# Patient Record
Sex: FEMALE | Race: WHITE | NOT HISPANIC OR LATINO | Employment: OTHER | URBAN - METROPOLITAN AREA
[De-identification: names, ages, dates, MRNs, and addresses within clinical notes are randomized per-mention and may not be internally consistent; named-entity substitution may affect disease eponyms.]

---

## 2023-05-30 PROBLEM — R71.8 ELEVATED MCV: Status: ACTIVE | Noted: 2023-05-30

## 2023-05-30 PROBLEM — R79.89 ABNORMAL THYROID BLOOD TEST: Status: ACTIVE | Noted: 2023-05-30

## 2023-05-30 PROBLEM — F41.9 ANXIETY AND DEPRESSION: Status: ACTIVE | Noted: 2023-05-30

## 2023-05-30 PROBLEM — L01.00 IMPETIGO: Status: ACTIVE | Noted: 2023-05-30

## 2023-05-30 PROBLEM — I63.411 CEREBRAL INFARCTION DUE TO EMBOLISM OF RIGHT MIDDLE CEREBRAL ARTERY (MULTI): Status: ACTIVE | Noted: 2023-05-30

## 2023-05-30 PROBLEM — D64.9 ANEMIA: Status: ACTIVE | Noted: 2023-05-30

## 2023-05-30 PROBLEM — R03.0 ELEVATED BLOOD PRESSURE READING WITHOUT DIAGNOSIS OF HYPERTENSION: Status: ACTIVE | Noted: 2023-05-30

## 2023-05-30 PROBLEM — R19.7 DIARRHEA: Status: ACTIVE | Noted: 2023-05-30

## 2023-05-30 PROBLEM — I61.9 STROKE, HEMORRHAGIC (MULTI): Status: ACTIVE | Noted: 2023-05-30

## 2023-05-30 PROBLEM — R93.0 ABNORMAL MRI OF HEAD: Status: ACTIVE | Noted: 2023-05-30

## 2023-05-30 PROBLEM — M35.9 CONNECTIVE TISSUE DISEASE (MULTI): Status: ACTIVE | Noted: 2023-05-30

## 2023-05-30 PROBLEM — F32.A ANXIETY AND DEPRESSION: Status: ACTIVE | Noted: 2023-05-30

## 2023-05-30 PROBLEM — I63.9 ACUTE CVA (CEREBROVASCULAR ACCIDENT) (MULTI): Status: ACTIVE | Noted: 2023-05-30

## 2023-05-30 PROBLEM — R56.9: Status: ACTIVE | Noted: 2023-05-30

## 2023-05-30 PROBLEM — N28.9 RENAL INSUFFICIENCY: Status: ACTIVE | Noted: 2023-05-30

## 2023-05-30 PROBLEM — I10 ACCELERATED HYPERTENSION: Status: ACTIVE | Noted: 2023-05-30

## 2023-05-30 PROBLEM — E78.5 HYPERLIPIDEMIA: Status: ACTIVE | Noted: 2023-05-30

## 2023-05-30 PROBLEM — I10 BENIGN ESSENTIAL HYPERTENSION: Status: ACTIVE | Noted: 2023-05-30

## 2023-05-30 PROBLEM — R55 VASOVAGAL SYNCOPE: Status: ACTIVE | Noted: 2023-05-30

## 2023-05-30 PROBLEM — L73.2 HYDRADENITIS: Status: ACTIVE | Noted: 2023-05-30
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
## 2023-05-30 PROBLEM — I61.9 CEREBRAL HEMORRHAGE (MULTI): Status: ACTIVE | Noted: 2023-05-30

## 2023-05-30 PROBLEM — F10.10 ALCOHOL ABUSE: Status: ACTIVE | Noted: 2023-05-30

## 2023-05-30 PROBLEM — G40.909 SEIZURE DISORDER (MULTI): Status: ACTIVE | Noted: 2023-05-30

## 2023-05-30 PROBLEM — R74.8 ELEVATED LIVER ENZYMES: Status: ACTIVE | Noted: 2023-05-30

## 2023-05-30 PROBLEM — T30.0 BURN BY HOT LIQUID: Status: ACTIVE | Noted: 2023-05-30

## 2023-05-30 PROBLEM — F10.929 ALCOHOL INTOXICATION (CMS-HCC): Status: ACTIVE | Noted: 2023-05-30

## 2023-05-30 RX ORDER — LEVETIRACETAM 500 MG/1
1 TABLET ORAL 2 TIMES DAILY
COMMUNITY
Start: 2020-03-21 | End: 2023-08-15 | Stop reason: SDUPTHER

## 2023-05-30 RX ORDER — ASPIRIN 81 MG/1
1 TABLET ORAL DAILY
COMMUNITY
Start: 2018-07-13

## 2023-05-30 RX ORDER — ATORVASTATIN CALCIUM 40 MG/1
1 TABLET, FILM COATED ORAL DAILY
COMMUNITY
Start: 2018-07-13 | End: 2023-05-31 | Stop reason: SDUPTHER

## 2023-05-30 RX ORDER — SERTRALINE HYDROCHLORIDE 50 MG/1
1 TABLET, FILM COATED ORAL DAILY
COMMUNITY
Start: 2018-11-05 | End: 2023-07-24 | Stop reason: SDUPTHER

## 2023-05-30 RX ORDER — CLOPIDOGREL BISULFATE 75 MG/1
1 TABLET ORAL DAILY
COMMUNITY
Start: 2022-03-08 | End: 2024-03-06 | Stop reason: SDUPTHER

## 2023-05-30 RX ORDER — LISINOPRIL 20 MG/1
1 TABLET ORAL DAILY
COMMUNITY
Start: 2019-01-08 | End: 2023-05-31 | Stop reason: SDUPTHER

## 2023-05-30 RX ORDER — HYDRALAZINE HYDROCHLORIDE 10 MG/1
1 TABLET, FILM COATED ORAL 3 TIMES DAILY
COMMUNITY
Start: 2019-01-08 | End: 2023-07-24 | Stop reason: SDUPTHER

## 2023-05-31 ENCOUNTER — OFFICE VISIT (OUTPATIENT)
Dept: PRIMARY CARE | Facility: CLINIC | Age: 63
End: 2023-05-31
Payer: MEDICAID

## 2023-05-31 VITALS
RESPIRATION RATE: 16 BRPM | OXYGEN SATURATION: 98 % | WEIGHT: 145.2 LBS | SYSTOLIC BLOOD PRESSURE: 122 MMHG | HEART RATE: 76 BPM | TEMPERATURE: 97.3 F | HEIGHT: 68 IN | BODY MASS INDEX: 22.01 KG/M2 | DIASTOLIC BLOOD PRESSURE: 70 MMHG

## 2023-05-31 DIAGNOSIS — Z00.00 ANNUAL PHYSICAL EXAM: Primary | ICD-10-CM

## 2023-05-31 DIAGNOSIS — F41.9 ANXIETY AND DEPRESSION: ICD-10-CM

## 2023-05-31 DIAGNOSIS — G40.909 SEIZURE DISORDER (MULTI): ICD-10-CM

## 2023-05-31 DIAGNOSIS — E78.2 MIXED HYPERLIPIDEMIA: ICD-10-CM

## 2023-05-31 DIAGNOSIS — F32.A ANXIETY AND DEPRESSION: ICD-10-CM

## 2023-05-31 DIAGNOSIS — I10 BENIGN ESSENTIAL HYPERTENSION: ICD-10-CM

## 2023-05-31 DIAGNOSIS — F10.10 ALCOHOL ABUSE: ICD-10-CM

## 2023-05-31 DIAGNOSIS — I61.9 STROKE, HEMORRHAGIC (MULTI): ICD-10-CM

## 2023-05-31 PROBLEM — I63.9 ACUTE CVA (CEREBROVASCULAR ACCIDENT) (MULTI): Status: RESOLVED | Noted: 2023-05-30 | Resolved: 2023-05-31

## 2023-05-31 PROBLEM — R03.0 ELEVATED BLOOD PRESSURE READING WITHOUT DIAGNOSIS OF HYPERTENSION: Status: RESOLVED | Noted: 2023-05-30 | Resolved: 2023-05-31

## 2023-05-31 PROBLEM — R79.89 ABNORMAL THYROID BLOOD TEST: Status: RESOLVED | Noted: 2023-05-30 | Resolved: 2023-05-31

## 2023-05-31 PROBLEM — R55 VASOVAGAL SYNCOPE: Status: RESOLVED | Noted: 2023-05-30 | Resolved: 2023-05-31

## 2023-05-31 PROBLEM — I63.411 CEREBRAL INFARCTION DUE TO EMBOLISM OF RIGHT MIDDLE CEREBRAL ARTERY (MULTI): Status: RESOLVED | Noted: 2023-05-30 | Resolved: 2023-05-31

## 2023-05-31 PROBLEM — T30.0 BURN BY HOT LIQUID: Status: RESOLVED | Noted: 2023-05-30 | Resolved: 2023-05-31
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
## 2023-05-31 PROBLEM — R56.9: Status: RESOLVED | Noted: 2023-05-30 | Resolved: 2023-05-31

## 2023-05-31 PROBLEM — R93.0 ABNORMAL MRI OF HEAD: Status: RESOLVED | Noted: 2023-05-30 | Resolved: 2023-05-31

## 2023-05-31 PROCEDURE — 99396 PREV VISIT EST AGE 40-64: CPT | Performed by: FAMILY MEDICINE

## 2023-05-31 PROCEDURE — 3078F DIAST BP <80 MM HG: CPT | Performed by: FAMILY MEDICINE

## 2023-05-31 PROCEDURE — 1036F TOBACCO NON-USER: CPT | Performed by: FAMILY MEDICINE

## 2023-05-31 PROCEDURE — 99213 OFFICE O/P EST LOW 20 MIN: CPT | Performed by: FAMILY MEDICINE

## 2023-05-31 PROCEDURE — 3074F SYST BP LT 130 MM HG: CPT | Performed by: FAMILY MEDICINE

## 2023-05-31 RX ORDER — FOLIC ACID 1 MG/1
1 TABLET ORAL DAILY
COMMUNITY
End: 2024-01-02 | Stop reason: SDUPTHER

## 2023-05-31 RX ORDER — ATORVASTATIN CALCIUM 40 MG/1
40 TABLET, FILM COATED ORAL DAILY
Qty: 30 TABLET | Refills: 0 | Status: SHIPPED | OUTPATIENT
Start: 2023-05-31 | End: 2024-03-06 | Stop reason: SDUPTHER

## 2023-05-31 RX ORDER — LISINOPRIL 20 MG/1
20 TABLET ORAL DAILY
Qty: 30 TABLET | Refills: 0 | Status: SHIPPED | OUTPATIENT
Start: 2023-05-31 | End: 2023-07-24 | Stop reason: SDUPTHER

## 2023-05-31 ASSESSMENT — PATIENT HEALTH QUESTIONNAIRE - PHQ9
SUM OF ALL RESPONSES TO PHQ QUESTIONS 1-9: 0
1. LITTLE INTEREST OR PLEASURE IN DOING THINGS: NOT AT ALL
3. TROUBLE FALLING OR STAYING ASLEEP OR SLEEPING TOO MUCH: NOT AT ALL
10. IF YOU CHECKED OFF ANY PROBLEMS, HOW DIFFICULT HAVE THESE PROBLEMS MADE IT FOR YOU TO DO YOUR WORK, TAKE CARE OF THINGS AT HOME, OR GET ALONG WITH OTHER PEOPLE: NOT DIFFICULT AT ALL
6. FEELING BAD ABOUT YOURSELF - OR THAT YOU ARE A FAILURE OR HAVE LET YOURSELF OR YOUR FAMILY DOWN: NOT AT ALL
SUM OF ALL RESPONSES TO PHQ9 QUESTIONS 1 AND 2: 0
7. TROUBLE CONCENTRATING ON THINGS, SUCH AS READING THE NEWSPAPER OR WATCHING TELEVISION: NOT AT ALL
2. FEELING DOWN, DEPRESSED OR HOPELESS: NOT AT ALL
5. POOR APPETITE OR OVEREATING: NOT AT ALL
4. FEELING TIRED OR HAVING LITTLE ENERGY: NOT AT ALL
8. MOVING OR SPEAKING SO SLOWLY THAT OTHER PEOPLE COULD HAVE NOTICED. OR THE OPPOSITE, BEING SO FIGETY OR RESTLESS THAT YOU HAVE BEEN MOVING AROUND A LOT MORE THAN USUAL: NOT AT ALL
9. THOUGHTS THAT YOU WOULD BE BETTER OFF DEAD, OR OF HURTING YOURSELF: NOT AT ALL

## 2023-05-31 ASSESSMENT — ENCOUNTER SYMPTOMS
OCCASIONAL FEELINGS OF UNSTEADINESS: 0
LOSS OF SENSATION IN FEET: 1
DEPRESSION: 0

## 2023-05-31 NOTE — ASSESSMENT & PLAN NOTE
Schedule fasting labs.  Declines mammogram, colonoscopy and bone density.  Declines immunizations.  Limit alcohol use to 1-2 per weekend.  Increase physical activity.    I am recommending a whole foods plant based diet with lean meats and low fat dairy.  Limit processed foods, fast food and sugar.  Engage in 150 minutes of moderate intensity aerobic activity weekly,  including strength training of most major muscle groups 2 times a week.

## 2023-05-31 NOTE — ASSESSMENT & PLAN NOTE
Stable  Checking basic labs.  Reviewed diet changes, limit processed sugar and salt in diet.  Recheck in 6 months

## 2023-05-31 NOTE — PROGRESS NOTES
"Subjective   Patient ID: Yue Sanz is a 62 y.o. female who presents for Annual Exam.    Here with mom for CPE    Taking meds daily and tolerating well  Has not run out of meds  No home Bp checks  Generalized aches and pain  Denies upper resp sx  Occ cough  No exercise  Diet is poor, easy prepare foods.  No cooking, eating out  No stove or oven  Outside all the time with the dog    Still pain from her CVA  No tobacco   Glass of wine daily 1-2     Pap years  Mammogram never  Colonoscopy never    C/o blood in stool for one episode  Had been having diarrhea   Blood with wiping  No more episodes         Review of Systems    Objective   /70   Pulse 76   Temp 36.3 °C (97.3 °F)   Resp 16   Ht 1.727 m (5' 8\")   Wt 65.9 kg (145 lb 3.2 oz)   SpO2 98%   BMI 22.08 kg/m²     Physical Exam  Vitals and nursing note reviewed.   Constitutional:       Appearance: Normal appearance.   HENT:      Head: Normocephalic.      Right Ear: Tympanic membrane normal.      Left Ear: Tympanic membrane normal.      Nose: Nose normal.      Mouth/Throat:      Mouth: Mucous membranes are dry.   Eyes:      Extraocular Movements: Extraocular movements intact.      Pupils: Pupils are equal, round, and reactive to light.   Cardiovascular:      Rate and Rhythm: Normal rate and regular rhythm.      Pulses: Normal pulses.   Pulmonary:      Effort: Pulmonary effort is normal.      Breath sounds: Normal breath sounds.   Abdominal:      General: Abdomen is flat. Bowel sounds are normal.      Palpations: Abdomen is soft.   Musculoskeletal:         General: Normal range of motion.      Cervical back: Normal range of motion.   Skin:     General: Skin is warm and dry.   Neurological:      General: No focal deficit present.      Mental Status: She is alert and oriented to person, place, and time.   Psychiatric:         Mood and Affect: Mood normal.         Behavior: Behavior normal.         Thought Content: Thought content normal.         Judgment: " Judgment normal.       Assessment/Plan   Problem List Items Addressed This Visit          Nervous    Seizure disorder (CMS/HCC)     Stable  Refilling meds when needed.  Recheck in 6 months         Stroke, hemorrhagic (CMS/HCC)     Stable  Continue to monitor            Circulatory    Benign essential hypertension     Stable  Checking basic labs.  Reviewed diet changes, limit processed sugar and salt in diet.  Recheck in 6 months         Relevant Medications    lisinopril 20 mg tablet    Other Relevant Orders    CBC and Auto Differential    Albumin , Urine Random       Other    Alcohol abuse     Uncontrolled  Discussed limiting alcohol use to weekends.  Recheck in 6 months         Anxiety and depression     Stable  Refilling medications at same dose when needed  Recheck in 6 months           Relevant Orders    TSH with reflex to Free T4 if abnormal    Hyperlipidemia     Check fasting lipid profile and adjust meds accordingly  Reviewed diet  Increase physical activity         Relevant Medications    atorvastatin (Lipitor) 40 mg tablet    Other Relevant Orders    Comprehensive Metabolic Panel    Lipid Panel    Annual physical exam - Primary     Schedule fasting labs.  Declines mammogram, colonoscopy and bone density.  Declines immunizations.  Limit alcohol use to 1-2 per weekend.  Increase physical activity.    I am recommending a whole foods plant based diet with lean meats and low fat dairy.  Limit processed foods, fast food and sugar.  Engage in 150 minutes of moderate intensity aerobic activity weekly,  including strength training of most major muscle groups 2 times a week.

## 2023-05-31 NOTE — ASSESSMENT & PLAN NOTE
Check fasting lipid profile and adjust meds accordingly  Reviewed diet  Increase physical activity

## 2023-06-02 ENCOUNTER — LAB (OUTPATIENT)
Dept: LAB | Facility: LAB | Age: 63
End: 2023-06-02
Payer: MEDICAID

## 2023-06-02 DIAGNOSIS — F32.A ANXIETY AND DEPRESSION: ICD-10-CM

## 2023-06-02 DIAGNOSIS — F41.9 ANXIETY AND DEPRESSION: ICD-10-CM

## 2023-06-02 DIAGNOSIS — E78.2 MIXED HYPERLIPIDEMIA: ICD-10-CM

## 2023-06-02 DIAGNOSIS — I10 BENIGN ESSENTIAL HYPERTENSION: ICD-10-CM

## 2023-06-02 LAB
ALANINE AMINOTRANSFERASE (SGPT) (U/L) IN SER/PLAS: 14 U/L (ref 7–45)
ALBUMIN (G/DL) IN SER/PLAS: 4.9 G/DL (ref 3.4–5)
ALBUMIN (MG/L) IN URINE: 42 MG/L
ALBUMIN/CREATININE (UG/MG) IN URINE: 16 UG/MG CRT (ref 0–30)
ALKALINE PHOSPHATASE (U/L) IN SER/PLAS: 66 U/L (ref 33–136)
ANION GAP IN SER/PLAS: 17 MMOL/L (ref 10–20)
ASPARTATE AMINOTRANSFERASE (SGOT) (U/L) IN SER/PLAS: 19 U/L (ref 9–39)
BASOPHILS (10*3/UL) IN BLOOD BY AUTOMATED COUNT: 0.05 X10E9/L (ref 0–0.1)
BASOPHILS/100 LEUKOCYTES IN BLOOD BY AUTOMATED COUNT: 0.8 % (ref 0–2)
BILIRUBIN TOTAL (MG/DL) IN SER/PLAS: 1.3 MG/DL (ref 0–1.2)
CALCIUM (MG/DL) IN SER/PLAS: 10.5 MG/DL (ref 8.6–10.6)
CARBON DIOXIDE, TOTAL (MMOL/L) IN SER/PLAS: 20 MMOL/L (ref 21–32)
CHLORIDE (MMOL/L) IN SER/PLAS: 105 MMOL/L (ref 98–107)
CHOLESTEROL (MG/DL) IN SER/PLAS: 220 MG/DL (ref 0–199)
CHOLESTEROL IN HDL (MG/DL) IN SER/PLAS: 89.6 MG/DL
CHOLESTEROL/HDL RATIO: 2.5
CREATININE (MG/DL) IN SER/PLAS: 1.47 MG/DL (ref 0.5–1.05)
CREATININE (MG/DL) IN URINE: 263 MG/DL (ref 20–320)
EOSINOPHILS (10*3/UL) IN BLOOD BY AUTOMATED COUNT: 0.15 X10E9/L (ref 0–0.7)
EOSINOPHILS/100 LEUKOCYTES IN BLOOD BY AUTOMATED COUNT: 2.4 % (ref 0–6)
ERYTHROCYTE DISTRIBUTION WIDTH (RATIO) BY AUTOMATED COUNT: 12.5 % (ref 11.5–14.5)
ERYTHROCYTE MEAN CORPUSCULAR HEMOGLOBIN CONCENTRATION (G/DL) BY AUTOMATED: 32.6 G/DL (ref 32–36)
ERYTHROCYTE MEAN CORPUSCULAR VOLUME (FL) BY AUTOMATED COUNT: 101 FL (ref 80–100)
ERYTHROCYTES (10*6/UL) IN BLOOD BY AUTOMATED COUNT: 3.35 X10E12/L (ref 4–5.2)
GFR FEMALE: 40 ML/MIN/1.73M2
GLUCOSE (MG/DL) IN SER/PLAS: 89 MG/DL (ref 74–99)
HEMATOCRIT (%) IN BLOOD BY AUTOMATED COUNT: 34 % (ref 36–46)
HEMOGLOBIN (G/DL) IN BLOOD: 11.1 G/DL (ref 12–16)
IMMATURE GRANULOCYTES/100 LEUKOCYTES IN BLOOD BY AUTOMATED COUNT: 0.3 % (ref 0–0.9)
LDL: 115 MG/DL (ref 0–99)
LEUKOCYTES (10*3/UL) IN BLOOD BY AUTOMATED COUNT: 6.3 X10E9/L (ref 4.4–11.3)
LYMPHOCYTES (10*3/UL) IN BLOOD BY AUTOMATED COUNT: 1.58 X10E9/L (ref 1.2–4.8)
LYMPHOCYTES/100 LEUKOCYTES IN BLOOD BY AUTOMATED COUNT: 24.9 % (ref 13–44)
MONOCYTES (10*3/UL) IN BLOOD BY AUTOMATED COUNT: 0.47 X10E9/L (ref 0.1–1)
MONOCYTES/100 LEUKOCYTES IN BLOOD BY AUTOMATED COUNT: 7.4 % (ref 2–10)
NEUTROPHILS (10*3/UL) IN BLOOD BY AUTOMATED COUNT: 4.07 X10E9/L (ref 1.2–7.7)
NEUTROPHILS/100 LEUKOCYTES IN BLOOD BY AUTOMATED COUNT: 64.2 % (ref 40–80)
NRBC (PER 100 WBCS) BY AUTOMATED COUNT: 0 /100 WBC (ref 0–0)
PLATELETS (10*3/UL) IN BLOOD AUTOMATED COUNT: 252 X10E9/L (ref 150–450)
POTASSIUM (MMOL/L) IN SER/PLAS: 5.4 MMOL/L (ref 3.5–5.3)
PROTEIN TOTAL: 8.1 G/DL (ref 6.4–8.2)
SODIUM (MMOL/L) IN SER/PLAS: 137 MMOL/L (ref 136–145)
THYROTROPIN (MIU/L) IN SER/PLAS BY DETECTION LIMIT <= 0.05 MIU/L: 2.03 MIU/L (ref 0.44–3.98)
TRIGLYCERIDE (MG/DL) IN SER/PLAS: 78 MG/DL (ref 0–149)
UREA NITROGEN (MG/DL) IN SER/PLAS: 48 MG/DL (ref 6–23)
VLDL: 16 MG/DL (ref 0–40)

## 2023-06-02 PROCEDURE — 82570 ASSAY OF URINE CREATININE: CPT

## 2023-06-02 PROCEDURE — 80061 LIPID PANEL: CPT

## 2023-06-02 PROCEDURE — 85025 COMPLETE CBC W/AUTO DIFF WBC: CPT

## 2023-06-02 PROCEDURE — 82043 UR ALBUMIN QUANTITATIVE: CPT

## 2023-06-02 PROCEDURE — 36415 COLL VENOUS BLD VENIPUNCTURE: CPT

## 2023-06-02 PROCEDURE — 84443 ASSAY THYROID STIM HORMONE: CPT

## 2023-06-02 PROCEDURE — 80053 COMPREHEN METABOLIC PANEL: CPT

## 2023-06-05 DIAGNOSIS — E78.2 MIXED HYPERLIPIDEMIA: ICD-10-CM

## 2023-06-05 DIAGNOSIS — R74.8 ELEVATED LIVER ENZYMES: ICD-10-CM

## 2023-06-05 DIAGNOSIS — D50.8 IRON DEFICIENCY ANEMIA SECONDARY TO INADEQUATE DIETARY IRON INTAKE: Primary | ICD-10-CM

## 2023-06-27 ENCOUNTER — APPOINTMENT (OUTPATIENT)
Dept: PRIMARY CARE | Facility: CLINIC | Age: 63
End: 2023-06-27
Payer: COMMERCIAL

## 2023-07-24 DIAGNOSIS — F32.A ANXIETY AND DEPRESSION: Primary | ICD-10-CM

## 2023-07-24 DIAGNOSIS — F41.9 ANXIETY AND DEPRESSION: Primary | ICD-10-CM

## 2023-07-24 DIAGNOSIS — I10 BENIGN ESSENTIAL HYPERTENSION: ICD-10-CM

## 2023-07-24 RX ORDER — SERTRALINE HYDROCHLORIDE 50 MG/1
50 TABLET, FILM COATED ORAL DAILY
Qty: 90 TABLET | Refills: 0 | Status: SHIPPED | OUTPATIENT
Start: 2023-07-24 | End: 2024-03-06 | Stop reason: SDUPTHER

## 2023-07-24 RX ORDER — HYDRALAZINE HYDROCHLORIDE 10 MG/1
10 TABLET, FILM COATED ORAL 3 TIMES DAILY
Qty: 270 TABLET | Refills: 0 | Status: SHIPPED | OUTPATIENT
Start: 2023-07-24 | End: 2024-02-21 | Stop reason: SDUPTHER

## 2023-07-24 RX ORDER — LISINOPRIL 20 MG/1
20 TABLET ORAL DAILY
Qty: 30 TABLET | Refills: 0 | Status: SHIPPED | OUTPATIENT
Start: 2023-07-24 | End: 2023-09-08 | Stop reason: SDUPTHER

## 2023-07-24 NOTE — TELEPHONE ENCOUNTER
Last OV 05/31 med refill Hydralazine, lisinopril and sertraline, send to the [facility] in [place]

## 2023-08-15 DIAGNOSIS — G40.909 SEIZURE DISORDER (MULTI): Primary | ICD-10-CM

## 2023-08-15 RX ORDER — LEVETIRACETAM 500 MG/1
500 TABLET ORAL 2 TIMES DAILY
Qty: 180 TABLET | Refills: 0 | Status: SHIPPED | OUTPATIENT
Start: 2023-08-15 | End: 2024-02-23 | Stop reason: SDUPTHER

## 2023-09-06 ENCOUNTER — PATIENT OUTREACH (OUTPATIENT)
Dept: CARE COORDINATION | Facility: CLINIC | Age: 63
End: 2023-09-06
Payer: COMMERCIAL

## 2023-09-06 NOTE — PROGRESS NOTES
Discharge Facility:[facility]  Discharge Diagnosis: CVA left sided weakness  Admission Date:09/04/23  Discharge Date: 09/05/23    PCP Appointment Date:  Specialist Appointment Date:   Hospital Encounter and Summary: Linked   See discharge assessment below for further details  Engagement  Call Start Time: 0901 (9/6/2023  9:01 AM)    Medications  Medications reviewed with patient/caregiver?: Yes (no new meds) (9/6/2023  9:01 AM)  Is the patient having any side effects they believe may be caused by any medication additions or changes?: No (9/6/2023  9:01 AM)  Does the patient have all medications ordered at discharge?: Not applicable (9/6/2023  9:01 AM)  Is the patient taking all medications as directed (includes completed medication regime)?: Yes (9/6/2023  9:01 AM)    Appointments  Does the patient have a primary care provider?: Yes (9/6/2023  9:01 AM)  Care Management Interventions: Advised patient to make appointment (9/6/2023  9:01 AM)    Self Management  Has home health visited the patient within 72 hours of discharge?: No (9/6/2023  9:01 AM)  Has all Durable Medical Equipment (DME) been delivered?: No (9/6/2023  9:01 AM)    Patient Teaching  Does the patient have access to their discharge instructions?: Yes (9/6/2023  9:01 AM)  Care Management Interventions: Reviewed instructions with patient (9/6/2023  9:01 AM)  What is the patient's perception of their health status since discharge?: Same (she stated she is feeling a little better) (9/6/2023  9:01 AM)    Wrap Up  Call End Time: 0910 (9/6/2023  9:01 AM)

## 2023-09-08 ENCOUNTER — OFFICE VISIT (OUTPATIENT)
Dept: PRIMARY CARE | Facility: CLINIC | Age: 63
End: 2023-09-08
Payer: COMMERCIAL

## 2023-09-08 VITALS
SYSTOLIC BLOOD PRESSURE: 110 MMHG | WEIGHT: 144.4 LBS | TEMPERATURE: 98.2 F | BODY MASS INDEX: 21.96 KG/M2 | HEART RATE: 82 BPM | OXYGEN SATURATION: 98 % | RESPIRATION RATE: 16 BRPM | DIASTOLIC BLOOD PRESSURE: 76 MMHG

## 2023-09-08 DIAGNOSIS — R29.898 WEAKNESS OF LEFT LOWER EXTREMITY: ICD-10-CM

## 2023-09-08 DIAGNOSIS — Z09 HOSPITAL DISCHARGE FOLLOW-UP: Primary | ICD-10-CM

## 2023-09-08 DIAGNOSIS — I10 BENIGN ESSENTIAL HYPERTENSION: ICD-10-CM

## 2023-09-08 DIAGNOSIS — F10.10 ALCOHOL ABUSE: ICD-10-CM

## 2023-09-08 DIAGNOSIS — I61.9 STROKE, HEMORRHAGIC (MULTI): ICD-10-CM

## 2023-09-08 DIAGNOSIS — R29.6 FREQUENT FALLS: ICD-10-CM

## 2023-09-08 PROBLEM — M35.9 CONNECTIVE TISSUE DISEASE (MULTI): Status: RESOLVED | Noted: 2023-05-30 | Resolved: 2023-09-08

## 2023-09-08 PROCEDURE — 99495 TRANSJ CARE MGMT MOD F2F 14D: CPT | Performed by: FAMILY MEDICINE

## 2023-09-08 PROCEDURE — 3078F DIAST BP <80 MM HG: CPT | Performed by: FAMILY MEDICINE

## 2023-09-08 PROCEDURE — 3074F SYST BP LT 130 MM HG: CPT | Performed by: FAMILY MEDICINE

## 2023-09-08 PROCEDURE — 1036F TOBACCO NON-USER: CPT | Performed by: FAMILY MEDICINE

## 2023-09-08 RX ORDER — LISINOPRIL 20 MG/1
20 TABLET ORAL DAILY
Qty: 30 TABLET | Refills: 11 | Status: SHIPPED | OUTPATIENT
Start: 2023-09-08 | End: 2024-03-06 | Stop reason: SDUPTHER

## 2023-09-08 NOTE — ASSESSMENT & PLAN NOTE
Reviewed available notes   Reviewed available labs  Recommending PT   Recommending home care for eval  Stop all etoh  Continue present meds  Recheck in 1 month

## 2023-09-08 NOTE — PROGRESS NOTES
"Patient: Yue Sanz  : 1960  PCP: Lluvia Alfred Pla DO  MRN: [id number]  Program: No linked episodes     Yue Sanz is a 62 y.o. female presenting today for follow-up after being discharged from the hospital 3 days ago. The main problem requiring admission was CVA, left side. LE weakness and frequent falls.  The discharge summary and/or Transitional Care Management documentation was reviewed. Medication reconciliation was performed as indicated via the \"Jean as Reviewed\" timestamp.     Yue Sanz was contacted by Transitional Care Management services two days after her discharge. This encounter and supporting documentation was reviewed.    The complexity of medical decision making for this patient's transitional care is moderate.      Here with mom for hospital discharge   Was admitted to [facility] on 23 with left side weakness  Left leg weak and fell, could not get up so mom called the squad.   She has had several falls since her last CVA  Weakness in her legs.   In ER BP elevated but resolved  CT head negative   Cardiac work-up neg  Strength in ER normal and equal.   Did see neurology in hospital and no changes,   Pt doing well at home alone per mom  Per mom does not need help or PT    Labs completed in  showing elevated cholesterol and declined increase in meds.   Not completing physical therapy  Will be moving to her duplex with no steps.  Completed PT in the past after her CVA and admits did not do anything.  She is not completing her exercises.  Taking all meds daily  Still drinking daily wine and occasional beer.   Sleeping well   Caring for a cat and dog.   Declines further meds       Physical Exam  Vitals and nursing note reviewed.   Constitutional:       Appearance: Normal appearance.   Cardiovascular:      Rate and Rhythm: Normal rate and regular rhythm.   Pulmonary:      Effort: Pulmonary effort is normal.      Breath sounds: Normal breath sounds.   Musculoskeletal:      Cervical back: " Normal range of motion.   Neurological:      Mental Status: She is alert.   Psychiatric:         Mood and Affect: Mood normal.         Behavior: Behavior normal.         Thought Content: Thought content normal.         Judgment: Judgment normal.         Assessment/Plan   Problem List Items Addressed This Visit       Alcohol abuse     Stop all etoh         Benign essential hypertension    Relevant Medications    lisinopril 20 mg tablet    Stroke, hemorrhagic (CMS/HCC)     Continue daily plavix and aspirin  Continue to monitor.          Hospital discharge follow-up - Primary     Reviewed available notes   Reviewed available labs  Recommending PT   Recommending home care for eval  Stop all etoh  Continue present meds  Recheck in 1 month         Frequent falls     Falls risks reviewed  Recommending PT and social work review         Relevant Orders    Referral to Social Work    Weakness of left lower extremity     Recommending PT eval  Declined at this time             Review of Systems    Family History   Problem Relation Name Age of Onset    Arthritis Mother      No Known Problems Father         Engagement  Call Start Time: 0901 (9/6/2023  9:01 AM)    Medications  Medications reviewed with patient/caregiver?: Yes (no new meds) (9/6/2023  9:01 AM)  Is the patient having any side effects they believe may be caused by any medication additions or changes?: No (9/6/2023  9:01 AM)  Does the patient have all medications ordered at discharge?: Not applicable (9/6/2023  9:01 AM)  Is the patient taking all medications as directed (includes completed medication regime)?: Yes (9/6/2023  9:01 AM)    Appointments  Does the patient have a primary care provider?: Yes (9/6/2023  9:01 AM)  Care Management Interventions: Advised patient to make appointment (9/6/2023  9:01 AM)    Self Management  Has home health visited the patient within 72 hours of discharge?: No (9/6/2023  9:01 AM)  Has all Durable Medical Equipment (DME) been  delivered?: No (9/6/2023  9:01 AM)    Patient Teaching  Does the patient have access to their discharge instructions?: Yes (9/6/2023  9:01 AM)  Care Management Interventions: Reviewed instructions with patient (9/6/2023  9:01 AM)  What is the patient's perception of their health status since discharge?: Same (she stated she is feeling a little better) (9/6/2023  9:01 AM)    Wrap Up  Call End Time: 0910 (9/6/2023  9:01 AM)        No follow-ups on file.Patient was identified as a fall risk. Risk prevention instructions provided.Patient was identified as a fall risk. Risk prevention instructions provided.

## 2023-09-08 NOTE — PATIENT INSTRUCTIONS

## 2023-09-08 NOTE — PROGRESS NOTES
Subjective   Patient ID: Yue Sanz is a 62 y.o. female who presents for Hospital Follow-up.    Here with mom for hospital discharge follow up    Hospitalized again 9/4/23--9-5-23 at [facility] for left sided weakness         Review of Systems    Objective   There were no vitals taken for this visit.    Physical Exam    Assessment/Plan

## 2023-09-18 ENCOUNTER — PATIENT OUTREACH (OUTPATIENT)
Dept: CARE COORDINATION | Facility: CLINIC | Age: 63
End: 2023-09-18
Payer: COMMERCIAL

## 2023-09-18 NOTE — PROGRESS NOTES
Call regarding appt. with PCP on 09/28/23after hospitalization.  At time of outreach call the patient feels as if their condition has (improved  since last visit.  Reviewed the PCP appointment with the pt and addressed any questions or concerns.

## 2023-10-04 ENCOUNTER — PATIENT OUTREACH (OUTPATIENT)
Dept: CARE COORDINATION | Facility: CLINIC | Age: 63
End: 2023-10-04
Payer: COMMERCIAL

## 2023-12-04 ENCOUNTER — PATIENT OUTREACH (OUTPATIENT)
Dept: CARE COORDINATION | Facility: CLINIC | Age: 63
End: 2023-12-04
Payer: COMMERCIAL

## 2024-01-01 ENCOUNTER — APPOINTMENT (OUTPATIENT)
Dept: RADIOLOGY | Facility: HOSPITAL | Age: 64
End: 2024-01-01
Payer: OTHER MISCELLANEOUS

## 2024-01-01 ENCOUNTER — APPOINTMENT (OUTPATIENT)
Dept: NEUROLOGY | Facility: HOSPITAL | Age: 64
End: 2024-01-01
Payer: OTHER MISCELLANEOUS

## 2024-01-01 ENCOUNTER — HOSPITAL ENCOUNTER (INPATIENT)
Facility: HOSPITAL | Age: 64
LOS: 3 days | End: 2024-08-11
Attending: INTERNAL MEDICINE | Admitting: HOSPITALIST
Payer: OTHER MISCELLANEOUS

## 2024-01-01 ENCOUNTER — APPOINTMENT (OUTPATIENT)
Dept: CARDIOLOGY | Facility: HOSPITAL | Age: 64
End: 2024-01-01
Payer: OTHER MISCELLANEOUS

## 2024-01-01 VITALS
RESPIRATION RATE: 28 BRPM | HEART RATE: 129 BPM | DIASTOLIC BLOOD PRESSURE: 75 MMHG | TEMPERATURE: 97.8 F | OXYGEN SATURATION: 75 % | SYSTOLIC BLOOD PRESSURE: 107 MMHG

## 2024-01-01 DIAGNOSIS — S06.5XAA SUBDURAL HEMATOMA (MULTI): ICD-10-CM

## 2024-01-01 PROCEDURE — 99238 HOSP IP/OBS DSCHRG MGMT 30/<: CPT | Performed by: HOSPITALIST

## 2024-01-01 PROCEDURE — 2500000004 HC RX 250 GENERAL PHARMACY W/ HCPCS (ALT 636 FOR OP/ED): Performed by: HOSPITALIST

## 2024-01-01 PROCEDURE — 93005 ELECTROCARDIOGRAM TRACING: CPT

## 2024-01-01 PROCEDURE — 95819 EEG AWAKE AND ASLEEP: CPT | Performed by: PSYCHIATRY & NEUROLOGY

## 2024-01-01 PROCEDURE — 70450 CT HEAD/BRAIN W/O DYE: CPT

## 2024-01-01 PROCEDURE — 1150000001 HC HOSPICE PRIVATE ROOM DAILY

## 2024-01-01 PROCEDURE — 99232 SBSQ HOSP IP/OBS MODERATE 35: CPT | Performed by: HOSPITALIST

## 2024-01-01 PROCEDURE — 70498 CT ANGIOGRAPHY NECK: CPT

## 2024-01-01 PROCEDURE — 2500000001 HC RX 250 WO HCPCS SELF ADMINISTERED DRUGS (ALT 637 FOR MEDICARE OP): Performed by: HOSPITALIST

## 2024-01-01 PROCEDURE — 95819 EEG AWAKE AND ASLEEP: CPT

## 2024-01-01 PROCEDURE — 72125 CT NECK SPINE W/O DYE: CPT

## 2024-01-01 PROCEDURE — 99232 SBSQ HOSP IP/OBS MODERATE 35: CPT | Performed by: INTERNAL MEDICINE

## 2024-01-01 RX ORDER — HYDROMORPHONE HYDROCHLORIDE 2 MG/1
3 TABLET ORAL EVERY 2 HOUR PRN
Status: DISCONTINUED | OUTPATIENT
Start: 2024-01-01 | End: 2024-01-01

## 2024-01-01 RX ORDER — GLYCOPYRROLATE 1 MG/1
1 TABLET ORAL EVERY 4 HOURS PRN
Status: DISCONTINUED | OUTPATIENT
Start: 2024-01-01 | End: 2024-01-01

## 2024-01-01 RX ORDER — HYDROMORPHONE HYDROCHLORIDE 1 MG/ML
0.6 INJECTION, SOLUTION INTRAMUSCULAR; INTRAVENOUS; SUBCUTANEOUS ONCE
Status: COMPLETED | OUTPATIENT
Start: 2024-01-01 | End: 2024-01-01

## 2024-01-01 RX ORDER — HYDROMORPHONE HYDROCHLORIDE 2 MG/1
3 TABLET ORAL EVERY 4 HOURS
Status: DISCONTINUED | OUTPATIENT
Start: 2024-01-01 | End: 2024-01-01

## 2024-01-01 RX ORDER — HYDROMORPHONE HYDROCHLORIDE 1 MG/ML
1 INJECTION, SOLUTION INTRAMUSCULAR; INTRAVENOUS; SUBCUTANEOUS EVERY 4 HOURS
Status: ACTIVE | OUTPATIENT
Start: 2024-01-01

## 2024-01-01 RX ORDER — LORAZEPAM 2 MG/ML
0.5 INJECTION INTRAMUSCULAR EVERY 4 HOURS PRN
Status: DISCONTINUED | OUTPATIENT
Start: 2024-01-01 | End: 2024-01-01

## 2024-01-01 RX ORDER — LORAZEPAM 2 MG/ML
0.5 INJECTION INTRAMUSCULAR EVERY 4 HOURS PRN
Status: DISPENSED | OUTPATIENT
Start: 2024-01-01

## 2024-01-01 RX ORDER — HYDROMORPHONE HYDROCHLORIDE 1 MG/ML
0.6 INJECTION, SOLUTION INTRAMUSCULAR; INTRAVENOUS; SUBCUTANEOUS EVERY 4 HOURS
Status: DISCONTINUED | OUTPATIENT
Start: 2024-01-01 | End: 2024-01-01

## 2024-01-01 RX ORDER — HYDROMORPHONE HYDROCHLORIDE 1 MG/ML
1 INJECTION, SOLUTION INTRAMUSCULAR; INTRAVENOUS; SUBCUTANEOUS EVERY 2 HOUR PRN
Status: DISCONTINUED | OUTPATIENT
Start: 2024-01-01 | End: 2024-01-01

## 2024-01-01 RX ORDER — HYDROMORPHONE HYDROCHLORIDE 1 MG/ML
1 INJECTION, SOLUTION INTRAMUSCULAR; INTRAVENOUS; SUBCUTANEOUS
Status: ACTIVE | OUTPATIENT
Start: 2024-01-01

## 2024-01-01 RX ORDER — HYDROMORPHONE HYDROCHLORIDE 1 MG/ML
0.6 INJECTION, SOLUTION INTRAMUSCULAR; INTRAVENOUS; SUBCUTANEOUS EVERY 2 HOUR PRN
Status: DISCONTINUED | OUTPATIENT
Start: 2024-01-01 | End: 2024-01-01

## 2024-01-01 RX ORDER — LORAZEPAM 1 MG/1
1 TABLET ORAL EVERY 4 HOURS PRN
Status: DISCONTINUED | OUTPATIENT
Start: 2024-01-01 | End: 2024-01-01

## 2024-01-01 RX ORDER — HALOPERIDOL 5 MG/ML
1 INJECTION INTRAMUSCULAR EVERY 4 HOURS PRN
Status: ACTIVE | OUTPATIENT
Start: 2024-01-01

## 2024-01-01 ASSESSMENT — COGNITIVE AND FUNCTIONAL STATUS - GENERAL
TOILETING: TOTAL
MOVING FROM LYING ON BACK TO SITTING ON SIDE OF FLAT BED WITH BEDRAILS: TOTAL
WALKING IN HOSPITAL ROOM: TOTAL
HELP NEEDED FOR BATHING: TOTAL
EATING MEALS: TOTAL
TOILETING: TOTAL
MOBILITY SCORE: 6
TOILETING: TOTAL
STANDING UP FROM CHAIR USING ARMS: TOTAL
PERSONAL GROOMING: TOTAL
MOBILITY SCORE: 6
HELP NEEDED FOR BATHING: TOTAL
STANDING UP FROM CHAIR USING ARMS: TOTAL
DAILY ACTIVITIY SCORE: 6
MOVING FROM LYING ON BACK TO SITTING ON SIDE OF FLAT BED WITH BEDRAILS: TOTAL
MOVING TO AND FROM BED TO CHAIR: TOTAL
TURNING FROM BACK TO SIDE WHILE IN FLAT BAD: TOTAL
DRESSING REGULAR UPPER BODY CLOTHING: TOTAL
PERSONAL GROOMING: TOTAL
MOVING FROM LYING ON BACK TO SITTING ON SIDE OF FLAT BED WITH BEDRAILS: TOTAL
EATING MEALS: TOTAL
CLIMB 3 TO 5 STEPS WITH RAILING: TOTAL
DAILY ACTIVITIY SCORE: 6
TURNING FROM BACK TO SIDE WHILE IN FLAT BAD: TOTAL
DAILY ACTIVITIY SCORE: 6
HELP NEEDED FOR BATHING: TOTAL
DRESSING REGULAR LOWER BODY CLOTHING: TOTAL
STANDING UP FROM CHAIR USING ARMS: TOTAL
WALKING IN HOSPITAL ROOM: TOTAL
MOVING TO AND FROM BED TO CHAIR: TOTAL
STANDING UP FROM CHAIR USING ARMS: TOTAL
MOVING TO AND FROM BED TO CHAIR: TOTAL
DAILY ACTIVITIY SCORE: 6
MOBILITY SCORE: 6
TURNING FROM BACK TO SIDE WHILE IN FLAT BAD: TOTAL
CLIMB 3 TO 5 STEPS WITH RAILING: TOTAL
CLIMB 3 TO 5 STEPS WITH RAILING: TOTAL
PERSONAL GROOMING: TOTAL
DRESSING REGULAR UPPER BODY CLOTHING: TOTAL
DAILY ACTIVITIY SCORE: 6
MOVING FROM LYING ON BACK TO SITTING ON SIDE OF FLAT BED WITH BEDRAILS: TOTAL
HELP NEEDED FOR BATHING: TOTAL
PERSONAL GROOMING: TOTAL
MOBILITY SCORE: 6
EATING MEALS: TOTAL
CLIMB 3 TO 5 STEPS WITH RAILING: TOTAL
STANDING UP FROM CHAIR USING ARMS: TOTAL
MOBILITY SCORE: 6
TOILETING: TOTAL
WALKING IN HOSPITAL ROOM: TOTAL
MOVING TO AND FROM BED TO CHAIR: TOTAL
DRESSING REGULAR LOWER BODY CLOTHING: TOTAL
DRESSING REGULAR UPPER BODY CLOTHING: TOTAL
WALKING IN HOSPITAL ROOM: TOTAL
CLIMB 3 TO 5 STEPS WITH RAILING: TOTAL
MOVING FROM LYING ON BACK TO SITTING ON SIDE OF FLAT BED WITH BEDRAILS: TOTAL
PERSONAL GROOMING: TOTAL
TOILETING: TOTAL
DRESSING REGULAR UPPER BODY CLOTHING: TOTAL
TURNING FROM BACK TO SIDE WHILE IN FLAT BAD: TOTAL
WALKING IN HOSPITAL ROOM: TOTAL
MOVING TO AND FROM BED TO CHAIR: TOTAL
HELP NEEDED FOR BATHING: TOTAL
DRESSING REGULAR LOWER BODY CLOTHING: TOTAL
TURNING FROM BACK TO SIDE WHILE IN FLAT BAD: TOTAL
DRESSING REGULAR LOWER BODY CLOTHING: TOTAL
DRESSING REGULAR UPPER BODY CLOTHING: TOTAL
DRESSING REGULAR LOWER BODY CLOTHING: TOTAL

## 2024-01-01 ASSESSMENT — PAIN SCALES - PAIN ASSESSMENT IN ADVANCED DEMENTIA (PAINAD)
TOTALSCORE: 4
CONSOLABILITY: NO NEED TO CONSOLE
BREATHING: OCCASIONAL LABORED BREATHING, SHORT PERIOD OF HYPERVENTILATION
BREATHING: OCCASIONAL LABORED BREATHING, SHORT PERIOD OF HYPERVENTILATION
FACIALEXPRESSION: SAD, FRIGHTENED, FROWN
FACIALEXPRESSION: SMILING OR INEXPRESSIVE
FACIALEXPRESSION: SMILING OR INEXPRESSIVE
TOTALSCORE: 1
BODYLANGUAGE: RELAXED
BODYLANGUAGE: RELAXED
BREATHING: NOISY LABORED BREATHING, LONG PERIODS OF HYPERVENTILATION, CHEYNE-STOKES RESPIRATIONS
CONSOLABILITY: UNABLE TO CONSOLE, DISTRACT OR REASSURE
BODYLANGUAGE: TENSE, DISTRESSED PACING, FIDGETING
TOTALSCORE: MEDICATION (SEE MAR)
TOTALSCORE: 4
CONSOLABILITY: NO NEED TO CONSOLE
BREATHING: OCCASIONAL LABORED BREATHING, SHORT PERIOD OF HYPERVENTILATION
FACIALEXPRESSION: SMILING OR INEXPRESSIVE
BODYLANGUAGE: RELAXED
NEGVOCALIZATION: OCCASIONAL MOAN/GROAN, LOW SPEECH, NEGATIVE/DISAPPROVING QUALITY
TOTALSCORE: 1
CONSOLABILITY: NO NEED TO CONSOLE
TOTALSCORE: MEDICATION (SEE MAR)

## 2024-01-01 ASSESSMENT — RESPIRATORY DISTRESS OBSERVATION SCALE (RDOS)
LOOK OF FEAR: 0 - NONE
LOOK OF FEAR: 0 - NONE
HEART RATE PER MINUTE: 1 - 90-109 BEATS
ACCESSORY MUSCLE RISE IN CLAVICLE DURING INSPIRATION: 0 - NONE
INVOLUNTARY NASAL FLARING: 0 - NONE
RDOS TOTAL SCORE: 2
RESPIRATORY RATE PER MINUTE: 1 - 19-30 BREATHS
PARADOXICAL BREATHING PATTERN: 0 - NONE
PARADOXICAL BREATHING PATTERN: 0 - NONE
RESTLESS NONPURPOSEFUL MOVEMENTS: 0 - NONE
GRUNTING AT END OF EXPIRATION: 0 - NONE
GRUNTING AT END OF EXPIRATION: 0 - NONE
INVOLUNTARY NASAL FLARING: 0 - NONE
ACCESSORY MUSCLE RISE IN CLAVICLE DURING INSPIRATION: 0 - NONE

## 2024-01-01 ASSESSMENT — PAIN DESCRIPTION - LOCATION: LOCATION: OTHER (COMMENT)

## 2024-01-01 ASSESSMENT — PAIN - FUNCTIONAL ASSESSMENT
PAIN_FUNCTIONAL_ASSESSMENT: UNABLE TO SELF-REPORT
PAIN_FUNCTIONAL_ASSESSMENT: UNABLE TO SELF-REPORT

## 2024-01-02 DIAGNOSIS — F10.10 ALCOHOL ABUSE: Primary | ICD-10-CM

## 2024-01-02 DIAGNOSIS — F32.A ANXIETY AND DEPRESSION: ICD-10-CM

## 2024-01-02 DIAGNOSIS — F41.9 ANXIETY AND DEPRESSION: ICD-10-CM

## 2024-01-02 RX ORDER — FOLIC ACID 1 MG/1
1 TABLET ORAL DAILY
Qty: 90 TABLET | Refills: 0 | Status: SHIPPED | OUTPATIENT
Start: 2024-01-02

## 2024-01-02 NOTE — TELEPHONE ENCOUNTER
Last OV 09/23   Requested Prescriptions     Pending Prescriptions Disp Refills    folic acid (Folvite) 1 mg tablet 90 tablet 0     Sig: Take 1 tablet (1 mg) by mouth once daily.

## 2024-02-21 DIAGNOSIS — I10 BENIGN ESSENTIAL HYPERTENSION: ICD-10-CM

## 2024-02-21 RX ORDER — HYDRALAZINE HYDROCHLORIDE 10 MG/1
10 TABLET, FILM COATED ORAL 3 TIMES DAILY
Qty: 90 TABLET | Refills: 0 | Status: SHIPPED | OUTPATIENT
Start: 2024-02-21 | End: 2024-03-06 | Stop reason: SDUPTHER

## 2024-02-23 DIAGNOSIS — G40.909 SEIZURE DISORDER (MULTI): ICD-10-CM

## 2024-02-23 RX ORDER — LEVETIRACETAM 500 MG/1
500 TABLET ORAL 2 TIMES DAILY
Qty: 180 TABLET | Refills: 0 | Status: SHIPPED | OUTPATIENT
Start: 2024-02-23 | End: 2024-03-06 | Stop reason: SDUPTHER

## 2024-02-23 NOTE — TELEPHONE ENCOUNTER
Next OV 03/06   Requested Prescriptions     Pending Prescriptions Disp Refills    levETIRAcetam (Keppra) 500 mg tablet 180 tablet 0     Sig: Take 1 tablet (500 mg) by mouth 2 times a day.

## 2024-03-06 ENCOUNTER — OFFICE VISIT (OUTPATIENT)
Dept: PRIMARY CARE | Facility: CLINIC | Age: 64
End: 2024-03-06
Payer: COMMERCIAL

## 2024-03-06 VITALS
RESPIRATION RATE: 17 BRPM | BODY MASS INDEX: 21.3 KG/M2 | WEIGHT: 140.1 LBS | TEMPERATURE: 97.7 F | HEART RATE: 102 BPM | OXYGEN SATURATION: 98 % | SYSTOLIC BLOOD PRESSURE: 130 MMHG | DIASTOLIC BLOOD PRESSURE: 78 MMHG

## 2024-03-06 DIAGNOSIS — F41.9 ANXIETY AND DEPRESSION: ICD-10-CM

## 2024-03-06 DIAGNOSIS — I61.9 STROKE, HEMORRHAGIC (MULTI): ICD-10-CM

## 2024-03-06 DIAGNOSIS — F10.929 ALCOHOLIC INTOXICATION WITH COMPLICATION (CMS-HCC): ICD-10-CM

## 2024-03-06 DIAGNOSIS — G40.909 SEIZURE DISORDER (MULTI): ICD-10-CM

## 2024-03-06 DIAGNOSIS — E78.2 MIXED HYPERLIPIDEMIA: ICD-10-CM

## 2024-03-06 DIAGNOSIS — I10 BENIGN ESSENTIAL HYPERTENSION: Primary | ICD-10-CM

## 2024-03-06 DIAGNOSIS — D64.9 ANEMIA, UNSPECIFIED TYPE: ICD-10-CM

## 2024-03-06 DIAGNOSIS — F32.A ANXIETY AND DEPRESSION: ICD-10-CM

## 2024-03-06 PROCEDURE — 1036F TOBACCO NON-USER: CPT | Performed by: FAMILY MEDICINE

## 2024-03-06 PROCEDURE — 3075F SYST BP GE 130 - 139MM HG: CPT | Performed by: FAMILY MEDICINE

## 2024-03-06 PROCEDURE — 3078F DIAST BP <80 MM HG: CPT | Performed by: FAMILY MEDICINE

## 2024-03-06 PROCEDURE — 99214 OFFICE O/P EST MOD 30 MIN: CPT | Performed by: FAMILY MEDICINE

## 2024-03-06 RX ORDER — LEVETIRACETAM 500 MG/1
500 TABLET ORAL 2 TIMES DAILY
Qty: 180 TABLET | Refills: 0 | Status: SHIPPED
Start: 2024-03-06 | End: 2024-03-06 | Stop reason: SDUPTHER

## 2024-03-06 RX ORDER — HYDRALAZINE HYDROCHLORIDE 10 MG/1
10 TABLET, FILM COATED ORAL 3 TIMES DAILY
Qty: 90 TABLET | Refills: 0 | Status: SHIPPED
Start: 2024-03-06 | End: 2024-03-06 | Stop reason: SDUPTHER

## 2024-03-06 RX ORDER — SERTRALINE HYDROCHLORIDE 50 MG/1
50 TABLET, FILM COATED ORAL DAILY
Qty: 90 TABLET | Refills: 1 | Status: SHIPPED | OUTPATIENT
Start: 2024-03-06 | End: 2024-06-06

## 2024-03-06 RX ORDER — SERTRALINE HYDROCHLORIDE 50 MG/1
50 TABLET, FILM COATED ORAL DAILY
Qty: 90 TABLET | Refills: 0 | Status: SHIPPED
Start: 2024-03-06 | End: 2024-03-06 | Stop reason: SDUPTHER

## 2024-03-06 RX ORDER — LISINOPRIL 20 MG/1
20 TABLET ORAL DAILY
Qty: 90 TABLET | Refills: 1 | Status: SHIPPED
Start: 2024-03-06 | End: 2024-03-12 | Stop reason: SINTOL

## 2024-03-06 RX ORDER — LISINOPRIL 20 MG/1
20 TABLET ORAL DAILY
Qty: 30 TABLET | Refills: 11 | Status: SHIPPED
Start: 2024-03-06 | End: 2024-03-06 | Stop reason: SDUPTHER

## 2024-03-06 RX ORDER — LEVETIRACETAM 500 MG/1
500 TABLET ORAL 2 TIMES DAILY
Qty: 180 TABLET | Refills: 1 | Status: SHIPPED | OUTPATIENT
Start: 2024-03-06 | End: 2024-06-06

## 2024-03-06 RX ORDER — HYDRALAZINE HYDROCHLORIDE 10 MG/1
10 TABLET, FILM COATED ORAL 3 TIMES DAILY
Qty: 90 TABLET | Refills: 1 | Status: SHIPPED | OUTPATIENT
Start: 2024-03-06 | End: 2024-06-06

## 2024-03-06 RX ORDER — CLOPIDOGREL BISULFATE 75 MG/1
75 TABLET ORAL DAILY
Qty: 90 TABLET | Refills: 1 | Status: SHIPPED | OUTPATIENT
Start: 2024-03-06

## 2024-03-06 RX ORDER — ATORVASTATIN CALCIUM 40 MG/1
40 TABLET, FILM COATED ORAL DAILY
Qty: 30 TABLET | Refills: 0 | Status: SHIPPED
Start: 2024-03-06 | End: 2024-03-06 | Stop reason: SDUPTHER

## 2024-03-06 RX ORDER — ATORVASTATIN CALCIUM 40 MG/1
40 TABLET, FILM COATED ORAL DAILY
Qty: 90 TABLET | Refills: 1 | Status: SHIPPED | OUTPATIENT
Start: 2024-03-06

## 2024-03-06 RX ORDER — CLOPIDOGREL BISULFATE 75 MG/1
75 TABLET ORAL DAILY
Qty: 90 TABLET | Refills: 1 | Status: SHIPPED
Start: 2024-03-06 | End: 2024-03-06 | Stop reason: SDUPTHER

## 2024-03-06 NOTE — PROGRESS NOTES
Subjective   Patient ID: Yue Sanz is a 63 y.o. female who presents for Follow-up (3 mth med check).    Here for med check and refill.    Here with mom.  Patient does not drive.  Taking medications daily for hypertension depression hyperlipidemia and peripheral vascular disease, CVA, stroke.  Generally staying at home.  Caring for her animals  Denies chest pain, shortness of breath, lightheaded, dizziness or headaches.   No home BP checks    Admits to pain in her left knee and hip  Achy pain that is constant   Occ wakes her at night  Taking aleve or tylenol and takes the edge off.     Diet has not been good.  Not cooking and eating processed foods  Trying to stick to low salt   Orders pizza    Still drinking wine daily  1-2 glasses a day.  Sleeping ok           Review of Systems    Objective   /78   Pulse 102   Temp 36.5 °C (97.7 °F)   Resp 17   Wt 63.5 kg (140 lb 1.6 oz)   SpO2 98%   BMI 21.30 kg/m²     Physical Exam  Vitals and nursing note reviewed.   Constitutional:       Appearance: Normal appearance.   Cardiovascular:      Rate and Rhythm: Normal rate and regular rhythm.   Pulmonary:      Effort: Pulmonary effort is normal.      Breath sounds: Normal breath sounds.   Musculoskeletal:      Cervical back: Normal range of motion.   Neurological:      Mental Status: She is alert.   Psychiatric:         Mood and Affect: Mood normal.         Behavior: Behavior normal.         Thought Content: Thought content normal.         Judgment: Judgment normal.         Assessment/Plan   Problem List Items Addressed This Visit             ICD-10-CM    Alcohol intoxication (CMS/HCC) F10.929     Resolved.  Continue to monitor         Seizure disorder (CMS/HCC) G40.909     Stable  Refilling medication at same dose.  Recheck in 6 months         Relevant Medications    levETIRAcetam (Keppra) 500 mg tablet    Anemia D64.9     Checking labs and treat accordingly         Anxiety and depression F41.9, F32.A      Stable  Refilling medication at same dose.  Recheck in 6 months         Relevant Medications    sertraline (Zoloft) 50 mg tablet    Benign essential hypertension - Primary I10     Stable  Recommending home blood pressure monitoring.  Checking basic labs.  Refilling meds.  Recheck in 6 months         Relevant Medications    hydrALAZINE (Apresoline) 10 mg tablet    lisinopril 20 mg tablet    Other Relevant Orders    CBC and Auto Differential    Stroke, hemorrhagic (CMS/HCC) I61.9     Resolved.  Continue present meds.  Continue to monitor         Relevant Medications    clopidogrel (Plavix) 75 mg tablet    Hyperlipidemia E78.5     Reviewed diet.  Limit processed foods.  Checking labs and treat accordingly         Relevant Medications    atorvastatin (Lipitor) 40 mg tablet    Other Relevant Orders    Comprehensive Metabolic Panel    Lipid Panel

## 2024-03-06 NOTE — ASSESSMENT & PLAN NOTE
Stable  Recommending home blood pressure monitoring.  Checking basic labs.  Refilling meds.  Recheck in 6 months

## 2024-03-08 ENCOUNTER — LAB (OUTPATIENT)
Dept: LAB | Facility: LAB | Age: 64
End: 2024-03-08
Payer: COMMERCIAL

## 2024-03-08 DIAGNOSIS — I10 BENIGN ESSENTIAL HYPERTENSION: ICD-10-CM

## 2024-03-08 DIAGNOSIS — E78.2 MIXED HYPERLIPIDEMIA: ICD-10-CM

## 2024-03-08 LAB
ALBUMIN SERPL BCP-MCNC: 4.7 G/DL (ref 3.4–5)
ALP SERPL-CCNC: 84 U/L (ref 33–136)
ALT SERPL W P-5'-P-CCNC: 15 U/L (ref 7–45)
ANION GAP SERPL CALC-SCNC: 16 MMOL/L (ref 10–20)
AST SERPL W P-5'-P-CCNC: 23 U/L (ref 9–39)
BASOPHILS # BLD AUTO: 0.04 X10*3/UL (ref 0–0.1)
BASOPHILS NFR BLD AUTO: 0.6 %
BILIRUB SERPL-MCNC: 1.2 MG/DL (ref 0–1.2)
BUN SERPL-MCNC: 15 MG/DL (ref 6–23)
CALCIUM SERPL-MCNC: 10 MG/DL (ref 8.6–10.6)
CHLORIDE SERPL-SCNC: 106 MMOL/L (ref 98–107)
CHOLEST SERPL-MCNC: 204 MG/DL (ref 0–199)
CHOLESTEROL/HDL RATIO: 2
CO2 SERPL-SCNC: 25 MMOL/L (ref 21–32)
CREAT SERPL-MCNC: 1 MG/DL (ref 0.5–1.05)
EGFRCR SERPLBLD CKD-EPI 2021: 63 ML/MIN/1.73M*2
EOSINOPHIL # BLD AUTO: 0.06 X10*3/UL (ref 0–0.7)
EOSINOPHIL NFR BLD AUTO: 0.9 %
ERYTHROCYTE [DISTWIDTH] IN BLOOD BY AUTOMATED COUNT: 12.3 % (ref 11.5–14.5)
GLUCOSE SERPL-MCNC: 91 MG/DL (ref 74–99)
HCT VFR BLD AUTO: 35.2 % (ref 36–46)
HDLC SERPL-MCNC: 104.2 MG/DL
HGB BLD-MCNC: 11.8 G/DL (ref 12–16)
IMM GRANULOCYTES # BLD AUTO: 0.02 X10*3/UL (ref 0–0.7)
IMM GRANULOCYTES NFR BLD AUTO: 0.3 % (ref 0–0.9)
LDLC SERPL CALC-MCNC: 83 MG/DL
LYMPHOCYTES # BLD AUTO: 1.65 X10*3/UL (ref 1.2–4.8)
LYMPHOCYTES NFR BLD AUTO: 25.6 %
MCH RBC QN AUTO: 33.5 PG (ref 26–34)
MCHC RBC AUTO-ENTMCNC: 33.5 G/DL (ref 32–36)
MCV RBC AUTO: 100 FL (ref 80–100)
MONOCYTES # BLD AUTO: 0.41 X10*3/UL (ref 0.1–1)
MONOCYTES NFR BLD AUTO: 6.4 %
NEUTROPHILS # BLD AUTO: 4.27 X10*3/UL (ref 1.2–7.7)
NEUTROPHILS NFR BLD AUTO: 66.2 %
NON HDL CHOLESTEROL: 100 MG/DL (ref 0–149)
NRBC BLD-RTO: 0 /100 WBCS (ref 0–0)
PLATELET # BLD AUTO: 275 X10*3/UL (ref 150–450)
POTASSIUM SERPL-SCNC: 5.5 MMOL/L (ref 3.5–5.3)
PROT SERPL-MCNC: 7.6 G/DL (ref 6.4–8.2)
RBC # BLD AUTO: 3.52 X10*6/UL (ref 4–5.2)
SODIUM SERPL-SCNC: 141 MMOL/L (ref 136–145)
TRIGL SERPL-MCNC: 84 MG/DL (ref 0–149)
VLDL: 17 MG/DL (ref 0–40)
WBC # BLD AUTO: 6.5 X10*3/UL (ref 4.4–11.3)

## 2024-03-08 PROCEDURE — 36415 COLL VENOUS BLD VENIPUNCTURE: CPT

## 2024-03-08 PROCEDURE — 85025 COMPLETE CBC W/AUTO DIFF WBC: CPT

## 2024-03-08 PROCEDURE — 80053 COMPREHEN METABOLIC PANEL: CPT

## 2024-03-08 PROCEDURE — 80061 LIPID PANEL: CPT

## 2024-03-12 DIAGNOSIS — I10 BENIGN ESSENTIAL HYPERTENSION: Primary | ICD-10-CM

## 2024-03-12 RX ORDER — LISINOPRIL 10 MG/1
10 TABLET ORAL DAILY
Qty: 90 TABLET | Refills: 0 | Status: SHIPPED | OUTPATIENT
Start: 2024-03-12 | End: 2025-04-16

## 2024-05-31 ENCOUNTER — OFFICE VISIT (OUTPATIENT)
Dept: ORTHOPEDIC SURGERY | Facility: HOSPITAL | Age: 64
End: 2024-05-31
Payer: COMMERCIAL

## 2024-05-31 ENCOUNTER — HOSPITAL ENCOUNTER (OUTPATIENT)
Dept: RADIOLOGY | Facility: HOSPITAL | Age: 64
Discharge: HOME | End: 2024-05-31
Payer: COMMERCIAL

## 2024-05-31 VITALS — WEIGHT: 140 LBS | BODY MASS INDEX: 22.5 KG/M2 | HEIGHT: 66 IN

## 2024-05-31 DIAGNOSIS — M79.605 LUMBAR PAIN WITH RADIATION DOWN LEFT LEG: ICD-10-CM

## 2024-05-31 DIAGNOSIS — M54.50 LUMBAR PAIN WITH RADIATION DOWN LEFT LEG: ICD-10-CM

## 2024-05-31 DIAGNOSIS — M54.50 LUMBAR PAIN WITH RADIATION DOWN LEFT LEG: Primary | ICD-10-CM

## 2024-05-31 DIAGNOSIS — M79.605 LUMBAR PAIN WITH RADIATION DOWN LEFT LEG: Primary | ICD-10-CM

## 2024-05-31 PROCEDURE — 72110 X-RAY EXAM L-2 SPINE 4/>VWS: CPT

## 2024-05-31 PROCEDURE — 72110 X-RAY EXAM L-2 SPINE 4/>VWS: CPT | Performed by: RADIOLOGY

## 2024-05-31 PROCEDURE — 99214 OFFICE O/P EST MOD 30 MIN: CPT | Performed by: PHYSICIAN ASSISTANT

## 2024-05-31 PROCEDURE — 99204 OFFICE O/P NEW MOD 45 MIN: CPT | Performed by: PHYSICIAN ASSISTANT

## 2024-05-31 RX ORDER — CYCLOBENZAPRINE HCL 10 MG
10 TABLET ORAL 3 TIMES DAILY PRN
Qty: 20 TABLET | Refills: 0 | Status: ON HOLD | OUTPATIENT
Start: 2024-05-31 | End: 2024-06-10

## 2024-05-31 RX ORDER — METHYLPREDNISOLONE 4 MG/1
4 TABLET ORAL ONCE
Qty: 21 TABLET | Refills: 0 | Status: SHIPPED
Start: 2024-05-31 | End: 2024-06-10 | Stop reason: HOSPADM

## 2024-05-31 ASSESSMENT — PAIN - FUNCTIONAL ASSESSMENT: PAIN_FUNCTIONAL_ASSESSMENT: 0-10

## 2024-05-31 ASSESSMENT — PAIN SCALES - GENERAL: PAINLEVEL_OUTOF10: 9

## 2024-05-31 NOTE — PATIENT INSTRUCTIONS
ASSESSMENT: left sided lumbar radiculopathy and muscle spasm    PLAN: Treatment options were discussed with the patient. The patient was given a prescription for physical therapy.  Physical therapy is medically necessary to improve strength, balance, range of motion and functional outcomes after injury and/or surgery. Patient was given a handout and instructed on an at home stretching program.  They should do these exercises 3 times per day for 6 weeks and then daily. They were given a rx for cyclobenzaprine and a medrol dose pack to take as directed. All the patient's questions were answered. The patient agrees with the above plan.  Follow up with spine

## 2024-06-04 ENCOUNTER — TELEPHONE (OUTPATIENT)
Dept: PRIMARY CARE | Facility: CLINIC | Age: 64
End: 2024-06-04
Payer: COMMERCIAL

## 2024-06-06 ENCOUNTER — HOSPITAL ENCOUNTER (INPATIENT)
Facility: HOSPITAL | Age: 64
LOS: 1 days | Discharge: LEFT BEFORE DISCHARGE | DRG: 683 | End: 2024-06-06
Attending: EMERGENCY MEDICINE | Admitting: INTERNAL MEDICINE
Payer: COMMERCIAL

## 2024-06-06 ENCOUNTER — APPOINTMENT (OUTPATIENT)
Dept: RADIOLOGY | Facility: HOSPITAL | Age: 64
DRG: 683 | End: 2024-06-06
Payer: COMMERCIAL

## 2024-06-06 VITALS
OXYGEN SATURATION: 100 % | BODY MASS INDEX: 24.11 KG/M2 | HEIGHT: 66 IN | RESPIRATION RATE: 21 BRPM | TEMPERATURE: 98.5 F | WEIGHT: 150 LBS | HEART RATE: 86 BPM | DIASTOLIC BLOOD PRESSURE: 107 MMHG | SYSTOLIC BLOOD PRESSURE: 136 MMHG

## 2024-06-06 DIAGNOSIS — L03.90 CELLULITIS, UNSPECIFIED CELLULITIS SITE: Primary | ICD-10-CM

## 2024-06-06 LAB
ALBUMIN SERPL BCP-MCNC: 3.9 G/DL (ref 3.4–5)
ALBUMIN SERPL BCP-MCNC: 4.3 G/DL (ref 3.4–5)
ALP SERPL-CCNC: 60 U/L (ref 33–136)
ALP SERPL-CCNC: 65 U/L (ref 33–136)
ALT SERPL W P-5'-P-CCNC: 11 U/L (ref 7–45)
ALT SERPL W P-5'-P-CCNC: 11 U/L (ref 7–45)
AMMONIA PLAS-SCNC: 23 UMOL/L (ref 16–53)
ANION GAP SERPL CALC-SCNC: 25 MMOL/L (ref 10–20)
ANION GAP SERPL CALC-SCNC: 26 MMOL/L (ref 10–20)
APTT PPP: 28 SECONDS (ref 27–38)
AST SERPL W P-5'-P-CCNC: 14 U/L (ref 9–39)
AST SERPL W P-5'-P-CCNC: 16 U/L (ref 9–39)
BASOPHILS # BLD AUTO: 0.04 X10*3/UL (ref 0–0.1)
BASOPHILS NFR BLD AUTO: 0.3 %
BILIRUB SERPL-MCNC: 0.5 MG/DL (ref 0–1.2)
BILIRUB SERPL-MCNC: 0.7 MG/DL (ref 0–1.2)
BUN SERPL-MCNC: 54 MG/DL (ref 6–23)
BUN SERPL-MCNC: 59 MG/DL (ref 6–23)
CALCIUM SERPL-MCNC: 10.1 MG/DL (ref 8.6–10.3)
CALCIUM SERPL-MCNC: 8.9 MG/DL (ref 8.6–10.3)
CARDIAC TROPONIN I PNL SERPL HS: 23 NG/L (ref 0–13)
CARDIAC TROPONIN I PNL SERPL HS: 25 NG/L (ref 0–13)
CHLORIDE SERPL-SCNC: 103 MMOL/L (ref 98–107)
CHLORIDE SERPL-SCNC: 98 MMOL/L (ref 98–107)
CK SERPL-CCNC: 33 U/L (ref 0–215)
CO2 SERPL-SCNC: 13 MMOL/L (ref 21–32)
CO2 SERPL-SCNC: 14 MMOL/L (ref 21–32)
CREAT SERPL-MCNC: 1.71 MG/DL (ref 0.5–1.05)
CREAT SERPL-MCNC: 2.09 MG/DL (ref 0.5–1.05)
EGFRCR SERPLBLD CKD-EPI 2021: 26 ML/MIN/1.73M*2
EGFRCR SERPLBLD CKD-EPI 2021: 33 ML/MIN/1.73M*2
EOSINOPHIL # BLD AUTO: 0.03 X10*3/UL (ref 0–0.7)
EOSINOPHIL NFR BLD AUTO: 0.2 %
ERYTHROCYTE [DISTWIDTH] IN BLOOD BY AUTOMATED COUNT: 12.1 % (ref 11.5–14.5)
ETHANOL SERPL-MCNC: <10 MG/DL
GLUCOSE SERPL-MCNC: 72 MG/DL (ref 74–99)
GLUCOSE SERPL-MCNC: 76 MG/DL (ref 74–99)
HCT VFR BLD AUTO: 30.4 % (ref 36–46)
HGB BLD-MCNC: 10.2 G/DL (ref 12–16)
IMM GRANULOCYTES # BLD AUTO: 0.1 X10*3/UL (ref 0–0.7)
IMM GRANULOCYTES NFR BLD AUTO: 0.8 % (ref 0–0.9)
INR PPP: 1 (ref 0.9–1.1)
LACTATE SERPL-SCNC: 0.9 MMOL/L (ref 0.4–2)
LYMPHOCYTES # BLD AUTO: 1.23 X10*3/UL (ref 1.2–4.8)
LYMPHOCYTES NFR BLD AUTO: 10 %
MCH RBC QN AUTO: 32.6 PG (ref 26–34)
MCHC RBC AUTO-ENTMCNC: 33.6 G/DL (ref 32–36)
MCV RBC AUTO: 97 FL (ref 80–100)
MONOCYTES # BLD AUTO: 0.75 X10*3/UL (ref 0.1–1)
MONOCYTES NFR BLD AUTO: 6.1 %
NEUTROPHILS # BLD AUTO: 10.16 X10*3/UL (ref 1.2–7.7)
NEUTROPHILS NFR BLD AUTO: 82.6 %
NRBC BLD-RTO: 0 /100 WBCS (ref 0–0)
PLATELET # BLD AUTO: 287 X10*3/UL (ref 150–450)
POTASSIUM SERPL-SCNC: 4.3 MMOL/L (ref 3.5–5.3)
POTASSIUM SERPL-SCNC: 4.5 MMOL/L (ref 3.5–5.3)
PROT SERPL-MCNC: 6.8 G/DL (ref 6.4–8.2)
PROT SERPL-MCNC: 7.4 G/DL (ref 6.4–8.2)
PROTHROMBIN TIME: 11.5 SECONDS (ref 9.8–12.8)
RBC # BLD AUTO: 3.13 X10*6/UL (ref 4–5.2)
SARS-COV-2 RNA RESP QL NAA+PROBE: NOT DETECTED
SODIUM SERPL-SCNC: 134 MMOL/L (ref 136–145)
SODIUM SERPL-SCNC: 136 MMOL/L (ref 136–145)
WBC # BLD AUTO: 12.3 X10*3/UL (ref 4.4–11.3)

## 2024-06-06 PROCEDURE — 1210000001 HC SEMI-PRIVATE ROOM DAILY

## 2024-06-06 PROCEDURE — 82077 ASSAY SPEC XCP UR&BREATH IA: CPT | Performed by: EMERGENCY MEDICINE

## 2024-06-06 PROCEDURE — 96367 TX/PROPH/DG ADDL SEQ IV INF: CPT

## 2024-06-06 PROCEDURE — 87075 CULTR BACTERIA EXCEPT BLOOD: CPT | Mod: AHULAB | Performed by: EMERGENCY MEDICINE

## 2024-06-06 PROCEDURE — 96361 HYDRATE IV INFUSION ADD-ON: CPT

## 2024-06-06 PROCEDURE — 2500000004 HC RX 250 GENERAL PHARMACY W/ HCPCS (ALT 636 FOR OP/ED): Performed by: EMERGENCY MEDICINE

## 2024-06-06 PROCEDURE — 70450 CT HEAD/BRAIN W/O DYE: CPT

## 2024-06-06 PROCEDURE — 99285 EMERGENCY DEPT VISIT HI MDM: CPT

## 2024-06-06 PROCEDURE — 70450 CT HEAD/BRAIN W/O DYE: CPT | Performed by: RADIOLOGY

## 2024-06-06 PROCEDURE — 72170 X-RAY EXAM OF PELVIS: CPT

## 2024-06-06 PROCEDURE — 74176 CT ABD & PELVIS W/O CONTRAST: CPT

## 2024-06-06 PROCEDURE — 85610 PROTHROMBIN TIME: CPT | Performed by: EMERGENCY MEDICINE

## 2024-06-06 PROCEDURE — 85025 COMPLETE CBC W/AUTO DIFF WBC: CPT | Performed by: EMERGENCY MEDICINE

## 2024-06-06 PROCEDURE — 36415 COLL VENOUS BLD VENIPUNCTURE: CPT | Performed by: EMERGENCY MEDICINE

## 2024-06-06 PROCEDURE — 80053 COMPREHEN METABOLIC PANEL: CPT | Mod: 91,MUE | Performed by: EMERGENCY MEDICINE

## 2024-06-06 PROCEDURE — 84484 ASSAY OF TROPONIN QUANT: CPT | Performed by: EMERGENCY MEDICINE

## 2024-06-06 PROCEDURE — 74176 CT ABD & PELVIS W/O CONTRAST: CPT | Performed by: RADIOLOGY

## 2024-06-06 PROCEDURE — 2500000004 HC RX 250 GENERAL PHARMACY W/ HCPCS (ALT 636 FOR OP/ED)

## 2024-06-06 PROCEDURE — 87040 BLOOD CULTURE FOR BACTERIA: CPT | Mod: 91,AHULAB | Performed by: EMERGENCY MEDICINE

## 2024-06-06 PROCEDURE — 87635 SARS-COV-2 COVID-19 AMP PRB: CPT | Performed by: EMERGENCY MEDICINE

## 2024-06-06 PROCEDURE — 72170 X-RAY EXAM OF PELVIS: CPT | Performed by: RADIOLOGY

## 2024-06-06 PROCEDURE — 71045 X-RAY EXAM CHEST 1 VIEW: CPT | Performed by: RADIOLOGY

## 2024-06-06 PROCEDURE — 83605 ASSAY OF LACTIC ACID: CPT | Performed by: EMERGENCY MEDICINE

## 2024-06-06 PROCEDURE — 82550 ASSAY OF CK (CPK): CPT | Performed by: EMERGENCY MEDICINE

## 2024-06-06 PROCEDURE — 84484 ASSAY OF TROPONIN QUANT: CPT | Mod: 91 | Performed by: EMERGENCY MEDICINE

## 2024-06-06 PROCEDURE — 82140 ASSAY OF AMMONIA: CPT | Performed by: EMERGENCY MEDICINE

## 2024-06-06 PROCEDURE — 96365 THER/PROPH/DIAG IV INF INIT: CPT

## 2024-06-06 PROCEDURE — 99291 CRITICAL CARE FIRST HOUR: CPT | Mod: 25 | Performed by: EMERGENCY MEDICINE

## 2024-06-06 PROCEDURE — 85730 THROMBOPLASTIN TIME PARTIAL: CPT | Performed by: EMERGENCY MEDICINE

## 2024-06-06 PROCEDURE — 71045 X-RAY EXAM CHEST 1 VIEW: CPT

## 2024-06-06 RX ORDER — ONDANSETRON HYDROCHLORIDE 2 MG/ML
4 INJECTION, SOLUTION INTRAVENOUS EVERY 6 HOURS PRN
Status: DISCONTINUED | OUTPATIENT
Start: 2024-06-06 | End: 2024-06-06 | Stop reason: HOSPADM

## 2024-06-06 RX ORDER — ALUMINUM HYDROXIDE, MAGNESIUM HYDROXIDE, AND SIMETHICONE 1200; 120; 1200 MG/30ML; MG/30ML; MG/30ML
20 SUSPENSION ORAL 4 TIMES DAILY PRN
Status: DISCONTINUED | OUTPATIENT
Start: 2024-06-06 | End: 2024-06-06 | Stop reason: HOSPADM

## 2024-06-06 RX ORDER — TALC
3 POWDER (GRAM) TOPICAL NIGHTLY PRN
Status: DISCONTINUED | OUTPATIENT
Start: 2024-06-06 | End: 2024-06-06 | Stop reason: HOSPADM

## 2024-06-06 RX ORDER — ACETAMINOPHEN 325 MG/1
650 TABLET ORAL EVERY 6 HOURS PRN
Status: DISCONTINUED | OUTPATIENT
Start: 2024-06-06 | End: 2024-06-06 | Stop reason: HOSPADM

## 2024-06-06 RX ORDER — CEFAZOLIN SODIUM 1 G/50ML
1 SOLUTION INTRAVENOUS EVERY 12 HOURS
Status: DISCONTINUED | OUTPATIENT
Start: 2024-06-06 | End: 2024-06-06 | Stop reason: HOSPADM

## 2024-06-06 RX ORDER — PANTOPRAZOLE SODIUM 40 MG/1
40 TABLET, DELAYED RELEASE ORAL
Status: DISCONTINUED | OUTPATIENT
Start: 2024-06-07 | End: 2024-06-06 | Stop reason: HOSPADM

## 2024-06-06 RX ORDER — SODIUM CHLORIDE 9 MG/ML
100 INJECTION, SOLUTION INTRAVENOUS CONTINUOUS
Status: DISCONTINUED | OUTPATIENT
Start: 2024-06-06 | End: 2024-06-06 | Stop reason: HOSPADM

## 2024-06-06 RX ORDER — TRAMADOL HYDROCHLORIDE 50 MG/1
50 TABLET ORAL EVERY 6 HOURS PRN
Status: DISCONTINUED | OUTPATIENT
Start: 2024-06-06 | End: 2024-06-06 | Stop reason: HOSPADM

## 2024-06-06 RX ADMIN — SODIUM CHLORIDE 1000 ML: 9 INJECTION, SOLUTION INTRAVENOUS at 17:27

## 2024-06-06 RX ADMIN — VANCOMYCIN HYDROCHLORIDE 1.25 G: 1.25 INJECTION, POWDER, LYOPHILIZED, FOR SOLUTION INTRAVENOUS at 17:56

## 2024-06-06 RX ADMIN — SODIUM CHLORIDE 1000 ML: 9 INJECTION, SOLUTION INTRAVENOUS at 13:40

## 2024-06-06 RX ADMIN — PIPERACILLIN SODIUM AND TAZOBACTAM SODIUM 3.38 G: 3; .375 INJECTION, SOLUTION INTRAVENOUS at 17:27

## 2024-06-06 ASSESSMENT — PAIN SCALES - GENERAL
PAINLEVEL_OUTOF10: 0 - NO PAIN

## 2024-06-06 ASSESSMENT — PAIN - FUNCTIONAL ASSESSMENT: PAIN_FUNCTIONAL_ASSESSMENT: 0-10

## 2024-06-06 ASSESSMENT — COLUMBIA-SUICIDE SEVERITY RATING SCALE - C-SSRS
1. IN THE PAST MONTH, HAVE YOU WISHED YOU WERE DEAD OR WISHED YOU COULD GO TO SLEEP AND NOT WAKE UP?: NO
2. HAVE YOU ACTUALLY HAD ANY THOUGHTS OF KILLING YOURSELF?: NO
6. HAVE YOU EVER DONE ANYTHING, STARTED TO DO ANYTHING, OR PREPARED TO DO ANYTHING TO END YOUR LIFE?: NO

## 2024-06-06 NOTE — ED PROVIDER NOTES
HPI   Chief Complaint   Patient presents with    Fall       This is a 63-year-old woman with past medical history of prior CVA, alcohol abuse disorder, seizures, hypertension, hyperlipidemia who does present with complaint of syncopal event while at home.  Found down after a few minutes.  Denies any headache.  Nuys any chest or abdominal pain.  Denies any infectious symptoms.  No dysuria is reported.  Reports drinking 1 to 2 glasses of wine a night.  States last drink was last night.  No seizure history recently was reported.                        Jonathan Coma Scale Score: 15                     Patient History   Past Medical History:   Diagnosis Date    Acute CVA (cerebrovascular accident) (Multi) 05/30/2023    Alcohol related seizure (Multi) 05/30/2023    Cerebral hemorrhage (Multi) 05/30/2023    Cerebral infarction due to embolism of right middle cerebral artery (Multi) 05/30/2023    Diarrhea, unspecified 08/10/2022    Diarrhea    Encounter for follow-up examination after completed treatment for conditions other than malignant neoplasm 04/20/2020    Hospital discharge follow-up    Encounter for other screening for malignant neoplasm of breast 03/08/2022    Breast screening    Encounter for screening for malignant neoplasm of colon 05/16/2022    Colon cancer screening    Other abnormality of red blood cells 08/10/2022    Elevated MCV    Personal history of other endocrine, nutritional and metabolic disease     History of high cholesterol    Personal history of transient ischemic attack (TIA), and cerebral infarction without residual deficits     History of cerebrovascular accident     Past Surgical History:   Procedure Laterality Date    MR HEAD ANGIO WO IV CONTRAST  5/19/2021    MR HEAD ANGIO WO IV CONTRAST 5/19/2021 [facility] EMERGENCY LEGACY    MR HEAD ANGIO WO IV CONTRAST  7/12/2018    MR HEAD ANGIO WO IV CONTRAST 7/12/2018 [facility] CLINICAL LEGACY    MR NECK ANGIO WO IV CONTRAST  5/19/2021    MR NECK ANGIO WO IV  CONTRAST 5/19/2021 [facility] EMERGENCY LEGACY     Family History   Problem Relation Name Age of Onset    Arthritis Mother      No Known Problems Father       Social History     Tobacco Use    Smoking status: Never     Passive exposure: Never    Smokeless tobacco: Never   Vaping Use    Vaping status: Never Used   Substance Use Topics    Alcohol use: Not Currently    Drug use: Not Currently       Physical Exam   ED Triage Vitals [06/06/24 1320]   Temperature Heart Rate Respirations BP   36.9 °C (98.5 °F) 82 16 85/62      Pulse Ox Temp Source Heart Rate Source Patient Position   98 % Oral Monitor Lying      BP Location FiO2 (%)     Left arm --       Physical Exam  Vitals and nursing note reviewed.   Constitutional:       Appearance: Normal appearance. She is normal weight.   HENT:      Head: Normocephalic and atraumatic.      Nose: Nose normal.      Mouth/Throat:      Mouth: Mucous membranes are dry.      Pharynx: Oropharynx is clear.   Eyes:      Extraocular Movements: Extraocular movements intact.      Conjunctiva/sclera: Conjunctivae normal.      Pupils: Pupils are equal, round, and reactive to light.   Cardiovascular:      Rate and Rhythm: Normal rate and regular rhythm.      Pulses: Normal pulses.      Heart sounds: Normal heart sounds.   Pulmonary:      Effort: Pulmonary effort is normal.      Breath sounds: Normal breath sounds.   Abdominal:      General: Abdomen is flat. Bowel sounds are normal. There is no distension.      Palpations: Abdomen is soft.      Tenderness: There is no abdominal tenderness. There is no right CVA tenderness, left CVA tenderness, guarding or rebound.   Musculoskeletal:         General: Normal range of motion.      Cervical back: Normal range of motion and neck supple.   Skin:     General: Skin is warm and dry.      Capillary Refill: Capillary refill takes less than 2 seconds.   Neurological:      General: No focal deficit present.      Mental Status: She is alert and oriented to person,  place, and time. Mental status is at baseline.      Sensory: No sensory deficit.      Motor: No weakness.   Psychiatric:         Mood and Affect: Mood normal.         Behavior: Behavior normal.         Thought Content: Thought content normal.         Judgment: Judgment normal.         ED Course & MDM   Diagnoses as of 06/06/24 1710   Cellulitis, unspecified cellulitis site       Medical Decision Making  Patient does have IV placed and labs drawn including CBC, CMP, troponin, UA.  CBC did have elevated blood cell count of 12.3.  Hemoglobin is stable at 10.2.  There was acute kidney injury up to 2.09 patient was started on IV fluids.  Noted to have a anion gap which may be related to dehydration versus alcohol.  His sugar was 76 and negative for DKA.  Following further workup patient was noted to have fairly significant cellulitis bilateral inguinal region during our secondary survey and does have some purulence noted in the inner thighs [id number]  Suspect the patient does have underlying cellulitis likely secondary from possible yeast infection that has secondary infection.  She was activated as sepsis and pancultured and covered with vancomycin and Zosyn and will require admission.    Amount and/or Complexity of Data Reviewed  Labs: ordered. Decision-making details documented in ED Course.  Radiology: ordered. Decision-making details documented in ED Course.  ECG/medicine tests: ordered. Decision-making details documented in ED Course.        Procedure  Procedures     Thang Mendez MD  06/06/24 2012

## 2024-06-06 NOTE — ED TRIAGE NOTES
Patient arrived by EMS. Patient complaint of fall w/ dizziness. The patient had been found on the ground by family. She was on the ground for less than 1 than hour. Unwitnessed fall, unknown LOC, unknown thinners. GCS 15

## 2024-06-07 ENCOUNTER — APPOINTMENT (OUTPATIENT)
Dept: RADIOLOGY | Facility: HOSPITAL | Age: 64
DRG: 603 | End: 2024-06-07
Payer: COMMERCIAL

## 2024-06-07 ENCOUNTER — TELEMEDICINE (OUTPATIENT)
Dept: PHARMACY | Facility: HOSPITAL | Age: 64
End: 2024-06-07
Payer: COMMERCIAL

## 2024-06-07 ENCOUNTER — APPOINTMENT (OUTPATIENT)
Dept: CARDIOLOGY | Facility: HOSPITAL | Age: 64
DRG: 603 | End: 2024-06-07
Payer: COMMERCIAL

## 2024-06-07 ENCOUNTER — HOSPITAL ENCOUNTER (INPATIENT)
Facility: HOSPITAL | Age: 64
LOS: 3 days | Discharge: HOME | DRG: 603 | End: 2024-06-10
Attending: EMERGENCY MEDICINE | Admitting: INTERNAL MEDICINE
Payer: COMMERCIAL

## 2024-06-07 DIAGNOSIS — I61.9 STROKE, HEMORRHAGIC (MULTI): Primary | ICD-10-CM

## 2024-06-07 DIAGNOSIS — R00.0 SINUS TACHYCARDIA: ICD-10-CM

## 2024-06-07 DIAGNOSIS — F10.10 ALCOHOL ABUSE: ICD-10-CM

## 2024-06-07 DIAGNOSIS — L03.90 CELLULITIS, UNSPECIFIED CELLULITIS SITE: Primary | ICD-10-CM

## 2024-06-07 LAB
APPEARANCE UR: ABNORMAL
BACTERIA #/AREA URNS AUTO: ABNORMAL /HPF
BILIRUB UR STRIP.AUTO-MCNC: NEGATIVE MG/DL
COLOR UR: YELLOW
D DIMER PPP FEU-MCNC: 1116 NG/ML FEU
GLUCOSE UR STRIP.AUTO-MCNC: NORMAL MG/DL
HOLD SPECIMEN: NORMAL
HYALINE CASTS #/AREA URNS AUTO: ABNORMAL /LPF
KETONES UR STRIP.AUTO-MCNC: ABNORMAL MG/DL
LEUKOCYTE ESTERASE UR QL STRIP.AUTO: ABNORMAL
MUCOUS THREADS #/AREA URNS AUTO: ABNORMAL /LPF
NITRITE UR QL STRIP.AUTO: NEGATIVE
PH UR STRIP.AUTO: 5.5 [PH]
PROT UR STRIP.AUTO-MCNC: ABNORMAL MG/DL
RBC # UR STRIP.AUTO: NEGATIVE /UL
RBC #/AREA URNS AUTO: ABNORMAL /HPF
SP GR UR STRIP.AUTO: 1.02
SQUAMOUS #/AREA URNS AUTO: ABNORMAL /HPF
UROBILINOGEN UR STRIP.AUTO-MCNC: NORMAL MG/DL
WBC #/AREA URNS AUTO: ABNORMAL /HPF

## 2024-06-07 PROCEDURE — 2500000001 HC RX 250 WO HCPCS SELF ADMINISTERED DRUGS (ALT 637 FOR MEDICARE OP): Performed by: STUDENT IN AN ORGANIZED HEALTH CARE EDUCATION/TRAINING PROGRAM

## 2024-06-07 PROCEDURE — 2500000002 HC RX 250 W HCPCS SELF ADMINISTERED DRUGS (ALT 637 FOR MEDICARE OP, ALT 636 FOR OP/ED): Mod: MUE | Performed by: INTERNAL MEDICINE

## 2024-06-07 PROCEDURE — 2500000001 HC RX 250 WO HCPCS SELF ADMINISTERED DRUGS (ALT 637 FOR MEDICARE OP): Performed by: INTERNAL MEDICINE

## 2024-06-07 PROCEDURE — 78830 RP LOCLZJ TUM SPECT W/CT 1: CPT | Performed by: RADIOLOGY

## 2024-06-07 PROCEDURE — 36415 COLL VENOUS BLD VENIPUNCTURE: CPT | Performed by: INTERNAL MEDICINE

## 2024-06-07 PROCEDURE — 3430000001 HC RX 343 DIAGNOSTIC RADIOPHARMACEUTICALS: Performed by: INTERNAL MEDICINE

## 2024-06-07 PROCEDURE — 1200000002 HC GENERAL ROOM WITH TELEMETRY DAILY

## 2024-06-07 PROCEDURE — 71045 X-RAY EXAM CHEST 1 VIEW: CPT

## 2024-06-07 PROCEDURE — 2500000004 HC RX 250 GENERAL PHARMACY W/ HCPCS (ALT 636 FOR OP/ED): Mod: JZ | Performed by: INTERNAL MEDICINE

## 2024-06-07 PROCEDURE — 78830 RP LOCLZJ TUM SPECT W/CT 1: CPT

## 2024-06-07 PROCEDURE — 71045 X-RAY EXAM CHEST 1 VIEW: CPT | Performed by: RADIOLOGY

## 2024-06-07 PROCEDURE — 99222 1ST HOSP IP/OBS MODERATE 55: CPT | Performed by: INTERNAL MEDICINE

## 2024-06-07 PROCEDURE — 81001 URINALYSIS AUTO W/SCOPE: CPT | Performed by: INTERNAL MEDICINE

## 2024-06-07 PROCEDURE — 93005 ELECTROCARDIOGRAM TRACING: CPT

## 2024-06-07 PROCEDURE — 85379 FIBRIN DEGRADATION QUANT: CPT | Performed by: INTERNAL MEDICINE

## 2024-06-07 PROCEDURE — 81003 URINALYSIS AUTO W/O SCOPE: CPT | Performed by: INTERNAL MEDICINE

## 2024-06-07 PROCEDURE — A9540 TC99M MAA: HCPCS | Performed by: INTERNAL MEDICINE

## 2024-06-07 PROCEDURE — 2500000002 HC RX 250 W HCPCS SELF ADMINISTERED DRUGS (ALT 637 FOR MEDICARE OP, ALT 636 FOR OP/ED): Mod: MUE | Performed by: STUDENT IN AN ORGANIZED HEALTH CARE EDUCATION/TRAINING PROGRAM

## 2024-06-07 RX ORDER — MULTIVIT-MIN/IRON FUM/FOLIC AC 7.5 MG-4
1 TABLET ORAL DAILY
Status: DISCONTINUED | OUTPATIENT
Start: 2024-06-07 | End: 2024-06-07 | Stop reason: SDUPTHER

## 2024-06-07 RX ORDER — SERTRALINE HYDROCHLORIDE 50 MG/1
50 TABLET, FILM COATED ORAL DAILY
Status: DISCONTINUED | OUTPATIENT
Start: 2024-06-07 | End: 2024-06-10 | Stop reason: HOSPADM

## 2024-06-07 RX ORDER — HYDRALAZINE HYDROCHLORIDE 10 MG/1
10 TABLET, FILM COATED ORAL 3 TIMES DAILY
Status: DISCONTINUED | OUTPATIENT
Start: 2024-06-07 | End: 2024-06-10 | Stop reason: HOSPADM

## 2024-06-07 RX ORDER — LORAZEPAM 0.5 MG/1
0.5 TABLET ORAL EVERY 2 HOUR PRN
Status: DISCONTINUED | OUTPATIENT
Start: 2024-06-07 | End: 2024-06-10 | Stop reason: HOSPADM

## 2024-06-07 RX ORDER — ONDANSETRON 4 MG/1
4 TABLET, FILM COATED ORAL EVERY 8 HOURS PRN
Status: DISCONTINUED | OUTPATIENT
Start: 2024-06-07 | End: 2024-06-10 | Stop reason: HOSPADM

## 2024-06-07 RX ORDER — FOLIC ACID 1 MG/1
1 TABLET ORAL DAILY
Status: DISCONTINUED | OUTPATIENT
Start: 2024-06-07 | End: 2024-06-10 | Stop reason: HOSPADM

## 2024-06-07 RX ORDER — FOLIC ACID 1 MG/1
1 TABLET ORAL DAILY
Status: DISCONTINUED | OUTPATIENT
Start: 2024-06-07 | End: 2024-06-07 | Stop reason: SDUPTHER

## 2024-06-07 RX ORDER — ATORVASTATIN CALCIUM 40 MG/1
40 TABLET, FILM COATED ORAL NIGHTLY
Status: DISCONTINUED | OUTPATIENT
Start: 2024-06-07 | End: 2024-06-10 | Stop reason: HOSPADM

## 2024-06-07 RX ORDER — ACETAMINOPHEN 325 MG/1
650 TABLET ORAL EVERY 4 HOURS PRN
Status: DISCONTINUED | OUTPATIENT
Start: 2024-06-07 | End: 2024-06-10 | Stop reason: HOSPADM

## 2024-06-07 RX ORDER — PANTOPRAZOLE SODIUM 40 MG/10ML
40 INJECTION, POWDER, LYOPHILIZED, FOR SOLUTION INTRAVENOUS
Status: DISCONTINUED | OUTPATIENT
Start: 2024-06-07 | End: 2024-06-10 | Stop reason: HOSPADM

## 2024-06-07 RX ORDER — LORAZEPAM 1 MG/1
2 TABLET ORAL EVERY 4 HOURS PRN
Status: DISCONTINUED | OUTPATIENT
Start: 2024-06-07 | End: 2024-06-10 | Stop reason: HOSPADM

## 2024-06-07 RX ORDER — LISINOPRIL 10 MG/1
10 TABLET ORAL DAILY
Status: DISCONTINUED | OUTPATIENT
Start: 2024-06-07 | End: 2024-06-10 | Stop reason: HOSPADM

## 2024-06-07 RX ORDER — CLOPIDOGREL BISULFATE 75 MG/1
75 TABLET ORAL DAILY
Status: DISCONTINUED | OUTPATIENT
Start: 2024-06-07 | End: 2024-06-10 | Stop reason: HOSPADM

## 2024-06-07 RX ORDER — PANTOPRAZOLE SODIUM 40 MG/1
40 TABLET, DELAYED RELEASE ORAL
Status: DISCONTINUED | OUTPATIENT
Start: 2024-06-07 | End: 2024-06-10 | Stop reason: HOSPADM

## 2024-06-07 RX ORDER — NYSTATIN 100000 U/G
OINTMENT TOPICAL 2 TIMES DAILY
Status: DISCONTINUED | OUTPATIENT
Start: 2024-06-07 | End: 2024-06-10 | Stop reason: HOSPADM

## 2024-06-07 RX ORDER — LEVETIRACETAM 500 MG/1
500 TABLET ORAL 2 TIMES DAILY
Status: DISCONTINUED | OUTPATIENT
Start: 2024-06-07 | End: 2024-06-10 | Stop reason: HOSPADM

## 2024-06-07 RX ORDER — ACETAMINOPHEN 160 MG/5ML
650 SOLUTION ORAL EVERY 4 HOURS PRN
Status: DISCONTINUED | OUTPATIENT
Start: 2024-06-07 | End: 2024-06-10 | Stop reason: HOSPADM

## 2024-06-07 RX ORDER — MULTIVIT-MIN/IRON FUM/FOLIC AC 7.5 MG-4
1 TABLET ORAL DAILY
Status: DISCONTINUED | OUTPATIENT
Start: 2024-06-07 | End: 2024-06-10 | Stop reason: HOSPADM

## 2024-06-07 RX ORDER — GUAIFENESIN 600 MG/1
600 TABLET, EXTENDED RELEASE ORAL EVERY 12 HOURS PRN
Status: DISCONTINUED | OUTPATIENT
Start: 2024-06-07 | End: 2024-06-10 | Stop reason: HOSPADM

## 2024-06-07 RX ORDER — TALC
3 POWDER (GRAM) TOPICAL NIGHTLY PRN
Status: DISCONTINUED | OUTPATIENT
Start: 2024-06-07 | End: 2024-06-10 | Stop reason: HOSPADM

## 2024-06-07 RX ORDER — LANOLIN ALCOHOL/MO/W.PET/CERES
100 CREAM (GRAM) TOPICAL DAILY
Status: DISCONTINUED | OUTPATIENT
Start: 2024-06-10 | End: 2024-06-10 | Stop reason: HOSPADM

## 2024-06-07 RX ORDER — CEFAZOLIN SODIUM 1 G/50ML
1 SOLUTION INTRAVENOUS EVERY 8 HOURS
Status: DISCONTINUED | OUTPATIENT
Start: 2024-06-07 | End: 2024-06-10 | Stop reason: HOSPADM

## 2024-06-07 RX ORDER — LORAZEPAM 1 MG/1
1 TABLET ORAL EVERY 2 HOUR PRN
Status: DISCONTINUED | OUTPATIENT
Start: 2024-06-07 | End: 2024-06-10 | Stop reason: HOSPADM

## 2024-06-07 RX ORDER — ASPIRIN 81 MG/1
81 TABLET ORAL DAILY
Status: DISCONTINUED | OUTPATIENT
Start: 2024-06-07 | End: 2024-06-10 | Stop reason: HOSPADM

## 2024-06-07 RX ORDER — DIAZEPAM 5 MG/1
10 TABLET ORAL EVERY 2 HOUR PRN
Status: DISCONTINUED | OUTPATIENT
Start: 2024-06-07 | End: 2024-06-07 | Stop reason: SDUPTHER

## 2024-06-07 RX ORDER — ONDANSETRON HYDROCHLORIDE 2 MG/ML
4 INJECTION, SOLUTION INTRAVENOUS EVERY 8 HOURS PRN
Status: DISCONTINUED | OUTPATIENT
Start: 2024-06-07 | End: 2024-06-10 | Stop reason: HOSPADM

## 2024-06-07 RX ORDER — POLYETHYLENE GLYCOL 3350 17 G/17G
17 POWDER, FOR SOLUTION ORAL DAILY PRN
Status: DISCONTINUED | OUTPATIENT
Start: 2024-06-07 | End: 2024-06-10 | Stop reason: HOSPADM

## 2024-06-07 RX ORDER — ACETAMINOPHEN 650 MG/1
650 SUPPOSITORY RECTAL EVERY 4 HOURS PRN
Status: DISCONTINUED | OUTPATIENT
Start: 2024-06-07 | End: 2024-06-10 | Stop reason: HOSPADM

## 2024-06-07 RX ORDER — METOPROLOL TARTRATE 25 MG/1
25 TABLET, FILM COATED ORAL 2 TIMES DAILY
Status: DISCONTINUED | OUTPATIENT
Start: 2024-06-07 | End: 2024-06-10 | Stop reason: HOSPADM

## 2024-06-07 RX ORDER — LANOLIN ALCOHOL/MO/W.PET/CERES
100 CREAM (GRAM) TOPICAL DAILY
Status: DISCONTINUED | OUTPATIENT
Start: 2024-06-07 | End: 2024-06-07

## 2024-06-07 RX ADMIN — ASPIRIN 81 MG: 81 TABLET, COATED ORAL at 11:37

## 2024-06-07 RX ADMIN — CEFAZOLIN SODIUM 1 G: 1 INJECTION, SOLUTION INTRAVENOUS at 18:00

## 2024-06-07 RX ADMIN — KIT FOR THE PREPARATION OF TECHNETIUM TC 99M ALBUMIN AGGREGATED 4.3 MILLICURIE: 2.5 INJECTION, POWDER, FOR SOLUTION INTRAVENOUS at 20:50

## 2024-06-07 RX ADMIN — SODIUM CHLORIDE 500 ML: 9 INJECTION, SOLUTION INTRAVENOUS at 11:45

## 2024-06-07 RX ADMIN — NYSTATIN: 100000 OINTMENT TOPICAL at 11:39

## 2024-06-07 RX ADMIN — LORAZEPAM 0.5 MG: 0.5 TABLET ORAL at 18:52

## 2024-06-07 RX ADMIN — FOLIC ACID 1 MG: 1 TABLET ORAL at 11:38

## 2024-06-07 RX ADMIN — HYDRALAZINE HYDROCHLORIDE 10 MG: 10 TABLET, FILM COATED ORAL at 16:53

## 2024-06-07 RX ADMIN — DIAZEPAM 10 MG: 5 TABLET ORAL at 01:41

## 2024-06-07 RX ADMIN — ATORVASTATIN CALCIUM 40 MG: 40 TABLET, FILM COATED ORAL at 21:53

## 2024-06-07 RX ADMIN — SERTRALINE HYDROCHLORIDE 50 MG: 50 TABLET ORAL at 11:38

## 2024-06-07 RX ADMIN — METOPROLOL TARTRATE 25 MG: 25 TABLET, FILM COATED ORAL at 23:58

## 2024-06-07 RX ADMIN — PANTOPRAZOLE SODIUM 40 MG: 40 TABLET, DELAYED RELEASE ORAL at 11:38

## 2024-06-07 RX ADMIN — CEFAZOLIN SODIUM 1 G: 1 INJECTION, SOLUTION INTRAVENOUS at 11:37

## 2024-06-07 RX ADMIN — Medication 1 TABLET: at 11:38

## 2024-06-07 RX ADMIN — LEVETIRACETAM 500 MG: 500 TABLET, FILM COATED ORAL at 21:53

## 2024-06-07 RX ADMIN — LORAZEPAM 0.5 MG: 0.5 TABLET ORAL at 21:53

## 2024-06-07 RX ADMIN — LEVETIRACETAM 500 MG: 500 TABLET, FILM COATED ORAL at 11:38

## 2024-06-07 RX ADMIN — CLOPIDOGREL 75 MG: 75 TABLET ORAL at 11:38

## 2024-06-07 SDOH — SOCIAL STABILITY: SOCIAL INSECURITY: HAVE YOU HAD ANY THOUGHTS OF HARMING ANYONE ELSE?: NO

## 2024-06-07 SDOH — SOCIAL STABILITY: SOCIAL INSECURITY: ARE THERE ANY APPARENT SIGNS OF INJURIES/BEHAVIORS THAT COULD BE RELATED TO ABUSE/NEGLECT?: NO

## 2024-06-07 SDOH — SOCIAL STABILITY: SOCIAL INSECURITY: DO YOU FEEL ANYONE HAS EXPLOITED OR TAKEN ADVANTAGE OF YOU FINANCIALLY OR OF YOUR PERSONAL PROPERTY?: NO

## 2024-06-07 SDOH — SOCIAL STABILITY: SOCIAL INSECURITY: DOES ANYONE TRY TO KEEP YOU FROM HAVING/CONTACTING OTHER FRIENDS OR DOING THINGS OUTSIDE YOUR HOME?: NO

## 2024-06-07 SDOH — SOCIAL STABILITY: SOCIAL INSECURITY: ABUSE: ADULT

## 2024-06-07 SDOH — SOCIAL STABILITY: SOCIAL INSECURITY: ARE YOU OR HAVE YOU BEEN THREATENED OR ABUSED PHYSICALLY, EMOTIONALLY, OR SEXUALLY BY ANYONE?: NO

## 2024-06-07 SDOH — SOCIAL STABILITY: SOCIAL INSECURITY: DO YOU FEEL UNSAFE GOING BACK TO THE PLACE WHERE YOU ARE LIVING?: NO

## 2024-06-07 SDOH — SOCIAL STABILITY: SOCIAL INSECURITY: HAS ANYONE EVER THREATENED TO HURT YOUR FAMILY OR YOUR PETS?: NO

## 2024-06-07 SDOH — SOCIAL STABILITY: SOCIAL INSECURITY: WERE YOU ABLE TO COMPLETE ALL THE BEHAVIORAL HEALTH SCREENINGS?: YES

## 2024-06-07 SDOH — SOCIAL STABILITY: SOCIAL INSECURITY: HAVE YOU HAD THOUGHTS OF HARMING ANYONE ELSE?: NO

## 2024-06-07 ASSESSMENT — COGNITIVE AND FUNCTIONAL STATUS - GENERAL
WALKING IN HOSPITAL ROOM: A LITTLE
DRESSING REGULAR LOWER BODY CLOTHING: A LITTLE
DRESSING REGULAR UPPER BODY CLOTHING: A LITTLE
DAILY ACTIVITIY SCORE: 24
MOBILITY SCORE: 19
CLIMB 3 TO 5 STEPS WITH RAILING: A LITTLE
MOVING TO AND FROM BED TO CHAIR: A LITTLE
MOVING TO AND FROM BED TO CHAIR: A LITTLE
PATIENT BASELINE BEDBOUND: NO
TOILETING: A LITTLE
DRESSING REGULAR UPPER BODY CLOTHING: A LITTLE
DRESSING REGULAR LOWER BODY CLOTHING: A LITTLE
STANDING UP FROM CHAIR USING ARMS: A LITTLE
HELP NEEDED FOR BATHING: A LITTLE
MOBILITY SCORE: 20
STANDING UP FROM CHAIR USING ARMS: A LITTLE
CLIMB 3 TO 5 STEPS WITH RAILING: A LOT
WALKING IN HOSPITAL ROOM: A LITTLE
TOILETING: A LITTLE
DAILY ACTIVITIY SCORE: 20
HELP NEEDED FOR BATHING: A LITTLE
DAILY ACTIVITIY SCORE: 20
WALKING IN HOSPITAL ROOM: A LITTLE
STANDING UP FROM CHAIR USING ARMS: A LITTLE
MOBILITY SCORE: 21
CLIMB 3 TO 5 STEPS WITH RAILING: A LITTLE

## 2024-06-07 ASSESSMENT — LIFESTYLE VARIABLES
VISUAL DISTURBANCES: NOT PRESENT
AGITATION: NORMAL ACTIVITY
TREMOR: NOT VISIBLE, BUT CAN BE FELT FINGERTIP TO FINGERTIP
NAUSEA AND VOMITING: NO NAUSEA AND NO VOMITING
AGITATION: NORMAL ACTIVITY
BLOOD PRESSURE: 152/108
ORIENTATION AND CLOUDING OF SENSORIUM: ORIENTED AND CAN DO SERIAL ADDITIONS
PULSE: 97
PAROXYSMAL SWEATS: NO SWEAT VISIBLE
ORIENTATION AND CLOUDING OF SENSORIUM: ORIENTED AND CAN DO SERIAL ADDITIONS
AUDITORY DISTURBANCES: NOT PRESENT
AUDIT-C TOTAL SCORE: 4
AUDITORY DISTURBANCES: NOT PRESENT
PAROXYSMAL SWEATS: NO SWEAT VISIBLE
AUDITORY DISTURBANCES: NOT PRESENT
AGITATION: NORMAL ACTIVITY
ORIENTATION AND CLOUDING OF SENSORIUM: ORIENTED AND CAN DO SERIAL ADDITIONS
VISUAL DISTURBANCES: NOT PRESENT
PULSE: 79
TREMOR: NO TREMOR
ANXIETY: MODERATELY ANXIOUS, OR GUARDED, SO ANXIETY IS INFERRED
VISUAL DISTURBANCES: NOT PRESENT
ANXIETY: NO ANXIETY, AT EASE
VISUAL DISTURBANCES: NOT PRESENT
PRESCIPTION_ABUSE_PAST_12_MONTHS: NO
VISUAL DISTURBANCES: NOT PRESENT
NAUSEA AND VOMITING: NO NAUSEA AND NO VOMITING
ANXIETY: NO ANXIETY, AT EASE
TREMOR: NO TREMOR
PAROXYSMAL SWEATS: NO SWEAT VISIBLE
HEADACHE, FULLNESS IN HEAD: NOT PRESENT
AGITATION: NORMAL ACTIVITY
AUDITORY DISTURBANCES: NOT PRESENT
AGITATION: NORMAL ACTIVITY
NAUSEA AND VOMITING: NO NAUSEA AND NO VOMITING
SKIP TO QUESTIONS 9-10: 1
ANXIETY: NO ANXIETY, AT EASE
PAROXYSMAL SWEATS: NO SWEAT VISIBLE
NAUSEA AND VOMITING: NO NAUSEA AND NO VOMITING
HEADACHE, FULLNESS IN HEAD: NOT PRESENT
TOTAL SCORE: 0
NAUSEA AND VOMITING: NO NAUSEA AND NO VOMITING
HEADACHE, FULLNESS IN HEAD: NOT PRESENT
HEADACHE, FULLNESS IN HEAD: NOT PRESENT
HOW OFTEN DO YOU HAVE 6 OR MORE DRINKS ON ONE OCCASION: NEVER
HOW OFTEN DO YOU HAVE A DRINK CONTAINING ALCOHOL: 4 OR MORE TIMES A WEEK
AGITATION: NORMAL ACTIVITY
SUBSTANCE_ABUSE_PAST_12_MONTHS: NO
TOTAL SCORE: 0
AUDITORY DISTURBANCES: NOT PRESENT
ANXIETY: NO ANXIETY, AT EASE
AUDITORY DISTURBANCES: NOT PRESENT
TOTAL SCORE: 2
PULSE: 120
AUDITORY DISTURBANCES: NOT PRESENT
AUDIT-C TOTAL SCORE: 4
NAUSEA AND VOMITING: NO NAUSEA AND NO VOMITING
TREMOR: NO TREMOR
TREMOR: NO TREMOR
TOTAL SCORE: 6
HOW MANY STANDARD DRINKS CONTAINING ALCOHOL DO YOU HAVE ON A TYPICAL DAY: 1 OR 2
TOTAL SCORE: 0
HEADACHE, FULLNESS IN HEAD: NOT PRESENT
PAROXYSMAL SWEATS: BARELY PERCEPTIBLE SWEATING, PALMS MOIST
ORIENTATION AND CLOUDING OF SENSORIUM: ORIENTED AND CAN DO SERIAL ADDITIONS
AGITATION: NORMAL ACTIVITY
AGITATION: NORMAL ACTIVITY
PAROXYSMAL SWEATS: BARELY PERCEPTIBLE SWEATING, PALMS MOIST
VISUAL DISTURBANCES: NOT PRESENT
VISUAL DISTURBANCES: NOT PRESENT
PAROXYSMAL SWEATS: BARELY PERCEPTIBLE SWEATING, PALMS MOIST
TOTAL SCORE: 1
ORIENTATION AND CLOUDING OF SENSORIUM: ORIENTED AND CAN DO SERIAL ADDITIONS
ORIENTATION AND CLOUDING OF SENSORIUM: ORIENTED AND CAN DO SERIAL ADDITIONS
HEADACHE, FULLNESS IN HEAD: NOT PRESENT
ANXIETY: NO ANXIETY, AT EASE
ORIENTATION AND CLOUDING OF SENSORIUM: ORIENTED AND CAN DO SERIAL ADDITIONS
AUDITORY DISTURBANCES: NOT PRESENT
TREMOR: NO TREMOR
TOTAL SCORE: 0
VISUAL DISTURBANCES: NOT PRESENT
ANXIETY: NO ANXIETY, AT EASE
HEADACHE, FULLNESS IN HEAD: NOT PRESENT
TREMOR: NOT VISIBLE, BUT CAN BE FELT FINGERTIP TO FINGERTIP
TREMOR: NO TREMOR
PAROXYSMAL SWEATS: NO SWEAT VISIBLE
ANXIETY: NO ANXIETY, AT EASE
HEADACHE, FULLNESS IN HEAD: NOT PRESENT
TOTAL SCORE: 0
ORIENTATION AND CLOUDING OF SENSORIUM: ORIENTED AND CAN DO SERIAL ADDITIONS

## 2024-06-07 ASSESSMENT — ACTIVITIES OF DAILY LIVING (ADL)
LACK_OF_TRANSPORTATION: NO
BATHING: INDEPENDENT
TOILETING: INDEPENDENT
ADEQUATE_TO_COMPLETE_ADL: YES
DRESSING YOURSELF: INDEPENDENT
GROOMING: INDEPENDENT
WALKS IN HOME: INDEPENDENT
FEEDING YOURSELF: INDEPENDENT
HEARING - LEFT EAR: FUNCTIONAL
HEARING - RIGHT EAR: FUNCTIONAL
JUDGMENT_ADEQUATE_SAFELY_COMPLETE_DAILY_ACTIVITIES: YES
LACK_OF_TRANSPORTATION: NO
PATIENT'S MEMORY ADEQUATE TO SAFELY COMPLETE DAILY ACTIVITIES?: YES

## 2024-06-07 ASSESSMENT — PATIENT HEALTH QUESTIONNAIRE - PHQ9
2. FEELING DOWN, DEPRESSED OR HOPELESS: NOT AT ALL
1. LITTLE INTEREST OR PLEASURE IN DOING THINGS: NOT AT ALL
SUM OF ALL RESPONSES TO PHQ9 QUESTIONS 1 & 2: 0

## 2024-06-07 ASSESSMENT — PAIN SCALES - GENERAL
PAINLEVEL_OUTOF10: 0 - NO PAIN

## 2024-06-07 ASSESSMENT — PAIN - FUNCTIONAL ASSESSMENT: PAIN_FUNCTIONAL_ASSESSMENT: 0-10

## 2024-06-07 NOTE — PROGRESS NOTES
06/07/24 1435   Discharge Planning   Living Arrangements Alone   Support Systems Family members;Friends/neighbors   Assistance Needed Family assists   Type of Residence Private residence   Number of Stairs to Enter Residence 0   Number of Stairs Within Residence 0   Do you have animals or pets at home? Yes   Type of Animals or Pets A dog and a cat   Who is requesting discharge planning? Provider   Home or Post Acute Services None   Patient expects to be discharged to: Home   Does the patient need discharge transport arranged? No   Financial Resource Strain   How hard is it for you to pay for the very basics like food, housing, medical care, and heating? Not very   Housing Stability   In the last 12 months, was there a time when you were not able to pay the mortgage or rent on time? N   In the last 12 months, how many places have you lived? 1   In the last 12 months, was there a time when you did not have a steady place to sleep or slept in a shelter (including now)? N   Transportation Needs   In the past 12 months, has lack of transportation kept you from medical appointments or from getting medications? no   In the past 12 months, has lack of transportation kept you from meetings, work, or from getting things needed for daily living? No     Briefly met with patient as she did not want to talk to me.  Patient admitted for cellulitis and odor under abdominal fold.  She was in the ED and left AMA and her nephew brought her back.  PLAN: IV antibiotic, PT/OT  DISP: home alone  ADOD: 3 days  TCC will need to follow for discharge needs.  Sierra Smith RN

## 2024-06-07 NOTE — H&P
Yue Sanz is a 63 y.o. female   Weakness, Gen       Patient with a past medical history of alcohol abuse seizure disorder anemia depression with anxiety hypertension history of CVA] hemorrhagic dyslipidemia comes in with an episode of lightheadedness dizziness and syncope while at home  Was brought to the emergency room where workup showed possible cellulitis  She was initially admitted but left AMA  But convinced to come back to the hospital by her family members  Admits to drinking but says only drinks a glass of wine which she does not count as alcohol  Imaging also shows cirrhosis of the liver  On my exam the patient does seem a little off  Is alert x 2-3 but has a flat affect    Past Medical History  Past Medical History:   Diagnosis Date    Acute CVA (cerebrovascular accident) (Multi) 05/30/2023    Alcohol related seizure (Multi) 05/30/2023    Cerebral hemorrhage (Multi) 05/30/2023    Cerebral infarction due to embolism of right middle cerebral artery (Multi) 05/30/2023    Diarrhea, unspecified 08/10/2022    Diarrhea    Encounter for follow-up examination after completed treatment for conditions other than malignant neoplasm 04/20/2020    Hospital discharge follow-up    Encounter for other screening for malignant neoplasm of breast 03/08/2022    Breast screening    Encounter for screening for malignant neoplasm of colon 05/16/2022    Colon cancer screening    Other abnormality of red blood cells 08/10/2022    Elevated MCV    Personal history of other endocrine, nutritional and metabolic disease     History of high cholesterol    Personal history of transient ischemic attack (TIA), and cerebral infarction without residual deficits     History of cerebrovascular accident       Surgical History  Past Surgical History:   Procedure Laterality Date    MR HEAD ANGIO WO IV CONTRAST  5/19/2021    MR HEAD ANGIO WO IV CONTRAST 5/19/2021 [facility] EMERGENCY LEGACY    MR HEAD ANGIO WO IV CONTRAST  7/12/2018    MR HEAD ANGIO WO  IV CONTRAST 7/12/2018 [facility] CLINICAL LEGACY    MR NECK ANGIO WO IV CONTRAST  5/19/2021    MR NECK ANGIO WO IV CONTRAST 5/19/2021 [facility]        Social History  She reports that she has never smoked. She has never been exposed to tobacco smoke. She has never used smokeless tobacco. She reports that she does not currently use alcohol. She reports that she does not currently use drugs.    Family History  Family History   Problem Relation Name Age of Onset    Arthritis Mother      No Known Problems Father          Allergies  Amoxicillin    Review of Systems     Constitutional: Flat affect  Eyes: no blurred vision and no diplopia.   ENT: no hearing loss, no tinnitus, no earache, no sore throat, no hoarseness and no swollen glands in the neck.   Cardiovascular: no chest pain, no tightness or heavy pressure, no shortness of breath, no palpitations and no lower extremity edema.   Respiratory: no cough, wheezing or shortness of breath at rest or exertion  Gastrointestinal: no change in bowel habits, no diarrhea, no constipation, no bloody stools, no nausea, no vomiting, no abdominal pain, no signs and symptoms of ulcer disease, no ashish colored stools and no intolerance to fatty foods.   Genitourinary: no urinary frequency, no dysuria, no hematuria, no burning sensation during urination, urinary stream is not smaller and urinary stream does not start and stop.   Musculoskeletal: no arthralgias, no joint stiffness, no muscle weakness, no back pain and no difficulty walking.   Skin: no rashes, no change in skin color and pigmentation, no skin lesions and no skin lumps.   Neurological: no headaches,no seizures, no tingling, no numbness, no signs and symptoms of stroke and no limb weakness.   Psychiatric: Some memory impairment  Endocrine: no goiter, no thyroid disorder, no diabetes mellitus, no excessive thirst, no dry skin, no cold intolerance, no heat intolerance and no increased urinary frequency.    Hematologic/Lymphatic: is not slow to heal, does not bleed easily, does not bruise easily, no thrombophlebitis, no anemia and no history of blood transfusion.   All other systems have been reviewed and are negative for complaint.     Vitals:    06/07/24 1307   BP: (!) 136/96   Pulse:    Resp: 16   Temp: 36.2 °C (97.2 °F)   SpO2: 96%        Scheduled medications  aspirin, 81 mg, oral, Daily  atorvastatin, 40 mg, oral, Nightly  ceFAZolin, 1 g, intravenous, q8h  clopidogrel, 75 mg, oral, Daily  folic acid, 1 mg, oral, Daily  hydrALAZINE, 10 mg, oral, TID  levETIRAcetam, 500 mg, oral, BID  lisinopril, 10 mg, oral, Daily  multivitamin with minerals, 1 tablet, oral, Daily  nystatin, , Topical, BID  pantoprazole, 40 mg, oral, Daily before breakfast   Or  pantoprazole, 40 mg, intravenous, Daily before breakfast  sertraline, 50 mg, oral, Daily  [START ON 6/10/2024] thiamine, 100 mg, oral, Daily      Continuous medications     PRN medications  PRN medications: acetaminophen **OR** acetaminophen **OR** acetaminophen, guaiFENesin, LORazepam **OR** LORazepam **OR** LORazepam, melatonin, ondansetron **OR** ondansetron, polyethylene glycol    Results from last 7 days   Lab Units 06/06/24  1341   WBC AUTO x10*3/uL 12.3*   HEMOGLOBIN g/dL 10.2*   HEMATOCRIT % 30.4*   PLATELETS AUTO x10*3/uL 287     Results from last 7 days   Lab Units 06/06/24  1726 06/06/24  1341   SODIUM mmol/L 136 134*   POTASSIUM mmol/L 4.5 4.3   CHLORIDE mmol/L 103 98   CO2 mmol/L 13* 14*   BUN mg/dL 54* 59*   CREATININE mg/dL 1.71* 2.09*   CALCIUM mg/dL 8.9 10.1   PROTEIN TOTAL g/dL 6.8 7.4   BILIRUBIN TOTAL mg/dL 0.5 0.7   ALK PHOS U/L 60 65   ALT U/L 11 11   AST U/L 14 16   GLUCOSE mg/dL 72* 76     Results from last 7 days   Lab Units 06/06/24  1511 06/06/24  1341   TROPHS ng/L 23* 25*        No orders to display       Physical Exam     Constitutional   General appearance: Alert   Eyes   Inspection of eyes: Sclera and conjunctiva were normal.    Pupil  exam: Pupils were equal in size. Extraocular movements were intact.   Pulmonary   Respiratory assessment: No respiratory distress, normal respiratory rhythm and effort.    Auscultation of Lungs: Clear bilateral breath sounds.   Cardiovascular   Auscultation of heart: Apical pulse normal, heart rate and rhythm normal, normal S1 and S2, no murmurs and no pericardial rub.    Exam for edema: No peripheral edema.   Abdomen   Abdominal Exam: No bruits, normal bowel sounds, soft, non-tender, no abdominal mass palpated.    Liver and Spleen exam: No hepato-splenomegaly.   Musculoskeletal       Inspection of digits and nails: No clubbing or cyanosis of the fingernails.    Inspection/palpation of joints, bones and muscles: No joint swelling. Normal movement of all extremities.   Skin   Skin inspection: Erythema in the groin area  Neurologic   Cranial nerves: Nerves 2-12 were intact, no focal neuro defects.   Flat affect  Psychiatric   Appears anxious      Assessment/Plan      #Cellulitis  #Candida  Started IV cefazolin  Added nystatin ointment    #Alcohol abuse  Suspect alcohol induced encephalopathy  Started CIWA protocol  Will consult psychiatry    #Low blood pressures  Responded well to a fluid bolus  Monitor CBC BMP    #Seizure disorder  Continue Keppra    #Dyslipidemia  Continue statins

## 2024-06-07 NOTE — CARE PLAN
Problem: Skin  Goal: Decreased wound size/increased tissue granulation at next dressing change  6/7/2024 1528 by Kaela Rodriguez  Outcome: Progressing  6/7/2024 1528 by Kaela Rodriguez  Outcome: Progressing  Goal: Participates in plan/prevention/treatment measures  6/7/2024 1528 by Kaela Rodriguez  Outcome: Progressing  6/7/2024 1528 by Kaela Rodriguez  Outcome: Progressing  Goal: Prevent/manage excess moisture  6/7/2024 1528 by Kaela Rodriguez  Outcome: Progressing  6/7/2024 1528 by Kaela Rodriguez  Outcome: Progressing  Goal: Prevent/minimize sheer/friction injuries  6/7/2024 1528 by Kaela Rodriguez  Outcome: Progressing  6/7/2024 1528 by Kaela Rodriguez  Outcome: Progressing  Goal: Promote/optimize nutrition  6/7/2024 1528 by Kaela Rodriguez  Outcome: Progressing  6/7/2024 1528 by Kaela Rodriguez  Outcome: Progressing  Goal: Promote skin healing  6/7/2024 1528 by Kaela Rodriguez  Outcome: Progressing  6/7/2024 1528 by Kaela Rodriguez  Outcome: Progressing     Problem: Pain  Goal: My pain/discomfort is manageable  Outcome: Progressing     Problem: Safety  Goal: Patient will be injury free during hospitalization  Outcome: Progressing  Goal: I will remain free of falls  Outcome: Progressing     Problem: Daily Care  Goal: Daily care needs are met  Outcome: Progressing     Problem: Psychosocial Needs  Goal: Demonstrates ability to cope with hospitalization/illness  Outcome: Progressing  Goal: Collaborate with me, my family, and caregiver to identify my specific goals  Outcome: Progressing     Problem: Discharge Barriers  Goal: My discharge needs are met  Outcome: Progressing

## 2024-06-07 NOTE — PROGRESS NOTES
Pharmacy Medication History Review    Per patient. Said stopped taking the dosepak as it made her sick.     Yue Snaz is a 63 y.o. female admitted for Cellulitis, unspecified cellulitis site. Pharmacy reviewed the patient's [id number] medications and allergies for accuracy.    The list below reflectives the updated PTA list. Please review each medication in order        The list below reflectives the updated allergy list. Please review each documented allergy for additional clarification and justification.  Allergies  Reviewed by Chris Max, EMT on 6/6/2024   No Known Allergies         Below are additional concerns with the patient's PTA list.  Prior to Admission Medications   Prescriptions Last Dose Informant   aspirin 81 mg EC tablet 6/5/2024    Sig: Take 1 tablet (81 mg) by mouth once daily.   atorvastatin (Lipitor) 40 mg tablet 6/5/2024    Sig: Take 1 tablet (40 mg) by mouth once daily.   clopidogrel (Plavix) 75 mg tablet 6/5/2024    Sig: Take 1 tablet (75 mg) by mouth once daily.   cyclobenzaprine (Flexeril) 10 mg tablet     Sig: Take 1 tablet (10 mg) by mouth 3 times a day as needed for muscle spasms for up to 10 days.   folic acid (Folvite) 1 mg tablet 6/5/2024    Sig: Take 1 tablet (1 mg) by mouth once daily.   hydrALAZINE (Apresoline) 10 mg tablet 6/5/2024    Sig: Take 1 tablet (10 mg) by mouth 3 times a day.   levETIRAcetam (Keppra) 500 mg tablet 6/5/2024    Sig: Take 1 tablet (500 mg) by mouth 2 times a day.   lisinopril 10 mg tablet 6/5/2024    Sig: Take 1 tablet (10 mg) by mouth once daily.              sertraline (Zoloft) 50 mg tablet 6/5/2024    Sig: Take 1 tablet (50 mg) by mouth once daily.      Facility-Administered Medications: None        Rukhsana Lam

## 2024-06-07 NOTE — ED PROCEDURE NOTE
Procedure  Critical Care    Performed by: Thang Mendez MD  Authorized by: Thang Mendez MD    Critical care provider statement:     Critical care time (minutes):  35    Critical care time was exclusive of:  Separately billable procedures and treating other patients and teaching time    Critical care was necessary to treat or prevent imminent or life-threatening deterioration of the following conditions:  Sepsis    Critical care was time spent personally by me on the following activities:  Blood draw for specimens, development of treatment plan with patient or surrogate, discussions with consultants, evaluation of patient's response to treatment, examination of patient, ordering and performing treatments and interventions, ordering and review of laboratory studies, ordering and review of radiographic studies, pulse oximetry, re-evaluation of patient's condition and review of old charts    Care discussed with: admitting provider                 Thang Mendez MD  06/06/24 2013

## 2024-06-07 NOTE — PROGRESS NOTES
Yue Sanz is a 63 y.o. female who was referred to the Clinical Pharmacy Team to complete a virtual Transitions of Care encounter for discharge medication optimization. The patient was referred for their h/o stroke.    Attending: Carrie Lancaster MD    PCP: Lluvia Alfred Pla, DO    _______________________________________________________________________  PHARMACY ASSESSMENT    Home Pharmacy: [facility]   Meds to beds? yes    Adherence: Patient states they remember to take meds every day, but also mentioned they ran out of ASA 81mg and have not taken it for a few days. Patient states they buy OTC and have not been to the store recently to restock. Addressed the importance of ASA and advised they could have doctor write Rx for the ASA.    No issues reported in regards to affordability, accessibility, organization, and adverse effects  _______________________________________________________________________  STROKE ASSESSMENT    -Cause of stroke: CVA hemorrhagic  -Medical management: ASA 81mg every day + Clopidogrel 75mg every day   -Duration of therapy: since 5/30/23  -Medication therapy appropriate per cause of stroke.  -The ASCVD Risk score (Jerman DK, et al., 2019) failed to calculate for the following reasons:    The patient has a prior MI or stroke diagnosis     HTN present/diagnosed: yes  -Most Recent BP:   ED admission [6/7/24] - 142/95 mmHg  During admission in room [6/7/24] - 136/96 mmHg  Last office visit [3/6/24] - 130/78 mmHg: at this visit, patient bloodwork came back and K+ was slightly elevated, PCP had Lisinopril decreased from 20mg to 10mg with notes to recheck K+ in one month  -Current Medication regimen:   Lisinopril 10mg po every day  Hydralazine 10mg po TID   -BP cuff at home? no - patient states their mother who lives right next door to them has a BP monitor that she could always use if needed. Encouraged patient to consider using at least once a week or anytime they are feeling lightheaded/dizzy  "to determine if related to high/low BP.      HLD present/diagnosed: yes   - Statin? yes: Atorvastatin 40mg every day   - At Goal: no    Lab Results   Component Value Date    CHOL 204 (H) 03/08/2024    CHOL CANCELED 09/06/2023    CHOL 220 (H) 06/02/2023     Lab Results   Component Value Date    .2 03/08/2024    HDL CANCELED 09/06/2023    HDL 89.6 06/02/2023     Lab Results   Component Value Date    LDLCALC 83 03/08/2024     Lab Results   Component Value Date    TRIG 84 03/08/2024    TRIG CANCELED 09/06/2023    TRIG 78 06/02/2023     No components found for: \"CHOLHDL\"  _______________________________________________________________________  PATIENT EDUCATION/GOALS  - LDL Goal: < 70mg/dL  - Your blood pressure goal is less than 130/80 mmHg  - Limit alcohol use to 1-2 drinks per weekend  - Answered all patient questions and concerns to best of my ability  _______________________________________________________________________  RECOMMENDATIONS/PLAN  Current BP not at goal with admission, follow up BP after discharge. Encouraged patient to begin checking BP at home, consider prescription for at home BP monitor to further assist patient.  Follow up on ASA adherence, consider prescription if patient not going to purchase OTC  LDL not currently at goal, consider increasing statin therapy with next Lipid panel review, due in 3 months.  Continue all medications per medical team  Please send prescriptions to [facility] pharmacy for assistance on insurance prior authorization and copay. Prescriptions will be delivered to the patient's bedside prior to discharge with the Meds to Beds program.   Continuity of care will be provided by PCP and clinical pharmacy team      Dian Rivers, PharmD    Verbal consent to manage patient's drug therapy was obtained from the patient. They were informed they may decline to participate or withdraw from participation in pharmacy services at any time.    " Joseph Hernandez)

## 2024-06-07 NOTE — ED PROVIDER NOTES
HPI   Chief Complaint   Patient presents with    Weakness, Gen       HPI     Patient is a 63-year-old female with a past medical history of prior CVA, alcohol use disorder, seizures, hypertension, hyperlipidemia presenting to the emergency department with cellulitis.  Patient was previously seen in the emergency department earlier today and had a CT of the abdomen pelvis to evaluate for more severe infection near the site of visibly apparent cellulitis and malodor near the patient's bilateral inguinal folds.  Showed no evidence of necrotizing infection.  Physical exam does not show any evidence of necrotizing infection.  Patient had left AMA because she did not want to wait around in the emergency department for a bed to become available upstairs.  Presents now with her nephew and states that she is amenable to staying.  States that she feels in her normal state of health.  Denies any chest pain, abdominal pain, nausea, vomiting, fever, chills.  Does state that she has some irritation near her area of known cellulitis.               [place] Coma Scale Score: 15                     Patient History   Past Medical History:   Diagnosis Date    Acute CVA (cerebrovascular accident) (Multi) 05/30/2023    Alcohol related seizure (Multi) 05/30/2023    Cerebral hemorrhage (Multi) 05/30/2023    Cerebral infarction due to embolism of right middle cerebral artery (Multi) 05/30/2023    Diarrhea, unspecified 08/10/2022    Diarrhea    Encounter for follow-up examination after completed treatment for conditions other than malignant neoplasm 04/20/2020    Hospital discharge follow-up    Encounter for other screening for malignant neoplasm of breast 03/08/2022    Breast screening    Encounter for screening for malignant neoplasm of colon 05/16/2022    Colon cancer screening    Other abnormality of red blood cells 08/10/2022    Elevated MCV    Personal history of other endocrine, nutritional and metabolic disease     History of high  cholesterol    Personal history of transient ischemic attack (TIA), and cerebral infarction without residual deficits     History of cerebrovascular accident     Past Surgical History:   Procedure Laterality Date    MR HEAD ANGIO WO IV CONTRAST  5/19/2021    MR HEAD ANGIO WO IV CONTRAST 5/19/2021 [facility] EMERGENCY LEGACY    MR HEAD ANGIO WO IV CONTRAST  7/12/2018    MR HEAD ANGIO WO IV CONTRAST 7/12/2018 [facility] CLINICAL LEGACY    MR NECK ANGIO WO IV CONTRAST  5/19/2021    MR NECK ANGIO WO IV CONTRAST 5/19/2021 [facility]     Family History   Problem Relation Name Age of Onset    Arthritis Mother      No Known Problems Father       Social History     Tobacco Use    Smoking status: Never     Passive exposure: Never    Smokeless tobacco: Never   Vaping Use    Vaping status: Never Used   Substance Use Topics    Alcohol use: Not Currently    Drug use: Not Currently       Physical Exam   ED Triage Vitals   Temperature Heart Rate Respirations BP   06/07/24 0000 06/07/24 0003 06/07/24 0003 06/07/24 0003   36.2 °C (97.1 °F) 89 18 (!) 142/95      Pulse Ox Temp Source Heart Rate Source Patient Position   06/07/24 0003 06/07/24 0000 06/07/24 0003 --   100 % Temporal Monitor       BP Location FiO2 (%)     -- --             Physical Exam  Constitutional:       Appearance: She is not toxic-appearing or diaphoretic.   HENT:      Head: Normocephalic and atraumatic.      Nose: Nose normal.   Eyes:      General: No scleral icterus.        Right eye: No discharge.         Left eye: No discharge.      Conjunctiva/sclera: Conjunctivae normal.   Cardiovascular:      Rate and Rhythm: Normal rate.      Heart sounds: No murmur heard.     No friction rub. No gallop.   Pulmonary:      Effort: No respiratory distress.      Breath sounds: Normal breath sounds.   Abdominal:      General: There is no distension.      Palpations: Abdomen is soft.   Musculoskeletal:         General: No deformity or signs of injury.      Cervical back: Neck  supple. No rigidity.   Skin:     General: Skin is warm and dry.      Findings: Rash (Redness swelling, malodor, some superficial weeping in the bilateral inguinal folds, right greater than left) present.   Neurological:      Mental Status: She is alert.   Psychiatric:         Mood and Affect: Mood normal.         Behavior: Behavior normal.         ED Course & MDM   Diagnoses as of 06/10/24 6897   Cellulitis, unspecified cellulitis site       Medical Decision Making  Patient is a 63-year-old female presenting to the emergency department with cellulitis.  There are concerns with the patient's ability to manage her symptoms at home.  Given the patient had left AMA, will be readmitted to the same service for IV antibiotics and treatment of cellulitis.  Patient placed empirically on CIWA protocols.  Previous imaging earlier today as well as previous labs were reviewed, showed no severe abnormalities.  Does have a slight ALEJANDRO change from previous.  Will be admitted for further evaluation, management, and treatment.    Procedure  Procedures     Romain Grider MD  Resident  06/07/24 0041       Romain Grider MD  Resident  06/10/24 [id number]

## 2024-06-07 NOTE — CARE PLAN
Problem: Skin  Goal: Decreased wound size/increased tissue granulation at next dressing change  Outcome: Progressing  Flowsheets (Taken 6/7/2024 0536)  Decreased wound size/increased tissue granulation at next dressing change: Promote sleep for wound healing  Goal: Participates in plan/prevention/treatment measures  Outcome: Progressing  Flowsheets (Taken 6/7/2024 0536)  Participates in plan/prevention/treatment measures: Increase activity/out of bed for meals  Goal: Prevent/manage excess moisture  Outcome: Progressing  Flowsheets (Taken 6/7/2024 0536)  Prevent/manage excess moisture:   Moisturize dry skin   Use wicking fabric (obtain order)   Monitor for/manage infection if present   Cleanse incontinence/protect with barrier cream  Goal: Prevent/minimize sheer/friction injuries  Outcome: Progressing  Flowsheets (Taken 6/7/2024 0536)  Prevent/minimize sheer/friction injuries:   Increase activity/out of bed for meals   Utilize specialty bed per algorithm   HOB 30 degrees or less   Complete micro-shifts as needed if patient unable. Adjust patient position to relieve pressure points, not a full turn  Goal: Promote/optimize nutrition  Outcome: Progressing  Flowsheets (Taken 6/7/2024 0536)  Promote/optimize nutrition:   Monitor/record intake including meals   Consume > 50% meals/supplements   Discuss with provider if NPO > 2 days   Offer water/supplements/favorite foods  Goal: Promote skin healing  Outcome: Progressing  Flowsheets (Taken 6/7/2024 0536)  Promote skin healing:   Turn/reposition every 2 hours/use positioning/transfer devices   Protective dressings over bony prominences   Assess skin/pad under line(s)/device(s)   The patient's goals for the shift include  regain strength working with physical therapy     The clinical goals for the shift include  regain strength working with physical therapy

## 2024-06-08 LAB
ANION GAP SERPL CALC-SCNC: 18 MMOL/L (ref 10–20)
BUN SERPL-MCNC: 28 MG/DL (ref 6–23)
CALCIUM SERPL-MCNC: 9.2 MG/DL (ref 8.6–10.3)
CHLORIDE SERPL-SCNC: 104 MMOL/L (ref 98–107)
CO2 SERPL-SCNC: 17 MMOL/L (ref 21–32)
CREAT SERPL-MCNC: 1.06 MG/DL (ref 0.5–1.05)
EGFRCR SERPLBLD CKD-EPI 2021: 59 ML/MIN/1.73M*2
ERYTHROCYTE [DISTWIDTH] IN BLOOD BY AUTOMATED COUNT: 11.9 % (ref 11.5–14.5)
GLUCOSE SERPL-MCNC: 86 MG/DL (ref 74–99)
HCT VFR BLD AUTO: 29.9 % (ref 36–46)
HGB BLD-MCNC: 10 G/DL (ref 12–16)
MCH RBC QN AUTO: 32.9 PG (ref 26–34)
MCHC RBC AUTO-ENTMCNC: 33.4 G/DL (ref 32–36)
MCV RBC AUTO: 98 FL (ref 80–100)
NRBC BLD-RTO: 0 /100 WBCS (ref 0–0)
PLATELET # BLD AUTO: 217 X10*3/UL (ref 150–450)
POTASSIUM SERPL-SCNC: 4.1 MMOL/L (ref 3.5–5.3)
RBC # BLD AUTO: 3.04 X10*6/UL (ref 4–5.2)
SODIUM SERPL-SCNC: 135 MMOL/L (ref 136–145)
WBC # BLD AUTO: 6.9 X10*3/UL (ref 4.4–11.3)

## 2024-06-08 PROCEDURE — 80048 BASIC METABOLIC PNL TOTAL CA: CPT | Performed by: INTERNAL MEDICINE

## 2024-06-08 PROCEDURE — 2500000001 HC RX 250 WO HCPCS SELF ADMINISTERED DRUGS (ALT 637 FOR MEDICARE OP): Performed by: STUDENT IN AN ORGANIZED HEALTH CARE EDUCATION/TRAINING PROGRAM

## 2024-06-08 PROCEDURE — 97166 OT EVAL MOD COMPLEX 45 MIN: CPT | Mod: GO

## 2024-06-08 PROCEDURE — 36415 COLL VENOUS BLD VENIPUNCTURE: CPT | Performed by: INTERNAL MEDICINE

## 2024-06-08 PROCEDURE — 2500000004 HC RX 250 GENERAL PHARMACY W/ HCPCS (ALT 636 FOR OP/ED): Mod: JZ,MUE | Performed by: INTERNAL MEDICINE

## 2024-06-08 PROCEDURE — 85027 COMPLETE CBC AUTOMATED: CPT | Performed by: INTERNAL MEDICINE

## 2024-06-08 PROCEDURE — 1200000002 HC GENERAL ROOM WITH TELEMETRY DAILY

## 2024-06-08 PROCEDURE — 82374 ASSAY BLOOD CARBON DIOXIDE: CPT | Performed by: INTERNAL MEDICINE

## 2024-06-08 PROCEDURE — 97535 SELF CARE MNGMENT TRAINING: CPT | Mod: 59,GO

## 2024-06-08 PROCEDURE — 97161 PT EVAL LOW COMPLEX 20 MIN: CPT | Mod: GP

## 2024-06-08 PROCEDURE — 2500000001 HC RX 250 WO HCPCS SELF ADMINISTERED DRUGS (ALT 637 FOR MEDICARE OP): Performed by: INTERNAL MEDICINE

## 2024-06-08 RX ADMIN — LEVETIRACETAM 500 MG: 500 TABLET, FILM COATED ORAL at 20:13

## 2024-06-08 RX ADMIN — Medication 3 MG: at 20:15

## 2024-06-08 RX ADMIN — FOLIC ACID 1 MG: 1 TABLET ORAL at 09:13

## 2024-06-08 RX ADMIN — LEVETIRACETAM 500 MG: 500 TABLET, FILM COATED ORAL at 09:00

## 2024-06-08 RX ADMIN — CEFAZOLIN SODIUM 1 G: 1 INJECTION, SOLUTION INTRAVENOUS at 09:05

## 2024-06-08 RX ADMIN — METOPROLOL TARTRATE 25 MG: 25 TABLET, FILM COATED ORAL at 20:13

## 2024-06-08 RX ADMIN — CEFAZOLIN SODIUM 1 G: 1 INJECTION, SOLUTION INTRAVENOUS at 00:00

## 2024-06-08 RX ADMIN — ATORVASTATIN CALCIUM 40 MG: 40 TABLET, FILM COATED ORAL at 20:13

## 2024-06-08 RX ADMIN — HYDRALAZINE HYDROCHLORIDE 10 MG: 10 TABLET, FILM COATED ORAL at 20:13

## 2024-06-08 RX ADMIN — CEFAZOLIN SODIUM 1 G: 1 INJECTION, SOLUTION INTRAVENOUS at 16:28

## 2024-06-08 RX ADMIN — ASPIRIN 81 MG: 81 TABLET, COATED ORAL at 09:05

## 2024-06-08 RX ADMIN — NYSTATIN: 100000 OINTMENT TOPICAL at 09:07

## 2024-06-08 RX ADMIN — CLOPIDOGREL 75 MG: 75 TABLET ORAL at 09:05

## 2024-06-08 RX ADMIN — Medication 1 TABLET: at 09:05

## 2024-06-08 RX ADMIN — NYSTATIN: 100000 OINTMENT TOPICAL at 20:13

## 2024-06-08 RX ADMIN — PANTOPRAZOLE SODIUM 40 MG: 40 TABLET, DELAYED RELEASE ORAL at 06:35

## 2024-06-08 RX ADMIN — CEFAZOLIN SODIUM 1 G: 1 INJECTION, SOLUTION INTRAVENOUS at 23:44

## 2024-06-08 ASSESSMENT — COGNITIVE AND FUNCTIONAL STATUS - GENERAL
STANDING UP FROM CHAIR USING ARMS: A LITTLE
CLIMB 3 TO 5 STEPS WITH RAILING: A LOT
MOVING TO AND FROM BED TO CHAIR: A LITTLE
STANDING UP FROM CHAIR USING ARMS: A LITTLE
TURNING FROM BACK TO SIDE WHILE IN FLAT BAD: A LITTLE
MOBILITY SCORE: 21
TURNING FROM BACK TO SIDE WHILE IN FLAT BAD: A LITTLE
STANDING UP FROM CHAIR USING ARMS: A LITTLE
DAILY ACTIVITIY SCORE: 21
CLIMB 3 TO 5 STEPS WITH RAILING: A LOT
DRESSING REGULAR LOWER BODY CLOTHING: A LITTLE
TOILETING: A LITTLE
WALKING IN HOSPITAL ROOM: A LITTLE
MOVING TO AND FROM BED TO CHAIR: A LITTLE
DRESSING REGULAR LOWER BODY CLOTHING: A LITTLE
MOVING FROM LYING ON BACK TO SITTING ON SIDE OF FLAT BED WITH BEDRAILS: A LITTLE
WALKING IN HOSPITAL ROOM: A LITTLE
DAILY ACTIVITIY SCORE: 21
MOBILITY SCORE: 17
HELP NEEDED FOR BATHING: A LITTLE
CLIMB 3 TO 5 STEPS WITH RAILING: A LITTLE
DAILY ACTIVITIY SCORE: 24
MOBILITY SCORE: 17
WALKING IN HOSPITAL ROOM: A LITTLE
HELP NEEDED FOR BATHING: A LITTLE
MOVING FROM LYING ON BACK TO SITTING ON SIDE OF FLAT BED WITH BEDRAILS: A LITTLE
TOILETING: A LITTLE

## 2024-06-08 ASSESSMENT — LIFESTYLE VARIABLES
AGITATION: NORMAL ACTIVITY
AUDITORY DISTURBANCES: NOT PRESENT
VISUAL DISTURBANCES: NOT PRESENT
PAROXYSMAL SWEATS: NO SWEAT VISIBLE
ORIENTATION AND CLOUDING OF SENSORIUM: ORIENTED AND CAN DO SERIAL ADDITIONS
TREMOR: NOT VISIBLE, BUT CAN BE FELT FINGERTIP TO FINGERTIP
AUDITORY DISTURBANCES: NOT PRESENT
PAROXYSMAL SWEATS: NO SWEAT VISIBLE
VISUAL DISTURBANCES: NOT PRESENT
AUDITORY DISTURBANCES: NOT PRESENT
NAUSEA AND VOMITING: NO NAUSEA AND NO VOMITING
AGITATION: NORMAL ACTIVITY
ANXIETY: NO ANXIETY, AT EASE
ANXIETY: MILDLY ANXIOUS
TREMOR: NO TREMOR
ANXIETY: NO ANXIETY, AT EASE
HEADACHE, FULLNESS IN HEAD: NOT PRESENT
TREMOR: NO TREMOR
ORIENTATION AND CLOUDING OF SENSORIUM: ORIENTED AND CAN DO SERIAL ADDITIONS
NAUSEA AND VOMITING: NO NAUSEA AND NO VOMITING
AGITATION: NORMAL ACTIVITY
VISUAL DISTURBANCES: NOT PRESENT
AUDITORY DISTURBANCES: NOT PRESENT
TOTAL SCORE: 2
TREMOR: NO TREMOR
NAUSEA AND VOMITING: NO NAUSEA AND NO VOMITING
HEADACHE, FULLNESS IN HEAD: NOT PRESENT
TOTAL SCORE: 1
PAROXYSMAL SWEATS: NO SWEAT VISIBLE
ORIENTATION AND CLOUDING OF SENSORIUM: ORIENTED AND CAN DO SERIAL ADDITIONS
TOTAL SCORE: 0
ANXIETY: MILDLY ANXIOUS
TOTAL SCORE: 0
VISUAL DISTURBANCES: NOT PRESENT
AGITATION: NORMAL ACTIVITY
HEADACHE, FULLNESS IN HEAD: NOT PRESENT
NAUSEA AND VOMITING: NO NAUSEA AND NO VOMITING
HEADACHE, FULLNESS IN HEAD: NOT PRESENT
ORIENTATION AND CLOUDING OF SENSORIUM: ORIENTED AND CAN DO SERIAL ADDITIONS
PAROXYSMAL SWEATS: NO SWEAT VISIBLE

## 2024-06-08 ASSESSMENT — PAIN - FUNCTIONAL ASSESSMENT
PAIN_FUNCTIONAL_ASSESSMENT: 0-10

## 2024-06-08 ASSESSMENT — ACTIVITIES OF DAILY LIVING (ADL)
ADL_ASSISTANCE: INDEPENDENT
ADL_ASSISTANCE: INDEPENDENT
HOME_MANAGEMENT_TIME_ENTRY: 8

## 2024-06-08 ASSESSMENT — PAIN SCALES - GENERAL
PAINLEVEL_OUTOF10: 0 - NO PAIN

## 2024-06-08 NOTE — PROGRESS NOTES
Yue Sanz is a 63 y.o. female on day 1 of admission presenting with Cellulitis, unspecified cellulitis site.      Subjective   She was seen this morning   No chest pain  No shortness of breath  No nausea vomiting   No abdo pain  She was tachycardic thruout the night        Objective     Last Recorded Vitals  /68 (BP Location: Left arm, Patient Position: Lying)   Pulse 108   Temp 36.6 °C (97.9 °F) (Temporal)   Resp 18   Wt 57.4 kg (126 lb 8 oz)   SpO2 99%   Intake/Output last 3 Shifts:    Intake/Output Summary (Last 24 hours) at 6/8/2024 1611  Last data filed at 6/7/2024 1744  Gross per 24 hour   Intake --   Output 300 ml   Net -300 ml       Admission Weight  Weight: 68 kg (150 lb) (06/07/24 0003)    Daily Weight  06/07/24 : 57.4 kg (126 lb 8 oz)    Image Results  XR chest 1 view  Narrative: Interpreted By:  Margaret Connor,   STUDY:  XR CHEST 1 VIEW;  6/7/2024 5:17 pm      INDICATION:  Signs/Symptoms:Shortness of breath      COMPARISON:  Chest x-ray 06/06/2024, 01/28/2021.      ACCESSION NUMBER(S):  [id number]      ORDERING CLINICIAN:  DUNG CHOUDHARY      TECHNIQUE:  Portable upright frontal view of the chest was obtained .      FINDINGS:  Monitoring wires and radiopaque densities are overlying the patient.      The cardiomediastinal silhouette is within normal limits.      No focal consolidation, pleural effusion or pneumothorax.      Impression: 1.  No radiographic evidence of acute cardiopulmonary process.              MACRO:  None.      Signed by: Margaret Connor 6/7/2024 9:34 PM  Dictation workstation:   [id number]  NM Lung perfusion with spect/ct  Interpreted By:  Margaret Connor,   STUDY:  NM LUNG PERFUSION WITH SPECT/CT;  6/7/2024 9:21 pm      INDICATION:  Signs/Symptoms:Hypoxia.      COMPARISON:  Chest x-ray 06/07/2024.      ACCESSION NUMBER(S):  [id number]      ORDERING CLINICIAN:  DUNG CHOUDHARY      TECHNIQUE:  Multiple perfusion images of the lungs were acquired after the  intravenous  administration of  4.3 mCi of Tc-99m macroaggregated  albumin (MAA). SPECT/CT images of the lungs were also obtained.      FINDINGS:  Perfusion images demonstrate a mildly heterogeneous distribution of  radiopharmaceutical throughout the lungs without evidence of  segmental perfusion abnormalities.      Additional findings on low-dose SPECT CT images :  No obvious consolidation or pleural effusion is identified.      IMPRESSION  1. Low probability of acute pulmonary embolism.              MACRO:  None.          Signed by: Margaret Connor 6/7/2024 9:32 PM  Dictation workstation:   [id number]      Physical Exam  Chest clear  Anxious  Cvs regular  Ext noedema  Abdo soft nontender bs active,   Heent normal muocsa  Neck supple     Relevant Results  Scheduled medications  aspirin, 81 mg, oral, Daily  atorvastatin, 40 mg, oral, Nightly  ceFAZolin, 1 g, intravenous, q8h  clopidogrel, 75 mg, oral, Daily  folic acid, 1 mg, oral, Daily  hydrALAZINE, 10 mg, oral, TID  levETIRAcetam, 500 mg, oral, BID  lisinopril, 10 mg, oral, Daily  metoprolol tartrate, 25 mg, oral, BID  multivitamin with minerals, 1 tablet, oral, Daily  nystatin, , Topical, BID  pantoprazole, 40 mg, oral, Daily before breakfast   Or  pantoprazole, 40 mg, intravenous, Daily before breakfast  sertraline, 50 mg, oral, Daily  [START ON 6/10/2024] thiamine, 100 mg, oral, Daily      Continuous medications     PRN medications  PRN medications: acetaminophen **OR** acetaminophen **OR** acetaminophen, guaiFENesin, LORazepam **OR** LORazepam **OR** LORazepam, melatonin, ondansetron **OR** ondansetron, polyethylene glycol  Results for orders placed or performed during the hospital encounter of 06/07/24 (from the past 96 hour(s))   Urinalysis with Reflex Microscopic   Result Value Ref Range    Color, Urine Yellow Light-Yellow, Yellow, Dark-Yellow    Appearance, Urine Turbid (N) Clear    Specific Gravity, Urine 1.024 1.005 - 1.035    pH, Urine 5.5 5.0, 5.5, 6.0, 6.5,  7.0, 7.5, 8.0    Protein, Urine 20 (TRACE) NEGATIVE, 10 (TRACE), 20 (TRACE) mg/dL    Glucose, Urine Normal Normal mg/dL    Blood, Urine NEGATIVE NEGATIVE    Ketones, Urine 20 (1+) (A) NEGATIVE mg/dL    Bilirubin, Urine NEGATIVE NEGATIVE    Urobilinogen, Urine Normal Normal mg/dL    Nitrite, Urine NEGATIVE NEGATIVE    Leukocyte Esterase, Urine 500 Raine/µL (A) NEGATIVE   Microscopic Only, Urine   Result Value Ref Range    WBC, Urine 11-20 (A) 1-5, NONE /HPF    RBC, Urine NONE NONE, 1-2, 3-5 /HPF    Squamous Epithelial Cells, Urine 1-9 (SPARSE) Reference range not established. /HPF    Bacteria, Urine 1+ (A) NONE SEEN /HPF    Mucus, Urine FEW Reference range not established. /LPF    Hyaline Casts, Urine 3+ (A) NONE /LPF   SST TOP   Result Value Ref Range    Extra Tube Hold for add-ons.    D-dimer, VTE Exclusion   Result Value Ref Range    D-Dimer, Quantitative VTE Exclusion 1,116 (H) <=500 ng/mL FEU   CBC   Result Value Ref Range    WBC 6.9 4.4 - 11.3 x10*3/uL    nRBC 0.0 0.0 - 0.0 /100 WBCs    RBC 3.04 (L) 4.00 - 5.20 x10*6/uL    Hemoglobin 10.0 (L) 12.0 - 16.0 g/dL    Hematocrit 29.9 (L) 36.0 - 46.0 %    MCV 98 80 - 100 fL    MCH 32.9 26.0 - 34.0 pg    MCHC 33.4 32.0 - 36.0 g/dL    RDW 11.9 11.5 - 14.5 %    Platelets 217 150 - 450 x10*3/uL   Basic metabolic panel   Result Value Ref Range    Glucose 86 74 - 99 mg/dL    Sodium 135 (L) 136 - 145 mmol/L    Potassium 4.1 3.5 - 5.3 mmol/L    Chloride 104 98 - 107 mmol/L    Bicarbonate 17 (L) 21 - 32 mmol/L    Anion Gap 18 10 - 20 mmol/L    Urea Nitrogen 28 (H) 6 - 23 mg/dL    Creatinine 1.06 (H) 0.50 - 1.05 mg/dL    eGFR 59 (L) >60 mL/min/1.73m*2    Calcium 9.2 8.6 - 10.3 mg/dL                Assessment/Plan        Principal Problem:    Cellulitis, unspecified cellulitis site  Anxiety   Sinus tach  Alcohol abuse   Seizure disorders  Prior cva    Cont with ciwa protocol  Cont with home meds  Cont to monitor on tele  Will follow           Walter Schmidt MD

## 2024-06-08 NOTE — NURSING NOTE
Pt BP 98/65, Pt has Hydralazine and Metoprolol ordered. Dr Schmidt was called and  made aware, NO. Hold hydralazine and give metoprolol

## 2024-06-08 NOTE — CARE PLAN
The patient's goals for the shift include      The clinical goals for the shift include remain free of falls / improve skin integrity

## 2024-06-08 NOTE — PROGRESS NOTES
Physical Therapy    Physical Therapy Evaluation    Patient Name: Yue Sanz  MRN: [id number]  Today's Date: 6/8/2024   Time Calculation  Start Time: 1300  Stop Time: 1320  Time Calculation (min): 20 min    Assessment/Plan   PT Assessment  PT Assessment Results: Decreased strength, Decreased endurance, Impaired balance, Decreased mobility, Decreased cognition, Impaired judgement, Decreased safety awareness  Rehab Prognosis: Good  Barriers to Discharge: Implusitivity and decreased balance  Evaluation/Treatment Tolerance: Patient tolerated treatment well  Medical Staff Made Aware: Yes  Strengths: Attitude of self, Support of extended family/friends  Barriers to Participation: Ability to acquire knowledge, Insight into problems  End of Session Communication: Bedside nurse  Assessment Comment: Pt presents today with decreased BLE strength, balance, safety awareness, and implusiveness. Currently, pt is at an increased fall risk d/t the above factors. Continued PT would benefit the pt to address the above factors and bring the pt closer to the PLOF while reducing fall risk.  End of Session Patient Position: Bed, 3 rail up, Alarm on  IP OR SWING BED PT PLAN  Inpatient or Swing Bed: Inpatient  PT Plan  Treatment/Interventions: Bed mobility, Transfer training, Gait training, Balance training, Strengthening, Endurance training, Therapeutic exercise, Therapeutic activity, Home exercise program, Postural re-education  PT Plan: Skilled PT  PT Frequency: 4 times per week  PT Discharge Recommendations: High intensity level of continued care  Equipment Recommended upon Discharge: Wheeled walker  PT Recommended Transfer Status: Contact guard, Assistive device  PT - OK to Discharge: Yes (Per PT POC)    Subjective   General Visit Information:  General  Reason for Referral: 64 y/o F presenting with c/o dizziness and syncope at home. Pt bieng treated for cellulitis  Referred By: ABELARDO Lancaster  Past Medical History Relevant to Rehab:   Past  Medical History:   Diagnosis Date    Acute CVA (cerebrovascular accident) (Multi) 05/30/2023    Alcohol related seizure (Multi) 05/30/2023    Cerebral hemorrhage (Multi) 05/30/2023    Cerebral infarction due to embolism of right middle cerebral artery (Multi) 05/30/2023    Diarrhea, unspecified 08/10/2022    Diarrhea    Encounter for follow-up examination after completed treatment for conditions other than malignant neoplasm 04/20/2020    Hospital discharge follow-up    Encounter for other screening for malignant neoplasm of breast 03/08/2022    Breast screening    Encounter for screening for malignant neoplasm of colon 05/16/2022    Colon cancer screening    Other abnormality of red blood cells 08/10/2022    Elevated MCV    Personal history of other endocrine, nutritional and metabolic disease     History of high cholesterol    Personal history of transient ischemic attack (TIA), and cerebral infarction without residual deficits     History of cerebrovascular accident     Family/Caregiver Present: No  Co-Treatment: OT  Co-Treatment Reason: Co-evaluation with OT to maximize pt safety, transfer ability, and participation.  Prior to Session Communication: Bedside nurse  Patient Position Received: Bed, 3 rail up, Alarm on  Preferred Learning Style: auditory, kinesthetic, visual  General Comment: Pt supine in bed upon PT arrival. Cleared to participate with RN, and agreeable to co-evaluation with PT/OT.  Home Living:  Home Living  Type of Home: House (Duplex)  Lives With: Alone  Home Adaptive Equipment: Cane  Home Layout: One level  Home Access: Level entry  Bathroom Shower/Tub: Walk-in shower  Bathroom Toilet: Standard  Bathroom Equipment: Grab bars around toilet  Prior Level of Function:  Prior Function Per Pt/Caregiver Report  Level of [place]: Independent with ADLs and functional transfers, Independent with homemaking with ambulation  Receives Help From: Family (Pt reports mother lives next door and her brother  lives nearby)  ADL Assistance: Independent  Homemaking Assistance: Independent  Ambulatory Assistance:  (Mod IND with cane)  Leisure: Pt enjoys outdoor activities  Hand Dominance: Right  Prior Function Comments: Pt reports ability to drive but mother provides transportation  Precautions:  Precautions  Medical Precautions: Fall precautions    Objective   Pain:  Pain Assessment  Pain Assessment: 0-10  Pain Score: 0 - No pain  Cognition:  Cognition  Orientation Level: Oriented X4  Insight: Moderate  Impulsive: Severely    General Assessments:  General Observation  General Observation: Pt demonstrating decreased safety awareness and impulsitivity with mobility. During session pt attempted standing mobility without instruction multiple times.    Activity Tolerance  Activity Tolerance Comments: Fair tolerance to sitting and standing activity    Sensation  Light Touch: No apparent deficits    Coordination  Movements are Fluid and Coordinated: Yes    Postural Control  Head Control: Forward head posture with gaze directed at the floor in standing with FWW support. Pt able to briefly correct with VC for forward gaze  Trunk Control: Left trunk lean in sitting and static standing  Posture Comment: Mild rounded shoulders    Static Sitting Balance  Static Sitting-Balance Support: Bilateral upper extremity supported, Feet supported  Static Sitting-Level of Assistance: Close supervision  Dynamic Sitting Balance  Dynamic Sitting-Balance Support: Bilateral upper extremity supported, Feet supported  Dynamic Sitting-Comments: Pt with LOB to the left following left trunk lean. Pt required assist during attempt to don hospital pants with multiple LOB posteriorly    Static Standing Balance  Static Standing-Balance Support: Bilateral upper extremity supported  Static Standing-Level of Assistance: Contact guard  Static Standing-Comment/Number of Minutes: With FWW support  Dynamic Standing Balance  Dynamic Standing-Balance Support: Bilateral  upper extremity supported  Dynamic Standing-Comments: CGA with FWW support  Functional Assessments:  Bed Mobility  Bed Mobility: Yes  Bed Mobility 1  Bed Mobility 1: Supine to sitting  Level of Assistance 1: Distant supervision  Bed Mobility Comments 1: HOB elevated.  Bed Mobility 2  Bed Mobility  2: Sitting to supine  Level of Assistance 2: Distant supervision    Transfers  Transfer: Yes  Transfer 1  Transfer From 1: Bed to  Transfer to 1: Stand  Technique 1: Sit to stand  Transfer Device 1: Walker, Gait belt  Transfer Level of Assistance 1: Contact guard, Minimal verbal cues  Trials/Comments 1: VC for BUE push from the bed instead of pulling to stand from the walker. Pt able to  a reasonable amount of time with a left trunk lean in standing. Pt unaware of trunk lean.  Transfers 2  Transfer From 2: Stand to  Transfer to 2: Chair with arms  Technique 2: Stand to sit  Transfer Device 2: Walker, Gait belt  Transfer Level of Assistance 2: Minimum assistance, Moderate verbal cues  Trials/Comments 2: Pt attempted to sit before in front of recliner chair requiring assist for safe approximation of pt with the chair. Pt verbally educated on proper positioning and body mechanics of sitting transfers using FWW with demonstration.    Ambulation/Gait Training  Ambulation/Gait Training Performed: Yes  Ambulation/Gait Training 1  Surface 1: Level tile  Device 1: Rolling walker  Gait Support Devices: Gait belt  Assistance 1: Contact guard, Minimal verbal cues  Comments/Distance (ft) 1: About 15 ft. Inconsistent step length with decreased yordy. Pt with gaze directed at the floor. VC for forward gaze. Narrow BLAS noted. Pt denies dizziness during trial and no overt LOB observed.    Stairs  Stairs: No  Extremity/Trunk Assessments:  RLE   RLE : Exceptions to WFL  Strength RLE  R Hip Flexion: 3+/5  R Knee Extension: 3+/5  R Ankle Dorsiflexion: 4/5  R Ankle Plantar Flexion: 3+/5  LLE   LLE : Exceptions to WFL  Strength LLE  L  Hip Flexion: 3+/5  L Knee Extension: 3+/5  L Ankle Dorsiflexion: 4/5  L Ankle Plantar Flexion: 3+/5  Outcome Measures:  [facility] Basic Mobility  Turning from your back to your side while in a flat bed without using bedrails: A little  Moving from lying on your back to sitting on the side of a flat bed without using bedrails: A little  Moving to and from bed to chair (including a wheelchair): A little  Standing up from a chair using your arms (e.g. wheelchair or bedside chair): A little  To walk in hospital room: A little  Climbing 3-5 steps with railing: A lot  Basic Mobility - Total Score: 17    Encounter Problems       Encounter Problems (Active)       Balance       Goal 1 (Progressing)       Start:  06/08/24    Expected End:  06/22/24       Pt performs all sitting and standing balance with close supervision using LRAD            Mobility       STG - Patient will ambulate (Progressing)       Start:  06/08/24    Expected End:  06/22/24       40 ft with CGA using LRAD            PT Problem       PT Goal 1 (Not Progressing)       Start:  06/08/24    Expected End:  06/22/24       Pt demonstrates IND in performing 2 sets of 15 reps of prescribed BLE HEP            PT Transfers       STG - Patient to transfer to and from sit to supine (Progressing)       Start:  06/08/24    Expected End:  06/22/24       IND         STG - Patient will transfer sit to and from stand (Progressing)       Start:  06/08/24    Expected End:  06/22/24       With close supervision using LRAD              Education Documentation  Precautions, taught by Dc Lucio PT at 6/8/2024  3:30 PM.  Learner: Patient  Readiness: Acceptance  Method: Explanation, Demonstration  Response: Needs Reinforcement    Body Mechanics, taught by Dc Lucio PT at 6/8/2024  3:30 PM.  Learner: Patient  Readiness: Acceptance  Method: Explanation, Demonstration  Response: Needs Reinforcement    Mobility Training, taught by cD Lucio PT at 6/8/2024  3:30  PM.  Learner: Patient  Readiness: Acceptance  Method: Explanation, Demonstration  Response: Needs Reinforcement    Education Comments  No comments found.

## 2024-06-08 NOTE — PROGRESS NOTES
Patient rec'd high intensity by ot/ot she would like referral to [facility]. Requested cnc to send referral to [facility].

## 2024-06-08 NOTE — PROGRESS NOTES
Occupational Therapy    Evaluation/Treatment    Patient Name: Yue Sanz  MRN: [id number]  : 1960  Today's Date: 24  Time Calculation  Start Time: 1259  Stop Time: 1319  Time Calculation (min): 20 min       Assessment:  OT Assessment: Pt presents with generalized weakness, reduced endurance, deconditioning, impulsivenss, and decreased balance/mobility which impedes ADL performance. Pt is performing below PLOF and would benefit from skilled OT services to address these deficits and to facilitate highest level of independence.  Prognosis: Good  Evaluation/Treatment Tolerance: Patient tolerated treatment well  Medical Staff Made Aware: Yes  End of Session Communication: Bedside nurse  End of Session Patient Position: Bed, 3 rail up, Alarm on (Initially place pt in chair with alarm. After exiting the room, alarm sounded and pt was sitting EOB. Pt assisted into bed with continued edu on call light usage.)  OT Assessment Results: Decreased ADL status, Decreased upper extremity strength, Decreased safe judgment during ADL, Decreased functional mobility  Prognosis: Good  Evaluation/Treatment Tolerance: Patient tolerated treatment well  Medical Staff Made Aware: Yes  Strengths: Attitude of self, Living arrangement secure, Support of extended family/friends  Barriers to Participation: Comorbidities  Plan:  Treatment Interventions: ADL retraining, Functional transfer training, Patient/family training  OT Frequency: 3 times per week  OT Discharge Recommendations: High intensity level of continued care  OT - OK to Discharge: Yes (per OT POC)  Treatment Interventions: ADL retraining, Functional transfer training, Patient/family training    Subjective   Current Problem:  1. Cellulitis, unspecified cellulitis site          General:   OT Received On: 24  General  Reason for Referral: is a 63-year-old woman with past medical history of prior CVA, alcohol abuse disorder, seizures, hypertension, hyperlipidemia who  does present with complaint of syncopal event while at home. Found down after a few minutes. Denies any headache. Nuys any chest or abdominal pain. Denies any infectious symptoms. No dysuria is reported. Reports drinking 1 to 2 glasses of wine a night. States last drink was night prior. No seizure history recently was reported.  Referred By: Tonny  Past Medical History Relevant to Rehab:  Acute CVA (cerebrovascular accident) (Multi) 05/30/2023   Alcohol related seizure (Multi) 05/30/2023   Cerebral hemorrhage (Multi) 05/30/2023   Cerebral infarction due to embolism of right middle cerebral artery (Multi) 05/30/2023   Diarrhea, unspecified 08/10/2022    Diarrhea   Encounter for follow-up examination after completed treatment for conditions other than malignant neoplasm 04/20/2020    Hospital discharge follow-up   Encounter for other screening for malignant neoplasm of breast 03/08/2022    Breast screening   Encounter for screening for malignant neoplasm of colon 05/16/2022    Colon cancer screening   Other abnormality of red blood cells 08/10/2022    Elevated MCV   Personal history of other endocrine, nutritional and metabolic disease      History of high cholesterol   Personal history of transient ischemic attack (TIA), and cerebral infarction without residual deficits      History of cerebrovascular accident  Family/Caregiver Present: No  Co-Treatment: PT  Co-Treatment Reason: Co-evaluation with PT to maximize pt safety and participation during fxl mobility.  Prior to Session Communication: Bedside nurse  Patient Position Received: Bed, 3 rail up, Alarm on  Preferred Learning Style: verbal, visual, kinesthetic, auditory  General Comment: Pt supine in bed upon OT arrival. Cleared to participate with RN, and agreeable to co-evaluation with PT/OT.  Precautions:  Precautions Comment: Fall  Vital Signs:     Pain:  Pain Assessment  Pain Assessment: 0-10  Pain Score: 0 - No pain    Objective   Cognition:  Overall  Cognitive Status: Impaired  Impulsive: Severely           Home Living:  Type of Home: House ([place])  Lives With: Alone  Home Adaptive Equipment: Cane  Home Layout: One level  Home Access: Level entry  Bathroom Shower/Tub: Walk-in shower  Bathroom Toilet: Standard (grab bars)  Bathroom Equipment: Grab bars around toilet  Prior Function:  Level of [place]: Independent with ADLs and functional transfers, Independent with homemaking with ambulation  Receives Help From: Parent(s), Friends (Mother and niece provide transportation.)  ADL Assistance: Independent  Homemaking Assistance: Independent  Ambulatory Assistance: Independent (occasionally uses cane.)  Hand Dominance: Right  IADL History:  Mode of Transportation: Family  ADL:  Eating Assistance: Independent  Grooming Assistance: Independent  Bathing Deficit: Supervision/safety  UE Dressing Deficit: Supervision/safety  LE Dressing Assistance: Minimal  Activities of Daily Living:      Grooming  Grooming Level of Assistance: Independent  Grooming Where Assessed: Bed level  Grooming Comments: Pt able to style hair while seated EOB Indep.         LE Dressing  LE Dressing: Yes  Pants Level of Assistance: Minimum assistance  Sock Level of Assistance: Minimum assistance  LE Dressing Where Assessed: Edge of bed       Activity Tolerance:  Endurance: Tolerates 30 min exercise with multiple rests  Functional Standing Tolerance:     Bed Mobility/Transfers: Bed Mobility  Bed Mobility: Yes  Bed Mobility 1  Level of Assistance 1: Distant supervision  Bed Mobility 2  Bed Mobility  2: Sitting to supine  Level of Assistance 2: Distant supervision    Transfers  Transfer: Yes (Min A-CGA)      Functional Mobility:  Functional Mobility  Functional Mobility Performed: Yes (CGA)  Sitting Balance:  Static Sitting Balance  Static Sitting-Level of Assistance: Distant supervision  Dynamic Sitting Balance  Dynamic Sitting-Balance: Lateral lean (Left LOB)  Standing Balance:  Static Standing  Balance  Static Standing-Balance Support: Bilateral upper extremity supported  Static Standing-Level of Assistance: Contact guard         Vision:Vision - Basic Assessment  Current Vision: Wears glasses only for reading  Sensation:     Strength:  Strength Comments: 3+/5 BUE Grossly  Other Activity:     Home Environment:     Perception:     Coordination:  Movements are Fluid and Coordinated: Yes   Hand Function:  Hand Function  Gross Grasp: Functional  Coordination: Functional  Extremities: RUE   RUE : Within Functional Limits and LUE   LUE: Within Functional Limits      Outcome Measures: [facility] Daily Activity  Putting on and taking off regular lower body clothing: A little  Bathing (including washing, rinsing, drying): A little  Putting on and taking off regular upper body clothing: None  Toileting, which includes using toilet, bedpan or urinal: A little  Taking care of personal grooming such as brushing teeth: None  Eating Meals: None  Daily Activity - Total Score: 21        Education Documentation  Precautions, taught by Poonam Mcclellan OT at 6/8/2024  2:57 PM.  Learner: Patient  Readiness: Acceptance  Method: Explanation, Demonstration  Response: Needs Reinforcement    ADL Training, taught by Poonam Mcclellan OT at 6/8/2024  2:57 PM.  Learner: Patient  Readiness: Acceptance  Method: Explanation, Demonstration  Response: Needs Reinforcement    Education Comments  No comments found.        OP EDUCATION:  Education  Individual(s) Educated: Patient  Education Provided: Fall precautons, POC discussed and agreed upon    Goals:  Encounter Problems       Encounter Problems (Active)       Dressings Lower Extremities       STG - Patient fabrice/doff socks/shoes Independent.  (Progressing)       Start:  06/08/24    Expected End:  06/22/24            STG - Patient will fabrice/doff pants over feet Independent. (Progressing)       Start:  06/08/24    Expected End:  06/22/24               Functional Balance       STG - Maintains  dynamic sitting balance without upper extremity support with Mod I to safely complete dressing.  (Progressing)       Start:  06/08/24    Expected End:  06/22/24       INTERVENTIONS:  1. Practice sitting on the edge of a bed/mat with minimal support.  2. Educate patient about maintining total hip precautions while maintaining balance.  3. Educate patient about pressure relief.  4. Educate patient about use of assistive device.            Safety       STG - Patient uses call light consistently to request assistance with transfers 75% of time.  (Progressing)       Start:  06/08/24    Expected End:  06/22/24

## 2024-06-08 NOTE — CARE PLAN
The patient's goals for the shift include  to hear results and go home    The clinical goals for the shift include remain free of falls / improve skin integrity    Over the shift, the patient did  make progress toward the following goals. Patient is requesting to be discharged today

## 2024-06-09 LAB
ANION GAP SERPL CALC-SCNC: 18 MMOL/L (ref 10–20)
BUN SERPL-MCNC: 24 MG/DL (ref 6–23)
CALCIUM SERPL-MCNC: 9.2 MG/DL (ref 8.6–10.3)
CHLORIDE SERPL-SCNC: 105 MMOL/L (ref 98–107)
CO2 SERPL-SCNC: 18 MMOL/L (ref 21–32)
CREAT SERPL-MCNC: 1.08 MG/DL (ref 0.5–1.05)
EGFRCR SERPLBLD CKD-EPI 2021: 58 ML/MIN/1.73M*2
ERYTHROCYTE [DISTWIDTH] IN BLOOD BY AUTOMATED COUNT: 11.9 % (ref 11.5–14.5)
GLUCOSE SERPL-MCNC: 91 MG/DL (ref 74–99)
HCT VFR BLD AUTO: 32 % (ref 36–46)
HGB BLD-MCNC: 10.4 G/DL (ref 12–16)
MCH RBC QN AUTO: 32.9 PG (ref 26–34)
MCHC RBC AUTO-ENTMCNC: 32.5 G/DL (ref 32–36)
MCV RBC AUTO: 101 FL (ref 80–100)
NRBC BLD-RTO: 0 /100 WBCS (ref 0–0)
PLATELET # BLD AUTO: 223 X10*3/UL (ref 150–450)
POTASSIUM SERPL-SCNC: 4.6 MMOL/L (ref 3.5–5.3)
RBC # BLD AUTO: 3.16 X10*6/UL (ref 4–5.2)
SODIUM SERPL-SCNC: 136 MMOL/L (ref 136–145)
WBC # BLD AUTO: 7.2 X10*3/UL (ref 4.4–11.3)

## 2024-06-09 PROCEDURE — 2500000002 HC RX 250 W HCPCS SELF ADMINISTERED DRUGS (ALT 637 FOR MEDICARE OP, ALT 636 FOR OP/ED): Mod: MUE | Performed by: INTERNAL MEDICINE

## 2024-06-09 PROCEDURE — 2500000004 HC RX 250 GENERAL PHARMACY W/ HCPCS (ALT 636 FOR OP/ED): Mod: JZ,MUE | Performed by: INTERNAL MEDICINE

## 2024-06-09 PROCEDURE — 36415 COLL VENOUS BLD VENIPUNCTURE: CPT | Performed by: INTERNAL MEDICINE

## 2024-06-09 PROCEDURE — 2500000001 HC RX 250 WO HCPCS SELF ADMINISTERED DRUGS (ALT 637 FOR MEDICARE OP): Performed by: INTERNAL MEDICINE

## 2024-06-09 PROCEDURE — 85027 COMPLETE CBC AUTOMATED: CPT | Performed by: INTERNAL MEDICINE

## 2024-06-09 PROCEDURE — 80048 BASIC METABOLIC PNL TOTAL CA: CPT | Performed by: INTERNAL MEDICINE

## 2024-06-09 PROCEDURE — 2500000001 HC RX 250 WO HCPCS SELF ADMINISTERED DRUGS (ALT 637 FOR MEDICARE OP): Performed by: STUDENT IN AN ORGANIZED HEALTH CARE EDUCATION/TRAINING PROGRAM

## 2024-06-09 PROCEDURE — 99222 1ST HOSP IP/OBS MODERATE 55: CPT | Performed by: STUDENT IN AN ORGANIZED HEALTH CARE EDUCATION/TRAINING PROGRAM

## 2024-06-09 PROCEDURE — 1200000002 HC GENERAL ROOM WITH TELEMETRY DAILY

## 2024-06-09 RX ADMIN — METOPROLOL TARTRATE 25 MG: 25 TABLET, FILM COATED ORAL at 20:13

## 2024-06-09 RX ADMIN — ASPIRIN 81 MG: 81 TABLET, COATED ORAL at 08:30

## 2024-06-09 RX ADMIN — FOLIC ACID 1 MG: 1 TABLET ORAL at 08:30

## 2024-06-09 RX ADMIN — ATORVASTATIN CALCIUM 40 MG: 40 TABLET, FILM COATED ORAL at 20:13

## 2024-06-09 RX ADMIN — Medication 1 TABLET: at 08:30

## 2024-06-09 RX ADMIN — SERTRALINE HYDROCHLORIDE 50 MG: 50 TABLET ORAL at 08:30

## 2024-06-09 RX ADMIN — CLOPIDOGREL 75 MG: 75 TABLET ORAL at 08:29

## 2024-06-09 RX ADMIN — PANTOPRAZOLE SODIUM 40 MG: 40 TABLET, DELAYED RELEASE ORAL at 06:02

## 2024-06-09 RX ADMIN — CEFAZOLIN SODIUM 1 G: 1 INJECTION, SOLUTION INTRAVENOUS at 17:01

## 2024-06-09 RX ADMIN — HYDRALAZINE HYDROCHLORIDE 10 MG: 10 TABLET, FILM COATED ORAL at 20:13

## 2024-06-09 RX ADMIN — NYSTATIN: 100000 OINTMENT TOPICAL at 08:37

## 2024-06-09 RX ADMIN — CEFAZOLIN SODIUM 1 G: 1 INJECTION, SOLUTION INTRAVENOUS at 23:30

## 2024-06-09 RX ADMIN — METOPROLOL TARTRATE 25 MG: 25 TABLET, FILM COATED ORAL at 10:00

## 2024-06-09 RX ADMIN — LEVETIRACETAM 500 MG: 500 TABLET, FILM COATED ORAL at 20:13

## 2024-06-09 RX ADMIN — Medication 3 MG: at 20:13

## 2024-06-09 RX ADMIN — CEFAZOLIN SODIUM 1 G: 1 INJECTION, SOLUTION INTRAVENOUS at 08:29

## 2024-06-09 RX ADMIN — LEVETIRACETAM 500 MG: 500 TABLET, FILM COATED ORAL at 08:30

## 2024-06-09 ASSESSMENT — LIFESTYLE VARIABLES
VISUAL DISTURBANCES: NOT PRESENT
VISUAL DISTURBANCES: NOT PRESENT
PAROXYSMAL SWEATS: NO SWEAT VISIBLE
HEADACHE, FULLNESS IN HEAD: NOT PRESENT
ORIENTATION AND CLOUDING OF SENSORIUM: ORIENTED AND CAN DO SERIAL ADDITIONS
ANXIETY: MILDLY ANXIOUS
PAROXYSMAL SWEATS: NO SWEAT VISIBLE
PAROXYSMAL SWEATS: NO SWEAT VISIBLE
HEADACHE, FULLNESS IN HEAD: NOT PRESENT
AUDITORY DISTURBANCES: NOT PRESENT
AGITATION: NORMAL ACTIVITY
AUDITORY DISTURBANCES: NOT PRESENT
TREMOR: NOT VISIBLE, BUT CAN BE FELT FINGERTIP TO FINGERTIP
TREMOR: NOT VISIBLE, BUT CAN BE FELT FINGERTIP TO FINGERTIP
NAUSEA AND VOMITING: NO NAUSEA AND NO VOMITING
AGITATION: NORMAL ACTIVITY
NAUSEA AND VOMITING: NO NAUSEA AND NO VOMITING
ANXIETY: MILDLY ANXIOUS
TOTAL SCORE: 0
ANXIETY: NO ANXIETY, AT EASE
TOTAL SCORE: 2
TOTAL SCORE: 1
ANXIETY: NO ANXIETY, AT EASE
PAROXYSMAL SWEATS: NO SWEAT VISIBLE
NAUSEA AND VOMITING: NO NAUSEA AND NO VOMITING
AUDITORY DISTURBANCES: NOT PRESENT
ANXIETY: MILDLY ANXIOUS
VISUAL DISTURBANCES: NOT PRESENT
ORIENTATION AND CLOUDING OF SENSORIUM: ORIENTED AND CAN DO SERIAL ADDITIONS
TOTAL SCORE: 0
AGITATION: NORMAL ACTIVITY
ORIENTATION AND CLOUDING OF SENSORIUM: ORIENTED AND CAN DO SERIAL ADDITIONS
PAROXYSMAL SWEATS: NO SWEAT VISIBLE
TREMOR: NO TREMOR
NAUSEA AND VOMITING: NO NAUSEA AND NO VOMITING
HEADACHE, FULLNESS IN HEAD: NOT PRESENT
HEADACHE, FULLNESS IN HEAD: NOT PRESENT
TREMOR: NO TREMOR
AGITATION: NORMAL ACTIVITY
AUDITORY DISTURBANCES: NOT PRESENT
HEADACHE, FULLNESS IN HEAD: NOT PRESENT
VISUAL DISTURBANCES: NOT PRESENT
AUDITORY DISTURBANCES: NOT PRESENT
TREMOR: NO TREMOR
AGITATION: NORMAL ACTIVITY
VISUAL DISTURBANCES: NOT PRESENT
TOTAL SCORE: 2
NAUSEA AND VOMITING: NO NAUSEA AND NO VOMITING

## 2024-06-09 ASSESSMENT — COGNITIVE AND FUNCTIONAL STATUS - GENERAL
DAILY ACTIVITIY SCORE: 24
DAILY ACTIVITIY SCORE: 24
WALKING IN HOSPITAL ROOM: A LITTLE
CLIMB 3 TO 5 STEPS WITH RAILING: A LITTLE
MOBILITY SCORE: 22
WALKING IN HOSPITAL ROOM: A LITTLE
CLIMB 3 TO 5 STEPS WITH RAILING: A LITTLE
MOBILITY SCORE: 22

## 2024-06-09 ASSESSMENT — PAIN SCALES - GENERAL
PAINLEVEL_OUTOF10: 0 - NO PAIN
PAINLEVEL_OUTOF10: 0 - NO PAIN

## 2024-06-09 ASSESSMENT — PAIN - FUNCTIONAL ASSESSMENT: PAIN_FUNCTIONAL_ASSESSMENT: 0-10

## 2024-06-09 NOTE — CARE PLAN
Problem: Skin  Goal: Decreased wound size/increased tissue granulation at next dressing change  Outcome: Progressing  Goal: Participates in plan/prevention/treatment measures  Outcome: Progressing  Goal: Prevent/manage excess moisture  Outcome: Progressing  Goal: Prevent/minimize sheer/friction injuries  Outcome: Progressing  Goal: Promote/optimize nutrition  Outcome: Progressing  Goal: Promote skin healing  Outcome: Progressing     Problem: Pain  Goal: My pain/discomfort is manageable  Outcome: Progressing     Problem: Safety  Goal: Patient will be injury free during hospitalization  Outcome: Progressing  Goal: I will remain free of falls  Outcome: Progressing     Problem: Safety  Goal: Patient will be injury free during hospitalization  Outcome: Progressing  Goal: I will remain free of falls  Outcome: Progressing     Problem: Safety  Goal: I will remain free of falls  Outcome: Progressing     Problem: Daily Care  Goal: Daily care needs are met  Outcome: Progressing     Problem: Psychosocial Needs  Goal: Demonstrates ability to cope with hospitalization/illness  Outcome: Progressing  Goal: Collaborate with me, my family, and caregiver to identify my specific goals  Outcome: Progressing     Problem: Discharge Barriers  Goal: My discharge needs are met  Outcome: Progressing     Problem: Dressings Lower Extremities  Goal: STG - Patient fabrice/doff socks/shoes Independent.   Outcome: Progressing  Goal: STG - Patient will fabrice/doff pants over feet Independent.  Outcome: Progressing

## 2024-06-09 NOTE — CARE PLAN
The patient's goals for the shift include  pt/ot improvement    The clinical goals for the shift include remain safe throughout shift    Over the shift, the patient is making progress toward the following goals.

## 2024-06-09 NOTE — PROGRESS NOTES
06/09/24 1402   Discharge Planning   Patient expects to be discharged to: Home vs [facility] is able to accept, auth requested.     Met with patient at bedside. Patient states she would like to go home. Patient lives home alone.  will reach out to family to discuss discharge planning.    Spoke with nephew, Xiang, via phone. Patient previously discharged [facility] and Xiang wanted to make sure that patient was medically ready when she was discharged. Explained AR and lela stated that patient would most likely not go or leave [facility] shortly after transporting there. Patient will most likely discharge home today. Nephew will coordinate .

## 2024-06-09 NOTE — CONSULTS
"Reason For Consult  Alcohol abuse    History Of Present Illness  Yue Sanz is a 63 y.o. female presenting with dizziness/lightheadedness.     States she does not have any current issues with alcohol. States she has a glass of wine every night while reading.    Has a hx of alcohol abuse with  in past, states in context of partying, but has not been the case for years. Has never had a DUI nor legal trouble, never had withdrawal sx, never done detox nor been in [facility] treatment.    Reiterates multiple times she does not have a drinking problem but not in a defensive or upset way. Says she appreciates that staff have expressed concern but doesn't think she has ever had issues and that family has never raised issues.    She is not aware of cirrhosis.     ~5 years ago when  had MI in front of her. Had been  33 years.    Lives on her own with pets. Has always had a lot of pets and finds meaningful.    States occasionally gets \"down in the dumps,\" but doesn't think she has ever been frankly depressed. Not sure when she was started on sertraline or exactly why, but has tried getting her PCP to simplify medications in the past.    Per chart review, she has been treated for depression/anxiety at least since 2018, unclear if in context of being . Longstanding history of poor adherence to medications/refusing medications, but also began post-CVAs so unsure to what degree was post-stroke depression vs impairment from alcohol. On 11/7/2018, patient went to ED with ; note from that date from neurology read:  1220 Pt's mother called. She indicated that the patient was refusing her medication and was agitated about it. Wanted advice on what to do. I recommended ER visit for psych admission given prior history of depression/anxiety and refusal to take medications. If she was not willing to go, I advised calling EMS with mental health emergency. She will keep me posted on progress.   No ER note " or subsequent documentation from that day. Follow up with neurology on 12/14/2018 read:  The patient's parents accompany her today and provide corroborative information. The patient has not been taking sertraline and she has been drinking alcohol regularly. She reports episodic left hand dystonic episodes but no tingling or weakness. She complains of memory loss still, and also as had no change in depressive symptoms. She denies other focal neurological symptoms including dysarthria, dysphagia, diplopia, focal weakness, other focal sensory change, ataxia, or vertigo, among others.   Was hospitalized shortly thereafter in 12/23/2018 for vasovagal sx; had some concern for alcohol withdrawal during that admission and was placed on CIWA protocol.    On chart review, continued noting of ~1 glass wine most nights, but no subsequent notes/hospital visits related to alcohol use and no other positive BAL values when checked.    Denies SI/HI/AVH.      Past Medical History  She has a past medical history of Acute CVA (cerebrovascular accident) (Multi) (05/30/2023), Alcohol related seizure (Multi) (05/30/2023), Cerebral hemorrhage (Multi) (05/30/2023), Cerebral infarction due to embolism of right middle cerebral artery (Multi) (05/30/2023), Diarrhea, unspecified (08/10/2022), Encounter for follow-up examination after completed treatment for conditions other than malignant neoplasm (04/20/2020), Encounter for other screening for malignant neoplasm of breast (03/08/2022), Encounter for screening for malignant neoplasm of colon (05/16/2022), Other abnormality of red blood cells (08/10/2022), Personal history of other endocrine, nutritional and metabolic disease, and Personal history of transient ischemic attack (TIA), and cerebral infarction without residual deficits.    Not sure of when she was formally dx with depression, has never seen psychiatry or a therapist. No psychiatric hospitalizations nor suicide attempts.    Surgical  History  She has a past surgical history that includes MR angio head wo IV contrast (5/19/2021); MR angio neck wo IV contrast (5/19/2021); and MR angio head wo IV contrast (7/12/2018).     Social History  She reports that she has never smoked. She has never been exposed to tobacco smoke. She has never used smokeless tobacco. She reports that she does not currently use alcohol. She reports that she does not currently use drugs.    States she has family nearby who are supportive.    Used to work for [employer].    Denies tobacco or illicit substances. Has 1 drink of wine every night. States she sometimes drank excessively in the past at parties, but never consistent. Denies any legal or functional issues from alcohol. No reported withdrawal phenomena in the past.    Family History  Family History   Problem Relation Name Age of Onset    Arthritis Mother      No Known Problems Father          Allergies  Amoxicillin    Review of Systems  Constitutional: no fatigue  Eyes: no blurred vision and no diplopia.   ENT: no hearing loss, no tinnitus, no earache, no sore throat, no hoarseness and no swollen glands in the neck.   Cardiovascular: no chest pain, no tightness or heavy pressure, no shortness of breath, no palpitations and no lower extremity edema.   Respiratory: no cough, wheezing or shortness of breath at rest or exertion  Gastrointestinal: no change in bowel habits, no diarrhea, no constipation, no bloody stools, no nausea, no vomiting, no abdominal pain, no signs and symptoms of ulcer disease, no ashish colored stools and no intolerance to fatty foods.   Genitourinary: no urinary frequency, no dysuria, no hematuria, no burning sensation during urination, urinary stream is not smaller and urinary stream does not start and stop.   Musculoskeletal: no arthralgias, no joint stiffness, no muscle weakness, no back pain and no difficulty walking.   Skin: no rashes, no change in skin color and  "pigmentation, no skin lesions and no skin lumps.   Neurological: no headaches,no seizures, no tingling, no numbness, no signs and symptoms of stroke and no limb weakness.   Psychiatric: Some \"ups and downs\"  Endocrine: no goiter, no thyroid disorder, no diabetes mellitus, no excessive thirst, no dry skin, no cold intolerance, no heat intolerance and no increased urinary frequency.   Hematologic/Lymphatic: is not slow to heal, does not bleed easily, does not bruise easily, no thrombophlebitis, no anemia and no history of blood transfusion.   All other systems have been reviewed and are negative for complaint.      Physical Exam  Mental Status Examination  General Appearance: Fairly groomed.  Gait/Station:  not observed  Speech: Normal rate, volume, prosody  Mood: \"okay\"  Affect: Constricted and Congruent with mood and topic of conversation  Thought Process: Linear, goal directed  Thought Associations: No loosening of associations  Thought Content: normal  Perception: No perceptual abnormalities noted  Level of Consciousness: Alert  Orientation: Alert and oriented to person, place, time and situation  Attention and Concentration: Intact  Recent Memory: Intact as evidenced by ability to recall details from the past 24 hours   Remote Memory: Intact as evidenced by ability to recall previous medical issues  and Intact as evidenced by ability to recall events from childhood   Executive function: Intact  Language: Naming intact  Fund of knowledge: Good  Insight:  unclear at this time; seems to minimize alcohol use but is not particularly defensive about it, more evasive  Judgment: Limited, as evidence by inability to reason through medical decision making      Last Recorded Vitals  Blood pressure 111/78, pulse 88, temperature 36.4 °C (97.5 °F), temperature source Temporal, resp. rate 18, height 1.676 m (5' 6\"), weight 57.4 kg (126 lb 8 oz), SpO2 98%.    Relevant Results  Results for orders placed or performed during the " hospital encounter of 06/07/24 (from the past 96 hour(s))   Urinalysis with Reflex Microscopic   Result Value Ref Range    Color, Urine Yellow Light-Yellow, Yellow, Dark-Yellow    Appearance, Urine Turbid (N) Clear    Specific Gravity, Urine 1.024 1.005 - 1.035    pH, Urine 5.5 5.0, 5.5, 6.0, 6.5, 7.0, 7.5, 8.0    Protein, Urine 20 (TRACE) NEGATIVE, 10 (TRACE), 20 (TRACE) mg/dL    Glucose, Urine Normal Normal mg/dL    Blood, Urine NEGATIVE NEGATIVE    Ketones, Urine 20 (1+) (A) NEGATIVE mg/dL    Bilirubin, Urine NEGATIVE NEGATIVE    Urobilinogen, Urine Normal Normal mg/dL    Nitrite, Urine NEGATIVE NEGATIVE    Leukocyte Esterase, Urine 500 Raine/µL (A) NEGATIVE   Microscopic Only, Urine   Result Value Ref Range    WBC, Urine 11-20 (A) 1-5, NONE /HPF    RBC, Urine NONE NONE, 1-2, 3-5 /HPF    Squamous Epithelial Cells, Urine 1-9 (SPARSE) Reference range not established. /HPF    Bacteria, Urine 1+ (A) NONE SEEN /HPF    Mucus, Urine FEW Reference range not established. /LPF    Hyaline Casts, Urine 3+ (A) NONE /LPF   SST TOP   Result Value Ref Range    Extra Tube Hold for add-ons.    D-dimer, VTE Exclusion   Result Value Ref Range    D-Dimer, Quantitative VTE Exclusion 1,116 (H) <=500 ng/mL FEU   CBC   Result Value Ref Range    WBC 6.9 4.4 - 11.3 x10*3/uL    nRBC 0.0 0.0 - 0.0 /100 WBCs    RBC 3.04 (L) 4.00 - 5.20 x10*6/uL    Hemoglobin 10.0 (L) 12.0 - 16.0 g/dL    Hematocrit 29.9 (L) 36.0 - 46.0 %    MCV 98 80 - 100 fL    MCH 32.9 26.0 - 34.0 pg    MCHC 33.4 32.0 - 36.0 g/dL    RDW 11.9 11.5 - 14.5 %    Platelets 217 150 - 450 x10*3/uL   Basic metabolic panel   Result Value Ref Range    Glucose 86 74 - 99 mg/dL    Sodium 135 (L) 136 - 145 mmol/L    Potassium 4.1 3.5 - 5.3 mmol/L    Chloride 104 98 - 107 mmol/L    Bicarbonate 17 (L) 21 - 32 mmol/L    Anion Gap 18 10 - 20 mmol/L    Urea Nitrogen 28 (H) 6 - 23 mg/dL    Creatinine 1.06 (H) 0.50 - 1.05 mg/dL    eGFR 59 (L) >60 mL/min/1.73m*2    Calcium 9.2 8.6 - 10.3 mg/dL    CBC   Result Value Ref Range    WBC 7.2 4.4 - 11.3 x10*3/uL    nRBC 0.0 0.0 - 0.0 /100 WBCs    RBC 3.16 (L) 4.00 - 5.20 x10*6/uL    Hemoglobin 10.4 (L) 12.0 - 16.0 g/dL    Hematocrit 32.0 (L) 36.0 - 46.0 %     (H) 80 - 100 fL    MCH 32.9 26.0 - 34.0 pg    MCHC 32.5 32.0 - 36.0 g/dL    RDW 11.9 11.5 - 14.5 %    Platelets 223 150 - 450 x10*3/uL   Basic metabolic panel   Result Value Ref Range    Glucose 91 74 - 99 mg/dL    Sodium 136 136 - 145 mmol/L    Potassium 4.6 3.5 - 5.3 mmol/L    Chloride 105 98 - 107 mmol/L    Bicarbonate 18 (L) 21 - 32 mmol/L    Anion Gap 18 10 - 20 mmol/L    Urea Nitrogen 24 (H) 6 - 23 mg/dL    Creatinine 1.08 (H) 0.50 - 1.05 mg/dL    eGFR 58 (L) >60 mL/min/1.73m*2    Calcium 9.2 8.6 - 10.3 mg/dL         Assessment/Plan     Hx depression and per chart, alcohol use issues. Unclear what alcohol pattern is like. On exam today Aox4 and fair attention, may benefit from monitoring cognition longitudinally with PCP. Minimizes alcohol use but is not defensive nor angry about concern from others. Does appear that had period in late 2018 of heavy alcohol abuse with hospitalization involving some withdrawal delirium, but no subsequent health encounters for alcohol use or withdrawal since then.    Patient is not interested in pursuing alcohol cessation or AUD treatment at this time.    Recommendations:  -continue CIWA taper as you are  -continue motivational interviewing around alcohol reduction/cessation, especially given concerns over cognition and cirrhosis  -does NOT meet criteria for inpatient psychiatric admission    I spent 60 minutes in the professional and overall care of this patient.      Michael Nugent MD

## 2024-06-10 ENCOUNTER — PHARMACY VISIT (OUTPATIENT)
Dept: PHARMACY | Facility: CLINIC | Age: 64
End: 2024-06-10
Payer: COMMERCIAL

## 2024-06-10 VITALS
RESPIRATION RATE: 18 BRPM | DIASTOLIC BLOOD PRESSURE: 77 MMHG | OXYGEN SATURATION: 99 % | SYSTOLIC BLOOD PRESSURE: 113 MMHG | TEMPERATURE: 97.7 F | BODY MASS INDEX: 20.33 KG/M2 | HEIGHT: 66 IN | WEIGHT: 126.5 LBS | HEART RATE: 108 BPM

## 2024-06-10 LAB
ANION GAP SERPL CALC-SCNC: 18 MMOL/L (ref 10–20)
BUN SERPL-MCNC: 20 MG/DL (ref 6–23)
CALCIUM SERPL-MCNC: 9 MG/DL (ref 8.6–10.3)
CHLORIDE SERPL-SCNC: 106 MMOL/L (ref 98–107)
CO2 SERPL-SCNC: 19 MMOL/L (ref 21–32)
CREAT SERPL-MCNC: 0.9 MG/DL (ref 0.5–1.05)
EGFRCR SERPLBLD CKD-EPI 2021: 72 ML/MIN/1.73M*2
ERYTHROCYTE [DISTWIDTH] IN BLOOD BY AUTOMATED COUNT: 11.9 % (ref 11.5–14.5)
GLUCOSE SERPL-MCNC: 89 MG/DL (ref 74–99)
HCT VFR BLD AUTO: 29.5 % (ref 36–46)
HGB BLD-MCNC: 9.9 G/DL (ref 12–16)
MCH RBC QN AUTO: 33 PG (ref 26–34)
MCHC RBC AUTO-ENTMCNC: 33.6 G/DL (ref 32–36)
MCV RBC AUTO: 98 FL (ref 80–100)
NRBC BLD-RTO: 0 /100 WBCS (ref 0–0)
PLATELET # BLD AUTO: 221 X10*3/UL (ref 150–450)
POTASSIUM SERPL-SCNC: 3.9 MMOL/L (ref 3.5–5.3)
RBC # BLD AUTO: 3 X10*6/UL (ref 4–5.2)
SODIUM SERPL-SCNC: 139 MMOL/L (ref 136–145)
WBC # BLD AUTO: 5.5 X10*3/UL (ref 4.4–11.3)

## 2024-06-10 PROCEDURE — 2500000002 HC RX 250 W HCPCS SELF ADMINISTERED DRUGS (ALT 637 FOR MEDICARE OP, ALT 636 FOR OP/ED): Mod: MUE | Performed by: INTERNAL MEDICINE

## 2024-06-10 PROCEDURE — 2500000001 HC RX 250 WO HCPCS SELF ADMINISTERED DRUGS (ALT 637 FOR MEDICARE OP): Performed by: INTERNAL MEDICINE

## 2024-06-10 PROCEDURE — 99239 HOSP IP/OBS DSCHRG MGMT >30: CPT | Performed by: INTERNAL MEDICINE

## 2024-06-10 PROCEDURE — 2500000004 HC RX 250 GENERAL PHARMACY W/ HCPCS (ALT 636 FOR OP/ED): Mod: JZ | Performed by: INTERNAL MEDICINE

## 2024-06-10 PROCEDURE — 2500000001 HC RX 250 WO HCPCS SELF ADMINISTERED DRUGS (ALT 637 FOR MEDICARE OP): Performed by: STUDENT IN AN ORGANIZED HEALTH CARE EDUCATION/TRAINING PROGRAM

## 2024-06-10 PROCEDURE — 80048 BASIC METABOLIC PNL TOTAL CA: CPT | Performed by: INTERNAL MEDICINE

## 2024-06-10 PROCEDURE — 36415 COLL VENOUS BLD VENIPUNCTURE: CPT | Performed by: INTERNAL MEDICINE

## 2024-06-10 PROCEDURE — RXMED WILLOW AMBULATORY MEDICATION CHARGE

## 2024-06-10 PROCEDURE — 85027 COMPLETE CBC AUTOMATED: CPT | Performed by: INTERNAL MEDICINE

## 2024-06-10 RX ORDER — MULTIVIT-MIN/IRON FUM/FOLIC AC 7.5 MG-4
1 TABLET ORAL DAILY
Qty: 30 TABLET | Refills: 0 | Status: SHIPPED | OUTPATIENT
Start: 2024-06-11 | End: 2024-07-11

## 2024-06-10 RX ORDER — CEPHALEXIN 500 MG/1
500 CAPSULE ORAL 4 TIMES DAILY
Qty: 20 CAPSULE | Refills: 0 | Status: SHIPPED | OUTPATIENT
Start: 2024-06-10 | End: 2024-06-15

## 2024-06-10 RX ORDER — METOPROLOL TARTRATE 25 MG/1
25 TABLET, FILM COATED ORAL 2 TIMES DAILY
Qty: 60 TABLET | Refills: 0 | Status: SHIPPED | OUTPATIENT
Start: 2024-06-10 | End: 2024-07-10

## 2024-06-10 RX ORDER — LANOLIN ALCOHOL/MO/W.PET/CERES
100 CREAM (GRAM) TOPICAL DAILY
Qty: 30 TABLET | Refills: 0 | Status: SHIPPED | OUTPATIENT
Start: 2024-06-11 | End: 2024-07-11

## 2024-06-10 RX ADMIN — CLOPIDOGREL 75 MG: 75 TABLET ORAL at 09:33

## 2024-06-10 RX ADMIN — ASPIRIN 81 MG: 81 TABLET, COATED ORAL at 09:33

## 2024-06-10 RX ADMIN — FOLIC ACID 1 MG: 1 TABLET ORAL at 09:33

## 2024-06-10 RX ADMIN — LEVETIRACETAM 500 MG: 500 TABLET, FILM COATED ORAL at 09:33

## 2024-06-10 RX ADMIN — PANTOPRAZOLE SODIUM 40 MG: 40 TABLET, DELAYED RELEASE ORAL at 06:02

## 2024-06-10 RX ADMIN — Medication 100 MG: at 09:33

## 2024-06-10 RX ADMIN — CEFAZOLIN SODIUM 1 G: 1 INJECTION, SOLUTION INTRAVENOUS at 09:33

## 2024-06-10 RX ADMIN — Medication 1 TABLET: at 09:33

## 2024-06-10 RX ADMIN — SERTRALINE HYDROCHLORIDE 50 MG: 50 TABLET ORAL at 09:33

## 2024-06-10 ASSESSMENT — LIFESTYLE VARIABLES
AGITATION: NORMAL ACTIVITY
VISUAL DISTURBANCES: NOT PRESENT
ANXIETY: MILDLY ANXIOUS
ORIENTATION AND CLOUDING OF SENSORIUM: ORIENTED AND CAN DO SERIAL ADDITIONS
PAROXYSMAL SWEATS: NO SWEAT VISIBLE
HEADACHE, FULLNESS IN HEAD: NOT PRESENT
NAUSEA AND VOMITING: NO NAUSEA AND NO VOMITING
AUDITORY DISTURBANCES: NOT PRESENT
TOTAL SCORE: 1
TREMOR: NO TREMOR

## 2024-06-10 ASSESSMENT — COGNITIVE AND FUNCTIONAL STATUS - GENERAL
WALKING IN HOSPITAL ROOM: A LITTLE
CLIMB 3 TO 5 STEPS WITH RAILING: A LOT
TOILETING: A LITTLE
MOBILITY SCORE: 20
HELP NEEDED FOR BATHING: A LITTLE
DAILY ACTIVITIY SCORE: 21
STANDING UP FROM CHAIR USING ARMS: A LITTLE
DRESSING REGULAR LOWER BODY CLOTHING: A LITTLE

## 2024-06-10 ASSESSMENT — PAIN SCALES - GENERAL: PAINLEVEL_OUTOF10: 0 - NO PAIN

## 2024-06-10 ASSESSMENT — PAIN - FUNCTIONAL ASSESSMENT: PAIN_FUNCTIONAL_ASSESSMENT: 0-10

## 2024-06-10 NOTE — DISCHARGE SUMMARY
Discharge Diagnosis  Cellulitis, unspecified cellulitis site    Issues Requiring Follow-Up  Follow-up with PCP    Test Results Pending At Discharge  Pending Labs       No current pending labs.            Hospital Course  Patient with a past medical history of alcohol abuse seizure disorder anemia depression with anxiety hypertension history of CVA] hemorrhagic dyslipidemia comes in with an episode of lightheadedness dizziness and syncope while at home  Was brought to the emergency room where workup showed possible cellulitis  She was initially admitted but left AMA  But convinced to come back to the hospital by her family members  Admits to drinking but says only drinks a glass of wine which she does not count as alcohol  Imaging also shows cirrhosis of the liver  On my exam the patient does seem a little off  Is alert x 2-3 but has a flat affect  Be started the patient on IV antibiotics cirrhosis  She is also noted to have fungal infection in the groin area for which she prescribed nystatin  Started on a [facility] protocol for alcohol use  The patient has been quite adamant that she does not drink alcohol that heavily  However imaging does show cirrhosis of the liver  We consulted psychiatry and she continues to deny excessive alcohol use  Patient was seen by physical Occupational Therapy who recommended rehab facility  Patient has refused rehab facility and family has advised discharge planner that she will leave AMA if sent to a rehab  Therefore we will discharge the patient home under family's care  Counseling provided for alcohol abstinence but the patient does not believe she has any alcohol issues    Pertinent Physical Exam At Time of Discharge  Physical Exam    Constitutional   General appearance: Alert     Pulmonary   Respiratory assessment: No respiratory distress, normal respiratory rhythm and effort.    Auscultation of Lungs: Clear bilateral breath sounds.   Cardiovascular   Auscultation of heart: Apical  pulse normal, heart rate and rhythm normal, normal S1 and S2, no murmurs and no pericardial rub.    Exam for edema: No peripheral edema.   Abdomen   Abdominal Exam: No bruits, normal bowel sounds, soft, non-tender, no abdominal mass palpated.    Liver and Spleen exam: No hepato-splenomegaly.   Musculoskeletal   Examination of gait: Normal.    Inspection of digits and nails: No clubbing or cyanosis of the fingernails.    Inspection/palpation of joints, bones and muscles: No joint swelling. Normal movement of all extremities.   Skin   Skin inspection: Normal skin color and pigmentation, normal skin turgor and no visible rash.   Neurologic   Cranial nerves: Nerves 2-12 were intact, no focal neuro defects.  Flat affect      Home Medications     Medication List      START taking these medications     cephalexin 500 mg capsule; Commonly known as: Keflex; Take 1 capsule   (500 mg) by mouth 4 times a day for 5 days.   Certavite-Antioxidant  mg-mcg tablet; Generic drug: multivitamin   with minerals; Take 1 tablet by mouth once daily.; Start taking on: June 11, 2024   metoprolol tartrate 25 mg tablet; Commonly known as: Lopressor; Take 1   tablet (25 mg) by mouth 2 times a day.   thiamine 100 mg tablet; Commonly known as: Vitamin B-1; Take 1 tablet   (100 mg) by mouth once daily.; Start taking on: June 11, 2024     CONTINUE taking these medications     aspirin 81 mg EC tablet   atorvastatin 40 mg tablet; Commonly known as: Lipitor; Take 1 tablet (40   mg) by mouth once daily.   clopidogrel 75 mg tablet; Commonly known as: Plavix; Take 1 tablet (75   mg) by mouth once daily.   cyclobenzaprine 10 mg tablet; Commonly known as: Flexeril; Take 1 tablet   (10 mg) by mouth 3 times a day as needed for muscle spasms for up to 10   days.   folic acid 1 mg tablet; Commonly known as: Folvite; Take 1 tablet (1 mg)   by mouth once daily.   hydrALAZINE 10 mg tablet; Commonly known as: Apresoline; Take 1 tablet   (10 mg) by mouth 3  times a day.   levETIRAcetam 500 mg tablet; Commonly known as: Keppra; Take 1 tablet   (500 mg) by mouth 2 times a day.   lisinopril 10 mg tablet; Take 1 tablet (10 mg) by mouth once daily.   sertraline 50 mg tablet; Commonly known as: Zoloft; Take 1 tablet (50   mg) by mouth once daily.     STOP taking these medications     methylPREDNISolone 4 mg tablets; Commonly known as: Medrol Dospak       Outpatient Follow-Up  Future Appointments   Date Time Provider Department Center   9/6/2024 11:00 AM Lluvia Alfred Pla, DO [id number]     Patient seen at bedside. Events from the last visit reviewed. Discussed with staff. Results of tests and investigations from last visit reviewed and discussed with patient/Family. Electronic chart on [facility] reviewed. Input / Recommendations  from consultants  appreciated and reviewed and agreed with.     discharge summary and profile completed. medications reviewed and discussed with patient and family.  scripts completed and signed.     total discharge time in excess of 30 minutes.    Carrie Lancaster MD

## 2024-06-10 NOTE — NURSING NOTE

## 2024-06-10 NOTE — CARE PLAN
The patient's goals for the shift include      The clinical goals for the shift include will remain safe    Over the shift, the patient did not make progress toward the following goals. Barriers to progression include  . Recommendations to address these barriers include  .

## 2024-06-10 NOTE — PROGRESS NOTES
Yue Sanz is a 63 y.o. female on day 2 of admission presenting with Cellulitis, unspecified cellulitis site.      Subjective   Pt is feeling better  No chest pain  No shortness of breath  No nausea vomiting   No abdo pain  Heart rate is better         Objective     Last Recorded Vitals  /78   Pulse 87   Temp 36.4 °C (97.5 °F) (Temporal)   Resp 18   Wt 57.4 kg (126 lb 8 oz)   SpO2 98%   Intake/Output last 3 Shifts:    Intake/Output Summary (Last 24 hours) at 6/10/2024 0640  Last data filed at 6/10/2024 0500  Gross per 24 hour   Intake 1080 ml   Output --   Net 1080 ml       Admission Weight  Weight: 68 kg (150 lb) (06/07/24 0003)    Daily Weight  06/07/24 : 57.4 kg (126 lb 8 oz)    Image Results  XR chest 1 view  Narrative: Interpreted By:  Margaret Connor,   STUDY:  XR CHEST 1 VIEW;  6/7/2024 5:17 pm      INDICATION:  Signs/Symptoms:Shortness of breath      COMPARISON:  Chest x-ray 06/06/2024, 01/28/2021.      ACCESSION NUMBER(S):  [id number]      ORDERING CLINICIAN:  DUNG CHOUDHARY      TECHNIQUE:  Portable upright frontal view of the chest was obtained .      FINDINGS:  Monitoring wires and radiopaque densities are overlying the patient.      The cardiomediastinal silhouette is within normal limits.      No focal consolidation, pleural effusion or pneumothorax.      Impression: 1.  No radiographic evidence of acute cardiopulmonary process.              MACRO:  None.      Signed by: Margaret Connor 6/7/2024 9:34 PM  Dictation workstation:   [id number]  NM Lung perfusion with spect/ct  Interpreted By:  Margaret Connor,   STUDY:  NM LUNG PERFUSION WITH SPECT/CT;  6/7/2024 9:21 pm      INDICATION:  Signs/Symptoms:Hypoxia.      COMPARISON:  Chest x-ray 06/07/2024.      ACCESSION NUMBER(S):  [id number]      ORDERING CLINICIAN:  DUNG CHOUDHARY      TECHNIQUE:  Multiple perfusion images of the lungs were acquired after the  intravenous administration of  4.3 mCi of Tc-99m macroaggregated  albumin (MAA).  SPECT/CT images of the lungs were also obtained.      FINDINGS:  Perfusion images demonstrate a mildly heterogeneous distribution of  radiopharmaceutical throughout the lungs without evidence of  segmental perfusion abnormalities.      Additional findings on low-dose SPECT CT images :  No obvious consolidation or pleural effusion is identified.      IMPRESSION  1. Low probability of acute pulmonary embolism.              MACRO:  None.          Signed by: Margaret Connor 6/7/2024 9:32 PM  Dictation workstation:   [id number]      Physical Exam  Chest clear  Anxious  Cvs regular  Ext noedema  Abdo soft nontender bs active,   Heent normal muocsa  Neck supple     Relevant Results  Scheduled medications  aspirin, 81 mg, oral, Daily  atorvastatin, 40 mg, oral, Nightly  ceFAZolin, 1 g, intravenous, q8h  clopidogrel, 75 mg, oral, Daily  folic acid, 1 mg, oral, Daily  hydrALAZINE, 10 mg, oral, TID  levETIRAcetam, 500 mg, oral, BID  lisinopril, 10 mg, oral, Daily  metoprolol tartrate, 25 mg, oral, BID  multivitamin with minerals, 1 tablet, oral, Daily  nystatin, , Topical, BID  pantoprazole, 40 mg, oral, Daily before breakfast   Or  pantoprazole, 40 mg, intravenous, Daily before breakfast  sertraline, 50 mg, oral, Daily  thiamine, 100 mg, oral, Daily      Continuous medications     PRN medications  PRN medications: acetaminophen **OR** acetaminophen **OR** acetaminophen, guaiFENesin, LORazepam **OR** LORazepam **OR** LORazepam, melatonin, ondansetron **OR** ondansetron, polyethylene glycol  Results for orders placed or performed during the hospital encounter of 06/07/24 (from the past 96 hour(s))   Urinalysis with Reflex Microscopic   Result Value Ref Range    Color, Urine Yellow Light-Yellow, Yellow, Dark-Yellow    Appearance, Urine Turbid (N) Clear    Specific Gravity, Urine 1.024 1.005 - 1.035    pH, Urine 5.5 5.0, 5.5, 6.0, 6.5, 7.0, 7.5, 8.0    Protein, Urine 20 (TRACE) NEGATIVE, 10 (TRACE), 20 (TRACE) mg/dL    Glucose,  Urine Normal Normal mg/dL    Blood, Urine NEGATIVE NEGATIVE    Ketones, Urine 20 (1+) (A) NEGATIVE mg/dL    Bilirubin, Urine NEGATIVE NEGATIVE    Urobilinogen, Urine Normal Normal mg/dL    Nitrite, Urine NEGATIVE NEGATIVE    Leukocyte Esterase, Urine 500 Raine/µL (A) NEGATIVE   Microscopic Only, Urine   Result Value Ref Range    WBC, Urine 11-20 (A) 1-5, NONE /HPF    RBC, Urine NONE NONE, 1-2, 3-5 /HPF    Squamous Epithelial Cells, Urine 1-9 (SPARSE) Reference range not established. /HPF    Bacteria, Urine 1+ (A) NONE SEEN /HPF    Mucus, Urine FEW Reference range not established. /LPF    Hyaline Casts, Urine 3+ (A) NONE /LPF   SST TOP   Result Value Ref Range    Extra Tube Hold for add-ons.    D-dimer, VTE Exclusion   Result Value Ref Range    D-Dimer, Quantitative VTE Exclusion 1,116 (H) <=500 ng/mL FEU   CBC   Result Value Ref Range    WBC 6.9 4.4 - 11.3 x10*3/uL    nRBC 0.0 0.0 - 0.0 /100 WBCs    RBC 3.04 (L) 4.00 - 5.20 x10*6/uL    Hemoglobin 10.0 (L) 12.0 - 16.0 g/dL    Hematocrit 29.9 (L) 36.0 - 46.0 %    MCV 98 80 - 100 fL    MCH 32.9 26.0 - 34.0 pg    MCHC 33.4 32.0 - 36.0 g/dL    RDW 11.9 11.5 - 14.5 %    Platelets 217 150 - 450 x10*3/uL   Basic metabolic panel   Result Value Ref Range    Glucose 86 74 - 99 mg/dL    Sodium 135 (L) 136 - 145 mmol/L    Potassium 4.1 3.5 - 5.3 mmol/L    Chloride 104 98 - 107 mmol/L    Bicarbonate 17 (L) 21 - 32 mmol/L    Anion Gap 18 10 - 20 mmol/L    Urea Nitrogen 28 (H) 6 - 23 mg/dL    Creatinine 1.06 (H) 0.50 - 1.05 mg/dL    eGFR 59 (L) >60 mL/min/1.73m*2    Calcium 9.2 8.6 - 10.3 mg/dL   CBC   Result Value Ref Range    WBC 7.2 4.4 - 11.3 x10*3/uL    nRBC 0.0 0.0 - 0.0 /100 WBCs    RBC 3.16 (L) 4.00 - 5.20 x10*6/uL    Hemoglobin 10.4 (L) 12.0 - 16.0 g/dL    Hematocrit 32.0 (L) 36.0 - 46.0 %     (H) 80 - 100 fL    MCH 32.9 26.0 - 34.0 pg    MCHC 32.5 32.0 - 36.0 g/dL    RDW 11.9 11.5 - 14.5 %    Platelets 223 150 - 450 x10*3/uL   Basic metabolic panel   Result Value Ref  Range    Glucose 91 74 - 99 mg/dL    Sodium 136 136 - 145 mmol/L    Potassium 4.6 3.5 - 5.3 mmol/L    Chloride 105 98 - 107 mmol/L    Bicarbonate 18 (L) 21 - 32 mmol/L    Anion Gap 18 10 - 20 mmol/L    Urea Nitrogen 24 (H) 6 - 23 mg/dL    Creatinine 1.08 (H) 0.50 - 1.05 mg/dL    eGFR 58 (L) >60 mL/min/1.73m*2    Calcium 9.2 8.6 - 10.3 mg/dL                Assessment/Plan        Principal Problem:    Cellulitis, unspecified cellulitis site  Anxiety   Sinus tach  Alcohol abuse   Seizure disorders  Prior cva    Cont with ciwa protocol  Cont with home meds  Cont to monitor on tele  Pt pt/mother and grand son in detail  Dc plan for tomorrow   Family not ready to take her home today   Social work to follow  Pt does not want to go to rehab  Time > 60 min in taking care of this pt, dw nursing several times and also different family members  Will follow           Walter Schmidt MD

## 2024-06-10 NOTE — PROGRESS NOTES
Occupational Therapy                 Therapy Communication Note    Patient Name: Yue Sanz  MRN: [id number]  Today's Date: 6/10/2024     Discipline: Occupational Therapy    Missed Visit Reason: Missed Visit Reason: Other (Comment) (Pt declined OT at this time. RN aware, stated has been refusing most things.)    Missed Time: Attempt

## 2024-06-10 NOTE — NURSING NOTE
Pt refuses tele and vs. Becomes agitated when approached. Still wanting to go home but understands she's not discharged.

## 2024-06-10 NOTE — CARE PLAN
Problem: Skin  Goal: Decreased wound size/increased tissue granulation at next dressing change  Outcome: Progressing  Goal: Participates in plan/prevention/treatment measures  Outcome: Progressing  Goal: Prevent/manage excess moisture  Outcome: Progressing  Goal: Prevent/minimize sheer/friction injuries  Outcome: Progressing  Goal: Promote/optimize nutrition  Outcome: Progressing  Goal: Promote skin healing  Outcome: Progressing     Problem: Pain  Goal: My pain/discomfort is manageable  Outcome: Progressing     Problem: Safety  Goal: Patient will be injury free during hospitalization  Outcome: Progressing  Goal: I will remain free of falls  Outcome: Progressing     Problem: Daily Care  Goal: Daily care needs are met  Outcome: Progressing     Problem: Psychosocial Needs  Goal: Demonstrates ability to cope with hospitalization/illness  Outcome: Progressing  Goal: Collaborate with me, my family, and caregiver to identify my specific goals  Outcome: Progressing     Problem: Discharge Barriers  Goal: My discharge needs are met  Outcome: Progressing     Problem: Dressings Lower Extremities  Goal: STG - Patient fabrice/doff socks/shoes Independent.   Outcome: Progressing  Goal: STG - Patient will fabrice/doff pants over feet Independent.  Outcome: Progressing

## 2024-06-10 NOTE — PROGRESS NOTES
Physical Therapy                 Therapy Communication Note    Patient Name: Yue Sanz  MRN: [id number]  Today's Date: 6/10/2024     Discipline: Physical Therapy    Missed Visit Reason: Missed Visit Reason:  (pt stated that she has been moving around in room and declined all activity at this time.  RN aware stated she has been refusing most things.)    Missed Time: Attempt    Comment:

## 2024-06-10 NOTE — CARE PLAN
HAWK      spoke with  nephew  Xiang - he  is  aware that pt  is  refusing  UHAR  but  also being independent  with ADLs and    IADLs.    He will call this  writer back with a pickup time.     HAYDEE Kelley, TISH                  6-10-24          1005  Per  Xiang   his  sister  will be    here  at  noon  to  pick pt      up    HAYDEE Kelley, JUDYW

## 2024-06-11 ENCOUNTER — PATIENT OUTREACH (OUTPATIENT)
Dept: CARE COORDINATION | Facility: CLINIC | Age: 64
End: 2024-06-11
Payer: COMMERCIAL

## 2024-06-11 LAB
BACTERIA BLD CULT: NORMAL
BACTERIA BLD CULT: NORMAL

## 2024-06-11 NOTE — PROGRESS NOTES
Discharge Facility:[facility]  Discharge Diagnosis:Cellulitis  Admission Date:06/07/24  Discharge Date: 06/10/24    PCP Appointment Date:none available sent to pcp office  Specialist Appointment Date:   Hospital Encounter and Summary: Linked   See discharge assessment below for further details  Engagement  Call Start Time: 0918 (6/11/2024  9:18 AM)    Medications  Medications reviewed with patient/caregiver?: Yes (keflex 500mg vit B1 metoprolol tartrate 25mg) (6/11/2024  9:18 AM)  Is the patient having any side effects they believe may be caused by any medication additions or changes?: No (6/11/2024  9:18 AM)  Does the patient have all medications ordered at discharge?: Yes (6/11/2024  9:18 AM)  Is the patient taking all medications as directed (includes completed medication regime)?: Yes (6/11/2024  9:18 AM)    Appointments  Does the patient have a primary care provider?: Yes (6/11/2024  9:18 AM)  Care Management Interventions: Advised patient to make appointment (6/11/2024  9:18 AM)    Self Management  Has home health visited the patient within 72 hours of discharge?: Not applicable (6/11/2024  9:18 AM)  Has all Durable Medical Equipment (DME) been delivered?: No (6/11/2024  9:18 AM)    Patient Teaching  Does the patient have access to their discharge instructions?: Yes (6/11/2024  9:18 AM)  Care Management Interventions: Reviewed instructions with patient (6/11/2024  9:18 AM)  What is the patient's perception of their health status since discharge?: Improving (6/11/2024  9:18 AM)    Wrap Up  Call End Time: 0925 (6/11/2024  9:18 AM)

## 2024-06-12 LAB
ATRIAL RATE: 108 BPM
P AXIS: 53 DEGREES
P OFFSET: 181 MS
P ONSET: 133 MS
PR INTERVAL: 180 MS
Q ONSET: 223 MS
QRS COUNT: 18 BEATS
QRS DURATION: 94 MS
QT INTERVAL: 340 MS
QTC CALCULATION(BAZETT): 455 MS
QTC FREDERICIA: 413 MS
R AXIS: 9 DEGREES
T AXIS: 31 DEGREES
T OFFSET: 393 MS
VENTRICULAR RATE: 108 BPM

## 2024-06-13 ENCOUNTER — APPOINTMENT (OUTPATIENT)
Dept: RADIOLOGY | Facility: HOSPITAL | Age: 64
End: 2024-06-13
Payer: COMMERCIAL

## 2024-06-13 ENCOUNTER — HOSPITAL ENCOUNTER (EMERGENCY)
Facility: HOSPITAL | Age: 64
Discharge: HOME | End: 2024-06-14
Attending: EMERGENCY MEDICINE
Payer: COMMERCIAL

## 2024-06-13 DIAGNOSIS — S42.292A CLOSED FRACTURE OF HEAD OF LEFT HUMERUS, INITIAL ENCOUNTER: ICD-10-CM
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
## 2024-06-13 LAB
ALBUMIN SERPL BCP-MCNC: 3.9 G/DL (ref 3.4–5)
ALP SERPL-CCNC: 57 U/L (ref 33–136)
ALT SERPL W P-5'-P-CCNC: 4 U/L (ref 7–45)
ANION GAP SERPL CALC-SCNC: 17 MMOL/L (ref 10–20)
AST SERPL W P-5'-P-CCNC: 27 U/L (ref 9–39)
BASOPHILS # BLD AUTO: 0.03 X10*3/UL (ref 0–0.1)
BASOPHILS NFR BLD AUTO: 0.3 %
BILIRUB SERPL-MCNC: 0.4 MG/DL (ref 0–1.2)
BUN SERPL-MCNC: 16 MG/DL (ref 6–23)
CALCIUM SERPL-MCNC: 8.5 MG/DL (ref 8.6–10.3)
CHLORIDE SERPL-SCNC: 108 MMOL/L (ref 98–107)
CO2 SERPL-SCNC: 19 MMOL/L (ref 21–32)
CREAT SERPL-MCNC: 1.11 MG/DL (ref 0.5–1.05)
EGFRCR SERPLBLD CKD-EPI 2021: 56 ML/MIN/1.73M*2
EOSINOPHIL # BLD AUTO: 0.12 X10*3/UL (ref 0–0.7)
EOSINOPHIL NFR BLD AUTO: 1.2 %
ERYTHROCYTE [DISTWIDTH] IN BLOOD BY AUTOMATED COUNT: 12.3 % (ref 11.5–14.5)
ETHANOL SERPL-MCNC: <10 MG/DL
GLUCOSE SERPL-MCNC: 114 MG/DL (ref 74–99)
HCT VFR BLD AUTO: 27.7 % (ref 36–46)
HGB BLD-MCNC: 9.2 G/DL (ref 12–16)
IMM GRANULOCYTES # BLD AUTO: 0.08 X10*3/UL (ref 0–0.7)
IMM GRANULOCYTES NFR BLD AUTO: 0.8 % (ref 0–0.9)
LYMPHOCYTES # BLD AUTO: 1.31 X10*3/UL (ref 1.2–4.8)
LYMPHOCYTES NFR BLD AUTO: 13 %
MCH RBC QN AUTO: 32.4 PG (ref 26–34)
MCHC RBC AUTO-ENTMCNC: 33.2 G/DL (ref 32–36)
MCV RBC AUTO: 98 FL (ref 80–100)
MONOCYTES # BLD AUTO: 0.68 X10*3/UL (ref 0.1–1)
MONOCYTES NFR BLD AUTO: 6.8 %
NEUTROPHILS # BLD AUTO: 7.83 X10*3/UL (ref 1.2–7.7)
NEUTROPHILS NFR BLD AUTO: 77.9 %
NRBC BLD-RTO: 0 /100 WBCS (ref 0–0)
PLATELET # BLD AUTO: 262 X10*3/UL (ref 150–450)
POTASSIUM SERPL-SCNC: 3.5 MMOL/L (ref 3.5–5.3)
PROT SERPL-MCNC: 6.6 G/DL (ref 6.4–8.2)
RBC # BLD AUTO: 2.84 X10*6/UL (ref 4–5.2)
SODIUM SERPL-SCNC: 140 MMOL/L (ref 136–145)
WBC # BLD AUTO: 10.1 X10*3/UL (ref 4.4–11.3)

## 2024-06-13 PROCEDURE — 73060 X-RAY EXAM OF HUMERUS: CPT | Mod: LEFT SIDE | Performed by: RADIOLOGY

## 2024-06-13 PROCEDURE — 85025 COMPLETE CBC W/AUTO DIFF WBC: CPT | Performed by: EMERGENCY MEDICINE

## 2024-06-13 PROCEDURE — 82077 ASSAY SPEC XCP UR&BREATH IA: CPT | Performed by: EMERGENCY MEDICINE

## 2024-06-13 PROCEDURE — 73070 X-RAY EXAM OF ELBOW: CPT | Mod: LT

## 2024-06-13 PROCEDURE — 71045 X-RAY EXAM CHEST 1 VIEW: CPT | Performed by: RADIOLOGY

## 2024-06-13 PROCEDURE — 36415 COLL VENOUS BLD VENIPUNCTURE: CPT | Performed by: EMERGENCY MEDICINE

## 2024-06-13 PROCEDURE — 96375 TX/PRO/DX INJ NEW DRUG ADDON: CPT

## 2024-06-13 PROCEDURE — 80053 COMPREHEN METABOLIC PANEL: CPT | Performed by: EMERGENCY MEDICINE

## 2024-06-13 PROCEDURE — 73060 X-RAY EXAM OF HUMERUS: CPT | Mod: LT

## 2024-06-13 PROCEDURE — 73070 X-RAY EXAM OF ELBOW: CPT | Mod: LEFT SIDE | Performed by: SURGERY

## 2024-06-13 PROCEDURE — 73030 X-RAY EXAM OF SHOULDER: CPT | Mod: LEFT SIDE | Performed by: SURGERY

## 2024-06-13 PROCEDURE — 2500000004 HC RX 250 GENERAL PHARMACY W/ HCPCS (ALT 636 FOR OP/ED): Performed by: EMERGENCY MEDICINE

## 2024-06-13 PROCEDURE — 71045 X-RAY EXAM CHEST 1 VIEW: CPT

## 2024-06-13 PROCEDURE — 73030 X-RAY EXAM OF SHOULDER: CPT | Mod: LT

## 2024-06-13 PROCEDURE — 99284 EMERGENCY DEPT VISIT MOD MDM: CPT | Mod: 25

## 2024-06-13 PROCEDURE — 96374 THER/PROPH/DIAG INJ IV PUSH: CPT

## 2024-06-13 RX ORDER — ONDANSETRON HYDROCHLORIDE 2 MG/ML
4 INJECTION, SOLUTION INTRAVENOUS ONCE
Status: COMPLETED | OUTPATIENT
Start: 2024-06-13 | End: 2024-06-13

## 2024-06-13 RX ORDER — MORPHINE SULFATE 4 MG/ML
4 INJECTION, SOLUTION INTRAMUSCULAR; INTRAVENOUS ONCE
Status: COMPLETED | OUTPATIENT
Start: 2024-06-13 | End: 2024-06-13

## 2024-06-13 RX ORDER — OXYCODONE AND ACETAMINOPHEN 5; 325 MG/1; MG/1
1 TABLET ORAL EVERY 6 HOURS PRN
Qty: 5 TABLET | Refills: 0 | Status: SHIPPED | OUTPATIENT
Start: 2024-06-13 | End: 2024-06-17 | Stop reason: SDUPTHER

## 2024-06-13 RX ADMIN — MORPHINE SULFATE 4 MG: 4 INJECTION, SOLUTION INTRAMUSCULAR; INTRAVENOUS at 20:12

## 2024-06-13 RX ADMIN — SODIUM CHLORIDE 1000 ML: 9 INJECTION, SOLUTION INTRAVENOUS at 20:52

## 2024-06-13 RX ADMIN — ONDANSETRON 4 MG: 2 INJECTION INTRAMUSCULAR; INTRAVENOUS at 20:12

## 2024-06-13 ASSESSMENT — PAIN - FUNCTIONAL ASSESSMENT: PAIN_FUNCTIONAL_ASSESSMENT: 0-10

## 2024-06-13 ASSESSMENT — PAIN SCALES - GENERAL
PAINLEVEL_OUTOF10: 0 - NO PAIN
PAINLEVEL_OUTOF10: 0 - NO PAIN

## 2024-06-13 NOTE — ED TRIAGE NOTES
PT arrives via ems after tripping over her dogs and falling. Left shoulder pain with deformity. Denies LOC or hitting head. Pt is on Plavix

## 2024-06-14 ENCOUNTER — HOSPITAL ENCOUNTER (OUTPATIENT)
Facility: HOSPITAL | Age: 64
Setting detail: OUTPATIENT SURGERY
End: 2024-06-14
Attending: ORTHOPAEDIC SURGERY | Admitting: ORTHOPAEDIC SURGERY
Payer: COMMERCIAL

## 2024-06-14 ENCOUNTER — PREP FOR PROCEDURE (OUTPATIENT)
Dept: ORTHOPEDIC SURGERY | Facility: HOSPITAL | Age: 64
End: 2024-06-14
Payer: COMMERCIAL

## 2024-06-14 VITALS
RESPIRATION RATE: 18 BRPM | HEART RATE: 82 BPM | SYSTOLIC BLOOD PRESSURE: 116 MMHG | HEIGHT: 67 IN | DIASTOLIC BLOOD PRESSURE: 82 MMHG | TEMPERATURE: 98.5 F | OXYGEN SATURATION: 97 % | WEIGHT: 150 LBS | BODY MASS INDEX: 23.54 KG/M2

## 2024-06-14 DIAGNOSIS — S42.351A: ICD-10-CM

## 2024-06-14 DIAGNOSIS — Z01.818 PRE-OP TESTING: ICD-10-CM

## 2024-06-14 NOTE — DISCHARGE INSTRUCTIONS
Please take Motrin 400 mg every 6 hours as needed for pain for the next 2-3 3 to 5 days.  Please apply ice for 15 minutes/h for 4 hours/day to areas that are sore.  Please keep the splint sling on to help with your fracture and to help maintain alignment.  Please follow-up with Dr. Doty or Neftaly from orthopedics trauma to continue care for your fracture.

## 2024-06-14 NOTE — ED PROVIDER NOTES
HPI   Chief Complaint   Patient presents with    Shoulder Injury    Fall       This is a 63-year-old woman with past medical history of prior stroke, alcohol abuse, anemia, depression, anxiety who does present status post mechanical trip and fall over her dog.  He landed on her left shoulder.  No head strike.  No LOC.  No chest or abdominal pain.  No difficulty moving arms or legs.  Positive pain over the left shoulder.                        No data recorded                   Patient History   Past Medical History:   Diagnosis Date    Acute CVA (cerebrovascular accident) (Multi) 05/30/2023    Alcohol related seizure (Multi) 05/30/2023    Cerebral hemorrhage (Multi) 05/30/2023    Cerebral infarction due to embolism of right middle cerebral artery (Multi) 05/30/2023    Diarrhea, unspecified 08/10/2022    Diarrhea    Encounter for follow-up examination after completed treatment for conditions other than malignant neoplasm 04/20/2020    Hospital discharge follow-up    Encounter for other screening for malignant neoplasm of breast 03/08/2022    Breast screening    Encounter for screening for malignant neoplasm of colon 05/16/2022    Colon cancer screening    Other abnormality of red blood cells 08/10/2022    Elevated MCV    Personal history of other endocrine, nutritional and metabolic disease     History of high cholesterol    Personal history of transient ischemic attack (TIA), and cerebral infarction without residual deficits     History of cerebrovascular accident     Past Surgical History:   Procedure Laterality Date    MR HEAD ANGIO WO IV CONTRAST  5/19/2021    MR HEAD ANGIO WO IV CONTRAST 5/19/2021 [facility]    MR HEAD ANGIO WO IV CONTRAST  7/12/2018    MR HEAD ANGIO WO IV CONTRAST 7/12/2018 [facility]    MR NECK ANGIO WO IV CONTRAST  5/19/2021    MR NECK ANGIO WO IV CONTRAST 5/19/2021 [facility]     Family History   Problem Relation Name Age of Onset    Arthritis Mother      No  Known Problems Father       Social History     Tobacco Use    Smoking status: Never     Passive exposure: Never    Smokeless tobacco: Never   Vaping Use    Vaping status: Never Used   Substance Use Topics    Alcohol use: Not Currently    Drug use: Not Currently       Physical Exam   ED Triage Vitals [06/13/24 1947]   Temperature Heart Rate Respirations BP   36.9 °C (98.5 °F) 80 16 101/72      Pulse Ox Temp src Heart Rate Source Patient Position   97 % -- -- --      BP Location FiO2 (%)     -- --       Physical Exam  Vitals and nursing note reviewed.   Constitutional:       Appearance: Normal appearance. She is normal weight.   HENT:      Head: Normocephalic and atraumatic.      Nose: Nose normal.      Mouth/Throat:      Mouth: Mucous membranes are moist.      Pharynx: Oropharynx is clear.   Eyes:      Extraocular Movements: Extraocular movements intact.      Conjunctiva/sclera: Conjunctivae normal.      Pupils: Pupils are equal, round, and reactive to light.   Cardiovascular:      Rate and Rhythm: Normal rate and regular rhythm.      Pulses: Normal pulses.      Heart sounds: Normal heart sounds.   Pulmonary:      Breath sounds: Normal breath sounds.   Abdominal:      General: Abdomen is flat. Bowel sounds are normal. There is no distension.      Palpations: Abdomen is soft.      Tenderness: There is no abdominal tenderness. There is no right CVA tenderness, left CVA tenderness, guarding or rebound.   Musculoskeletal:         General: Deformity present.      Cervical back: Normal range of motion and neck supple.      Comments: Positive tenderness outpatient over the left shoulder.  Indentation noted to the skin.  No skin break.  No tenderness over the humerus.  No tenderness over the elbow.  Full range of motion of the wrist.   Skin:     General: Skin is warm and dry.      Capillary Refill: Capillary refill takes less than 2 seconds.   Neurological:      General: No focal deficit present.      Mental Status: She is  alert and oriented to person, place, and time. Mental status is at baseline.      Sensory: No sensory deficit.      Motor: No weakness.   Psychiatric:         Mood and Affect: Mood normal.         Behavior: Behavior normal.         Thought Content: Thought content normal.         Judgment: Judgment normal.         ED Course & MDM   Diagnoses as of 06/13/24 2252   Fall, initial encounter   Closed fracture of head of left humerus, initial encounter       Medical Decision Making  Labs were sent including CBC, CMP, ethanol level.  The level negative.  No elevated blood cell count or stomach infection.  Can no clinically significant anemia.  No acute kidney injury.  She does receive IV fluids for dehydration and does have x-ray imaging of the chest which was negative for acute pathology.  She does have left acute comminuted fracture proximal humeral metadiaphysis and I did discuss the case with orthopedics who did review imaging and did request humeral view which was sent as well as placing patient in a sling and having her follow-up with orthopedic physician as outpatient.  Patient is in agreement.  Provided with pain medication and plan for Ortho follow-up and return precautions are discussed.      She is placed in a sling for stabilization for operative management by the tech under my supervision. Neurovascularly intact pre and post sling    Amount and/or Complexity of Data Reviewed  Labs: ordered. Decision-making details documented in ED Course.  Radiology: ordered. Decision-making details documented in ED Course.        Procedure  Procedures     Thang Mendez MD  06/13/24 6811       Thang Mendez MD  07/06/24 4882

## 2024-06-17 ENCOUNTER — APPOINTMENT (OUTPATIENT)
Dept: ORTHOPEDIC SURGERY | Facility: CLINIC | Age: 64
End: 2024-06-17
Payer: COMMERCIAL

## 2024-06-17 ENCOUNTER — PRE-ADMISSION TESTING (OUTPATIENT)
Dept: PREADMISSION TESTING | Facility: HOSPITAL | Age: 64
DRG: 493 | End: 2024-06-17
Payer: COMMERCIAL

## 2024-06-17 ENCOUNTER — TELEPHONE (OUTPATIENT)
Dept: ORTHOPEDIC SURGERY | Facility: CLINIC | Age: 64
End: 2024-06-17

## 2024-06-17 VITALS
OXYGEN SATURATION: 97 % | TEMPERATURE: 95.9 F | SYSTOLIC BLOOD PRESSURE: 103 MMHG | WEIGHT: 132.5 LBS | HEART RATE: 96 BPM | HEIGHT: 67 IN | DIASTOLIC BLOOD PRESSURE: 72 MMHG | BODY MASS INDEX: 20.8 KG/M2

## 2024-06-17 DIAGNOSIS — S42.351A: ICD-10-CM

## 2024-06-17 DIAGNOSIS — R01.1 MURMUR: Primary | ICD-10-CM

## 2024-06-17 DIAGNOSIS — R73.09 OTHER ABNORMAL GLUCOSE: ICD-10-CM

## 2024-06-17 DIAGNOSIS — E03.9 HYPOTHYROIDISM, UNSPECIFIED TYPE: ICD-10-CM

## 2024-06-17 DIAGNOSIS — Z01.818 PRE-OP TESTING: ICD-10-CM

## 2024-06-17 DIAGNOSIS — S42.292A CLOSED FRACTURE OF HEAD OF LEFT HUMERUS, INITIAL ENCOUNTER: ICD-10-CM

## 2024-06-17 LAB
ABO GROUP (TYPE) IN BLOOD: NORMAL
ANION GAP SERPL CALC-SCNC: 18 MMOL/L (ref 10–20)
ANTIBODY SCREEN: NORMAL
APTT PPP: 37 SECONDS (ref 27–38)
BUN SERPL-MCNC: 16 MG/DL (ref 6–23)
CALCIUM SERPL-MCNC: 9.4 MG/DL (ref 8.6–10.6)
CHLORIDE SERPL-SCNC: 109 MMOL/L (ref 98–107)
CO2 SERPL-SCNC: 22 MMOL/L (ref 21–32)
CREAT SERPL-MCNC: 1.26 MG/DL (ref 0.5–1.05)
EGFRCR SERPLBLD CKD-EPI 2021: 48 ML/MIN/1.73M*2
ERYTHROCYTE [DISTWIDTH] IN BLOOD BY AUTOMATED COUNT: 12.7 % (ref 11.5–14.5)
EST. AVERAGE GLUCOSE BLD GHB EST-MCNC: 97 MG/DL
GLUCOSE SERPL-MCNC: 113 MG/DL (ref 74–99)
HBA1C MFR BLD: 5 %
HCT VFR BLD AUTO: 30 % (ref 36–46)
HGB BLD-MCNC: 9.9 G/DL (ref 12–16)
INR PPP: 1 (ref 0.9–1.1)
MCH RBC QN AUTO: 32.9 PG (ref 26–34)
MCHC RBC AUTO-ENTMCNC: 33 G/DL (ref 32–36)
MCV RBC AUTO: 100 FL (ref 80–100)
NRBC BLD-RTO: 0 /100 WBCS (ref 0–0)
PLATELET # BLD AUTO: 417 X10*3/UL (ref 150–450)
POTASSIUM SERPL-SCNC: 3.9 MMOL/L (ref 3.5–5.3)
PROTHROMBIN TIME: 11.6 SECONDS (ref 9.8–12.8)
RBC # BLD AUTO: 3.01 X10*6/UL (ref 4–5.2)
RH FACTOR (ANTIGEN D): NORMAL
SODIUM SERPL-SCNC: 145 MMOL/L (ref 136–145)
WBC # BLD AUTO: 11 X10*3/UL (ref 4.4–11.3)

## 2024-06-17 PROCEDURE — 84443 ASSAY THYROID STIM HORMONE: CPT

## 2024-06-17 PROCEDURE — 86923 COMPATIBILITY TEST ELECTRIC: CPT

## 2024-06-17 PROCEDURE — 86901 BLOOD TYPING SEROLOGIC RH(D): CPT

## 2024-06-17 PROCEDURE — 83036 HEMOGLOBIN GLYCOSYLATED A1C: CPT

## 2024-06-17 PROCEDURE — 85610 PROTHROMBIN TIME: CPT

## 2024-06-17 PROCEDURE — 82374 ASSAY BLOOD CARBON DIOXIDE: CPT

## 2024-06-17 PROCEDURE — 84439 ASSAY OF FREE THYROXINE: CPT

## 2024-06-17 PROCEDURE — 99204 OFFICE O/P NEW MOD 45 MIN: CPT | Performed by: NURSE PRACTITIONER

## 2024-06-17 PROCEDURE — 85027 COMPLETE CBC AUTOMATED: CPT

## 2024-06-17 PROCEDURE — 36415 COLL VENOUS BLD VENIPUNCTURE: CPT

## 2024-06-17 PROCEDURE — 84702 CHORIONIC GONADOTROPIN TEST: CPT

## 2024-06-17 PROCEDURE — 87081 CULTURE SCREEN ONLY: CPT

## 2024-06-17 RX ORDER — OXYCODONE AND ACETAMINOPHEN 5; 325 MG/1; MG/1
1 TABLET ORAL EVERY 6 HOURS PRN
Qty: 8 TABLET | Refills: 0 | Status: ON HOLD | OUTPATIENT
Start: 2024-06-17 | End: 2024-06-19

## 2024-06-17 RX ORDER — CHLORHEXIDINE GLUCONATE ORAL RINSE 1.2 MG/ML
15 SOLUTION DENTAL SEE ADMIN INSTRUCTIONS
Qty: 473 ML | Refills: 0 | Status: SHIPPED | OUTPATIENT
Start: 2024-06-17

## 2024-06-17 RX ORDER — CHLORHEXIDINE GLUCONATE 40 MG/ML
SOLUTION TOPICAL 2 TIMES DAILY
Qty: 473 ML | Refills: 0 | Status: SHIPPED | OUTPATIENT
Start: 2024-06-17 | End: 2024-06-22

## 2024-06-17 ASSESSMENT — ENCOUNTER SYMPTOMS
CHILLS: 0
CONSTITUTIONAL NEGATIVE: 1
RESPIRATORY NEGATIVE: 1
GASTROINTESTINAL NEGATIVE: 1
ARTHRALGIAS: 1
FEVER: 0
CARDIOVASCULAR NEGATIVE: 1
EYES NEGATIVE: 1
MYALGIAS: 1
NECK NEGATIVE: 1
NEUROLOGICAL NEGATIVE: 1
LIMITED RANGE OF MOTION: 1
ENDOCRINE NEGATIVE: 1

## 2024-06-17 ASSESSMENT — DUKE ACTIVITY SCORE INDEX (DASI)
TOTAL_SCORE: 10.7
CAN YOU TAKE CARE OF YOURSELF (EAT, DRESS, BATHE, OR USE TOILET): YES
CAN YOU WALK INDOORS, SUCH AS AROUND YOUR HOUSE: YES
CAN YOU HAVE SEXUAL RELATIONS: NO
CAN YOU CLIMB A FLIGHT OF STAIRS OR WALK UP A HILL: NO
CAN YOU DO MODERATE WORK AROUND THE HOUSE LIKE VACUUMING, SWEEPING FLOORS OR CARRYING GROCERIES: YES
CAN YOU DO YARD WORK LIKE RAKING LEAVES, WEEDING OR PUSHING A MOWER: NO
CAN YOU PARTICIPATE IN STRENOUS SPORTS LIKE SWIMMING, SINGLES TENNIS, FOOTBALL, BASKETBALL, OR SKIING: NO
CAN YOU DO HEAVY WORK AROUND THE HOUSE LIKE SCRUBBING FLOORS OR LIFTING AND MOVING HEAVY FURNITURE: NO
CAN YOU RUN A SHORT DISTANCE: NO
CAN YOU PARTICIPATE IN MODERATE RECREATIONAL ACTIVITIES LIKE GOLF, BOWLING, DANCING, DOUBLES TENNIS OR THROWING A BASEBALL OR FOOTBALL: NO
CAN YOU DO LIGHT WORK AROUND THE HOUSE LIKE DUSTING OR WASHING DISHES: YES
CAN YOU WALK A BLOCK OR TWO ON LEVEL GROUND: NO
DASI METS SCORE: 4.1

## 2024-06-17 ASSESSMENT — LIFESTYLE VARIABLES: SMOKING_STATUS: NONSMOKER

## 2024-06-17 NOTE — TELEPHONE ENCOUNTER
Patient is a s/p R Humerus fx, surgical planning for Wednesday 06/19 with Dr. Patricia.     Daughter, Carmela, called the office requesting a refill on pain medication. ED only gave short supply.     Notified Carmela that patient can alternate tylenol and ibuprofen until I, Eula Handley LPN hear back from provider.     Eula Handley LPN

## 2024-06-17 NOTE — CPM/PAT H&P
CPM/PAT Evaluation       Name: Yue Sanz (Yue Sanz)  /Age: 1960/63 y.o.     Visit Type:   In-Person       Chief Complaint:   Insertion Intramedullary Nail Humerus - Left    HPI Patient is a 63 year old female, accompanied by significant other, scheduled for surgery [facility] Dr. Jaret Patricia on 24 related to Closed comminuted fracture of right humerus, initial encounter     Patient referred by Dr. Patricia for preoperative evaluation of history of CVA, ETOH, seizure, depression, hypertension, hyperlipidemia, hypothyroidism, and fracture of right humerus.     Past Medical History:   Diagnosis Date    Acute CVA (cerebrovascular accident) (Multi) 2023    Alcohol related seizure (Multi) 2023    Cerebral hemorrhage (Multi) 2023    Cerebral infarction due to embolism of right middle cerebral artery (Multi) 2023    Diarrhea, unspecified 08/10/2022    Diarrhea    Encounter for follow-up examination after completed treatment for conditions other than malignant neoplasm 2020    Hospital discharge follow-up    Encounter for other screening for malignant neoplasm of breast 2022    Breast screening    Encounter for screening for malignant neoplasm of colon 2022    Colon cancer screening    Other abnormality of red blood cells 08/10/2022    Elevated MCV    Personal history of other endocrine, nutritional and metabolic disease     History of high cholesterol    Personal history of transient ischemic attack (TIA), and cerebral infarction without residual deficits     History of cerebrovascular accident     Past Surgical History:   Procedure Laterality Date    MR HEAD ANGIO WO IV CONTRAST  2021    MR HEAD ANGIO WO IV CONTRAST 2021 [facility] EMERGENCY LEGACY    MR HEAD ANGIO WO IV CONTRAST  2018    MR HEAD ANGIO WO IV CONTRAST 2018 [facility] CLINICAL LEGACY    MR NECK ANGIO WO IV CONTRAST  2021    MR NECK ANGIO WO IV CONTRAST 2021 [facility]      Family History   Problem Relation Name Age of Onset    Arthritis Mother      No Known Problems Father       Allergies   Allergen Reactions    Amoxicillin Other     Prior to Admission medications    Medication Sig Start Date End Date Taking? Authorizing Provider   aspirin 81 mg EC tablet Take 1 tablet (81 mg) by mouth once daily. 7/13/18   Historical Provider, MD   atorvastatin (Lipitor) 40 mg tablet Take 1 tablet (40 mg) by mouth once daily. 3/6/24   Lluvia M Aubrie Long, DO   cephalexin (Keflex) 500 mg capsule Take 1 capsule (500 mg) by mouth 4 times a day for 5 days. 6/10/24 6/15/24  LOVE Ballesteros   clopidogrel (Plavix) 75 mg tablet Take 1 tablet (75 mg) by mouth once daily. 3/6/24   Lluvia M Aubrie Long, DO   cyclobenzaprine (Flexeril) 10 mg tablet Take 1 tablet (10 mg) by mouth 3 times a day as needed for muscle spasms for up to 10 days. 5/31/24 6/10/24  Maycol Louis PA-C   folic acid (Folvite) 1 mg tablet Take 1 tablet (1 mg) by mouth once daily. 1/2/24   Lluvia M Aubrie Long, DO   hydrALAZINE (Apresoline) 10 mg tablet Take 1 tablet (10 mg) by mouth 3 times a day. 3/6/24 6/6/24  Lluvia M Aubrie Long, DO   levETIRAcetam (Keppra) 500 mg tablet Take 1 tablet (500 mg) by mouth 2 times a day. 3/6/24 6/6/24  Lluvia M Aubrie Long, DO   lisinopril 10 mg tablet Take 1 tablet (10 mg) by mouth once daily. 3/12/24 4/16/25  Lluvia M Aubrie Long, DO   metoprolol tartrate (Lopressor) 25 mg tablet Take 1 tablet (25 mg) by mouth 2 times a day. 6/10/24 7/10/24  LOVE Ballesteros   multivitamin with minerals tablet Take 1 tablet by mouth once daily. 6/11/24 7/11/24  LOVE Ballesteros   oxyCODONE-acetaminophen (Percocet) 5-325 mg tablet Take 1 tablet by mouth every 6 hours if needed for severe pain (7 - 10) for up to 2 days. 6/17/24 6/19/24  Sultana Whitlock PA-C   sertraline (Zoloft) 50 mg tablet Take 1 tablet (50 mg) by mouth once daily. 3/6/24 6/6/24  Lluvia Alfred  Long, DO   thiamine (Vitamin B-1) 100 mg tablet Take 1 tablet (100 mg) by mouth once daily. 6/11/24 7/11/24  MANDA Ballesteros-CAROL   oxyCODONE-acetaminophen (Percocet) 5-325 mg tablet Take 1 tablet by mouth every 6 hours if needed for severe pain (7 - 10) for up to 3 days. 6/13/24 6/17/24  Thang Mendez MD      PAT ROS:   Constitutional:   neg     no fever   no chills  Neuro/Psych:   neg    Eyes:   neg    Ears:   neg    Nose:   neg    Mouth:   neg    Throat:   neg    Neck:   neg    Cardio:   neg    Respiratory:   neg    Endocrine:   neg    GI:   neg    :   neg    Musculoskeletal:    Left shoulder in a sling. States shoulder is painful   arthralgias   myalgias   decreased ROM  Hematologic:   neg     no history of blood transfusion   no blood clots  Skin:  neg      Physical Exam  Vitals reviewed.   Constitutional:       Appearance: Normal appearance. She is normal weight.   HENT:      Head: Normocephalic and atraumatic.      Nose: Nose normal.      Mouth/Throat:      Mouth: Mucous membranes are moist.      Pharynx: Oropharynx is clear.   Eyes:      Extraocular Movements: Extraocular movements intact.      Pupils: Pupils are equal, round, and reactive to light.   Cardiovascular:      Rate and Rhythm: Normal rate and regular rhythm.      Heart sounds: Murmur heard.   Pulmonary:      Effort: Pulmonary effort is normal.      Breath sounds: Normal breath sounds.   Abdominal:      General: Abdomen is flat. Bowel sounds are normal.      Palpations: Abdomen is soft.   Musculoskeletal:      Cervical back: Normal range of motion and neck supple.      Comments: Left arm in sling   Skin:     General: Skin is warm and dry.   Neurological:      Mental Status: She is alert and oriented to person, place, and time.   Psychiatric:         Mood and Affect: Mood normal.         Behavior: Behavior normal.         Thought Content: Thought content normal.         Judgment: Judgment normal.        PAT AIRWAY:   Airway:      "Mallampati::  II    Neck ROM::  Full   States no loose teeth    Visit Vitals  /72   Pulse 96   Temp 35.5 °C (95.9 °F) (Temporal)     Visit Vitals  /72   Pulse 96   Temp 35.5 °C (95.9 °F) (Temporal)   Ht 1.702 m (5' 7\")   Wt 60.1 kg (132 lb 8 oz)   SpO2 97%   BMI 20.75 kg/m²   OB Status Postmenopausal   Smoking Status Never   BSA 1.69 m²     DASI Risk Score      Flowsheet Row Most Recent Value   DASI SCORE 10.7   METS Score (Will be calculated only when all the questions are answered) 4.1          Caprini DVT Assessment      Flowsheet Row Most Recent Value   DVT Score 6   Current Status Major surgery planned, lasting 2-3 hours   Age 60-75 years   BMI 30 or less          Modified Frailty Index      Flowsheet Row Most Recent Value   Modified Frailty Index Calculator .1818          CHADS2 Stroke Risk  Current as of 10 minutes ago        N/A 3 to 100%: High Risk   2 to < 3%: Medium Risk   0 to < 2%: Low Risk     Last Change: N/A          This score determines the patient's risk of having a stroke if the patient has atrial fibrillation.        This score is not applicable to this patient. Components are not calculated.          Revised Cardiac Risk Index      Flowsheet Row Most Recent Value   Revised Cardiac Risk Calculator 1          Apfel Simplified Score      Flowsheet Row Most Recent Value   Apfel Simplified Score Calculator 3          Risk Analysis Index Results This Encounter    No data found in the last 1 encounters.       Stop Bang Score      Flowsheet Row Most Recent Value   Do you snore loudly? 0   Do you often feel tired or fatigued after your sleep? 0   Has anyone ever observed you stop breathing in your sleep? 0   Do you have or are you being treated for high blood pressure? 1   Recent BMI (Calculated) 23.5   Is BMI greater than 35 kg/m2? 0=No   Age older than 50 years old? 1=Yes   Is your neck circumference greater than 17 inches (Male) or 16 inches (Female)? 0   Gender - Male 0=No   STOP-BANG " Total Score 2          Assessment and Plan:     Neuro:   No neurologic diagnoses, however, the patient is at an increased risk for post operative delirium secondary to  depression, ETOH, type and duration of surgery    The patient is at an increased risk for perioperative stroke secondary to previous CVA, HTN, HLD, female, general anesthesia, and op time >2.5 hours    Patient given information on brain exercises and delirium prevention.    ETOH  Currently taking folic acid, thiamine    Patient states she is not currently drinking ETOH, though she had in previous years.     History of seizure  Currently taking Keppra. States has not had any seizures.     History of CVA  Currently taking aspirin, atorvastatin, clopidogrel    Depression  Currently treated with sertraline.     9-4-23: CT of Head  IMPRESSION:     1. NO ACUTE INTRACRANIAL PROCESS     2.  CHRONIC INFARCTIONS IN RIGHT HEMISPHERE, AS DETAILED     3.  NO CERVICAL FRACTURE DISLOCATION     Anatomic Variant:  None.  Assume 7 cervical vertebrae with counting from   the craniocervical junction.     5-19-21: MR Cervical Spine  IMPRESSION:  Multilevel degenerative changes as described above, no high-grade canal or foraminal narrowing.    5-19-21: MR Angio Neck  MRI NECK:     The source images are mildly degraded by artifact.     Right carotid vessels: There is expected flow signal in the  visualized portion of the common carotid artery. There is mild attenuation of flow signal at the carotid bifurcation which may be secondary to flow related artifact. The internal carotid artery in the neck demonstrates expected flow signal.     Left carotid vessels:  There is expected flow signal in the  visualized portion of the common carotid artery. There is mild attenuation of flow signal at the carotid bifurcation which may be secondary to flow related artifact. The internal carotid artery in the neck demonstrates expected flow signal.     Vertebral vessels: Right dominant.  The visualized segments of the cervical vertebral arteries demonstrate expected flow signal.     IMPRESSION:  1. No acute intracranial pathology.  2. No flow-limiting stenosis or occlusion in the head and neck.    5-19-21 MR Head  IMPRESSION:  1. No acute intracranial pathology.  2. No flow-limiting stenosis or occlusion in the head and neck.      5-19-21: MR Brain  IMPRESSION:  1. No acute intracranial pathology.  2. No flow-limiting stenosis or occlusion in the head and neck.    5-18-21: CT Brain Attack  FINDINGS:  Global volume loss. Remote MCA territory infarct again seen. No overt stigmata of acute infarct. Subinsular region is symmetric bilaterally. Chronic small vessel ischemic change. No mass effect or midline shift. Mastoid air cells and paranasal sinuses appear clear.     IMPRESSION:  Stable examination. Global volume loss. Chronic small vessel ischemic  change. No evidence of acute hemorrhage.    1-31-19: Carotid Artery Duplex  CONCLUSIONS:  Right Carotid: Findings are consistent with less than 50% stenosis of the right proximal ICA. Right external carotid artery appears patent with no evidence of stenosis. No evidence of hemodynamically significant stenosis of the right common carotid artery. The right vertebral artery is patent with antegrade flow. No evidence of hemodynamically significant stenosis in the right subclavian.  Left Carotid: Findings are consistent with less than 50% stenosis of the left proximal ICA. Left external carotid artery appears patent with no evidence of stenosis. No evidence of hemodynamically significant stenosis of the left common carotid artery. The left vertebral artery is patent with antegrade flow.  No evidence of hemodynamically significant stenosis in the left subclavian.       HEENT/Airway  No diagnosis or significant findings on chart review or clinical presentation and evaluation.    Cardiovascular    Hypertension & hyperlipidemia  Currently taking aspirin,  atorvastatin, clopidogrel, lisinopril, metoprolol tartrate, and hydralazine.     BP today 103/72  3-8-24: LDL 83    CARDS EVAL  The patient is not followed by cardiology.    RCRI  The patient meets 0-1 RCRI criteria and therefore has a less than 1% risk of major adverse cardiac complications.  METS  The patient's functional capacity is greater than 4 METS.  MACE  30-day risk for MACE of 1 predictor, 6.0% risk for cardiac death, nonfatal myocardial infarction, and nonfatal cardiac arrest  DACIA  0.2% risk for 51st to 90th percentile    6-18-24: EKG sinus rhythm with PACs     6-7-24: EKG sinus rhythm with PVC    6-18-24: Echo  CONCLUSIONS:   1. Poorly visualized anatomical structures due to suboptimal image quality.   2. Left ventricular systolic function is normal with a 60-65% estimated ejection fraction.   3. Spectral Doppler shows an impaired relaxation pattern of left ventricular diastolic filling.   4. RVSP within normal limits.   5. Normal aortic root.    Pulmonary   No diagnosis or significant findings on chart review or clinical presentation and evaluation.    STOP BANG Score of 2, which places patient at low risk for having DARRYL.  ARISCAT 30, Intermediate, 13.3% risk of in-hospital postoperative pulmonary complications  PRODIGY 8, intermediate of respiratory depression episode.    Patient given information on deep breathing exercises.     Renal  No diagnosis or significant findings on chart review or clinical presentation and evaluation.    6-17-24: creatinine 1.26, eGFR 48    Endocrine    Thyroid Disease Evaluation  Not currently on medication   6-3-23: TSH 2.03  6-17-24: TSH 12.45  6-17-24: Thyroxine, Free is 1.09  Dr. Lluvia Alfred Long notified of TSH and Thyroxine, Free results     Hematology  No diagnosis or significant findings on chart review or clinical presentation and evaluation.    Antiplatelet management   The patient is currently receiving antiplatelet therapy for history of  CVA/TIA.  Anticoagulation management  The patient is not currently receiving anticoagulation therapy.  Caprini score 6, high risk of VTE    Transfusion Evaluation  A type and screen was obtained given the likelihood for perioperative transfusion of blood or blood products.    Patient given information on DVT prevention.     GI  No diagnosis or significant findings on chart review or clinical presentation and evaluation.    Eat 10- 0  Apfel: 3 points 61% risk for post operative nausea/vomiting.     Genitourinary  No diagnosis or significant findings on chart review or clinical presentation and evaluation.    ID  No diagnosis or significant findings on chart review or clinical presentation and evaluation.    MRSA swab ordered  UA with reflex culture ordered  Chlorhexidine mouth wash and body wash ordered    Musculoskeletal    Fracture of right humerus  Currently wearing a sling and scheduled for surgery. Taking cyclobenzaprine and Percocet for pain management.    6-13-24: XR Humerus  FINDINGS:  Two views of the left humerus are obtained.      Mild diffuse osteopenia. Redemonstration of acute comminuted fracture of the proximal humeral metadiaphysis with full shaft width displacement and overriding of the fracture fragments. Humeral head  articulates with the glenoid. No dislocation. AC joint is maintained.  Soft tissue swelling noted.      IMPRESSION:  Acute comminuted displaced fracture of the proximal humeral metadiaphysis as described above.    -Preoperative medication instructions were provided and reviewed with the patient.  Any additional testing or evaluation was explained to the patient.  NPO Instructions were discussed, and the patient's questions were answered prior to conclusion of this encounter    Labs ordered:    MRSA Swab in process     Patient unable to void today for UA with reflex.     Dr. Patricia aware of H&H and murmur.     Results for orders placed or performed in visit on 06/17/24   Coagulation  Screen   Result Value Ref Range    Protime 11.6 9.8 - 12.8 seconds    INR 1.0 0.9 - 1.1    aPTT 37 27 - 38 seconds   Type And Screen   Result Value Ref Range    ABO TYPE O     Rh TYPE POS     ANTIBODY SCREEN NEG    CBC   Result Value Ref Range    WBC 11.0 4.4 - 11.3 x10*3/uL    nRBC 0.0 0.0 - 0.0 /100 WBCs    RBC 3.01 (L) 4.00 - 5.20 x10*6/uL    Hemoglobin 9.9 (L) 12.0 - 16.0 g/dL    Hematocrit 30.0 (L) 36.0 - 46.0 %     80 - 100 fL    MCH 32.9 26.0 - 34.0 pg    MCHC 33.0 32.0 - 36.0 g/dL    RDW 12.7 11.5 - 14.5 %    Platelets 417 150 - 450 x10*3/uL   Basic Metabolic Panel   Result Value Ref Range    Glucose 113 (H) 74 - 99 mg/dL    Sodium 145 136 - 145 mmol/L    Potassium 3.9 3.5 - 5.3 mmol/L    Chloride 109 (H) 98 - 107 mmol/L    Bicarbonate 22 21 - 32 mmol/L    Anion Gap 18 10 - 20 mmol/L    Urea Nitrogen 16 6 - 23 mg/dL    Creatinine 1.26 (H) 0.50 - 1.05 mg/dL    eGFR 48 (L) >60 mL/min/1.73m*2    Calcium 9.4 8.6 - 10.6 mg/dL   Hemoglobin A1C   Result Value Ref Range    Hemoglobin A1C 5.0 see below %    Estimated Average Glucose 97 Not Established mg/dL   TSH with reflex to Free T4 if abnormal   Result Value Ref Range    Thyroid Stimulating Hormone 12.45 (H) 0.44 - 3.98 mIU/L   Thyroxine, Free   Result Value Ref Range    Thyroxine, Free 1.09 0.78 - 1.48 ng/dL     Recent Results (from the past 168 hour(s))   CBC and Auto Differential    Collection Time: 06/13/24  8:07 PM   Result Value Ref Range    WBC 10.1 4.4 - 11.3 x10*3/uL    nRBC 0.0 0.0 - 0.0 /100 WBCs    RBC 2.84 (L) 4.00 - 5.20 x10*6/uL    Hemoglobin 9.2 (L) 12.0 - 16.0 g/dL    Hematocrit 27.7 (L) 36.0 - 46.0 %    MCV 98 80 - 100 fL    MCH 32.4 26.0 - 34.0 pg    MCHC 33.2 32.0 - 36.0 g/dL    RDW 12.3 11.5 - 14.5 %    Platelets 262 150 - 450 x10*3/uL    Neutrophils % 77.9 40.0 - 80.0 %    Immature Granulocytes %, Automated 0.8 0.0 - 0.9 %    Lymphocytes % 13.0 13.0 - 44.0 %    Monocytes % 6.8 2.0 - 10.0 %    Eosinophils % 1.2 0.0 - 6.0 %    Basophils  % 0.3 0.0 - 2.0 %    Neutrophils Absolute 7.83 (H) 1.20 - 7.70 x10*3/uL    Immature Granulocytes Absolute, Automated 0.08 0.00 - 0.70 x10*3/uL    Lymphocytes Absolute 1.31 1.20 - 4.80 x10*3/uL    Monocytes Absolute 0.68 0.10 - 1.00 x10*3/uL    Eosinophils Absolute 0.12 0.00 - 0.70 x10*3/uL    Basophils Absolute 0.03 0.00 - 0.10 x10*3/uL   Comprehensive metabolic panel    Collection Time: 06/13/24  8:07 PM   Result Value Ref Range    Glucose 114 (H) 74 - 99 mg/dL    Sodium 140 136 - 145 mmol/L    Potassium 3.5 3.5 - 5.3 mmol/L    Chloride 108 (H) 98 - 107 mmol/L    Bicarbonate 19 (L) 21 - 32 mmol/L    Anion Gap 17 10 - 20 mmol/L    Urea Nitrogen 16 6 - 23 mg/dL    Creatinine 1.11 (H) 0.50 - 1.05 mg/dL    eGFR 56 (L) >60 mL/min/1.73m*2    Calcium 8.5 (L) 8.6 - 10.3 mg/dL    Albumin 3.9 3.4 - 5.0 g/dL    Alkaline Phosphatase 57 33 - 136 U/L    Total Protein 6.6 6.4 - 8.2 g/dL    AST 27 9 - 39 U/L    Bilirubin, Total 0.4 0.0 - 1.2 mg/dL    ALT 4 (L) 7 - 45 U/L   Ethanol    Collection Time: 06/13/24  8:07 PM   Result Value Ref Range    Alcohol <10 <=10 mg/dL   Coagulation Screen    Collection Time: 06/17/24  4:08 PM   Result Value Ref Range    Protime 11.6 9.8 - 12.8 seconds    INR 1.0 0.9 - 1.1    aPTT 37 27 - 38 seconds   Type And Screen    Collection Time: 06/17/24  4:08 PM   Result Value Ref Range    ABO TYPE O     Rh TYPE POS     ANTIBODY SCREEN NEG    CBC    Collection Time: 06/17/24  4:08 PM   Result Value Ref Range    WBC 11.0 4.4 - 11.3 x10*3/uL    nRBC 0.0 0.0 - 0.0 /100 WBCs    RBC 3.01 (L) 4.00 - 5.20 x10*6/uL    Hemoglobin 9.9 (L) 12.0 - 16.0 g/dL    Hematocrit 30.0 (L) 36.0 - 46.0 %     80 - 100 fL    MCH 32.9 26.0 - 34.0 pg    MCHC 33.0 32.0 - 36.0 g/dL    RDW 12.7 11.5 - 14.5 %    Platelets 417 150 - 450 x10*3/uL   Basic Metabolic Panel    Collection Time: 06/17/24  4:08 PM   Result Value Ref Range    Glucose 113 (H) 74 - 99 mg/dL    Sodium 145 136 - 145 mmol/L    Potassium 3.9 3.5 - 5.3 mmol/L     Chloride 109 (H) 98 - 107 mmol/L    Bicarbonate 22 21 - 32 mmol/L    Anion Gap 18 10 - 20 mmol/L    Urea Nitrogen 16 6 - 23 mg/dL    Creatinine 1.26 (H) 0.50 - 1.05 mg/dL    eGFR 48 (L) >60 mL/min/1.73m*2    Calcium 9.4 8.6 - 10.6 mg/dL   Hemoglobin A1C    Collection Time: 06/17/24  4:08 PM   Result Value Ref Range    Hemoglobin A1C 5.0 see below %    Estimated Average Glucose 97 Not Established mg/dL   Human Chorionic Gonadotropin, Serum Quantitative    Collection Time: 06/17/24  4:08 PM   Result Value Ref Range    HCG, Beta-Quantitative 7 (H) <5 mIU/mL   TSH with reflex to Free T4 if abnormal    Collection Time: 06/17/24  4:08 PM   Result Value Ref Range    Thyroid Stimulating Hormone 12.45 (H) 0.44 - 3.98 mIU/L   Thyroxine, Free    Collection Time: 06/17/24  4:08 PM   Result Value Ref Range    Thyroxine, Free 1.09 0.78 - 1.48 ng/dL   Transthoracic Echo (TTE) Complete    Collection Time: 06/18/24  3:59 PM   Result Value Ref Range    AV pk adriel 1.82 m/s    LVOT diam 2.00 cm    LV Biplane EF 64 %    MV avg E/e' ratio 11.84     MV E/A ratio 0.55     LA vol index A/L 27.0 ml/m2    Tricuspid annular plane systolic excursion 2.1 cm    RV free wall pk S' 11.70 cm/s    LVIDd 4.00 cm    RVSP 17.9 mmHg    Aortic Valve Area by Continuity of Peak Velocity 2.33 cm2    AV pk grad 13.2 mmHg    LV A4C EF 57.4

## 2024-06-17 NOTE — PREPROCEDURE INSTRUCTIONS
Thank you for visiting [facility] ([facility]) today for your pre-procedure evaluation, you were seen by     Brigid Golden, MSN, NP-C, CNP  Family Nurse Practitioner  Department of Anesthesiology and Perioperative Medicine  Main phone: [phone]  Fax :[phone]    **Please be sure to follow any guidance provided by your surgeon regarding foods, fluids and medications prior to surgery**      This summary includes instructions and information to aid you during your perioperative period.  Please read carefully. If you have any questions about your visit today, please call the number listed above.  If you become ill or have any changes to your health before your surgery, please contact your primary care provider and alert your surgeon.    Preparing for your Surgery       Exercises  Preoperative Deep Breathing Exercises  Why it is important to do deep breathing exercises before my surgery?  Deep breathing exercises strengthen your breathing muscles.  This helps you to recover after your surgery and decreases the chance of breathing complications.  How are the deep breathing exercises done?  Sit straight with your back supported.  Breathe in deeply and slowly through your nose. Your lower rib cage should expand and your abdomen may move forward.  Hold that breath for 3 to 5 seconds.  Breathe out through pursed lips, slowly and completely.  Rest and repeat 10 times every hour while awake.  Rest longer if you become dizzy or lightheaded.      Preoperative Brain Exercises    What are brain exercises?  A brain exercise is any activity that engages your thinking (cognitive) skills.    What types of activities are considered brain exercises?  Jigsaw puzzles, crossword puzzles, word jumble, memory games, word search, and many more.  Many can be found free online or on your phone via a mobile jim.    Why should I do brain exercises before my surgery?  More recent research has shown brain exercise  before surgery can lower the risk of postoperative delirium (confusion) which can be especially important for older adults.  Patients who did brain exercises for 5 to 10 hours the days before surgery, cut their risk of postoperative delirium in half up to 1 week after surgery.    Instructions    Preoperative Fasting Guidelines    Why must I stop eating and drinking near surgery time?  With sedation, food or liquid in your stomach can enter your lungs causing serious complications  Food can increase nausea and vomiting  When do I need to stop eating and drinking before my surgery?      Do not eat any food or drink any liquids after midnight the night before your surgery/procedure.  You may have sips of water to take medications.            Simple things you can do to help prevent blood clots     Blood clots are blockages that can form in the body's veins. When a blood clot forms in your deep veins, it may be called a deep vein thrombosis, or DVT for short. Blood clots can happen in any part of the body where blood flows, but they are most common in the arms and legs. If a piece of a blood clot breaks free and travels to the lungs, it is called a pulmonary embolus (PE). A PE can be a very serious problem.         Being in the hospital or having surgery can raise your chances of getting a blood clot because you may not be well enough to move around as much as you normally do.         Ways you can help prevent blood clots in the hospital       Wearing SCDs  SCDs stands for Sequential Compression Devices.   SCDs are special sleeves that wrap around your legs. They attach to a pump that fills them with air to gently squeeze your legs every few minutes.  This helps return the blood in your legs to your heart.   SCDs should only be taken off when walking or bathing. SCDs may not be comfortable, but they can help save your life.              Pump SCD leg sleeves  Wearing compression stockings - if your doctor orders them.  These special snug-fitting stockings gently squeeze your legs to help blood flow.       Walking. Walking helps move the blood in your legs.   If your doctor says it is ok, try walking the halls at least   5 times a day. Ask us to help you get up, so you don't fall.      Taking any blood-thinning medicines your doctor orders.              Ways you can help prevent blood clots at home         Wearing compression stockings - if your doctor orders them.   Walking - to help move the blood in your legs.    Taking any blood-thinning medicines your doctor orders.      Signs of a blood clot or PE    Tell your doctor or nurse right away if you have any of the problems listed below.         If you are at home, seek medical care right away. Call 911 for chest pain or problems breathing.            Signs of a blood clot (DVT) - such as pain, swelling, redness, or warmth in your arm or legs.  Signs of a pulmonary embolism (PE) - such as chest pain or feeling short of breath      Tobacco and Alcohol;  Do not drink alcohol or smoke within 24 hours of surgery.  It is best to quit smoking for as long as possible before any surgery or procedure.    The Week before Surgery        Seven days before Surgery  Check your CPM medication instructions  Do the exercises provided to you by CPM   Arrange for a responsible, adult licensed  to take you home after surgery and stay with you for 24 hours.  You will not be permitted to drive yourself home if you have received any anesthetic/sedation  Six days before surgery  Check your CPM medication instructions  Do the exercises provided to you by CPM   Start using Chlorhexidene (CHG) body wash if prescribed  Five days before surgery  Check your CPM medication instructions  Do the exercises provided to you by CPM   Continue to use CHG body wash if prescribed  Three days before surgery  Check your CPM medication instructions  Do the exercises provided to you by CPM   Continue to use CHG body  wash if prescribed  Two days before surgery  Check your CPM medication instructions  Do the exercises provided to you by CPM   Continue to use CHG body wash if prescribed    The Day before Surgery       Check your CPM medication and all other CPM instructions including when to stop eating and drinking  You will be called with your arrival time for surgery in the late afternoon.  If you do not receive a call please reach out to your surgeon's office.  Do not smoke or drink 24 hours before surgery  Prepare items to bring with you to the hospital  Shower with your chlorhexidine wash if prescribed  Brush your teeth and use your chlorhexidine dental rinse if prescribed    The Day of Surgery       Check your CPM medication instructions  Ensure you follow the instructions for when to stop eating and drinking  Shower, if prescribed use CHG.  Do not apply any lotions, creams, moisturizers, perfume or deodorant  Brush your teeth and use your CHG dental rinse if prescribed  Wear loose comfortable clothing  Avoid make-up  Remove  jewelry and piercings, consider professional piercing removal with a plastic spacer if needed  Bring photo ID and Insurance card  Bring an accurate medication list that includes medication dose, frequency and allergies  Bring a copy of your advanced directives (will, health care power of )  Bring any devices and controllers as well as medical devices you have been provided with for surgery (CPAP, slings, braces, etc.)  Dentures, eyeglasses, and contacts will be removed before surgery, please bring cases for contacts or glasses

## 2024-06-17 NOTE — TELEPHONE ENCOUNTER
Patient is scheduled for surgery on 6/19/2024 with Dr. Patricia for s/p R humerus fracture. Patient was given limited pain medication from the ED. Patient is seeing Dr. Patricia tomorrow. Due to her fracture and that we will be seeing her in the office, patient is given 2 days of percocet. It is highly emphasized that she be seen in the office tomorrow for evaluation of fracture as if she does not she will not receive a refill on pain medication.

## 2024-06-18 ENCOUNTER — HOSPITAL ENCOUNTER (INPATIENT)
Facility: HOSPITAL | Age: 64
Discharge: HOME HEALTH | DRG: 493 | End: 2024-06-18
Attending: ORTHOPAEDIC SURGERY | Admitting: ORTHOPAEDIC SURGERY
Payer: COMMERCIAL

## 2024-06-18 ENCOUNTER — HOSPITAL ENCOUNTER (OUTPATIENT)
Dept: RADIOLOGY | Facility: HOSPITAL | Age: 64
Discharge: HOME | End: 2024-06-18
Payer: COMMERCIAL

## 2024-06-18 ENCOUNTER — ANESTHESIA EVENT (OUTPATIENT)
Dept: OPERATING ROOM | Facility: HOSPITAL | Age: 64
End: 2024-06-18
Payer: COMMERCIAL

## 2024-06-18 ENCOUNTER — APPOINTMENT (OUTPATIENT)
Dept: CARDIOLOGY | Facility: HOSPITAL | Age: 64
DRG: 493 | End: 2024-06-18
Payer: COMMERCIAL

## 2024-06-18 ENCOUNTER — OFFICE VISIT (OUTPATIENT)
Dept: ORTHOPEDIC SURGERY | Facility: HOSPITAL | Age: 64
End: 2024-06-18
Payer: COMMERCIAL

## 2024-06-18 DIAGNOSIS — I67.89 OTHER CEREBROVASCULAR DISEASE: ICD-10-CM

## 2024-06-18 DIAGNOSIS — S42.292A CLOSED FRACTURE OF HEAD OF LEFT HUMERUS, INITIAL ENCOUNTER: ICD-10-CM

## 2024-06-18 DIAGNOSIS — S42.292A OTHER CLOSED DISPLACED FRACTURE OF PROXIMAL END OF LEFT HUMERUS, INITIAL ENCOUNTER: Primary | ICD-10-CM

## 2024-06-18 LAB
AORTIC VALVE PEAK VELOCITY: 1.82 M/S
AV PEAK GRADIENT: 13.2 MMHG
AVA (PEAK VEL): 2.33 CM2
B-HCG SERPL-ACNC: 7 MIU/ML
EJECTION FRACTION APICAL 4 CHAMBER: 57.4
LEFT ATRIUM VOLUME AREA LENGTH INDEX BSA: 27 ML/M2
LEFT VENTRICLE INTERNAL DIMENSION DIASTOLE: 4 CM (ref 3.5–6)
LEFT VENTRICULAR OUTFLOW TRACT DIAMETER: 2 CM
LV EJECTION FRACTION BIPLANE: 64 %
MITRAL VALVE E/A RATIO: 0.55
MITRAL VALVE E/E' RATIO: 11.84
RIGHT VENTRICLE FREE WALL PEAK S': 11.7 CM/S
RIGHT VENTRICLE PEAK SYSTOLIC PRESSURE: 17.9 MMHG
T4 FREE SERPL-MCNC: 1.09 NG/DL (ref 0.78–1.48)
TRICUSPID ANNULAR PLANE SYSTOLIC EXCURSION: 2.1 CM
TSH SERPL-ACNC: 12.45 MIU/L (ref 0.44–3.98)

## 2024-06-18 PROCEDURE — 99214 OFFICE O/P EST MOD 30 MIN: CPT | Mod: 57 | Performed by: ORTHOPAEDIC SURGERY

## 2024-06-18 PROCEDURE — 1100000001 HC PRIVATE ROOM DAILY

## 2024-06-18 PROCEDURE — 93306 TTE W/DOPPLER COMPLETE: CPT

## 2024-06-18 PROCEDURE — 73030 X-RAY EXAM OF SHOULDER: CPT | Mod: LT

## 2024-06-18 PROCEDURE — 93306 TTE W/DOPPLER COMPLETE: CPT | Performed by: INTERNAL MEDICINE

## 2024-06-18 PROCEDURE — 36415 COLL VENOUS BLD VENIPUNCTURE: CPT

## 2024-06-18 PROCEDURE — 2500000004 HC RX 250 GENERAL PHARMACY W/ HCPCS (ALT 636 FOR OP/ED)

## 2024-06-18 PROCEDURE — 73030 X-RAY EXAM OF SHOULDER: CPT | Mod: LEFT SIDE | Performed by: STUDENT IN AN ORGANIZED HEALTH CARE EDUCATION/TRAINING PROGRAM

## 2024-06-18 PROCEDURE — 99214 OFFICE O/P EST MOD 30 MIN: CPT | Performed by: ORTHOPAEDIC SURGERY

## 2024-06-18 PROCEDURE — 1036F TOBACCO NON-USER: CPT | Performed by: ORTHOPAEDIC SURGERY

## 2024-06-18 PROCEDURE — 2500000001 HC RX 250 WO HCPCS SELF ADMINISTERED DRUGS (ALT 637 FOR MEDICARE OP)

## 2024-06-18 PROCEDURE — 93005 ELECTROCARDIOGRAM TRACING: CPT

## 2024-06-18 RX ORDER — BISACODYL 10 MG/1
10 SUPPOSITORY RECTAL DAILY PRN
Status: DISCONTINUED | OUTPATIENT
Start: 2024-06-18 | End: 2024-06-24 | Stop reason: HOSPADM

## 2024-06-18 RX ORDER — ACETAMINOPHEN 325 MG/1
650 TABLET ORAL EVERY 6 HOURS SCHEDULED
Status: DISCONTINUED | OUTPATIENT
Start: 2024-06-18 | End: 2024-06-24 | Stop reason: HOSPADM

## 2024-06-18 RX ORDER — NALOXONE HYDROCHLORIDE 0.4 MG/ML
0.2 INJECTION, SOLUTION INTRAMUSCULAR; INTRAVENOUS; SUBCUTANEOUS EVERY 5 MIN PRN
Status: DISCONTINUED | OUTPATIENT
Start: 2024-06-18 | End: 2024-06-24 | Stop reason: HOSPADM

## 2024-06-18 RX ORDER — ONDANSETRON 4 MG/1
4 TABLET, FILM COATED ORAL EVERY 8 HOURS PRN
Status: DISCONTINUED | OUTPATIENT
Start: 2024-06-18 | End: 2024-06-24 | Stop reason: HOSPADM

## 2024-06-18 RX ORDER — LANOLIN ALCOHOL/MO/W.PET/CERES
100 CREAM (GRAM) TOPICAL DAILY
Status: DISCONTINUED | OUTPATIENT
Start: 2024-06-18 | End: 2024-06-24 | Stop reason: HOSPADM

## 2024-06-18 RX ORDER — PROCHLORPERAZINE MALEATE 10 MG
10 TABLET ORAL EVERY 6 HOURS PRN
Status: DISCONTINUED | OUTPATIENT
Start: 2024-06-18 | End: 2024-06-24 | Stop reason: HOSPADM

## 2024-06-18 RX ORDER — CYCLOBENZAPRINE HCL 10 MG
10 TABLET ORAL 3 TIMES DAILY PRN
Status: DISCONTINUED | OUTPATIENT
Start: 2024-06-18 | End: 2024-06-24 | Stop reason: HOSPADM

## 2024-06-18 RX ORDER — PROCHLORPERAZINE 25 MG/1
25 SUPPOSITORY RECTAL EVERY 12 HOURS PRN
Status: DISCONTINUED | OUTPATIENT
Start: 2024-06-18 | End: 2024-06-24 | Stop reason: HOSPADM

## 2024-06-18 RX ORDER — ONDANSETRON HYDROCHLORIDE 2 MG/ML
4 INJECTION, SOLUTION INTRAVENOUS EVERY 8 HOURS PRN
Status: DISCONTINUED | OUTPATIENT
Start: 2024-06-18 | End: 2024-06-24 | Stop reason: HOSPADM

## 2024-06-18 RX ORDER — HYDROMORPHONE HYDROCHLORIDE 1 MG/ML
0.5 INJECTION, SOLUTION INTRAMUSCULAR; INTRAVENOUS; SUBCUTANEOUS EVERY 4 HOURS PRN
Status: DISCONTINUED | OUTPATIENT
Start: 2024-06-18 | End: 2024-06-24 | Stop reason: HOSPADM

## 2024-06-18 RX ORDER — FOLIC ACID 1 MG/1
1 TABLET ORAL DAILY
Status: DISCONTINUED | OUTPATIENT
Start: 2024-06-18 | End: 2024-06-24 | Stop reason: HOSPADM

## 2024-06-18 RX ORDER — PROCHLORPERAZINE EDISYLATE 5 MG/ML
10 INJECTION INTRAMUSCULAR; INTRAVENOUS EVERY 6 HOURS PRN
Status: DISCONTINUED | OUTPATIENT
Start: 2024-06-18 | End: 2024-06-24 | Stop reason: HOSPADM

## 2024-06-18 RX ORDER — SODIUM CHLORIDE, SODIUM LACTATE, POTASSIUM CHLORIDE, CALCIUM CHLORIDE 600; 310; 30; 20 MG/100ML; MG/100ML; MG/100ML; MG/100ML
125 INJECTION, SOLUTION INTRAVENOUS CONTINUOUS
Status: DISCONTINUED | OUTPATIENT
Start: 2024-06-18 | End: 2024-06-20

## 2024-06-18 RX ORDER — POLYETHYLENE GLYCOL 3350 17 G/17G
17 POWDER, FOR SOLUTION ORAL DAILY
Status: DISCONTINUED | OUTPATIENT
Start: 2024-06-18 | End: 2024-06-24 | Stop reason: HOSPADM

## 2024-06-18 RX ORDER — OXYCODONE HYDROCHLORIDE 5 MG/1
10 TABLET ORAL EVERY 4 HOURS PRN
Status: DISCONTINUED | OUTPATIENT
Start: 2024-06-18 | End: 2024-06-24 | Stop reason: HOSPADM

## 2024-06-18 RX ORDER — MULTIVIT-MIN/IRON FUM/FOLIC AC 7.5 MG-4
1 TABLET ORAL DAILY
Status: DISCONTINUED | OUTPATIENT
Start: 2024-06-18 | End: 2024-06-24 | Stop reason: HOSPADM

## 2024-06-18 RX ORDER — OXYCODONE HYDROCHLORIDE 5 MG/1
5 TABLET ORAL EVERY 4 HOURS PRN
Status: DISCONTINUED | OUTPATIENT
Start: 2024-06-18 | End: 2024-06-24 | Stop reason: HOSPADM

## 2024-06-18 RX ORDER — AMOXICILLIN 250 MG
2 CAPSULE ORAL 2 TIMES DAILY
Status: DISCONTINUED | OUTPATIENT
Start: 2024-06-18 | End: 2024-06-24 | Stop reason: HOSPADM

## 2024-06-18 RX ORDER — BISACODYL 5 MG
10 TABLET, DELAYED RELEASE (ENTERIC COATED) ORAL DAILY PRN
Status: DISCONTINUED | OUTPATIENT
Start: 2024-06-18 | End: 2024-06-24 | Stop reason: HOSPADM

## 2024-06-18 SDOH — SOCIAL STABILITY: SOCIAL INSECURITY: ARE YOU OR HAVE YOU BEEN THREATENED OR ABUSED PHYSICALLY, EMOTIONALLY, OR SEXUALLY BY ANYONE?: NO

## 2024-06-18 SDOH — SOCIAL STABILITY: SOCIAL INSECURITY: HAVE YOU HAD THOUGHTS OF HARMING ANYONE ELSE?: NO

## 2024-06-18 SDOH — SOCIAL STABILITY: SOCIAL INSECURITY: DO YOU FEEL UNSAFE GOING BACK TO THE PLACE WHERE YOU ARE LIVING?: NO

## 2024-06-18 SDOH — SOCIAL STABILITY: SOCIAL INSECURITY: ABUSE: ADULT

## 2024-06-18 SDOH — SOCIAL STABILITY: SOCIAL INSECURITY: DO YOU FEEL ANYONE HAS EXPLOITED OR TAKEN ADVANTAGE OF YOU FINANCIALLY OR OF YOUR PERSONAL PROPERTY?: NO

## 2024-06-18 SDOH — SOCIAL STABILITY: SOCIAL INSECURITY: HAVE YOU HAD ANY THOUGHTS OF HARMING ANYONE ELSE?: NO

## 2024-06-18 SDOH — SOCIAL STABILITY: SOCIAL INSECURITY: ARE THERE ANY APPARENT SIGNS OF INJURIES/BEHAVIORS THAT COULD BE RELATED TO ABUSE/NEGLECT?: NO

## 2024-06-18 SDOH — SOCIAL STABILITY: SOCIAL INSECURITY: HAS ANYONE EVER THREATENED TO HURT YOUR FAMILY OR YOUR PETS?: NO

## 2024-06-18 SDOH — SOCIAL STABILITY: SOCIAL INSECURITY: DOES ANYONE TRY TO KEEP YOU FROM HAVING/CONTACTING OTHER FRIENDS OR DOING THINGS OUTSIDE YOUR HOME?: NO

## 2024-06-18 SDOH — SOCIAL STABILITY: SOCIAL INSECURITY: WERE YOU ABLE TO COMPLETE ALL THE BEHAVIORAL HEALTH SCREENINGS?: YES

## 2024-06-18 ASSESSMENT — PAIN - FUNCTIONAL ASSESSMENT
PAIN_FUNCTIONAL_ASSESSMENT: 0-10

## 2024-06-18 ASSESSMENT — ACTIVITIES OF DAILY LIVING (ADL)
HEARING - RIGHT EAR: FUNCTIONAL
JUDGMENT_ADEQUATE_SAFELY_COMPLETE_DAILY_ACTIVITIES: YES
BATHING: INDEPENDENT
HEARING - LEFT EAR: FUNCTIONAL
FEEDING YOURSELF: INDEPENDENT
DRESSING YOURSELF: INDEPENDENT
TOILETING: INDEPENDENT
GROOMING: INDEPENDENT
PATIENT'S MEMORY ADEQUATE TO SAFELY COMPLETE DAILY ACTIVITIES?: YES
ADEQUATE_TO_COMPLETE_ADL: YES
WALKS IN HOME: INDEPENDENT
LACK_OF_TRANSPORTATION: NO

## 2024-06-18 ASSESSMENT — LIFESTYLE VARIABLES
ANXIETY: NO ANXIETY, AT EASE
HOW MANY STANDARD DRINKS CONTAINING ALCOHOL DO YOU HAVE ON A TYPICAL DAY: 1 OR 2
TREMOR: NO TREMOR
NAUSEA AND VOMITING: NO NAUSEA AND NO VOMITING
HOW OFTEN DO YOU HAVE A DRINK CONTAINING ALCOHOL: 2-3 TIMES A WEEK
HEADACHE, FULLNESS IN HEAD: NOT PRESENT
TOTAL SCORE: 0
VISUAL DISTURBANCES: NOT PRESENT
VISUAL DISTURBANCES: NOT PRESENT
AGITATION: NORMAL ACTIVITY
HEADACHE, FULLNESS IN HEAD: NOT PRESENT
SKIP TO QUESTIONS 9-10: 1
PAROXYSMAL SWEATS: NO SWEAT VISIBLE
TOTAL SCORE: 0
ORIENTATION AND CLOUDING OF SENSORIUM: ORIENTED AND CAN DO SERIAL ADDITIONS
NAUSEA AND VOMITING: NO NAUSEA AND NO VOMITING
ORIENTATION AND CLOUDING OF SENSORIUM: ORIENTED AND CAN DO SERIAL ADDITIONS
PAROXYSMAL SWEATS: NO SWEAT VISIBLE
ANXIETY: NO ANXIETY, AT EASE
AUDIT-C TOTAL SCORE: 3
AGITATION: NORMAL ACTIVITY
AUDITORY DISTURBANCES: NOT PRESENT
AUDIT-C TOTAL SCORE: 3
HOW OFTEN DO YOU HAVE 6 OR MORE DRINKS ON ONE OCCASION: NEVER
AUDITORY DISTURBANCES: NOT PRESENT
TREMOR: NO TREMOR

## 2024-06-18 ASSESSMENT — COGNITIVE AND FUNCTIONAL STATUS - GENERAL
MOBILITY SCORE: 20
PATIENT BASELINE BEDBOUND: NO
MOVING FROM LYING ON BACK TO SITTING ON SIDE OF FLAT BED WITH BEDRAILS: A LITTLE
TURNING FROM BACK TO SIDE WHILE IN FLAT BAD: A LITTLE
TURNING FROM BACK TO SIDE WHILE IN FLAT BAD: A LITTLE
MOBILITY SCORE: 20
STANDING UP FROM CHAIR USING ARMS: A LITTLE
MOVING TO AND FROM BED TO CHAIR: A LITTLE
MOVING FROM LYING ON BACK TO SITTING ON SIDE OF FLAT BED WITH BEDRAILS: A LITTLE
DAILY ACTIVITIY SCORE: 22
DAILY ACTIVITIY SCORE: 22
DRESSING REGULAR UPPER BODY CLOTHING: A LITTLE
MOVING TO AND FROM BED TO CHAIR: A LITTLE
DRESSING REGULAR UPPER BODY CLOTHING: A LITTLE
STANDING UP FROM CHAIR USING ARMS: A LITTLE
DRESSING REGULAR LOWER BODY CLOTHING: A LITTLE
DRESSING REGULAR LOWER BODY CLOTHING: A LITTLE

## 2024-06-18 ASSESSMENT — PAIN DESCRIPTION - DESCRIPTORS: DESCRIPTORS: PATIENT UNABLE TO DESCRIBE

## 2024-06-18 ASSESSMENT — PAIN SCALES - GENERAL
PAINLEVEL_OUTOF10: 0 - NO PAIN
PAINLEVEL_OUTOF10: 6
PAINLEVEL_OUTOF10: 8
PAINLEVEL_OUTOF10: 2
PAINLEVEL_OUTOF10: 7
PAINLEVEL_OUTOF10: 0 - NO PAIN

## 2024-06-18 ASSESSMENT — PATIENT HEALTH QUESTIONNAIRE - PHQ9
SUM OF ALL RESPONSES TO PHQ9 QUESTIONS 1 & 2: 0
1. LITTLE INTEREST OR PLEASURE IN DOING THINGS: NOT AT ALL
2. FEELING DOWN, DEPRESSED OR HOPELESS: NOT AT ALL

## 2024-06-18 NOTE — CONSULTS
Internal Medicine Consult Note    Yue Sanz is a 63 y.o. year old female patient with PMHx of Stroke, alcohol substance misuse, alcohol related seizure, anemia, depression, and HTN who is admitted for L humerus fracture planned for intramedullary nail repair on  w/ Dr. Patricia. Internal medicine was consulted for Pre-operative evaluation and risk stratification.      Subjective   Per previous ED notes patient presented after mechanical trip and fall over her dog. She landed on her left shoulder. No head strike. No LOC. No chest or abdominal pain. No difficulty moving arms or legs. Imaging disclosed an acute comminuted displaced fracture of the L. proximal humeral metadiaphysis .     Patient attended Pre-admission Testing on . At which time ROS was positive for L. Shoulder pain, arthralgias, myalgias and decreased ROM of L shoulder. Exam also disclosed a murmur at this time. The patient is currently on plavix which was last taken on .     Per our discussion with the patient, she reports no recent episodes of chest pain, palpitations, SOB or LOC. Patient reports she has been feeling well lately. She is fairly active at home and is able to care for herself and complete light and moderate housework.  She is also able to walk up and down a flight of stairs and around her home without chest pain or SOB.     Risk Stratification Scores:  RCRI: 1, Class II, 6.0% 30-day risk of death, MI, or cardiac arrest  DASI: 42.2  METS: 7.93  STOP BAN, Low risk for moderate to severe DARRYL      Echo: 24  CONCLUSIONS:   1. Poorly visualized anatomical structures due to suboptimal image quality.   2. Left ventricular systolic function is normal with a 60-65% estimated ejection fraction.   3. Spectral Doppler shows an impaired relaxation pattern of left ventricular diastolic filling.   4. RVSP within normal limits.   5. Normal aortic root.    3/21/2020  CONCLUSIONS:   1. The left ventricular systolic function is  normal with a 65-70% estimated ejection fraction.    EC24:  Sinus tachycardia with occasional Premature ventricular complexes and Fusion complexes  Incomplete right bundle branch block  Abnormal ECG    Past Medical History   has a past medical history of Acute CVA (cerebrovascular accident) (Multi) (2023), Alcohol related seizure (Multi) (2023), Cerebral hemorrhage (Multi) (2023), Cerebral infarction due to embolism of right middle cerebral artery (Multi) (2023), Diarrhea, unspecified (08/10/2022), Encounter for follow-up examination after completed treatment for conditions other than malignant neoplasm (2020), Encounter for other screening for malignant neoplasm of breast (2022), Encounter for screening for malignant neoplasm of colon (2022), Other abnormality of red blood cells (08/10/2022), Personal history of other endocrine, nutritional and metabolic disease, and Personal history of transient ischemic attack (TIA), and cerebral infarction without residual deficits.    Past Surgical History   has a past surgical history that includes MR angio head wo IV contrast (2021); MR angio neck wo IV contrast (2021); and MR angio head wo IV contrast (2018).    Allergies  Allergies   Allergen Reactions    Amoxicillin Other       Social History   reports that she has never smoked. She has never been exposed to tobacco smoke. She has never used smokeless tobacco. She reports that she does not currently use alcohol. She reports that she does not currently use drugs.          Objective     Physical Exam:  Constitutional: No acute distress, normal appearing  Head/Neck: Normocephalic, Atraumatic, Trachea midline  ENT: Mucous membranes moist, no lesions, no nasal discharge  Cardio: Heart RRR, clear S1 & S2, LUSB systolic murmur appreciated, capillary refill < 2 sec  Respiratory: Lungs cta b/l, good respiratory effort, normal aeration, no crackles, rhonchi or wheezes  appreciated  Abdominal: Soft, nontender, nondistended, normoactive BS  Extremities: No peripheral edema, radial and DP pulses 2+ b/l  Skin: warm and dry, no redness, bruising or bleeding  Neuro: Aox3, CN grossly intact, moving all 4 extremities  Behavioral: appropriate mood and behavior    There were no vitals filed for this visit.    No intake or output data in the 24 hours ending 06/18/24 1431    Results for orders placed or performed in visit on 06/17/24 (from the past 24 hour(s))   Coagulation Screen   Result Value Ref Range    Protime 11.6 9.8 - 12.8 seconds    INR 1.0 0.9 - 1.1    aPTT 37 27 - 38 seconds   Type And Screen   Result Value Ref Range    ABO TYPE O     Rh TYPE POS     ANTIBODY SCREEN NEG    CBC   Result Value Ref Range    WBC 11.0 4.4 - 11.3 x10*3/uL    nRBC 0.0 0.0 - 0.0 /100 WBCs    RBC 3.01 (L) 4.00 - 5.20 x10*6/uL    Hemoglobin 9.9 (L) 12.0 - 16.0 g/dL    Hematocrit 30.0 (L) 36.0 - 46.0 %     80 - 100 fL    MCH 32.9 26.0 - 34.0 pg    MCHC 33.0 32.0 - 36.0 g/dL    RDW 12.7 11.5 - 14.5 %    Platelets 417 150 - 450 x10*3/uL   Basic Metabolic Panel   Result Value Ref Range    Glucose 113 (H) 74 - 99 mg/dL    Sodium 145 136 - 145 mmol/L    Potassium 3.9 3.5 - 5.3 mmol/L    Chloride 109 (H) 98 - 107 mmol/L    Bicarbonate 22 21 - 32 mmol/L    Anion Gap 18 10 - 20 mmol/L    Urea Nitrogen 16 6 - 23 mg/dL    Creatinine 1.26 (H) 0.50 - 1.05 mg/dL    eGFR 48 (L) >60 mL/min/1.73m*2    Calcium 9.4 8.6 - 10.6 mg/dL   Hemoglobin A1C   Result Value Ref Range    Hemoglobin A1C 5.0 see below %    Estimated Average Glucose 97 Not Established mg/dL       No results found.         Assessment     Yue Sanz is a 63 y.o. year old female patient with PMHx of Stroke, alcohol substance misuse, alcohol related seizure, anemia, depression who is admitted for L humerus fracture planned for intramedullary nail repair on 6/19 w/ Dr. Patricia. Internal medicine was consulted for Pre-operative evaluation and risk  stratification.     #Pre-Operative Evaluation:   ::RCRI: 1, Class II, 6.0% 30-day risk of death, MI, or cardiac arrest  ::DASI: 10.71  ::METS: 4.1  ::STOP BAN, Low risk for moderate to severe DARRYL  ::ECG 24: NSR w/ PVC complexes and incomplete RBBB  ::Echo 24: EF 60-65%, Impaired relaxation, normal RV function and RVSP, normal aortic root  ::No recent episodes of chest pain, LoC, SOB    - Based on the risk factors discussed above the patient is considered Low-Moderate Risk for cardiac complications in the perioperative period. The patient has no significant cardiac history but does have history of stroke prior. Additionally the patient's echo is unchanged in the last 4 years and has no major abnormalities. The patient has decent functional capacity with METS>4 and should be able to tolerate surgery. Of note the patient's last dose of clopidogrel was on  so while the surgery does not carry an inherently large risk of bleeding the patient is at increased risk of bleed due to recent antiplatelet usage. The patient requires no further workup prior to the planned surgical procedure.     Recommendations:   - Resume antiplatelet medication per surgical recommendations  - Unclear what cardiac medications patient takes at home, states she believes she is taking lisinopril, niece helps patient with meds. Recommend speaking with family to clarify medications and resume post-op as tolerated.       The internal medicine consult service will sign off at this time, we thank you for the consult.         This patient was seen and discussed with the attending physician.    Bereket Flower MD, PGY-1

## 2024-06-18 NOTE — PROGRESS NOTES
ORTHOPAEDIC HISTORY AND PHYSICAL    History Of Present Illness  Orthopaedic Problems/Injuries:  Yue Sanz is a 63 y.o. female presenting with left humerus fracture.  Patient reported that she tripped in her kitchen landing on her left shoulder had immediate pain and deformity.  Patient was seen at [facility] emergency room with severe displacement of her fracture.  She was referred to me by Dr. Milo Cole for surgical consultation.  Of note patient reported that her pain is severe.  She is wearing a sling for immobilization.  Pain is worse with attempted use of the arm.  Pain is better with sling immobilization.  Patient denies numbness or tingling in the arm or fingers.  Patient recently was seen in the preoperative anesthesia clinic and was noted to have history of murmur.  Her last echocardiogram was few years ago.  Patient also has a remote history of stroke and is on Plavix.  Her last dose was yesterday morning.      Review of Systems: 12 point ROS negative unless stated in HPI    Past Medical History  She has a past medical history of Acute CVA (cerebrovascular accident) (Multi) (05/30/2023), Alcohol related seizure (Multi) (05/30/2023), Cerebral hemorrhage (Multi) (05/30/2023), Cerebral infarction due to embolism of right middle cerebral artery (Multi) (05/30/2023), Diarrhea, unspecified (08/10/2022), Encounter for follow-up examination after completed treatment for conditions other than malignant neoplasm (04/20/2020), Encounter for other screening for malignant neoplasm of breast (03/08/2022), Encounter for screening for malignant neoplasm of colon (05/16/2022), Other abnormality of red blood cells (08/10/2022), Personal history of other endocrine, nutritional and metabolic disease, and Personal history of transient ischemic attack (TIA), and cerebral infarction without residual deficits.    Surgical History  She has a past surgical history that includes MR angio head wo IV contrast (5/19/2021);  MR angio neck wo IV contrast (5/19/2021); and MR angio head wo IV contrast (7/12/2018).     Social History  She reports that she has never smoked. She has never been exposed to tobacco smoke. She has never used smokeless tobacco. She reports that she does not currently use alcohol. She reports that she does not currently use drugs.    Family History  Family History   Problem Relation Name Age of Onset    Arthritis Mother      No Known Problems Father          Allergies  Amoxicillin    Review of Systems     Physical Exam  Gen: The patient is alert and oriented ×3, is in no acute distress, and appear their stated age and weight.    Psychiatric: Mood and affect are appropriate.    Eyes: Sclera are white, and pupils are round and symmetric.    ENT: Mucous membranes are moist.     Neck: Supple. Thyroid is midline.    Respiratory: Respirations are nonlabored, chest rise is symmetric.    Cardiac: Rate is regular by palpation of distal pulses.     Abdomen: Nondistended.    Integument: No obvious cutaneous lesions are noted. No signs of lymphangitis. No signs of systemic edema.    Evaluate left arm reveals sling immobilization.  There is ecchymosis and tenderness at the fracture site.  There is gross instability of the humerus.  Patient has been motor function in the hand.  Normal sensation.        Relevant Results  I personally reviewed left humerus fracture that reveals a proximal third humeral shaft fracture with greater than 100% translational displacement and shortening.  No evidence of healing    Assessment/Plan   Patient presents today with a highly displaced left proximal humerus shaft fracture.  Due to the amount of displacement I discussed with her that her best chance for healing will be operative intervention.  I discussed risk-benefit and alternative including conservative treatment.  I discussed with her the fixation strategy would likely be an intramedullary nail.  We will perform this in a percutaneous  fashion given her history of recent Plavix use.  Due to the fact that she needs cardiac evaluation and optimization/clearance I recommended inpatient admission to expedite the workup.  We requested a hospital bed and patient was transferred to inpatient for preoperative workup.  Patient will be weightbearing as tolerated after surgery.  Patient understood and wished to proceed.

## 2024-06-18 NOTE — PROGRESS NOTES
NPV-   History of Present Illness  63 y.o.female presents at same day walk in clinic for low back pain  1. Lumbar pain with radiation down left leg  XR lumbar spine complete 4+ views    cyclobenzaprine (Flexeril) 10 mg tablet    Referral to Physical Therapy    DISCONTINUED: methylPREDNISolone (Medrol Dospak) 4 mg tablets        Mechanism of injury: none  Date of Injury/Pain: couple of weeks ago  Frequency of Pain: worse with walking, laying  Hx of stroke in 2020  They deny any bowel or bladder incontinence or saddle anesthesia.     27 point review of systems negative except what is stated in HPI      On evaluation of back;  revealing no midline tenderness. Lower extremity myotome assessment intact. DTR L4, S1-2/4 bilaterally. Gross sensation intact to bilateral lower extremities. DP pulse 2+ bilaterally.  flexion 0-45, extension 0-30 with minimal discomfort, L/R rotation 0-30, L/R sidebending 0-30 normal.  - Trendelenburg   negative Straight leg raise. Stinchfield negative for hip joint pain. FADIR negative. GABBY negative. Gait is normal    I personally reviewed the patient's x-ray images and reports of the lumbar spine. The xrays show no fractures or dislocation.  Mild degenerative changes of the lumbar spine    ASSESSMENT: left sided lumbar radiculopathy, SI joint dysfunction and muscle spasm    PLAN: Treatment options were discussed with the patient. The patient was given a prescription for physical therapy.  Physical therapy is medically necessary to improve strength, balance, range of motion and functional outcomes after injury and/or surgery. Patient was given a handout and instructed on an at home stretching program.  They should do these exercises 3 times per day for 6 weeks and then daily. They were given a rx for a medrol dose pack to take as directed. All the patient's questions were answered. The patient agrees with the above plan.  Follow up with spine

## 2024-06-18 NOTE — H&P
ORTHOPAEDIC HISTORY AND PHYSICAL    History Of Present Illness  Orthopaedic Problems/Injuries:  63F (CVA wo deficits on plavix (last dose 6/17), alcoholic withdrawal w seizures), presenting with left proximal humerus fx. She had a mechanical trip and fall over her dog on 6/13. Presented to [facility] ED and was sent for outpatient follow up with orthopaedics. Seen in clinic today and was direct admitted here for operative intervention.     Past medical history: per HPI; no history of blood clots  Past surgical history: per HPI, rest reviewed in EMR  Allergies: per EMR  Medications: per EMR  Social History: denies tobacco abuse, reports history of alcohol abuse with withdrawal seizures, denies IVDU   Family History:  Non-contributory to this patient's acute surgical issue.    Review of Systems: 12 point ROS negative unless stated in HPI    Past Medical History  She has a past medical history of Acute CVA (cerebrovascular accident) (Multi) (05/30/2023), Alcohol related seizure (Multi) (05/30/2023), Cerebral hemorrhage (Multi) (05/30/2023), Cerebral infarction due to embolism of right middle cerebral artery (Multi) (05/30/2023), Diarrhea, unspecified (08/10/2022), Encounter for follow-up examination after completed treatment for conditions other than malignant neoplasm (04/20/2020), Encounter for other screening for malignant neoplasm of breast (03/08/2022), Encounter for screening for malignant neoplasm of colon (05/16/2022), Other abnormality of red blood cells (08/10/2022), Personal history of other endocrine, nutritional and metabolic disease, and Personal history of transient ischemic attack (TIA), and cerebral infarction without residual deficits.    Surgical History  She has a past surgical history that includes MR angio head wo IV contrast (5/19/2021); MR angio neck wo IV contrast (5/19/2021); and MR angio head wo IV contrast (7/12/2018).     Social History  Please see above    Family History  Family History    Problem Relation Name Age of Onset    Arthritis Mother      No Known Problems Father          Allergies  Amoxicillin    ROS  12-point review of systems is negative other than what is mentioned above.    Physical Exam:  Gen: AOx3, NAD  HEENT: normocephalic, atraumatic  Psych: appropriate mood and affect  Resp: nonlabored breathing  Cardiac: Extremities WWP, regular rate on peripheral palpation  Neuro: moves all extremities spontaneously   Skin: no rashes  MSK:     LUE:  - Skin intact, moderate swelling to left arm  - Tender at site of injury with painful ROM   - Motor intact in axillary/AIN/PIN/ulnar distributions  - SILT axillary/radial/median/ulnar distributions  - Hand wwp, 2+ radial pulse, cap refill brisk  - Compartments soft and compressible, no pain with passive stretch of digits      Last Recorded Vitals  There were no vitals taken for this visit.    Relevant Results  Results for orders placed or performed in visit on 06/17/24 (from the past 24 hour(s))   Coagulation Screen   Result Value Ref Range    Protime 11.6 9.8 - 12.8 seconds    INR 1.0 0.9 - 1.1    aPTT 37 27 - 38 seconds   Type And Screen   Result Value Ref Range    ABO TYPE O     Rh TYPE POS     ANTIBODY SCREEN NEG    CBC   Result Value Ref Range    WBC 11.0 4.4 - 11.3 x10*3/uL    nRBC 0.0 0.0 - 0.0 /100 WBCs    RBC 3.01 (L) 4.00 - 5.20 x10*6/uL    Hemoglobin 9.9 (L) 12.0 - 16.0 g/dL    Hematocrit 30.0 (L) 36.0 - 46.0 %     80 - 100 fL    MCH 32.9 26.0 - 34.0 pg    MCHC 33.0 32.0 - 36.0 g/dL    RDW 12.7 11.5 - 14.5 %    Platelets 417 150 - 450 x10*3/uL   Basic Metabolic Panel   Result Value Ref Range    Glucose 113 (H) 74 - 99 mg/dL    Sodium 145 136 - 145 mmol/L    Potassium 3.9 3.5 - 5.3 mmol/L    Chloride 109 (H) 98 - 107 mmol/L    Bicarbonate 22 21 - 32 mmol/L    Anion Gap 18 10 - 20 mmol/L    Urea Nitrogen 16 6 - 23 mg/dL    Creatinine 1.26 (H) 0.50 - 1.05 mg/dL    eGFR 48 (L) >60 mL/min/1.73m*2    Calcium 9.4 8.6 - 10.6 mg/dL    Hemoglobin A1C   Result Value Ref Range    Hemoglobin A1C 5.0 see below %    Estimated Average Glucose 97 Not Established mg/dL       No results found.     Assessment/Plan   Principal Problem:    Other closed displaced fracture of proximal end of left humerus, initial encounter      .Plan:   - Admitted to orthopaedic surgery  - Consented and added to OR schedule for IMN L humerus w/ Dr. Patricia on 6/19  - Clearance for OR pending ECHO and periop medicine consult  - Pending pregnancy test for preop lab workup  - WBAT: NWB LUE in sling  - Abx: periop   - DVT: SCDs, hold chemoppx for OR  - Diet: NPO at midnight for upcoming procedure  - FEN: LR @ 100cc/hr; HLIV with good PO intake  - Pain: Tylenol, oxycodone, dilaudid   - Bowel: colace, senna, miralax  - Continue home meds    Staffed and discussed with attending. Please don't hesitate to reach out with any questions.    Erickson Oreilly MD  Orthopaedic Surgery, PGY-1  Epic Chat preferred    ------------------------------------------------    While admitted, this patient will be followed by the Ortho Trauma Team. Please contact the residents listed below with any questions (available via Epic Chat).     First call: Juan Finch, PGY-1  Second call: Darryl Summers, PGY-2   Third call: Ernestine Womack, PGY-3    Please call the ortho pager [phone]) on weekends and between 6p-7a on weekdays.

## 2024-06-19 ENCOUNTER — ANESTHESIA (OUTPATIENT)
Dept: OPERATING ROOM | Facility: HOSPITAL | Age: 64
End: 2024-06-19
Payer: COMMERCIAL

## 2024-06-19 ENCOUNTER — APPOINTMENT (OUTPATIENT)
Dept: RADIOLOGY | Facility: HOSPITAL | Age: 64
DRG: 493 | End: 2024-06-19
Payer: COMMERCIAL

## 2024-06-19 LAB
ABO GROUP (TYPE) IN BLOOD: NORMAL
ATRIAL RATE: 72 BPM
ERYTHROCYTE [DISTWIDTH] IN BLOOD BY AUTOMATED COUNT: 12.7 % (ref 11.5–14.5)
HCT VFR BLD AUTO: 26.1 % (ref 36–46)
HGB BLD-MCNC: 8.4 G/DL (ref 12–16)
MCH RBC QN AUTO: 32.4 PG (ref 26–34)
MCHC RBC AUTO-ENTMCNC: 32.2 G/DL (ref 32–36)
MCV RBC AUTO: 101 FL (ref 80–100)
NRBC BLD-RTO: 0 /100 WBCS (ref 0–0)
P AXIS: 48 DEGREES
P OFFSET: 167 MS
P ONSET: 121 MS
PLATELET # BLD AUTO: 306 X10*3/UL (ref 150–450)
PR INTERVAL: 196 MS
Q ONSET: 219 MS
QRS COUNT: 12 BEATS
QRS DURATION: 98 MS
QT INTERVAL: 426 MS
QTC CALCULATION(BAZETT): 466 MS
QTC FREDERICIA: 452 MS
R AXIS: 8 DEGREES
RBC # BLD AUTO: 2.59 X10*6/UL (ref 4–5.2)
RH FACTOR (ANTIGEN D): NORMAL
STAPHYLOCOCCUS SPEC CULT: NORMAL
T AXIS: 4 DEGREES
T OFFSET: 432 MS
VENTRICULAR RATE: 72 BPM
WBC # BLD AUTO: 11.8 X10*3/UL (ref 4.4–11.3)

## 2024-06-19 PROCEDURE — 2500000004 HC RX 250 GENERAL PHARMACY W/ HCPCS (ALT 636 FOR OP/ED)

## 2024-06-19 PROCEDURE — 7100000001 HC RECOVERY ROOM TIME - INITIAL BASE CHARGE: Performed by: ORTHOPAEDIC SURGERY

## 2024-06-19 PROCEDURE — 2500000001 HC RX 250 WO HCPCS SELF ADMINISTERED DRUGS (ALT 637 FOR MEDICARE OP)

## 2024-06-19 PROCEDURE — C1713 ANCHOR/SCREW BN/BN,TIS/BN: HCPCS | Performed by: ORTHOPAEDIC SURGERY

## 2024-06-19 PROCEDURE — A6213 FOAM DRG >16<=48 SQ IN W/BDR: HCPCS | Performed by: ORTHOPAEDIC SURGERY

## 2024-06-19 PROCEDURE — 3700000001 HC GENERAL ANESTHESIA TIME - INITIAL BASE CHARGE: Performed by: ORTHOPAEDIC SURGERY

## 2024-06-19 PROCEDURE — 36415 COLL VENOUS BLD VENIPUNCTURE: CPT

## 2024-06-19 PROCEDURE — C1769 GUIDE WIRE: HCPCS | Performed by: ORTHOPAEDIC SURGERY

## 2024-06-19 PROCEDURE — 0PSG04Z REPOSITION LEFT HUMERAL SHAFT WITH INTERNAL FIXATION DEVICE, OPEN APPROACH: ICD-10-PCS | Performed by: ORTHOPAEDIC SURGERY

## 2024-06-19 PROCEDURE — 24516 TX HUMRL SHAFT FX IMED IMPLT: CPT | Performed by: ORTHOPAEDIC SURGERY

## 2024-06-19 PROCEDURE — 3700000002 HC GENERAL ANESTHESIA TIME - EACH INCREMENTAL 1 MINUTE: Performed by: ORTHOPAEDIC SURGERY

## 2024-06-19 PROCEDURE — C1776 JOINT DEVICE (IMPLANTABLE): HCPCS | Performed by: ORTHOPAEDIC SURGERY

## 2024-06-19 PROCEDURE — 2500000004 HC RX 250 GENERAL PHARMACY W/ HCPCS (ALT 636 FOR OP/ED): Performed by: ANESTHESIOLOGY

## 2024-06-19 PROCEDURE — 2500000004 HC RX 250 GENERAL PHARMACY W/ HCPCS (ALT 636 FOR OP/ED): Mod: JZ

## 2024-06-19 PROCEDURE — 3600000004 HC OR TIME - INITIAL BASE CHARGE - PROCEDURE LEVEL FOUR: Performed by: ORTHOPAEDIC SURGERY

## 2024-06-19 PROCEDURE — 99221 1ST HOSP IP/OBS SF/LOW 40: CPT

## 2024-06-19 PROCEDURE — 7100000002 HC RECOVERY ROOM TIME - EACH INCREMENTAL 1 MINUTE: Performed by: ORTHOPAEDIC SURGERY

## 2024-06-19 PROCEDURE — 2720000007 HC OR 272 NO HCPCS: Performed by: ORTHOPAEDIC SURGERY

## 2024-06-19 PROCEDURE — 3600000009 HC OR TIME - EACH INCREMENTAL 1 MINUTE - PROCEDURE LEVEL FOUR: Performed by: ORTHOPAEDIC SURGERY

## 2024-06-19 PROCEDURE — 2500000005 HC RX 250 GENERAL PHARMACY W/O HCPCS

## 2024-06-19 PROCEDURE — 1100000001 HC PRIVATE ROOM DAILY

## 2024-06-19 PROCEDURE — 85027 COMPLETE CBC AUTOMATED: CPT

## 2024-06-19 PROCEDURE — 2780000003 HC OR 278 NO HCPCS: Performed by: ORTHOPAEDIC SURGERY

## 2024-06-19 PROCEDURE — 2500000004 HC RX 250 GENERAL PHARMACY W/ HCPCS (ALT 636 FOR OP/ED): Performed by: ORTHOPAEDIC SURGERY

## 2024-06-19 PROCEDURE — A4217 STERILE WATER/SALINE, 500 ML: HCPCS | Performed by: ORTHOPAEDIC SURGERY

## 2024-06-19 DEVICE — NAIL, HUMERAL, AEQUALIS, PROXIMAL SCREW, 5 X 40MM: Type: IMPLANTABLE DEVICE | Site: ARM | Status: FUNCTIONAL

## 2024-06-19 DEVICE — NAIL, HUMERAL, AEQUALIS, PROXIMAL SCREW, 5 X 36MM: Type: IMPLANTABLE DEVICE | Site: ARM | Status: FUNCTIONAL

## 2024-06-19 DEVICE — IMPLANTABLE DEVICE: Type: IMPLANTABLE DEVICE | Site: ARM | Status: FUNCTIONAL

## 2024-06-19 DEVICE — NAIL, HUMERAL, AEQUALIS, DISTAL SCREW, DIA 4, 3MM X 22MM, LONG: Type: IMPLANTABLE DEVICE | Site: ARM | Status: FUNCTIONAL

## 2024-06-19 RX ORDER — ACETAMINOPHEN 500 MG
5 TABLET ORAL NIGHTLY PRN
Status: DISCONTINUED | OUTPATIENT
Start: 2024-06-19 | End: 2024-06-24 | Stop reason: HOSPADM

## 2024-06-19 RX ORDER — PHENYLEPHRINE HCL IN 0.9% NACL 0.4MG/10ML
SYRINGE (ML) INTRAVENOUS AS NEEDED
Status: DISCONTINUED | OUTPATIENT
Start: 2024-06-19 | End: 2024-06-19

## 2024-06-19 RX ORDER — SODIUM CHLORIDE, SODIUM LACTATE, POTASSIUM CHLORIDE, CALCIUM CHLORIDE 600; 310; 30; 20 MG/100ML; MG/100ML; MG/100ML; MG/100ML
100 INJECTION, SOLUTION INTRAVENOUS CONTINUOUS
Status: DISCONTINUED | OUTPATIENT
Start: 2024-06-19 | End: 2024-06-19 | Stop reason: HOSPADM

## 2024-06-19 RX ORDER — HYDROMORPHONE HYDROCHLORIDE 1 MG/ML
0.2 INJECTION, SOLUTION INTRAMUSCULAR; INTRAVENOUS; SUBCUTANEOUS EVERY 5 MIN PRN
Status: DISCONTINUED | OUTPATIENT
Start: 2024-06-19 | End: 2024-06-19 | Stop reason: HOSPADM

## 2024-06-19 RX ORDER — ROCURONIUM BROMIDE 10 MG/ML
INJECTION, SOLUTION INTRAVENOUS AS NEEDED
Status: DISCONTINUED | OUTPATIENT
Start: 2024-06-19 | End: 2024-06-19

## 2024-06-19 RX ORDER — CALCIUM CARBONATE 200(500)MG
500 TABLET,CHEWABLE ORAL 4 TIMES DAILY PRN
Status: DISCONTINUED | OUTPATIENT
Start: 2024-06-19 | End: 2024-06-24 | Stop reason: HOSPADM

## 2024-06-19 RX ORDER — CEFAZOLIN SODIUM 2 G/100ML
2 INJECTION, SOLUTION INTRAVENOUS EVERY 8 HOURS
Status: COMPLETED | OUTPATIENT
Start: 2024-06-19 | End: 2024-06-20

## 2024-06-19 RX ORDER — LIDOCAINE HYDROCHLORIDE 10 MG/ML
0.1 INJECTION INFILTRATION; PERINEURAL ONCE
Status: DISCONTINUED | OUTPATIENT
Start: 2024-06-19 | End: 2024-06-19 | Stop reason: HOSPADM

## 2024-06-19 RX ORDER — LIDOCAINE HYDROCHLORIDE 20 MG/ML
INJECTION, SOLUTION INFILTRATION; PERINEURAL AS NEEDED
Status: DISCONTINUED | OUTPATIENT
Start: 2024-06-19 | End: 2024-06-19

## 2024-06-19 RX ORDER — HYDROMORPHONE HYDROCHLORIDE 1 MG/ML
0.5 INJECTION, SOLUTION INTRAMUSCULAR; INTRAVENOUS; SUBCUTANEOUS EVERY 5 MIN PRN
Status: DISCONTINUED | OUTPATIENT
Start: 2024-06-19 | End: 2024-06-19 | Stop reason: HOSPADM

## 2024-06-19 RX ORDER — PROPOFOL 10 MG/ML
INJECTION, EMULSION INTRAVENOUS AS NEEDED
Status: DISCONTINUED | OUTPATIENT
Start: 2024-06-19 | End: 2024-06-19

## 2024-06-19 RX ORDER — SODIUM CHLORIDE 0.9 G/100ML
IRRIGANT IRRIGATION AS NEEDED
Status: DISCONTINUED | OUTPATIENT
Start: 2024-06-19 | End: 2024-06-19 | Stop reason: HOSPADM

## 2024-06-19 RX ORDER — FENTANYL CITRATE 50 UG/ML
INJECTION, SOLUTION INTRAMUSCULAR; INTRAVENOUS AS NEEDED
Status: DISCONTINUED | OUTPATIENT
Start: 2024-06-19 | End: 2024-06-19

## 2024-06-19 RX ORDER — CEFAZOLIN 1 G/1
INJECTION, POWDER, FOR SOLUTION INTRAVENOUS AS NEEDED
Status: DISCONTINUED | OUTPATIENT
Start: 2024-06-19 | End: 2024-06-19

## 2024-06-19 RX ORDER — ASPIRIN 81 MG/1
81 TABLET ORAL 2 TIMES DAILY
Status: DISCONTINUED | OUTPATIENT
Start: 2024-06-19 | End: 2024-06-24 | Stop reason: HOSPADM

## 2024-06-19 RX ORDER — ONDANSETRON HYDROCHLORIDE 2 MG/ML
INJECTION, SOLUTION INTRAVENOUS AS NEEDED
Status: DISCONTINUED | OUTPATIENT
Start: 2024-06-19 | End: 2024-06-19

## 2024-06-19 SDOH — HEALTH STABILITY: MENTAL HEALTH: CURRENT SMOKER: 0

## 2024-06-19 ASSESSMENT — PAIN SCALES - GENERAL
PAINLEVEL_OUTOF10: 0 - NO PAIN
PAINLEVEL_OUTOF10: 4
PAINLEVEL_OUTOF10: 8
PAINLEVEL_OUTOF10: 8
PAINLEVEL_OUTOF10: 0 - NO PAIN
PAINLEVEL_OUTOF10: 8
PAINLEVEL_OUTOF10: 4
PAINLEVEL_OUTOF10: 0 - NO PAIN
PAIN_LEVEL: 8
PAINLEVEL_OUTOF10: 8
PAINLEVEL_OUTOF10: 0 - NO PAIN
PAINLEVEL_OUTOF10: 3

## 2024-06-19 ASSESSMENT — LIFESTYLE VARIABLES
TOTAL SCORE: 0
VISUAL DISTURBANCES: NOT PRESENT
ORIENTATION AND CLOUDING OF SENSORIUM: ORIENTED AND CAN DO SERIAL ADDITIONS
AUDITORY DISTURBANCES: NOT PRESENT
TREMOR: NO TREMOR
TOTAL SCORE: 1
ANXIETY: MILDLY ANXIOUS
VISUAL DISTURBANCES: NOT PRESENT
PAROXYSMAL SWEATS: NO SWEAT VISIBLE
HEADACHE, FULLNESS IN HEAD: NOT PRESENT
NAUSEA AND VOMITING: NO NAUSEA AND NO VOMITING
TREMOR: NO TREMOR
AUDITORY DISTURBANCES: NOT PRESENT
ANXIETY: NO ANXIETY, AT EASE
TOTAL SCORE: 0
HEADACHE, FULLNESS IN HEAD: NOT PRESENT
ORIENTATION AND CLOUDING OF SENSORIUM: ORIENTED AND CAN DO SERIAL ADDITIONS
HEADACHE, FULLNESS IN HEAD: NOT PRESENT
VISUAL DISTURBANCES: NOT PRESENT
PAROXYSMAL SWEATS: NO SWEAT VISIBLE
ANXIETY: NO ANXIETY, AT EASE
ORIENTATION AND CLOUDING OF SENSORIUM: ORIENTED AND CAN DO SERIAL ADDITIONS
NAUSEA AND VOMITING: NO NAUSEA AND NO VOMITING
AGITATION: NORMAL ACTIVITY
AUDITORY DISTURBANCES: NOT PRESENT
AGITATION: NORMAL ACTIVITY
TREMOR: NO TREMOR
NAUSEA AND VOMITING: NO NAUSEA AND NO VOMITING
PAROXYSMAL SWEATS: NO SWEAT VISIBLE
AGITATION: NORMAL ACTIVITY

## 2024-06-19 ASSESSMENT — PAIN - FUNCTIONAL ASSESSMENT
PAIN_FUNCTIONAL_ASSESSMENT: 0-10

## 2024-06-19 ASSESSMENT — COGNITIVE AND FUNCTIONAL STATUS - GENERAL
DRESSING REGULAR UPPER BODY CLOTHING: A LITTLE
HELP NEEDED FOR BATHING: A LITTLE
MOVING FROM LYING ON BACK TO SITTING ON SIDE OF FLAT BED WITH BEDRAILS: A LITTLE
MOBILITY SCORE: 20
MOBILITY SCORE: 20
DRESSING REGULAR UPPER BODY CLOTHING: A LITTLE
MOVING TO AND FROM BED TO CHAIR: A LITTLE
MOVING FROM LYING ON BACK TO SITTING ON SIDE OF FLAT BED WITH BEDRAILS: A LITTLE
DAILY ACTIVITIY SCORE: 21
MOVING TO AND FROM BED TO CHAIR: A LITTLE
TURNING FROM BACK TO SIDE WHILE IN FLAT BAD: A LITTLE
TURNING FROM BACK TO SIDE WHILE IN FLAT BAD: A LITTLE
DAILY ACTIVITIY SCORE: 21
STANDING UP FROM CHAIR USING ARMS: A LITTLE
STANDING UP FROM CHAIR USING ARMS: A LITTLE
DRESSING REGULAR LOWER BODY CLOTHING: A LITTLE
DRESSING REGULAR LOWER BODY CLOTHING: A LITTLE
HELP NEEDED FOR BATHING: A LITTLE

## 2024-06-19 ASSESSMENT — COLUMBIA-SUICIDE SEVERITY RATING SCALE - C-SSRS
6. HAVE YOU EVER DONE ANYTHING, STARTED TO DO ANYTHING, OR PREPARED TO DO ANYTHING TO END YOUR LIFE?: NO
1. IN THE PAST MONTH, HAVE YOU WISHED YOU WERE DEAD OR WISHED YOU COULD GO TO SLEEP AND NOT WAKE UP?: NO
2. HAVE YOU ACTUALLY HAD ANY THOUGHTS OF KILLING YOURSELF?: NO

## 2024-06-19 ASSESSMENT — PATIENT HEALTH QUESTIONNAIRE - PHQ9
SUM OF ALL RESPONSES TO PHQ9 QUESTIONS 1 AND 2: 0
1. LITTLE INTEREST OR PLEASURE IN DOING THINGS: NOT AT ALL
2. FEELING DOWN, DEPRESSED OR HOPELESS: NOT AT ALL

## 2024-06-19 ASSESSMENT — PAIN DESCRIPTION - ORIENTATION: ORIENTATION: LEFT

## 2024-06-19 ASSESSMENT — ENCOUNTER SYMPTOMS
DEPRESSION: 0
OCCASIONAL FEELINGS OF UNSTEADINESS: 0
LOSS OF SENSATION IN FEET: 0

## 2024-06-19 ASSESSMENT — PAIN SCALES - WONG BAKER: WONGBAKER_NUMERICALRESPONSE: HURTS WHOLE LOT

## 2024-06-19 ASSESSMENT — PAIN DESCRIPTION - DESCRIPTORS: DESCRIPTORS: ACHING

## 2024-06-19 ASSESSMENT — PAIN DESCRIPTION - LOCATION
LOCATION: SHOULDER
LOCATION: ARM

## 2024-06-19 NOTE — ANESTHESIA PREPROCEDURE EVALUATION
Patient: Yue Sanz    Procedure Information       Date/Time: 06/19/24 1015    Procedure: Insertion Intramedullary Nail Humerus (Left: Arm Upper)    Location: [facility] OR 06 / [facility] [place]    Surgeons: Jaret Patricia MD            Relevant Problems   Cardiac   (+) Benign essential hypertension   (+) Hyperlipidemia      Neuro   (+) Anxiety and depression   (+) CVA (cerebral vascular accident) (Multi)   (+) Seizure disorder (Multi)      Hematology   (+) Anemia      ID   (+) Impetigo       Clinical information reviewed:   Tobacco  Allergies  Meds   Med Hx  Surg Hx   Fam Hx  Soc Hx        NPO Detail:  NPO/Void Status  Date of Last Solid: 06/19/24  Time of Last Solid: 0000         Physical Exam    Airway  Mallampati: III  TM distance: >3 FB  Neck ROM: full     Cardiovascular - normal exam  Rhythm: regular  Rate: normal     Dental    Pulmonary - normal exam  Breath sounds clear to auscultation     Abdominal            Anesthesia Plan    History of general anesthesia?: no  History of complications of general anesthesia?: no    ASA 3     general     The patient is not a current smoker.  Patient did not smoke on day of procedure.    intravenous induction   Postoperative administration of opioids is intended.  Anesthetic plan and risks discussed with patient.  Use of blood products discussed with patient who consented to blood products.    Plan discussed with attending.

## 2024-06-19 NOTE — PROGRESS NOTES
Pharmacy Medication History Review    Yue Sanz is a 63 y.o. female admitted for Other closed displaced fracture of proximal end of left humerus, initial encounter. Pharmacy reviewed the patient's [id number] medications and allergies for accuracy.    The list below reflects the updated PTA list. Comments regarding how patient may be taking medications differently can be found in the Admit Orders Activity  Prior to Admission Medications   Prescriptions Informant Taking?   aspirin 81 mg EC tablet Family Member, Self Yes   Sig: Take 1 tablet (81 mg) by mouth once daily.   atorvastatin (Lipitor) 40 mg tablet Family Member, Self Yes   Sig: Take 1 tablet (40 mg) by mouth once daily.   cephalexin (Keflex) 500 mg capsule Family Member, Self Yes   Sig: Take 1 capsule (500 mg) by mouth 4 times a day for 5 days.    -->still have 6-7 doses left   clopidogrel (Plavix) 75 mg tablet Family Member, Self Yes   Sig: Take 1 tablet (75 mg) by mouth once daily.   cyclobenzaprine (Flexeril) 10 mg tablet Family Member, Self Yes   Sig: Take 1 tablet (10 mg) by mouth 3 times a day as needed for muscle spasms for up to 10 days.   folic acid (Folvite) 1 mg tablet Family Member, Self Yes   Sig: Take 1 tablet (1 mg) by mouth once daily.    -->> ran out. Needs additional refills   hydrALAZINE (Apresoline) 10 mg tablet Family Member, Self Yes   Sig: Take 1 tablet (10 mg) by mouth 3 times a day.   levETIRAcetam (Keppra) 500 mg tablet Family Member, Self Yes   Sig: Take 1 tablet (500 mg) by mouth 2 times a day.   lisinopril 10 mg tablet Family Member, Self Yes   Sig: Take 1 tablet (10 mg) by mouth once daily.   metoprolol tartrate (Lopressor) 25 mg tablet Family Member, Self Yes   Sig: Take 1 tablet (25 mg) by mouth 2 times a day.   multivitamin with minerals tablet Family Member, Self Yes   Sig: Take 1 tablet by mouth once daily.   oxyCODONE-acetaminophen (Percocet) 5-325 mg tablet Family Member, Self Yes   Sig: Take 1 tablet by mouth  every 6 hours if needed for severe pain (7 - 10) for up to 2 days.   sertraline (Zoloft) 50 mg tablet Family Member, Self Yes   Sig: Take 1 tablet (50 mg) by mouth once daily.   thiamine (Vitamin B-1) 100 mg tablet Family Member, Self Yes   Sig: Take 1 tablet (100 mg) by mouth once daily.      Facility-Administered Medications: None        The list below reflects the updated allergy list. Please review each documented allergy for additional clarification and justification.  Allergies  Reviewed by Samaria Bermudez PharmD on 6/19/2024        Severity Reactions Comments    Amoxicillin Not Specified Other             Patient accepts M2B at discharge. Pharmacy has been updated to [facility].    Sources used: Pharmacy dispense history, OARRs, patient interview (poor historian- called Johnna (niece? Or daughter?)- helps organize medications and was a good historian), primary care note from 3/6 and discharge summary from 6/10    Below are additional concerns with the patient's PTA list.  - Per Johnna, pt was taking Keflex (for cellulitis) incorrectly and still has 6-7 doses left. Please be advised    Samaria Bermudez PharmD, [facility]  Transitions of Care Pharmacist  [facility]  Please reach out via Secure Chat for questions, or if no response call [employer] or [facility]

## 2024-06-19 NOTE — ANESTHESIA POSTPROCEDURE EVALUATION
Patient: Yue Sanz    Procedure Summary       Date: 06/19/24 Room / Location: [facility] OR 06 / Virtual [facility] OR    Anesthesia Start: 1256 Anesthesia Stop: 1456    Procedure: Insertion Intramedullary Nail Humerus (Left: Arm Upper) Diagnosis:       Closed comminuted fracture of right humerus, initial encounter      (Closed comminuted fracture of right humerus, initial encounter [S42.351A])    Surgeons: Jaret Patricia MD Responsible Provider: Tonny Wisdom DO    Anesthesia Type: general ASA Status: 3            Anesthesia Type: general    Vitals Value Taken Time   /81 06/19/24 1445   Temp 36.0 06/19/24 1456   Pulse 92 06/19/24 1452   Resp 14 06/19/24 1452   SpO2 100 % 06/19/24 1452   Vitals shown include unfiled device data.    Anesthesia Post Evaluation    Patient location during evaluation: PACU  Patient participation: complete - patient participated  Level of consciousness: awake and alert  Pain score: 8  Pain management: adequate  Airway patency: patent  Cardiovascular status: acceptable  Respiratory status: acceptable  Hydration status: acceptable  Postoperative Nausea and Vomiting: none        There were no known notable events for this encounter.

## 2024-06-19 NOTE — ANESTHESIA PROCEDURE NOTES
Airway  Date/Time: 6/19/2024 1:12 PM  Urgency: elective    Airway not difficult    Staffing  Performed: ASHIA   Authorized by: Tonny Wisdom DO    Performed by: ASHIA Rodriguez  Patient location during procedure: OR    Indications and Patient Condition  Indications for airway management: anesthesia  Spontaneous Ventilation: absent  Sedation level: deep  Preoxygenated: yes  Patient position: sniffing  Mask difficulty assessment: 1 - vent by mask    Final Airway Details  Final airway type: endotracheal airway      Successful airway: ETT  Cuffed: yes   Successful intubation technique: direct laryngoscopy  Facilitating devices/methods: intubating stylet  Endotracheal tube insertion site: oral  Blade: Eulogio  Blade size: #3  ETT size (mm): 7.0  Cormack-Lehane Classification: grade I - full view of glottis  Placement verified by: chest auscultation and capnometry   Measured from: lips  ETT to lips (cm): 20  Number of attempts at approach: 1

## 2024-06-19 NOTE — H&P
H&P reviewed. The patient was examined and there are no changes to the H&P. Patient electing to proceed with surgery. Patient consented and posted.    Beni Castillo MD  Orthopaedic Surgery PGY-2

## 2024-06-19 NOTE — OP NOTE
ORTHOPAEDIC SURGERY OPERATIVE REPORT      Date of Surgery: 6/19/2024    Surgeon: Jaret Patricia MD    Assistant Surgeon: MD Dr. Stalin Simpson participated in this case as the assistant surgeon, performing components of the positioning, approach, debridement, reduction, fixation, and closure. Due to the nature and complexity of the case, no qualified resident of an appropriate level was available to assist.    Assistants:  Shaheen Page DPM    Preoperative Diagnosis:  Left proximal humeral shaft fracture    Postoperative Diagnosis:  Left proximal humeral shaft fracture    Procedures Performed:  Left proximal humeral shaft fracture open reduction internal fixation    Anesthesia: General anesthesia    IV Fluids: Per anesthesia record    Estimated Blood Loss: 50 mL    Complications: None    Implants:   Giuseppe Aequalis 8 x 270 mm humeral nail with associated interlocking screws    Specimens: None    Intraoperative Findings: Stable fracture fixation and safe extra-articular hardware placement noted at the conclusion of the case.    Indications For Procedure:  Yue Sanz is a 63 y.o. female who sustained a ground-level fall at home which fracture in her left shoulder.  She presented to [facility] emergency department where imaging obtained demonstrated a left proximal humerus fracture.  The patient was subsequently seen in clinic at which time operative and nonoperative treatment options were discussed.  The patient elected to proceed with operative intervention.  She was subsequently direct admitted for presurgical medical optimization. Due to the nature of the patient's injury, they were indicated for operative stabilization.  Informed consent was obtained after discussing the risks, benefits, and alternatives to the procedure.  Risks include pain, bleeding, infection, damage to nearby structures, malunion, nonunion, hardware complications, need for further procedures, blood clots,  anesthesia risks, and death.  Decision was made to proceed.  The patient was then brought to the preoperative holding area on the day of surgery.    Procedure Detail:  The patient was met in the preoperative holding area where their identity was confirmed and the operative extremity was marked. The patient was taken back to the operating theater where a preinduction timeout was performed which confirmed the patient's identity, procedure to be performed, correct operative site, availability of imaging and implants, allergies, and preoperative antibiotics. Everyone present was in agreement. They were placed under general anesthesia without complication. The patient was transferred to the operating table and placed in the supine position. All bony prominences were well-padded. The patient's left upper extremity was then prepped and draped in the usual sterile fashion. A preprocedure timeout was performed which again confirmed the patient's identity, procedure to be performed, and correct operative site.  Everyone present was in agreement. Preoperative antibiotics were administered.    We began by marking out the bony landmarks of the shoulder.  A small stab incision was made just off the anterior lateral aspect of the acromion.  A guidewire was placed and positioned in an appropriate starting point on the humeral head.  This was then advanced.  Incision was extended and opening reamer was utilized over top of the guidewire.  We then attempted to place a smooth guidewire down intramedullary canal.  However, given the fracture displacement, this was unable to be performed initially.  We then used the intramedullary reduction device as well as a combination of manual manipulation and we were then able to affect a satisfactory reduction and advance the guidewire.  This was fully advanced to the level of the distal humerus but this measured approximately 285 mm and we elected to use a 270 mm nail.  While holding the  reduction appropriate, we utilized a 9.5 mm reamer.  Following this, we attempted to place our Troy Aequalis 8 x 270 mm humeral nail.  As we are beginning to advance nail, we did note that the fracture reduction was less than optimal.  We then made a percutaneous incision and placed a bone hook around the medial aspect of the proximal humerus and a separate purchase incision through which we used a ball spike pusher in order to  perform our fracture reduction.  The fracture was well reduced at this point.  We then advanced the nail fully.  While in the reduction, we placed 2 of the proximal interlocking screws in a unicortical fashion.  We then placed the 2 distal interlocking screws using the aiming jig.  These were placed bicortically and had excellent purchase.  At this point, we felt that we had appropriate control of the fracture and we were able to let go with the ball spike pusher and the bone hook.  We placed the final proximal interlocking screw using the aiming jig.  All the screws were fully advanced and had satisfactory purchase.  Insertion handle was removed and we obtained our final fluoroscopic images.  The fracture did very slightly rebound, but was still overall very satisfactory reduction.  All screws proximally were extra-articular in nature.  All incisions were copious irrigated with normal saline.  We placed a figure-of-eight suture to attempt to reapproximate the rotator cuff through our proximal incision.  The remainder of this incision as well as all other percutaneous incisions was closed using 2-0 Monocryl followed by staples for the skin.  All counts were correct x 2 at this time.  Sterile dressing was applied.    The drapes were taken down and the patient was awoken from anesthesia without complication. They were transferred back to their hospital bed and taken to PACU in stable condition.    Postoperative Plan:  The patient may be weightbearing as tolerated to the left upper  extremity.  She may utilize a sling as needed for comfort.  Patient may perform range of motion as tolerated as well.  The patient will receive postoperative antibiotics.  The patient will be started on aspirin 81 mg twice daily for DVT prophylaxis while admitted.  The patient will receive evaluations by PT and OT. The patient will follow up with Dr. Jaret Patricia MD in approximately 2 weeks time for clinical check, staple removal, and 3 view xrays of the left shoulder (AP/Grashey/Scap Y) and 2 view x-rays of the left humerus (AP/lateral) at that time.      Dr. Jaret Patricia MD was present for the critical portions of the case and was otherwise immediately available to assist.      Maurice Gant MD  Orthopaedic Trauma Fellow  6/19/2024

## 2024-06-19 NOTE — CARE PLAN
Problem: Pain - Adult  Goal: Verbalizes/displays adequate comfort level or baseline comfort level  Outcome: Progressing     Problem: Safety - Adult  Goal: Free from fall injury  Outcome: Progressing     Problem: Discharge Planning  Goal: Discharge to home or other facility with appropriate resources  Outcome: Progressing     Problem: Chronic Conditions and Co-morbidities  Goal: Patient's chronic conditions and co-morbidity symptoms are monitored and maintained or improved  Outcome: Progressing   The patient's goals for the shift include      The clinical goals for the shift include will remain pain free throughout shift

## 2024-06-20 ENCOUNTER — APPOINTMENT (OUTPATIENT)
Dept: CARDIOLOGY | Facility: HOSPITAL | Age: 64
DRG: 493 | End: 2024-06-20
Payer: COMMERCIAL

## 2024-06-20 LAB
ALBUMIN SERPL BCP-MCNC: 3.1 G/DL (ref 3.4–5)
ALP SERPL-CCNC: 75 U/L (ref 33–136)
ALT SERPL W P-5'-P-CCNC: 5 U/L (ref 7–45)
ANION GAP BLDV CALCULATED.4IONS-SCNC: 13 MMOL/L (ref 10–25)
ANION GAP SERPL CALC-SCNC: 16 MMOL/L (ref 10–20)
APPEARANCE UR: CLEAR
AST SERPL W P-5'-P-CCNC: 33 U/L (ref 9–39)
ATRIAL RATE: 120 BPM
ATRIAL RATE: 122 BPM
BASE EXCESS BLDV CALC-SCNC: -3.4 MMOL/L (ref -2–3)
BASOPHILS # BLD AUTO: 0.03 X10*3/UL (ref 0–0.1)
BASOPHILS NFR BLD AUTO: 0.3 %
BILIRUB SERPL-MCNC: 0.6 MG/DL (ref 0–1.2)
BILIRUB SERPL-MCNC: 0.7 MG/DL (ref 0–1.2)
BILIRUB UR STRIP.AUTO-MCNC: NEGATIVE MG/DL
BLOOD EXPIRATION DATE: NORMAL
BNP SERPL-MCNC: 296 PG/ML (ref 0–99)
BODY TEMPERATURE: 37 DEGREES CELSIUS
BUN SERPL-MCNC: 25 MG/DL (ref 6–23)
BURR CELLS BLD QL SMEAR: NORMAL
CA-I BLDV-SCNC: 1.13 MMOL/L (ref 1.1–1.33)
CALCIUM SERPL-MCNC: 7.9 MG/DL (ref 8.6–10.6)
CARDIAC TROPONIN I PNL SERPL HS: 44 NG/L (ref 0–34)
CARDIAC TROPONIN I PNL SERPL HS: 59 NG/L (ref 0–34)
CHLORIDE BLDV-SCNC: 108 MMOL/L (ref 98–107)
CHLORIDE SERPL-SCNC: 106 MMOL/L (ref 98–107)
CO2 SERPL-SCNC: 22 MMOL/L (ref 21–32)
COLOR UR: YELLOW
CREAT SERPL-MCNC: 1.3 MG/DL (ref 0.5–1.05)
DISPENSE STATUS: NORMAL
EGFRCR SERPLBLD CKD-EPI 2021: 46 ML/MIN/1.73M*2
EOSINOPHIL # BLD AUTO: 0.02 X10*3/UL (ref 0–0.7)
EOSINOPHIL NFR BLD AUTO: 0.2 %
ERYTHROCYTE [DISTWIDTH] IN BLOOD BY AUTOMATED COUNT: 12.9 % (ref 11.5–14.5)
ERYTHROCYTE [DISTWIDTH] IN BLOOD BY AUTOMATED COUNT: 12.9 % (ref 11.5–14.5)
GLUCOSE BLDV-MCNC: 124 MG/DL (ref 74–99)
GLUCOSE SERPL-MCNC: 126 MG/DL (ref 74–99)
GLUCOSE UR STRIP.AUTO-MCNC: NORMAL MG/DL
HCO3 BLDV-SCNC: 21.3 MMOL/L (ref 22–26)
HCT VFR BLD AUTO: 19.5 % (ref 36–46)
HCT VFR BLD AUTO: 22.9 % (ref 36–46)
HCT VFR BLD EST: 24 % (ref 36–46)
HGB BLD-MCNC: 6.1 G/DL (ref 12–16)
HGB BLD-MCNC: 7.5 G/DL (ref 12–16)
HGB BLDV-MCNC: 8.1 G/DL (ref 12–16)
HOLD SPECIMEN: NORMAL
IMM GRANULOCYTES # BLD AUTO: 0.06 X10*3/UL (ref 0–0.7)
IMM GRANULOCYTES NFR BLD AUTO: 0.6 % (ref 0–0.9)
INHALED O2 CONCENTRATION: 0 %
KETONES UR STRIP.AUTO-MCNC: ABNORMAL MG/DL
LACTATE BLDV-SCNC: 1.6 MMOL/L (ref 0.4–2)
LACTATE SERPL-SCNC: 1.4 MMOL/L (ref 0.4–2)
LDH SERPL L TO P-CCNC: 181 U/L (ref 84–246)
LEUKOCYTE ESTERASE UR QL STRIP.AUTO: NEGATIVE
LYMPHOCYTES # BLD AUTO: 1.35 X10*3/UL (ref 1.2–4.8)
LYMPHOCYTES NFR BLD AUTO: 13 %
MCH RBC QN AUTO: 33 PG (ref 26–34)
MCH RBC QN AUTO: 33.2 PG (ref 26–34)
MCHC RBC AUTO-ENTMCNC: 31.3 G/DL (ref 32–36)
MCHC RBC AUTO-ENTMCNC: 32.8 G/DL (ref 32–36)
MCV RBC AUTO: 101 FL (ref 80–100)
MCV RBC AUTO: 105 FL (ref 80–100)
MONOCYTES # BLD AUTO: 0.92 X10*3/UL (ref 0.1–1)
MONOCYTES NFR BLD AUTO: 8.8 %
NEUTROPHILS # BLD AUTO: 8.04 X10*3/UL (ref 1.2–7.7)
NEUTROPHILS NFR BLD AUTO: 77.1 %
NITRITE UR QL STRIP.AUTO: NEGATIVE
NRBC BLD-RTO: 0 /100 WBCS (ref 0–0)
NRBC BLD-RTO: 0 /100 WBCS (ref 0–0)
OVALOCYTES BLD QL SMEAR: NORMAL
OXYHGB MFR BLDV: 55.8 % (ref 45–75)
P AXIS: 33 DEGREES
P AXIS: 51 DEGREES
P OFFSET: 175 MS
P OFFSET: 181 MS
P ONSET: 131 MS
P ONSET: 136 MS
PCO2 BLDV: 36 MM HG (ref 41–51)
PH BLDV: 7.38 PH (ref 7.33–7.43)
PH UR STRIP.AUTO: 5.5 [PH]
PLATELET # BLD AUTO: 280 X10*3/UL (ref 150–450)
PLATELET # BLD AUTO: 307 X10*3/UL (ref 150–450)
PO2 BLDV: 39 MM HG (ref 35–45)
POLYCHROMASIA BLD QL SMEAR: NORMAL
POTASSIUM BLDV-SCNC: 4 MMOL/L (ref 3.5–5.3)
POTASSIUM SERPL-SCNC: 3.9 MMOL/L (ref 3.5–5.3)
PR INTERVAL: 174 MS
PR INTERVAL: 174 MS
PRODUCT BLOOD TYPE: 5100
PRODUCT CODE: NORMAL
PROT SERPL-MCNC: 5.5 G/DL (ref 6.4–8.2)
PROT UR STRIP.AUTO-MCNC: NEGATIVE MG/DL
Q ONSET: 218 MS
Q ONSET: 223 MS
QRS COUNT: 19 BEATS
QRS COUNT: 20 BEATS
QRS DURATION: 82 MS
QRS DURATION: 84 MS
QT INTERVAL: 308 MS
QT INTERVAL: 386 MS
QTC CALCULATION(BAZETT): 438 MS
QTC CALCULATION(BAZETT): 545 MS
QTC FREDERICIA: 390 MS
QTC FREDERICIA: 486 MS
R AXIS: 0 DEGREES
R AXIS: 9 DEGREES
RBC # BLD AUTO: 1.85 X10*6/UL (ref 4–5.2)
RBC # BLD AUTO: 2.26 X10*6/UL (ref 4–5.2)
RBC # UR STRIP.AUTO: NEGATIVE /UL
RBC MORPH BLD: NORMAL
SAO2 % BLDV: 57 % (ref 45–75)
SODIUM BLDV-SCNC: 138 MMOL/L (ref 136–145)
SODIUM SERPL-SCNC: 140 MMOL/L (ref 136–145)
SP GR UR STRIP.AUTO: 1.02
T AXIS: 2 DEGREES
T AXIS: 9 DEGREES
T OFFSET: 377 MS
T OFFSET: 411 MS
UNIT ABO: NORMAL
UNIT NUMBER: NORMAL
UNIT RH: NORMAL
UNIT VOLUME: 350
UROBILINOGEN UR STRIP.AUTO-MCNC: ABNORMAL MG/DL
VENTRICULAR RATE: 120 BPM
VENTRICULAR RATE: 122 BPM
WBC # BLD AUTO: 10.4 X10*3/UL (ref 4.4–11.3)
WBC # BLD AUTO: 9.9 X10*3/UL (ref 4.4–11.3)
XM INTEP: NORMAL

## 2024-06-20 PROCEDURE — 2500000001 HC RX 250 WO HCPCS SELF ADMINISTERED DRUGS (ALT 637 FOR MEDICARE OP)

## 2024-06-20 PROCEDURE — 83880 ASSAY OF NATRIURETIC PEPTIDE: CPT

## 2024-06-20 PROCEDURE — 2500000002 HC RX 250 W HCPCS SELF ADMINISTERED DRUGS (ALT 637 FOR MEDICARE OP, ALT 636 FOR OP/ED)

## 2024-06-20 PROCEDURE — 1100000001 HC PRIVATE ROOM DAILY

## 2024-06-20 PROCEDURE — 84132 ASSAY OF SERUM POTASSIUM: CPT

## 2024-06-20 PROCEDURE — 83615 LACTATE (LD) (LDH) ENZYME: CPT | Performed by: STUDENT IN AN ORGANIZED HEALTH CARE EDUCATION/TRAINING PROGRAM

## 2024-06-20 PROCEDURE — 2500000004 HC RX 250 GENERAL PHARMACY W/ HCPCS (ALT 636 FOR OP/ED)

## 2024-06-20 PROCEDURE — 93005 ELECTROCARDIOGRAM TRACING: CPT

## 2024-06-20 PROCEDURE — 82247 BILIRUBIN TOTAL: CPT | Performed by: STUDENT IN AN ORGANIZED HEALTH CARE EDUCATION/TRAINING PROGRAM

## 2024-06-20 PROCEDURE — 36415 COLL VENOUS BLD VENIPUNCTURE: CPT

## 2024-06-20 PROCEDURE — RXMED WILLOW AMBULATORY MEDICATION CHARGE

## 2024-06-20 PROCEDURE — 85025 COMPLETE CBC W/AUTO DIFF WBC: CPT | Performed by: STUDENT IN AN ORGANIZED HEALTH CARE EDUCATION/TRAINING PROGRAM

## 2024-06-20 PROCEDURE — 83605 ASSAY OF LACTIC ACID: CPT | Mod: 91

## 2024-06-20 PROCEDURE — 81003 URINALYSIS AUTO W/O SCOPE: CPT

## 2024-06-20 PROCEDURE — 85027 COMPLETE CBC AUTOMATED: CPT

## 2024-06-20 PROCEDURE — P9016 RBC LEUKOCYTES REDUCED: HCPCS

## 2024-06-20 PROCEDURE — 36430 TRANSFUSION BLD/BLD COMPNT: CPT

## 2024-06-20 PROCEDURE — 84484 ASSAY OF TROPONIN QUANT: CPT

## 2024-06-20 PROCEDURE — 83010 ASSAY OF HAPTOGLOBIN QUANT: CPT | Performed by: STUDENT IN AN ORGANIZED HEALTH CARE EDUCATION/TRAINING PROGRAM

## 2024-06-20 PROCEDURE — 97161 PT EVAL LOW COMPLEX 20 MIN: CPT | Mod: GP

## 2024-06-20 RX ORDER — POLYETHYLENE GLYCOL 3350 17 G/17G
17 POWDER, FOR SOLUTION ORAL DAILY
Qty: 30 PACKET | Refills: 0 | Status: SHIPPED | OUTPATIENT
Start: 2024-06-20 | End: 2024-07-24

## 2024-06-20 RX ORDER — TAMSULOSIN HYDROCHLORIDE 0.4 MG/1
0.4 CAPSULE ORAL DAILY
Status: DISCONTINUED | OUTPATIENT
Start: 2024-06-20 | End: 2024-06-24 | Stop reason: HOSPADM

## 2024-06-20 RX ORDER — FERROUS SULFATE, DRIED 160(50) MG
1 TABLET, EXTENDED RELEASE ORAL 2 TIMES DAILY
Qty: 180 TABLET | Refills: 0 | Status: SHIPPED | OUTPATIENT
Start: 2024-06-20 | End: 2024-09-22

## 2024-06-20 RX ORDER — LEVETIRACETAM 500 MG/1
500 TABLET ORAL 2 TIMES DAILY
Status: DISCONTINUED | OUTPATIENT
Start: 2024-06-20 | End: 2024-06-24 | Stop reason: HOSPADM

## 2024-06-20 RX ORDER — METOPROLOL TARTRATE 25 MG/1
25 TABLET, FILM COATED ORAL 2 TIMES DAILY
Status: DISCONTINUED | OUTPATIENT
Start: 2024-06-20 | End: 2024-06-24 | Stop reason: HOSPADM

## 2024-06-20 RX ORDER — ACETAMINOPHEN 500 MG
500 TABLET ORAL EVERY 6 HOURS
Qty: 112 TABLET | Refills: 0 | Status: SHIPPED | OUTPATIENT
Start: 2024-06-20 | End: 2024-07-22

## 2024-06-20 RX ORDER — OXYCODONE HYDROCHLORIDE 5 MG/1
5 TABLET ORAL EVERY 6 HOURS PRN
Qty: 28 TABLET | Refills: 0 | Status: SHIPPED | OUTPATIENT
Start: 2024-06-20 | End: 2024-07-01

## 2024-06-20 RX ORDER — SERTRALINE HYDROCHLORIDE 50 MG/1
50 TABLET, FILM COATED ORAL DAILY
Status: DISCONTINUED | OUTPATIENT
Start: 2024-06-20 | End: 2024-06-24 | Stop reason: HOSPADM

## 2024-06-20 RX ORDER — PANTOPRAZOLE SODIUM 20 MG/1
20 TABLET, DELAYED RELEASE ORAL
Status: DISCONTINUED | OUTPATIENT
Start: 2024-06-20 | End: 2024-06-24 | Stop reason: HOSPADM

## 2024-06-20 ASSESSMENT — COGNITIVE AND FUNCTIONAL STATUS - GENERAL
MOVING TO AND FROM BED TO CHAIR: A LOT
MOBILITY SCORE: 13
MOVING FROM LYING ON BACK TO SITTING ON SIDE OF FLAT BED WITH BEDRAILS: A LITTLE
TURNING FROM BACK TO SIDE WHILE IN FLAT BAD: A LITTLE
WALKING IN HOSPITAL ROOM: A LOT
CLIMB 3 TO 5 STEPS WITH RAILING: TOTAL
STANDING UP FROM CHAIR USING ARMS: A LOT

## 2024-06-20 ASSESSMENT — LIFESTYLE VARIABLES
AUDITORY DISTURBANCES: NOT PRESENT
HEADACHE, FULLNESS IN HEAD: NOT PRESENT
PAROXYSMAL SWEATS: NO SWEAT VISIBLE
AGITATION: NORMAL ACTIVITY
TOTAL SCORE: 0
ANXIETY: NO ANXIETY, AT EASE
ORIENTATION AND CLOUDING OF SENSORIUM: ORIENTED AND CAN DO SERIAL ADDITIONS
NAUSEA AND VOMITING: NO NAUSEA AND NO VOMITING
TREMOR: NO TREMOR
VISUAL DISTURBANCES: NOT PRESENT

## 2024-06-20 ASSESSMENT — PAIN SCALES - GENERAL
PAINLEVEL_OUTOF10: 10 - WORST POSSIBLE PAIN
PAINLEVEL_OUTOF10: 3
PAINLEVEL_OUTOF10: 2

## 2024-06-20 ASSESSMENT — ACTIVITIES OF DAILY LIVING (ADL): ADL_ASSISTANCE: INDEPENDENT

## 2024-06-20 NOTE — PROGRESS NOTES
Physical Therapy                 Therapy Communication Note    Patient Name: Yue Sanz  MRN: [id number]  Today's Date: 6/20/2024     Discipline: Physical Therapy    Missed Visit Reason: Patient placed on medical hold (Pt with low BP's systolic in 70's. RN's present and aware, notifying team. Will hold PT and reattempt when appropriate.)    Missed Time: Attempt

## 2024-06-20 NOTE — PROGRESS NOTES
TCC admission assessment completed. Explained role of TCC - verbalized understanding.     Demographics/Insurance: Confirmed in chart.   Living Environment: Lives alone.   Primary Support Person: NephewXiang, [phone]  PCP: Dr. Lluvia Alfred Pla - Last seen within the past month.   Recent Falls: Recurrent falls and recurrent admissions. Patient denies having recurring falls.   Assistive Devices/DME: Has a cane but doesn't use it.   Home Oxygen: Denies  Dialysis: Denies  Home Care Agency: Denies  Transportation at Discharge: Family will transport home  Pharmacy: [facility] in [place]  Concerns about discharge: None at this time.    Discussed SNF with patient based on PT recommendation. Discussed it would be temporary prior to returning to home. Patient stated she was at home. When I told her she was in the hospital she said she was only joking with me. Notified the medical team of discussion. Medical team to have discussions and assess for capacity.     Huma Avila RN, TCC

## 2024-06-20 NOTE — SIGNIFICANT EVENT
Rapid Response RN Note    Rapid response RN paged for RADAR score 6 due to the following VS: T 36.3 °Celsius; ; RR 11; BP 86/56; SPO2 95%.     Reviewed above VS with bedside RN.  Becky at bedside ordered 500 ml LR bolus and EKG.  No interventions by rapid response team indicated at this time.      Staff to page rapid response for any concerns or acute change in condition/VS.

## 2024-06-20 NOTE — PROGRESS NOTES
Physical Therapy    Physical Therapy Evaluation    Patient Name: Yue Sanz  MRN: [id number]  Today's Date: 6/20/2024   Time Calculation  Start Time: 1338  Stop Time: 1354  Time Calculation (min): 16 min    Assessment/Plan   PT Assessment  PT Assessment Results: Decreased strength, Decreased range of motion, Decreased endurance, Impaired balance, Decreased mobility, Decreased cognition, Impaired judgement, Decreased safety awareness  Rehab Prognosis: Good  Barriers to Discharge: none  Evaluation/Treatment Tolerance: Patient limited by fatigue, Patient limited by pain  End of Session Communication: Physician, PCT/NA/CTA, Bedside nurse  Assessment Comment: 63 y.o. F admitted s/p fall and LUE fx. Currently demonstrating impaired strength, balance, and function. Pt will benefit from continued PT in house and after discharge to maximize function.  End of Session Patient Position: Bed, 3 rail up, Alarm on  IP OR SWING BED PT PLAN  Inpatient or Swing Bed: Inpatient  PT Plan  Treatment/Interventions: Bed mobility, Transfer training, Gait training, Balance training, Strengthening, Endurance training, Therapeutic exercise, Therapeutic activity  PT Plan: Ongoing PT  PT Frequency: 6 times per week  PT Discharge Recommendations: Moderate intensity level of continued care  PT Recommended Transfer Status: Assist x1  PT - OK to Discharge: Yes    Subjective   General Visit Information:  General  Reason for Referral: fall  Past Medical History Relevant to Rehab: 63 y.o. F admitted after a fall with LUE fx now s/p repair. Pt with PMH multiple recent admissions for falls and syncope. PMH alcohol abuse, seizures, CVA.  Missed Visit: Yes  Missed Visit Reason: Patient placed on medical hold (Pt with low BP's systolic in 70's. RN's present and aware, notifying team. Will hold PT and reattempt when appropriate.)  Family/Caregiver Present: Yes (Pt's sister and brother present for PT visit who assisted in providing accurate subjective  "history.)  Prior to Session Communication: Bedside nurse  Patient Position Received: Bed, 3 rail up, Alarm on  Preferred Learning Style: verbal, auditory  General Comment: Pt resting in bed upon entry. Willing to participate with PT though noting generalized fatigue. Pt received unit of blood and bolus of fluids this AM due to low BP. Pt without complaints of lightheadedness at this time.  Home Living:  Home Living  Type of Home: House  Lives With: Alone  Home Adaptive Equipment: Cane  Home Layout: One level  Home Access: No concerns  Prior Level of Function:  Prior Function Per Pt/Caregiver Report  Level of [place]: Independent with ADLs and functional transfers, Independent with homemaking with ambulation  ADL Assistance: Independent  Ambulatory Assistance: Independent  Prior Function Comments: Pt with multiple falls in past month leading to admissions to ED. Pt had previously been recommended continued therapy in the past though has declined and had left the hospital AMA. Pt now with readmission with UE fracture.  Precautions:  Precautions  UE Weight Bearing Status: Weight Bearing as Tolerated (LUE)  Medical Precautions: Fall precautions (Pt with multiple falls in past month leading to current admission and LUE fx.)    Objective   Pain:  Pain Assessment  Pain Assessment:  (Pt unable to objectively measure. Pt would only state, \"It's not bad, I'm fine.\" Pt reportedly medicated this morning.)  Cognition:  Cognition  Overall Cognitive Status: Impaired (Pt tends to fabricate history. Pt reported to therapist that she had already been up and ambulating on her own though has not been out of bed since procedure.)  Orientation Level: Disoriented to situation, Disoriented to time  Safety/Judgement: Exceptions to WFL  Insight: Moderate    General Assessments:    Activity Tolerance  Endurance: Tolerates 10 - 20 min exercise with multiple rests    Sensation  Light Touch: No apparent deficits    Strength  Strength " Comments: BLE grossly >4/5 based on function.  Strength  Strength Comments: BLE grossly >4/5 based on function.    Perception  Inattention/Neglect: Appears intact    Coordination  Movements are Fluid and Coordinated: Yes    Postural Control  Postural Control: Impaired  Posture Comment: Forward flexed posture w static sit.    Static Sitting Balance  Static Sitting-Balance Support: Right upper extremity supported  Static Sitting-Level of Assistance: Minimum assistance  Static Sitting-Comment/Number of Minutes: EOB x 10 minutes prior to return to supine due to persistent fatigue.    Static Standing Balance  Static Standing-Level of Assistance:  (Pt not able to tolerate attempts to stand due to faituge)  Functional Assessments:    Bed Mobility  Bed Mobility: Yes  Bed Mobility 1  Bed Mobility 1: Supine to sitting, Sitting to supine  Level of Assistance 1: Minimum assistance    Transfers  Transfer: No (Pt uanble to tolerate attempts to stand due to fatigue/discomfort though anticipate would require min/mod assist to perform.)    Ambulation/Gait Training  Ambulation/Gait Training Performed: No    Outcome Measures:  [facility] Basic Mobility  Turning from your back to your side while in a flat bed without using bedrails: A little  Moving from lying on your back to sitting on the side of a flat bed without using bedrails: A little  Moving to and from bed to chair (including a wheelchair): A lot  Standing up from a chair using your arms (e.g. wheelchair or bedside chair): A lot  To walk in hospital room: A lot  Climbing 3-5 steps with railing: Total  Basic Mobility - Total Score: 13    Encounter Problems       Encounter Problems (Active)       Mobility       STG - Patient will ambulate >25ft, least restrictive device, CGA       Start:  06/20/24    Expected End:  07/04/24               PT Transfers       STG - Patient to transfer to and from sit to supine CGA       Start:  06/20/24    Expected End:  07/04/24            STG -  Patient will transfer sit to and from stand CGA       Start:  06/20/24    Expected End:  07/04/24               Pain - Adult              Education Documentation  Precautions, taught by Scott Alonso, PT at 6/20/2024  3:26 PM.  Learner: Patient  Readiness: Acceptance  Method: Explanation  Response: Verbalizes Understanding    Mobility Training, taught by Scott Alonso, PT at 6/20/2024  3:26 PM.  Learner: Patient  Readiness: Acceptance  Method: Explanation  Response: Verbalizes Understanding    Education Comments  No comments found.

## 2024-06-20 NOTE — PROGRESS NOTES
Pharmacy Admission Order Reconciliation Review    Yue Sanz is a 63 y.o. female admitted for Other closed displaced fracture of proximal end of left humerus, initial encounter. Pharmacy reviewed the patient's unreconciled admission medications.    Prior to admission medications that were reviewed and acted on by the pharmacist include:  Aspirin  Chlorhexidine  Flexeril  Folic acid  MVI  Oxycodone/apap  Thiamine    These medications have been reconciled.     Any other unreconcilied medications have been addressed and will be ordered or held by the patient's medical team. Medications addressed by the pharmacist may be added or changed by the patient's medical team at any time.    Samaria Bermudez, PharmD  Transitions of Care Pharmacist  [facility]  Please reach out via Secure Chat for questions

## 2024-06-20 NOTE — CARE PLAN
The patient's goals for the shift include      The clinical goals for the shift include pt will remain safe throughout end of shift    Problem: Pain - Adult  Goal: Verbalizes/displays adequate comfort level or baseline comfort level  Outcome: Progressing     Problem: Safety - Adult  Goal: Free from fall injury  Outcome: Progressing     Problem: Discharge Planning  Goal: Discharge to home or other facility with appropriate resources  Outcome: Progressing     Problem: Chronic Conditions and Co-morbidities  Goal: Patient's chronic conditions and co-morbidity symptoms are monitored and maintained or improved  Outcome: Progressing

## 2024-06-20 NOTE — PROGRESS NOTES
Orthopaedic Surgery Progress Note    Subjective: Had some heartburn overnight; chest pain workup obtained and per [facility] resident EKG and mild troponin elevations are nonconcerning. Pain well controlled considering recent surgery. Denies chest pain, shortness of breath, or fevers.    Objective:  BP 99/64   Pulse (!) 116   Temp 36.3 °C (97.3 °F)   Resp 11   SpO2 98%     Gen: arousable, NAD, appropriately conversational  Cardiac: RRR to peripheral palpation  Resp: nonlabored on RA  GI: soft, nondistended    MSK:  LUE:   - Post-operative dressing in place without strikethrough bleeding.   -Motor intact in axillary/AIN/PIN/ulnar distributions  -SILT axillary/radial/median/ulnar distributions  -Hand wwp, 2+ radial pulse, cap refill brisk  -Compartments soft and compressible, no pain with passive stretch of digits      Assessment/Plan: 63 y.o. female s/p L humeral IMN on 6/19/24 with Dr. Patricia. Recovering appropriately.      Plan:  - Weight bearing: WBAT LUE  - DVT ppx: SCDs, ASA while in house  - CIWA given history of Dts  - Per [facility] resident, mild troponemia and sinus tachycardia are not concerning and do not need further workup  - Diet: Regular  - Pain: Tylenol, oxycodone 5/10, dilaudid breakthrough  - Antibiotics: perioperative ancef 2g q8hr x3 doses  - FEN: HLIV with good PO intake  - Bowel Regimen: Miralax, senna, dulcolax  - PT/OT  - Pulm: Encourage IS  - Continue home medications  - No haynes    Dispo: home today after PT    Juan Finch MD  Orthopaedic Surgery PGY-1  Available by Epic Chat    While admitted, this patient will be followed by the Ortho Trauma Team, available via Epic Chat weekdays 6a-6p. Please page [phone] on nights and weekends.    Ortho Trauma  First Call: Juan Finch, PGY-1  Second Call: Darryl Summers PGY-2  Third Call: Ernestine Womack, PGY-3

## 2024-06-20 NOTE — SIGNIFICANT EVENT
Rapid Response RN Note    Rapid response RN at bedside for RADAR score 6 due to the following VS: T 35.9 °Celsius; ; RR 16; BP 82/50; SPO2 96%.       Vitals:    06/20/24 1000 06/20/24 1015 06/20/24 1030 06/20/24 1100   BP: (!) 82/47 83/52 91/60 90/59   Pulse: (!) 114 (!) 114 (!) 112 (!) 116   Resp: 16 16 16 16   Temp: 36.4 °C (97.5 °F) 36.4 °C (97.5 °F)  36.4 °C (97.5 °F)   TempSrc: Temporal Temporal  Temporal   SpO2: 97% 98% 96% 96%        Reviewed above VS with bedside RN.  Upon arrival to floor, pt symptomatic.  Complaining of being light headed and dizzy.  After multiple attempts to contact ortho team.  Radar was escalated to rapid response by this writer.  Still no answer from ortho.  A rapid response level 2 was paged.  Kirill VELEZ ([facility]) was on floor and was made aware of events.  500 LR bolus and 1 unit of PRBC ordered.  Dr Thomas ([facility]) came to bedside to assess.  Stat cbc obtained.  # 20 gauge iv placed in right forearm.  Pt tolerated placement well.  Bladder scan performed with greater than 1300 ml in bladder noted.  Steve placed with urine sample obtained.  PRBC was started and systolic bp improved to the 90's.  Dr Finch (ortho) was able to come to bedside and was updated regarding events.  Pt okay to remain on floor at this time.  Will continue to reassess post interventions.  Staff to page rapid response for any concerns or acute change in condition/VS.

## 2024-06-20 NOTE — PROGRESS NOTES
Occupational Therapy    Therapy Communication Note    Patient Name: Yue Sanz  MRN: [id number]  Today's Date: 6/20/2024     Missed Visit: Yes  Missed Visit Reason: Patient placed on medical hold      06/20/24 at 8:30 AM   Vianca DESOUZA/L, OTD  Rehab Office: [phone]

## 2024-06-21 ENCOUNTER — APPOINTMENT (OUTPATIENT)
Dept: CARDIOLOGY | Facility: HOSPITAL | Age: 64
DRG: 493 | End: 2024-06-21
Payer: COMMERCIAL

## 2024-06-21 LAB
ANION GAP SERPL CALC-SCNC: 14 MMOL/L (ref 10–20)
ATRIAL RATE: 81 BPM
BASOPHILS # BLD AUTO: 0.03 X10*3/UL (ref 0–0.1)
BASOPHILS NFR BLD AUTO: 0.4 %
BUN SERPL-MCNC: 27 MG/DL (ref 6–23)
CALCIUM SERPL-MCNC: 7.7 MG/DL (ref 8.6–10.6)
CHLORIDE SERPL-SCNC: 109 MMOL/L (ref 98–107)
CO2 SERPL-SCNC: 22 MMOL/L (ref 21–32)
CREAT SERPL-MCNC: 0.95 MG/DL (ref 0.5–1.05)
EGFRCR SERPLBLD CKD-EPI 2021: 67 ML/MIN/1.73M*2
EOSINOPHIL # BLD AUTO: 0.17 X10*3/UL (ref 0–0.7)
EOSINOPHIL NFR BLD AUTO: 2.1 %
ERYTHROCYTE [DISTWIDTH] IN BLOOD BY AUTOMATED COUNT: 15 % (ref 11.5–14.5)
GLUCOSE BLD MANUAL STRIP-MCNC: 78 MG/DL (ref 74–99)
GLUCOSE SERPL-MCNC: 76 MG/DL (ref 74–99)
HAPTOGLOB SERPL-MCNC: 112 MG/DL (ref 37–246)
HCT VFR BLD AUTO: 22.3 % (ref 36–46)
HGB BLD-MCNC: 7.3 G/DL (ref 12–16)
IMM GRANULOCYTES # BLD AUTO: 0.04 X10*3/UL (ref 0–0.7)
IMM GRANULOCYTES NFR BLD AUTO: 0.5 % (ref 0–0.9)
LYMPHOCYTES # BLD AUTO: 1.72 X10*3/UL (ref 1.2–4.8)
LYMPHOCYTES NFR BLD AUTO: 21.6 %
MCH RBC QN AUTO: 31.7 PG (ref 26–34)
MCHC RBC AUTO-ENTMCNC: 32.7 G/DL (ref 32–36)
MCV RBC AUTO: 97 FL (ref 80–100)
MONOCYTES # BLD AUTO: 0.49 X10*3/UL (ref 0.1–1)
MONOCYTES NFR BLD AUTO: 6.2 %
NEUTROPHILS # BLD AUTO: 5.5 X10*3/UL (ref 1.2–7.7)
NEUTROPHILS NFR BLD AUTO: 69.2 %
NRBC BLD-RTO: 0 /100 WBCS (ref 0–0)
P AXIS: 42 DEGREES
P OFFSET: 183 MS
P ONSET: 132 MS
PLATELET # BLD AUTO: 210 X10*3/UL (ref 150–450)
POTASSIUM SERPL-SCNC: 4.3 MMOL/L (ref 3.5–5.3)
PR INTERVAL: 180 MS
Q ONSET: 222 MS
QRS COUNT: 13 BEATS
QRS DURATION: 100 MS
QT INTERVAL: 418 MS
QTC CALCULATION(BAZETT): 485 MS
QTC FREDERICIA: 461 MS
R AXIS: 2 DEGREES
RBC # BLD AUTO: 2.3 X10*6/UL (ref 4–5.2)
SODIUM SERPL-SCNC: 141 MMOL/L (ref 136–145)
T AXIS: 9 DEGREES
T OFFSET: 431 MS
VENTRICULAR RATE: 81 BPM
WBC # BLD AUTO: 8 X10*3/UL (ref 4.4–11.3)

## 2024-06-21 PROCEDURE — 36415 COLL VENOUS BLD VENIPUNCTURE: CPT

## 2024-06-21 PROCEDURE — 80048 BASIC METABOLIC PNL TOTAL CA: CPT

## 2024-06-21 PROCEDURE — 82947 ASSAY GLUCOSE BLOOD QUANT: CPT

## 2024-06-21 PROCEDURE — 2500000001 HC RX 250 WO HCPCS SELF ADMINISTERED DRUGS (ALT 637 FOR MEDICARE OP)

## 2024-06-21 PROCEDURE — 1100000001 HC PRIVATE ROOM DAILY

## 2024-06-21 PROCEDURE — 93005 ELECTROCARDIOGRAM TRACING: CPT

## 2024-06-21 PROCEDURE — 99221 1ST HOSP IP/OBS SF/LOW 40: CPT

## 2024-06-21 PROCEDURE — 2500000002 HC RX 250 W HCPCS SELF ADMINISTERED DRUGS (ALT 637 FOR MEDICARE OP, ALT 636 FOR OP/ED)

## 2024-06-21 PROCEDURE — 2500000004 HC RX 250 GENERAL PHARMACY W/ HCPCS (ALT 636 FOR OP/ED)

## 2024-06-21 PROCEDURE — 93010 ELECTROCARDIOGRAM REPORT: CPT | Performed by: INTERNAL MEDICINE

## 2024-06-21 PROCEDURE — 85025 COMPLETE CBC W/AUTO DIFF WBC: CPT

## 2024-06-21 ASSESSMENT — PAIN - FUNCTIONAL ASSESSMENT
PAIN_FUNCTIONAL_ASSESSMENT: 0-10

## 2024-06-21 ASSESSMENT — PAIN SCALES - GENERAL
PAINLEVEL_OUTOF10: 0 - NO PAIN
PAINLEVEL_OUTOF10: 3

## 2024-06-21 NOTE — PROGRESS NOTES
Physical Therapy                 Therapy Communication Note    Patient Name: Yue Sanz  MRN: [id number]  Today's Date: 6/21/2024     Discipline: Physical Therapy    Missed Visit Reason:  (Pt with reported fall recently and now awaiting possible imaging. Will hold PT/mobility at this time and reattempt as appropriate.)    Missed Time: Attempt

## 2024-06-21 NOTE — PROGRESS NOTES
PT recommends moderate intensity PT. Per TCC note, patient declined SNF. SW spoke with patient this morning. Patient confirmed she prefers to discharge home and is agreeable to home care. SW updated TCC. No further SW needs at this time.    1146-SW left SNF list at bedside in case patient becomes agreeable to SNF. TCC notified.     TISH Lindquist

## 2024-06-21 NOTE — CARE PLAN
Per MBSIP 30 day post surgical requirements:  Phone call placed. Spoke with patient. Denies readmissions, reoperations, IVF or ED visits. The patient's goals for the shift include      The clinical goals for the shift include pt will remain HDS and free from falls throughout shift    Over the shift, the patient remained HDS and free from falls      Problem: Pain - Adult  Goal: Verbalizes/displays adequate comfort level or baseline comfort level  Outcome: Progressing     Problem: Safety - Adult  Goal: Free from fall injury  Outcome: Progressing     Problem: Discharge Planning  Goal: Discharge to home or other facility with appropriate resources  Outcome: Progressing     Problem: Chronic Conditions and Co-morbidities  Goal: Patient's chronic conditions and co-morbidity symptoms are monitored and maintained or improved  Outcome: Progressing

## 2024-06-21 NOTE — SIGNIFICANT EVENT
YESSI   06/21/24 1140   Onset Documentation   Rapid Response Initiated By Radar auto page   Pager Time 1140   Arrival Time 1150   Event End Time 1210   Primary Reason for Call Radar auto page     RADAR page auto generated from VS -see documented VS. Radar score 6. Upon arrival and spoke with the nurse- orthostatic VS were obtained and lowest BP was standing. Pt in bed- sitting up and recheck of /75 MAP 87. Pt with no complaints at the moment. Pt stated she really didn't see an MD before this hospitalization so unsure if she was ever told she was anemic. Pt received 1 uPRBC yesterday. Medicine team consulted and awaiting input. Ortho team aware of latest VS and awaiting any interventions. Pt comfortable and understanding to ask for assisted help.  Pt ok to remain on the floor for continued monitoring- and will call with any concerns.

## 2024-06-21 NOTE — PROGRESS NOTES
Occupational Therapy    Therapy Communication Note    Patient Name: Yue Sanz  MRN: [id number]  Today's Date: 6/21/2024     Missed Visit: Yes  Missed Visit Reason:  (pt was fallen on the ground, nursing staff present. Will hold OT eval.)      06/21/24 at 12:40 PM   Vianca Mtz OTR/L, OTD  Rehab Office: [phone]

## 2024-06-21 NOTE — CONSULTS
Internal Medicine Consult Note    Yeu Sanz is a 63 y.o. year old female patient with PMHx of  Stroke, alcohol substance misuse, alcohol related seizure, anemia, depression, and HTN who is admitted for L humerus fracture who is now s/p intramedullary nail repair on  w/ Dr. Patricia. Internal medicine was consulted for Syncope post-op and a fall today.      Subjective   Per our discussion with the patient she states that she was getting up to go to the bathroom and decided not to wait for the nurses. After standing she fell to the ground, she denies LoC, head strike and states she sustained no injuries She denies any prodromal symptoms including dizziness, lightheadedness, nausea, blurry vision, tinnitus, or tunnel vision. She states this has happened 1-2 times before and believes they were also related to standing up.     Per her reports the patient also states that she has not been eating and drinking enough and feels that may be contributing her symptoms. The patient denies any chest pain, palpitations or SOB. Of note patient has had intermittent tachycardia over the previous few days and required transfusion due to Hgb < 7.0 yesterday. Hgb today was 7.3.    EC24  Normal sinus rhythm  Incomplete right bundle branch block  Cannot rule out Anterior infarct , age undetermined  Abnormal ECG  When compared with ECG of 2024 09:59,  Previous ECG has undetermined rhythm, needs review    Past Medical History   has a past medical history of Acute CVA (cerebrovascular accident) (Multi) (2023), Alcohol related seizure (Multi) (2023), Cerebral hemorrhage (Multi) (2023), Cerebral infarction due to embolism of right middle cerebral artery (Multi) (2023), Diarrhea, unspecified (08/10/2022), Encounter for follow-up examination after completed treatment for conditions other than malignant neoplasm (2020), Encounter for other screening for malignant neoplasm of breast (2022),  Encounter for screening for malignant neoplasm of colon (05/16/2022), Other abnormality of red blood cells (08/10/2022), Personal history of other endocrine, nutritional and metabolic disease, and Personal history of transient ischemic attack (TIA), and cerebral infarction without residual deficits.    Past Surgical History   has a past surgical history that includes MR angio head wo IV contrast (5/19/2021); MR angio neck wo IV contrast (5/19/2021); and MR angio head wo IV contrast (7/12/2018).    Allergies  Allergies   Allergen Reactions    Amoxicillin Other       Social History   reports that she has never smoked. She has never been exposed to tobacco smoke. She has never used smokeless tobacco. She reports that she does not currently use alcohol. She reports that she does not currently use drugs.          Objective     Physical Exam:  Constitutional: No acute distress, normal appearing  Head/Neck: Normocephalic, Atraumatic, Trachea midline  ENT: Mucous membranes moist, no lesions, no nasal discharge  Cardio: Heart RRR, clear S1 & S2, quiet murmur appreciated, capillary refill < 2 sec  Respiratory: Lungs cta b/l, good respiratory effort, normal aeration, no crackles or wheezes appreciated  Abdominal: Soft, nontender, nondistended  Extremities: No peripheral edema, radial and DP pulses 1+ b/l  Skin: warm and dry, no redness, bruising or bleeding  Neuro: Aox3, CN grossly intact, moving all 4 extremities  Behavioral: appropriate mood and behavior    Vitals:    06/21/24 1200   BP: 113/75   Pulse: 83   Resp:    Temp:    SpO2: 97%         Intake/Output Summary (Last 24 hours) at 6/21/2024 1239  Last data filed at 6/21/2024 0615  Gross per 24 hour   Intake --   Output 300 ml   Net -300 ml       Results for orders placed or performed during the hospital encounter of 06/18/24 (from the past 24 hour(s))   Haptoglobin   Result Value Ref Range    Haptoglobin 112 37 - 246 mg/dL   Lactate dehydrogenase   Result Value Ref Range      84 - 246 U/L   Bilirubin, total   Result Value Ref Range    Bilirubin, Total 0.7 0.0 - 1.2 mg/dL   CBC and Auto Differential   Result Value Ref Range    WBC 8.0 4.4 - 11.3 x10*3/uL    nRBC 0.0 0.0 - 0.0 /100 WBCs    RBC 2.30 (L) 4.00 - 5.20 x10*6/uL    Hemoglobin 7.3 (L) 12.0 - 16.0 g/dL    Hematocrit 22.3 (L) 36.0 - 46.0 %    MCV 97 80 - 100 fL    MCH 31.7 26.0 - 34.0 pg    MCHC 32.7 32.0 - 36.0 g/dL    RDW 15.0 (H) 11.5 - 14.5 %    Platelets 210 150 - 450 x10*3/uL    Neutrophils % 69.2 40.0 - 80.0 %    Immature Granulocytes %, Automated 0.5 0.0 - 0.9 %    Lymphocytes % 21.6 13.0 - 44.0 %    Monocytes % 6.2 2.0 - 10.0 %    Eosinophils % 2.1 0.0 - 6.0 %    Basophils % 0.4 0.0 - 2.0 %    Neutrophils Absolute 5.50 1.20 - 7.70 x10*3/uL    Immature Granulocytes Absolute, Automated 0.04 0.00 - 0.70 x10*3/uL    Lymphocytes Absolute 1.72 1.20 - 4.80 x10*3/uL    Monocytes Absolute 0.49 0.10 - 1.00 x10*3/uL    Eosinophils Absolute 0.17 0.00 - 0.70 x10*3/uL    Basophils Absolute 0.03 0.00 - 0.10 x10*3/uL   Basic metabolic panel   Result Value Ref Range    Glucose 76 74 - 99 mg/dL    Sodium 141 136 - 145 mmol/L    Potassium 4.3 3.5 - 5.3 mmol/L    Chloride 109 (H) 98 - 107 mmol/L    Bicarbonate 22 21 - 32 mmol/L    Anion Gap 14 10 - 20 mmol/L    Urea Nitrogen 27 (H) 6 - 23 mg/dL    Creatinine 0.95 0.50 - 1.05 mg/dL    eGFR 67 >60 mL/min/1.73m*2    Calcium 7.7 (L) 8.6 - 10.6 mg/dL   Electrocardiogram, 12-lead PRN ACS symptoms   Result Value Ref Range    Ventricular Rate 81 BPM    Atrial Rate 81 BPM    GA Interval 180 ms    QRS Duration 100 ms    QT Interval 418 ms    QTC Calculation(Bazett) 485 ms    P Axis 42 degrees    R Axis 2 degrees    T Axis 9 degrees    QRS Count 13 beats    Q Onset 222 ms    P Onset 132 ms    P Offset 183 ms    T Offset 431 ms    QTC Fredericia 461 ms       Electrocardiogram, 12-lead PRN ACS symptoms    Result Date: 6/21/2024  Normal sinus rhythm Incomplete right bundle branch block Cannot  rule out Anterior infarct , age undetermined Abnormal ECG When compared with ECG of 2024 09:59, Previous ECG has undetermined rhythm, needs review    ECG 12 Lead    Result Date: 2024  Sinus tachycardia Cannot rule out Anterior infarct (cited on or before 2024) Abnormal ECG When compared with ECG of 2024 07:49, Fusion complexes are no longer Present Premature ventricular complexes are no longer Present Nonspecific T wave abnormality no longer evident in Lateral leads    ECG 12 Lead    Result Date: 2024  Sinus tachycardia Nonspecific ST and T wave abnormality Prolonged QT Abnormal ECG When compared with ECG of 2024 01:33, No significant change was found    FL fluoro images no charge    Result Date: 2024  These images are not reportable by radiology and will not be interpreted by  Radiologists.          Assessment     Yue Sanz is a 63 y.o. year old female patient with PMHx of  Stroke, alcohol substance misuse, alcohol related seizure, anemia, depression, and HTN who is admitted for L humerus fracture who is now s/p intramedullary nail repair on  w/ Dr. Patricia. Internal medicine was consulted for Syncope post-op and a fall today.     #Syncope:  #Falls  ::Patient reports 1-2 episodes previously, in relation to standing up  ::Patient reported falling after standing to go to bathroom  ::ECG: NSR w/ incomplete BBB  ::Hgb 7.3, required 1un pRBC yesterday for Hgb 6.1  ::Orthostatic Vitals:   - Supine: 115/75, HR 83   - Sittn/61, HR 96   - Standin/53,   -Recommend 1 L fluid bolus with LR for orthostatic hypotension  -Encourage PO intake  -Recommend POCT glucose to ensure no hypoglycemia    The internal medicine team will continue to follow.          This patient was seen and discussed with the attending physician.    Bereket Flower MD, PGY-1

## 2024-06-21 NOTE — CARE PLAN
The patient's goals for the shift include      The clinical goals for the shift include pt vitals will remain within normal limits throughout shift today      Problem: Pain - Adult  Goal: Verbalizes/displays adequate comfort level or baseline comfort level  Outcome: Progressing     Problem: Safety - Adult  Goal: Free from fall injury  Outcome: Progressing     Problem: Discharge Planning  Goal: Discharge to home or other facility with appropriate resources  Outcome: Progressing     Problem: Chronic Conditions and Co-morbidities  Goal: Patient's chronic conditions and co-morbidity symptoms are monitored and maintained or improved  Outcome: Progressing

## 2024-06-21 NOTE — PROGRESS NOTES
Orthopaedic Surgery Progress Note    Subjective: Per day nurse and night yesterday yesterday, had waxing and waning orientation and sometimes thought she was at home. This AM, she is A&Ox4 and requesting to be discharged. Pain well controlled considering recent surgery. Denies chest pain, shortness of breath, or fevers.    Objective:  /76 (BP Location: Right arm, Patient Position: Lying)   Pulse 72   Temp 36.5 °C (97.7 °F) (Temporal)   Resp 18   SpO2 99%     Gen: arousable, NAD, appropriately conversational  Cardiac: RRR to peripheral palpation  Resp: nonlabored on RA  GI: soft, nondistended    MSK:  LUE:   - Post-operative dressing in place without strikethrough bleeding.   -Motor intact in axillary/AIN/PIN/ulnar distributions  -SILT axillary/radial/median/ulnar distributions  -Hand wwp, 2+ radial pulse, cap refill brisk  -Compartments soft and compressible, no pain with passive stretch of digits      Assessment/Plan: 63 y.o. female s/p L humeral IMN on 6/19/24 with Dr. Patricia. Recovering appropriately. Hypotensive and tachycardic yesterday morning; found to be anemic with Hgb 7.5 and also retaining 1.3L urine. Steve placed, received 2.5L crystalloid and 1u pRBCs. Repeat CBC with Hgb 6.1, likely some component of hemodilution so pending CBC this AM. PT rec SNF given history of multiple falls which patient refused (and is denying the falls even though family insists they have been happening), but there was a question of capacity yesterday given her waxing and waning mental status.    Plan:  - After discussion with patient this morning, she does have capacity, however she is not medically safe to discharge  - repeat CBC pending this AM  - Steve to be removed  - Weight bearing: WBAT LUE  - DVT ppx: SCDs, ASA while in house  - CIWA given history of Dts  - Diet: Regular  - Pain: Tylenol, oxycodone 5/10, dilaudid breakthrough  - Antibiotics: perioperative ancef 2g q8hr x3 doses  - FEN: HLIV with good PO  intake  - Bowel Regimen: Miralax, senna, dulcolax  - PT/OT  - Pulm: Encourage IS  - Continue home medications    Dispo: [facility]    Juan Finch MD  Orthopaedic Surgery PGY-1  Available by Epic Chat    While admitted, this patient will be followed by the Ortho Trauma Team, available via Epic Chat weekdays 6a-6p. Please page [phone] on nights and weekends.    Ortho Trauma  First Call: Juan Finch, PGY-1  Second Call: Darryl Summers, PGY-2  Third Call: Ernestine Womack, PGY-3

## 2024-06-22 LAB
ALBUMIN SERPL BCP-MCNC: 2.4 G/DL (ref 3.4–5)
ANION GAP SERPL CALC-SCNC: 16 MMOL/L (ref 10–20)
BASOPHILS # BLD AUTO: 0.02 X10*3/UL (ref 0–0.1)
BASOPHILS # BLD AUTO: 0.02 X10*3/UL (ref 0–0.1)
BASOPHILS NFR BLD AUTO: 0.4 %
BASOPHILS NFR BLD AUTO: 0.5 %
BLOOD EXPIRATION DATE: NORMAL
BUN SERPL-MCNC: 18 MG/DL (ref 6–23)
CALCIUM SERPL-MCNC: 7 MG/DL (ref 8.6–10.6)
CHLORIDE SERPL-SCNC: 111 MMOL/L (ref 98–107)
CO2 SERPL-SCNC: 18 MMOL/L (ref 21–32)
CREAT SERPL-MCNC: 0.94 MG/DL (ref 0.5–1.05)
DISPENSE STATUS: NORMAL
EGFRCR SERPLBLD CKD-EPI 2021: 68 ML/MIN/1.73M*2
EOSINOPHIL # BLD AUTO: 0.14 X10*3/UL (ref 0–0.7)
EOSINOPHIL # BLD AUTO: 0.15 X10*3/UL (ref 0–0.7)
EOSINOPHIL NFR BLD AUTO: 3 %
EOSINOPHIL NFR BLD AUTO: 3.3 %
ERYTHROCYTE [DISTWIDTH] IN BLOOD BY AUTOMATED COUNT: 15 % (ref 11.5–14.5)
ERYTHROCYTE [DISTWIDTH] IN BLOOD BY AUTOMATED COUNT: 15.4 % (ref 11.5–14.5)
GLUCOSE SERPL-MCNC: 88 MG/DL (ref 74–99)
HCT VFR BLD AUTO: 19.3 % (ref 36–46)
HCT VFR BLD AUTO: 20.2 % (ref 36–46)
HGB BLD-MCNC: 6.3 G/DL (ref 12–16)
HGB BLD-MCNC: 6.3 G/DL (ref 12–16)
HGB RETIC QN: 33 PG (ref 28–38)
IMM GRANULOCYTES # BLD AUTO: 0.02 X10*3/UL (ref 0–0.7)
IMM GRANULOCYTES # BLD AUTO: 0.03 X10*3/UL (ref 0–0.7)
IMM GRANULOCYTES NFR BLD AUTO: 0.4 % (ref 0–0.9)
IMM GRANULOCYTES NFR BLD AUTO: 0.7 % (ref 0–0.9)
IMMATURE RETIC FRACTION: 27.8 %
LYMPHOCYTES # BLD AUTO: 1.18 X10*3/UL (ref 1.2–4.8)
LYMPHOCYTES # BLD AUTO: 1.39 X10*3/UL (ref 1.2–4.8)
LYMPHOCYTES NFR BLD AUTO: 28 %
LYMPHOCYTES NFR BLD AUTO: 28.2 %
MAGNESIUM SERPL-MCNC: 1.08 MG/DL (ref 1.6–2.4)
MCH RBC QN AUTO: 31.5 PG (ref 26–34)
MCH RBC QN AUTO: 31.7 PG (ref 26–34)
MCHC RBC AUTO-ENTMCNC: 31.2 G/DL (ref 32–36)
MCHC RBC AUTO-ENTMCNC: 32.6 G/DL (ref 32–36)
MCV RBC AUTO: 102 FL (ref 80–100)
MCV RBC AUTO: 97 FL (ref 80–100)
MONOCYTES # BLD AUTO: 0.26 X10*3/UL (ref 0.1–1)
MONOCYTES # BLD AUTO: 0.3 X10*3/UL (ref 0.1–1)
MONOCYTES NFR BLD AUTO: 6 %
MONOCYTES NFR BLD AUTO: 6.2 %
NEUTROPHILS # BLD AUTO: 2.56 X10*3/UL (ref 1.2–7.7)
NEUTROPHILS # BLD AUTO: 3.09 X10*3/UL (ref 1.2–7.7)
NEUTROPHILS NFR BLD AUTO: 61.1 %
NEUTROPHILS NFR BLD AUTO: 62.2 %
NRBC BLD-RTO: 0 /100 WBCS (ref 0–0)
NRBC BLD-RTO: 0 /100 WBCS (ref 0–0)
PHOSPHATE SERPL-MCNC: 2.4 MG/DL (ref 2.5–4.9)
PLATELET # BLD AUTO: 188 X10*3/UL (ref 150–450)
PLATELET # BLD AUTO: 197 X10*3/UL (ref 150–450)
POTASSIUM SERPL-SCNC: 3.6 MMOL/L (ref 3.5–5.3)
PRODUCT BLOOD TYPE: 5100
PRODUCT CODE: NORMAL
RBC # BLD AUTO: 1.99 X10*6/UL (ref 4–5.2)
RBC # BLD AUTO: 2 X10*6/UL (ref 4–5.2)
RETICS #: 0.08 X10*6/UL (ref 0.02–0.08)
RETICS/RBC NFR AUTO: 4.1 % (ref 0.5–2)
SODIUM SERPL-SCNC: 141 MMOL/L (ref 136–145)
UNIT ABO: NORMAL
UNIT NUMBER: NORMAL
UNIT RH: NORMAL
UNIT VOLUME: 277
WBC # BLD AUTO: 4.2 X10*3/UL (ref 4.4–11.3)
WBC # BLD AUTO: 5 X10*3/UL (ref 4.4–11.3)
XM INTEP: NORMAL

## 2024-06-22 PROCEDURE — 36415 COLL VENOUS BLD VENIPUNCTURE: CPT | Performed by: STUDENT IN AN ORGANIZED HEALTH CARE EDUCATION/TRAINING PROGRAM

## 2024-06-22 PROCEDURE — 85025 COMPLETE CBC W/AUTO DIFF WBC: CPT | Mod: 91 | Performed by: STUDENT IN AN ORGANIZED HEALTH CARE EDUCATION/TRAINING PROGRAM

## 2024-06-22 PROCEDURE — 85045 AUTOMATED RETICULOCYTE COUNT: CPT | Performed by: STUDENT IN AN ORGANIZED HEALTH CARE EDUCATION/TRAINING PROGRAM

## 2024-06-22 PROCEDURE — 97530 THERAPEUTIC ACTIVITIES: CPT | Mod: GO

## 2024-06-22 PROCEDURE — 97165 OT EVAL LOW COMPLEX 30 MIN: CPT | Mod: GO

## 2024-06-22 PROCEDURE — 85027 COMPLETE CBC AUTOMATED: CPT

## 2024-06-22 PROCEDURE — 97116 GAIT TRAINING THERAPY: CPT | Mod: GP,CQ

## 2024-06-22 PROCEDURE — 36430 TRANSFUSION BLD/BLD COMPNT: CPT

## 2024-06-22 PROCEDURE — 2500000002 HC RX 250 W HCPCS SELF ADMINISTERED DRUGS (ALT 637 FOR MEDICARE OP, ALT 636 FOR OP/ED): Mod: MUE

## 2024-06-22 PROCEDURE — 97110 THERAPEUTIC EXERCISES: CPT | Mod: GP,CQ

## 2024-06-22 PROCEDURE — 83735 ASSAY OF MAGNESIUM: CPT | Performed by: STUDENT IN AN ORGANIZED HEALTH CARE EDUCATION/TRAINING PROGRAM

## 2024-06-22 PROCEDURE — 2500000004 HC RX 250 GENERAL PHARMACY W/ HCPCS (ALT 636 FOR OP/ED): Performed by: STUDENT IN AN ORGANIZED HEALTH CARE EDUCATION/TRAINING PROGRAM

## 2024-06-22 PROCEDURE — 2500000002 HC RX 250 W HCPCS SELF ADMINISTERED DRUGS (ALT 637 FOR MEDICARE OP, ALT 636 FOR OP/ED)

## 2024-06-22 PROCEDURE — 99232 SBSQ HOSP IP/OBS MODERATE 35: CPT | Performed by: STUDENT IN AN ORGANIZED HEALTH CARE EDUCATION/TRAINING PROGRAM

## 2024-06-22 PROCEDURE — 2500000001 HC RX 250 WO HCPCS SELF ADMINISTERED DRUGS (ALT 637 FOR MEDICARE OP)

## 2024-06-22 PROCEDURE — P9016 RBC LEUKOCYTES REDUCED: HCPCS

## 2024-06-22 PROCEDURE — 1100000001 HC PRIVATE ROOM DAILY

## 2024-06-22 PROCEDURE — 2500000004 HC RX 250 GENERAL PHARMACY W/ HCPCS (ALT 636 FOR OP/ED)

## 2024-06-22 PROCEDURE — 80069 RENAL FUNCTION PANEL: CPT | Performed by: STUDENT IN AN ORGANIZED HEALTH CARE EDUCATION/TRAINING PROGRAM

## 2024-06-22 ASSESSMENT — COGNITIVE AND FUNCTIONAL STATUS - GENERAL
DRESSING REGULAR LOWER BODY CLOTHING: A LOT
TOILETING: A LOT
TURNING FROM BACK TO SIDE WHILE IN FLAT BAD: A LITTLE
DAILY ACTIVITIY SCORE: 15
TURNING FROM BACK TO SIDE WHILE IN FLAT BAD: A LITTLE
MOVING FROM LYING ON BACK TO SITTING ON SIDE OF FLAT BED WITH BEDRAILS: A LITTLE
CLIMB 3 TO 5 STEPS WITH RAILING: A LOT
HELP NEEDED FOR BATHING: A LOT
MOVING TO AND FROM BED TO CHAIR: A LITTLE
CLIMB 3 TO 5 STEPS WITH RAILING: A LOT
WALKING IN HOSPITAL ROOM: A LITTLE
MOBILITY SCORE: 17
WALKING IN HOSPITAL ROOM: A LITTLE
PERSONAL GROOMING: A LITTLE
DRESSING REGULAR UPPER BODY CLOTHING: A LOT
HELP NEEDED FOR BATHING: A LOT
DRESSING REGULAR LOWER BODY CLOTHING: A LOT
PERSONAL GROOMING: A LITTLE
STANDING UP FROM CHAIR USING ARMS: A LITTLE
TOILETING: A LOT
DRESSING REGULAR UPPER BODY CLOTHING: A LOT
DAILY ACTIVITIY SCORE: 15
MOVING FROM LYING ON BACK TO SITTING ON SIDE OF FLAT BED WITH BEDRAILS: A LITTLE
MOBILITY SCORE: 17
STANDING UP FROM CHAIR USING ARMS: A LITTLE
MOVING TO AND FROM BED TO CHAIR: A LITTLE

## 2024-06-22 ASSESSMENT — LIFESTYLE VARIABLES
ORIENTATION AND CLOUDING OF SENSORIUM: CANNOT DO SERIAL ADDITIONS OR IS UNCERTAIN ABOUT DATE
VISUAL DISTURBANCES: NOT PRESENT
AUDITORY DISTURBANCES: NOT PRESENT
PAROXYSMAL SWEATS: NO SWEAT VISIBLE
NAUSEA AND VOMITING: NO NAUSEA AND NO VOMITING
TREMOR: NO TREMOR
PAROXYSMAL SWEATS: NO SWEAT VISIBLE
AUDITORY DISTURBANCES: NOT PRESENT
ANXIETY: MILDLY ANXIOUS
VISUAL DISTURBANCES: NOT PRESENT
HEADACHE, FULLNESS IN HEAD: NOT PRESENT
ANXIETY: 2
TOTAL SCORE: 1
AGITATION: NORMAL ACTIVITY
HEADACHE, FULLNESS IN HEAD: NOT PRESENT
TOTAL SCORE: 4
TREMOR: NO TREMOR
AGITATION: SOMEWHAT MORE THAN NORMAL ACTIVITY
ORIENTATION AND CLOUDING OF SENSORIUM: ORIENTED AND CAN DO SERIAL ADDITIONS
NAUSEA AND VOMITING: NO NAUSEA AND NO VOMITING

## 2024-06-22 ASSESSMENT — PAIN SCALES - GENERAL
PAINLEVEL_OUTOF10: 4
PAINLEVEL_OUTOF10: 0 - NO PAIN
PAINLEVEL_OUTOF10: 0 - NO PAIN
PAINLEVEL_OUTOF10: 4
PAINLEVEL_OUTOF10: 0 - NO PAIN
PAINLEVEL_OUTOF10: 7

## 2024-06-22 ASSESSMENT — PAIN - FUNCTIONAL ASSESSMENT
PAIN_FUNCTIONAL_ASSESSMENT: 0-10

## 2024-06-22 ASSESSMENT — ACTIVITIES OF DAILY LIVING (ADL)
BATHING_ASSISTANCE: MODERATE
ADL_ASSISTANCE: INDEPENDENT

## 2024-06-22 NOTE — DISCHARGE INSTRUCTIONS
Weight bearing status: as tolerated    VTE Prophylaxis (Blood Clot Prevention): aspirin      Home Medication: Resume all home medications    Resume normal diet     Leave operative dressing in place until 6/28/2024. Then remove and leave incision open to air. Let water run freely over incision when showering, do not scrub. Do not soak in pool or tub. Do not swim in pools or ponds until 3 months after surgery.    Call if any drainage after 7 days, increased redness/warmth/swelling at incision site, pain/tenderness of calf, swelling of calf that does not respond to elevation, SOB/chest pain.    Call for any questions or concerns.     MEDICATION SIDE EFFECTS.  OXYCODONE: constipation, nausea, vomiting, upset stomach, (sleepiness), dizziness, lightheadedness, itching, headache, blurred vision, dry mouth, sweating  ASPIRIN:  upset stomach, heartburn; drowsiness; or headache    Follow up with Dr. Patricia in 2 weeks. Call [phone] to schedule/confirm appointment.

## 2024-06-22 NOTE — PROGRESS NOTES
Internal Medicine Consult Progress Note     Subjective   Patient still complaining of dizziness. Continues to be orthostatic. Had a witnessed fall during the day.     Objective     Physical Exam:  Constitutional: No acute distress, normal appearing  Head/Neck: Normocephalic, Atraumatic, Trachea midline  ENT: Mucous membranes moist, no lesions, no nasal discharge  Cardio: Heart RRR, clear S1 & S2, quiet murmur appreciated, capillary refill < 2 sec  Respiratory: Lungs cta b/l, good respiratory effort, normal aeration, no crackles or wheezes appreciated  Abdominal: Soft, nontender, nondistended  Extremities: No peripheral edema, radial and DP pulses 1+ b/l  Skin: warm and dry, no redness, bruising or bleeding  Neuro: Aox3, CN grossly intact, moving all 4 extremities  Behavioral: appropriate mood and behavior    Vitals:    06/22/24 1703   BP: 122/79   Pulse: 80   Resp: 20   Temp: 36.3 °C (97.3 °F)   SpO2: 97%         Intake/Output Summary (Last 24 hours) at 6/22/2024 1848  Last data filed at 6/22/2024 1030  Gross per 24 hour   Intake 355 ml   Output 2700 ml   Net -2345 ml       Results for orders placed or performed during the hospital encounter of 06/18/24 (from the past 24 hour(s))   Magnesium   Result Value Ref Range    Magnesium 1.08 (L) 1.60 - 2.40 mg/dL   Renal Function Panel   Result Value Ref Range    Glucose 88 74 - 99 mg/dL    Sodium 141 136 - 145 mmol/L    Potassium 3.6 3.5 - 5.3 mmol/L    Chloride 111 (H) 98 - 107 mmol/L    Bicarbonate 18 (L) 21 - 32 mmol/L    Anion Gap 16 10 - 20 mmol/L    Urea Nitrogen 18 6 - 23 mg/dL    Creatinine 0.94 0.50 - 1.05 mg/dL    eGFR 68 >60 mL/min/1.73m*2    Calcium 7.0 (L) 8.6 - 10.6 mg/dL    Phosphorus 2.4 (L) 2.5 - 4.9 mg/dL    Albumin 2.4 (L) 3.4 - 5.0 g/dL   CBC and Auto Differential   Result Value Ref Range    WBC 5.0 4.4 - 11.3 x10*3/uL    nRBC 0.0 0.0 - 0.0 /100 WBCs    RBC 1.99 (L) 4.00 - 5.20 x10*6/uL    Hemoglobin 6.3 (LL) 12.0 - 16.0 g/dL    Hematocrit 20.2 (L) 36.0  - 46.0 %     (H) 80 - 100 fL    MCH 31.7 26.0 - 34.0 pg    MCHC 31.2 (L) 32.0 - 36.0 g/dL    RDW 15.4 (H) 11.5 - 14.5 %    Platelets 197 150 - 450 x10*3/uL    Neutrophils % 62.2 40.0 - 80.0 %    Immature Granulocytes %, Automated 0.4 0.0 - 0.9 %    Lymphocytes % 28.0 13.0 - 44.0 %    Monocytes % 6.0 2.0 - 10.0 %    Eosinophils % 3.0 0.0 - 6.0 %    Basophils % 0.4 0.0 - 2.0 %    Neutrophils Absolute 3.09 1.20 - 7.70 x10*3/uL    Immature Granulocytes Absolute, Automated 0.02 0.00 - 0.70 x10*3/uL    Lymphocytes Absolute 1.39 1.20 - 4.80 x10*3/uL    Monocytes Absolute 0.30 0.10 - 1.00 x10*3/uL    Eosinophils Absolute 0.15 0.00 - 0.70 x10*3/uL    Basophils Absolute 0.02 0.00 - 0.10 x10*3/uL   Reticulocytes   Result Value Ref Range    Retic % 4.1 (H) 0.5 - 2.0 %    Retic Absolute 0.079 0.018 - 0.083 x10*6/uL    Reticulocyte Hemoglobin 33 28 - 38 pg    Immature Retic fraction 27.8 (H) <=16.0 %   CBC and Auto Differential   Result Value Ref Range    WBC 4.2 (L) 4.4 - 11.3 x10*3/uL    nRBC 0.0 0.0 - 0.0 /100 WBCs    RBC 2.00 (L) 4.00 - 5.20 x10*6/uL    Hemoglobin 6.3 (LL) 12.0 - 16.0 g/dL    Hematocrit 19.3 (L) 36.0 - 46.0 %    MCV 97 80 - 100 fL    MCH 31.5 26.0 - 34.0 pg    MCHC 32.6 32.0 - 36.0 g/dL    RDW 15.0 (H) 11.5 - 14.5 %    Platelets 188 150 - 450 x10*3/uL    Neutrophils % 61.1 40.0 - 80.0 %    Immature Granulocytes %, Automated 0.7 0.0 - 0.9 %    Lymphocytes % 28.2 13.0 - 44.0 %    Monocytes % 6.2 2.0 - 10.0 %    Eosinophils % 3.3 0.0 - 6.0 %    Basophils % 0.5 0.0 - 2.0 %    Neutrophils Absolute 2.56 1.20 - 7.70 x10*3/uL    Immature Granulocytes Absolute, Automated 0.03 0.00 - 0.70 x10*3/uL    Lymphocytes Absolute 1.18 (L) 1.20 - 4.80 x10*3/uL    Monocytes Absolute 0.26 0.10 - 1.00 x10*3/uL    Eosinophils Absolute 0.14 0.00 - 0.70 x10*3/uL    Basophils Absolute 0.02 0.00 - 0.10 x10*3/uL   Prepare RBC: 1 Units   Result Value Ref Range    PRODUCT CODE O8544Z03     Unit Number [id number]     Unit ABO O      Unit RH NEG     XM INTEP COMP     Dispense Status IS     Blood Expiration Date June 24, 2024 23:59 EDT     PRODUCT BLOOD TYPE 9500     UNIT VOLUME 284        Electrocardiogram, 12-lead PRN ACS symptoms    Result Date: 6/21/2024  Normal sinus rhythm Incomplete right bundle branch block Cannot rule out Anterior infarct , age undetermined Abnormal ECG When compared with ECG of 21-JUN-2024 09:59, Previous ECG has undetermined rhythm, needs review          Assessment     Yue Sanz is a 63 y.o. year old female patient with PMHx of  Stroke, alcohol substance misuse, alcohol related seizure, anemia, depression, and HTN who is admitted for L humerus fracture who is now s/p intramedullary nail repair on 6/19 w/ Dr. Patricia. Internal medicine was consulted for Syncope post-op and a fall today.     #Syncope:  #Falls  ::Patient reports 1-2 episodes previously, in relation to standing up  ::Patient reported falling after standing to go to bathroom  ::ECG: NSR w/ incomplete BBB  ::Hgb 7.3, required 1un pRBC yesterday for Hgb 6.1  ::Patient remains orthostatic 6/22  ::MCV high end normal  S/p 1 L fluid bolus with LR for orthostatic hypotension  Recommend 2 units pRBC for symptomatic anemia  Hemolysis labs negative for hemolytic anemia  Encourage PO intake  Continue folate and thiamine    The internal medicine team will continue to follow.    This patient was seen and discussed with the attending physician.    Lisandro Thomas MD

## 2024-06-22 NOTE — PROGRESS NOTES
Occupational Therapy    Occupational Therapy    Evaluation and Treatment    Patient Name: Yue Sanz  MRN: [id number]  Today's Date: 6/22/2024  Room: [address] [place] [address]  Time Calculation  Start Time: 0924  Stop Time: 0955  Time Calculation (min): 31 min    Assessment  IP OT Assessment  Prognosis: Good  Evaluation/Treatment Tolerance: Patient tolerated treatment well  End of Session Communication: Bedside nurse  End of Session Patient Position: Bed, 3 rail up, Alarm on  Plan:  Inpatient Plan  Treatment Interventions: ADL retraining, Functional transfer training, Compensatory technique education, Equipment evaluation/education, Cognitive reorientation, UE strengthening/ROM  OT Frequency: 3 times per week  OT Discharge Recommendations: Moderate intensity level of continued care  OT Recommended Transfer Status: Minimal assist, Assist of 1  OT - OK to Discharge: Yes  OT Assessment  OT Assessment Results: Decreased ADL status, Decreased upper extremity range of motion, Decreased safe judgment during ADL, Decreased cognition, Decreased IADLs, Decreased functional mobility  Prognosis: Good  Evaluation/Treatment Tolerance: Patient tolerated treatment well    Subjective   Current Problem:    General:  Reason for Referral: s/p fall, L humeral IMN on 6/19/24  Past Medical History Relevant to Rehab: 63 y.o. F admitted after a fall with LUE fx now s/p repair. Pt with PMH multiple recent admissions for falls and syncope. PMH alcohol abuse, seizures, CVA.  Co-Treatment: PT  Co-Treatment Reason: to maximize pts ssafety and rehab potential  Prior to Session Communication: Bedside nurse  Patient Position Received: Bed, 3 rail up, Alarm on  General Comment: Pt in supine on arrival, willing to participate in therapy with encouragement   Precautions:  UE Weight Bearing Status: Weight Bearing as Tolerated  Medical Precautions: Fall precautions  Precautions Comment: pt ewearing sling, required adjustment prior mobility  Vital Signs:  Heart Rate:   (80-91)  Heart Rate Source: Monitor  BP:  (Supine: 90/59, sitting EOB: 94/59, standin/61. Pt asymptomatic.)  Pain:  Pain Assessment  Pain Assessment: 0-10  0-10 (Numeric) Pain Score:  (unrated)  Pain Type: Surgical pain  Pain Location:  (L shoulder with movement)  Lines/Tubes/Drains:         Objective   Cognition:  Overall Cognitive Status: Impaired  Orientation Level:  (oriented to self, hospital, month and year)  Insight: Moderate  Impulsive: Mildly  Processing Speed: Delayed           Home Living:  Type of Home: House  Lives With: Alone  Home Adaptive Equipment: Cane  Home Layout: One level  Home Access: No concerns   Prior Function:  Level of [place]: Independent with ADLs and functional transfers, Independent with homemaking with ambulation  Receives Help From: Family (pt reports mother and other family members live close by)  ADL Assistance: Independent  Homemaking Assistance: Independent  Ambulatory Assistance: Independent  IADL History:     ADL:  Eating Assistance: Independent  Eating Deficit: Setup  Grooming Assistance: Independent  Grooming Deficit: Setup  Bathing Assistance: Moderate  UE Dressing Assistance: Moderate  LE Dressing Assistance: Moderate  Toileting Assistance with Device: Moderate  Activity Tolerance:  Endurance: Tolerates 10 - 20 min exercise with multiple rests  Balance:     Bed Mobility/Transfers: Bed Mobility/Transfers: Bed Mobility  Bed Mobility: Yes  Bed Mobility 1  Bed Mobility 1: Supine to sitting, Sitting to supine  Level of Assistance 1: Contact guard  Bed Mobility Comments 1: HOB up  Functional Mobility  Functional Mobility Performed: Yes  Functional Mobility 1  Surface 1: Level tile  Device 1: Rolling walker  Assistance 1: Minimum assistance  Comments 1: pt completed functional mobility in room and hallway using one HHA, presents with unsteadiness   and Transfers  Transfer: Yes  Transfer 1  Transfer From 1: Sit to  Transfer to 1: Stand  Technique 1: Sit to stand, Stand  "to sit  Transfer Level of Assistance 1: Minimum assistance  Trials/Comments 1: 2 trials  IADL's:      Vision: Vision - Basic Assessment  Current Vision: Wears glasses only for reading   and    Sensation:  Light Touch: No apparent deficits  Strength:  Strength Comments: R UE WFL        Hand Function:  Hand Function  Gross Grasp: Functional  Coordination: Functional  Extremities:   RUE   RUE : Within Functional Limits, LUE   LUE: Exceptions to WFL  LUE AROM (degrees)  LUE AROM Comment: elbow/wrist/hand WFL,  , and        Outcome Measures: [facility] Daily Activity  Putting on and taking off regular lower body clothing: A lot  Bathing (including washing, rinsing, drying): A lot  Putting on and taking off regular upper body clothing: A lot  Toileting, which includes using toilet, bedpan or urinal: A lot  Taking care of personal grooming such as brushing teeth: A little  Eating Meals: None  Daily Activity - Total Score: 15         ,     OT Adult Other Outcome Measures  Short Blessed Test (SBT): Sum Total score 9/28. Pt with errors on item 3, 5 and 6. Scored 3 on item 3 (unable to recall time of the day). Item 5 (unable to name months of the year in reverse order). Required cues to recall \"[place]\" on item 6. Based SBT a score of 5-9; Questionable cognitive impairment.    Education Documentation  Body Mechanics, taught by Hilaria Pineda OT at 6/22/2024 12:37 PM.  Learner: Patient  Readiness: Eager  Method: Explanation, Demonstration  Response: Needs Reinforcement    Precautions, taught by Hilaria Pineda OT at 6/22/2024 12:37 PM.  Learner: Patient  Readiness: Eager  Method: Explanation, Demonstration  Response: Needs Reinforcement    ADL Training, taught by Hilaria Pineda OT at 6/22/2024 12:37 PM.  Learner: Patient  Readiness: Eager  Method: Explanation, Demonstration  Response: Needs Reinforcement    Education Comments  No comments found.        Goals:   Encounter Problems       Encounter Problems (Active)       ADLs  "      Patient will perform UB and LB bathing with Min A level of assistance and shower chair. (Progressing)       Start:  06/22/24    Expected End:  07/06/24            Patient with complete upper body dressing with contact guard assist level of assistance donning and doffing all UE clothes with no adaptive equipment while edge of bed  (Progressing)       Start:  06/22/24    Expected End:  07/06/24            Patient with complete lower body dressing with contact guard assist level of assistance donning and doffing all LE clothes  with PRN adaptive equipment while edge of bed  (Progressing)       Start:  06/22/24    Expected End:  07/06/24            Patient will complete toileting including hygiene clothing management/hygiene with contact guard assist level of assistance and raised toilet seat. (Progressing)       Start:  06/22/24    Expected End:  07/06/24               COGNITION/SAFETY       Patient will score WFL on standardized cognitive assessment without cues and within reasonable time frame (Progressing)       Start:  06/22/24    Expected End:  07/06/24               MOBILITY       Patient will perform Functional mobility Household distances with contact guard assist level of assistance and least restrictive device in order to improve safety and functional mobility. (Progressing)       Start:  06/22/24    Expected End:  07/06/24                   Treatment Completed on Evaluation    Therapy/Activity:     Therapeutic Activity  Therapeutic Activity Performed: Yes  Therapeutic Activity 1: Pt participated functional mobility in room and hallway with Min A. Pt educated on L UE positioning and distal ROM to decrease edeama and prevent blood cloths. Donned/doffed sling with Max A.         06/22/24 at 12:38 PM   Hilaria Pineda OT   Rehab Office: [phone]

## 2024-06-22 NOTE — PROGRESS NOTES
Orthopaedic Surgery Progress Note    Subjective: Per day nurse and night yesterday yesterday, had waxing and waning orientation and sometimes thought she was at home. This AM, she is A&Ox4 and requesting to be discharged. Pain well controlled considering recent surgery. Denies chest pain, shortness of breath, or fevers.    Objective:  BP 87/52 (BP Location: Right arm, Patient Position: Standing)   Pulse (!) 114   Temp 36.6 °C (97.9 °F) (Temporal)   Resp 16   SpO2 99%     Constitutional: NAD  HEENT: hearing and vision grossly intact, MMM  Resp: breathing comfortably   CV: extremities warm, well perfused  Neuro: alert, sensory and motor grossly intact  Psych: Appropriate mood and affect      MSK:  LUE:   - Post-operative dressing in place without strikethrough bleeding.   -Motor intact in axillary/AIN/PIN/ulnar distributions  -SILT axillary/radial/median/ulnar distributions  -Hand wwp, 2+ radial pulse, cap refill brisk  -Compartments soft and compressible, no pain with passive stretch of digits  - Notable edema in L hand      Assessment/Plan: 63 y.o. female s/p L humeral IMN on 6/19/24 with Dr. Patricia. Recovering appropriately. Hypotensive and tachycardic yesterday morning; found to be anemic with Hgb 7.5 and also retaining 1.3L urine. Steve placed, received 2.5L crystalloid and 1u pRBCs. Repeat CBC with Hgb 6.1, likely some component of hemodilution so pending CBC this AM. PT rec SNF given history of multiple falls which patient refused.     Plan:  - Steve removal today  - Weight bearing: WBAT LUE  - DVT ppx: SCDs, ASA while in house  - CIWA given history of Dts  - Diet: Regular  - Pain: Tylenol, oxycodone 5/10, dilaudid breakthrough  - Antibiotics: perioperative ancef 2g q8hr x3 doses  - FEN: HLIV with good PO intake  - Bowel Regimen: Miralax, senna, dulcolax  - PT/OT  - Pulm: Encourage IS  - Continue home medications  - Medicine consulted for co-management, appreciate recs    Dispo: [facility] pending  placement    Ernestine Womack, PGY-3  Orthopaedic Surgery  Orthopaedic Trauma Team    This patient will be followed by the Orthopaedic Trauma service. Please page or Epic Chat the corresponding residents below with questions or concerns.      Ortho Trauma Service (Epic Chat Preferred)  First call: Juan Finch, PGY1  Second call: Darryl Summers, PGY2  Third call: Ernestine Womack, PGY3    Please page [phone] on weekends or from 6PM - 6AM for any additional questions or concerns.

## 2024-06-22 NOTE — PROGRESS NOTES
Physical Therapy    Physical Therapy Treatment    Patient Name: Yue Sanz  MRN: [id number]  Today's Date: 2024  Time Calculation  Start Time: 924  Stop Time: 949  Time Calculation (min): 25 min    Assessment/Plan   PT Assessment  End of Session Communication: Bedside nurse  Assessment Comment: Pt tolerated PT session well, able to tolerated OOB activity this date with no c/o's of dizziness or lightheadedness. Pt continues to remain unsteady with ambulation required Geeta throughout. Pt continues to remain appropriate for Moderate intensity PT upon D/C from hospital.  End of Session Patient Position: Bed, 3 rail up, Alarm on  PT Plan  Inpatient/Swing Bed or Outpatient: Inpatient  PT Plan  Treatment/Interventions: Bed mobility, Transfer training, Gait training, Balance training, Strengthening, Endurance training, Therapeutic exercise, Therapeutic activity  PT Plan: Ongoing PT  PT Frequency: 6 times per week  PT Discharge Recommendations: Moderate intensity level of continued care  PT Recommended Transfer Status: Assist x1  PT - OK to Discharge: Yes      General Visit Information:   PT  Visit  PT Received On: 24  General  Missed Visit: No  Missed Visit Reason:  (n/a)  Family/Caregiver Present: No  Co-Treatment: OT  Co-Treatment Reason: to maximize pt's safety with OOB activity and ambulation.  Prior to Session Communication: Bedside nurse  Patient Position Received: Bed, 3 rail up, Alarm on  General Comment: RN cleared pt for therapy. Pt supine in bed on arrival, agreeable to participate in therapy with encouragement.    Subjective   Precautions:  Precautions  UE Weight Bearing Status: Weight Bearing as Tolerated (LUE)  Medical Precautions: Fall precautions  Precautions Comment: pt with sling donned prior to therapies arrival, assist to properly align  Vital Signs:  Vital Signs  Heart Rate:  (91)  SpO2: 98 %  BP:  (Supine:90/59, Seated:94/59, Standin/61)    Objective   Pain:  Pain Assessment  Pain  Assessment: 0-10  0-10 (Numeric) Pain Score:  (pt did not rate pain however states with L shoulder movement experiences pain.)  Cognition:  Cognition  Overall Cognitive Status: Impaired  Orientation Level:  (oriented to self, hospital, month and year)  Insight: Moderate  Impulsive: Mildly    Activity Tolerance:  Activity Tolerance  Endurance: Tolerates 10 - 20 min exercise with multiple rests  Treatments:  Therapeutic Exercise  Therapeutic Exercise Performed: Yes  Therapeutic Exercise Activity 1: Supine: AP, HS, Hip ABD x10 BLE    Bed Mobility  Bed Mobility: Yes  Bed Mobility 1  Bed Mobility 1: Supine to sitting  Level of Assistance 1: Contact guard  Bed Mobility Comments 1: HOB elevated,  Bed Mobility 2  Bed Mobility  2: Sitting to supine  Level of Assistance 2: Contact guard  Bed Mobility Comments 2: Pt able to swing BLEs into bed    Ambulation/Gait Training  Ambulation/Gait Training Performed: Yes  Ambulation/Gait Training 1  Surface 1: Level tile  Device 1: No device  Assistance 1: Hand held assistance, Minimum assistance, Minimal verbal cues  Quality of Gait 1: Narrow base of support, Diminished heel strike, Decreased step length (Decreased yordy, decreased endurance, unsteady,)  Comments/Distance (ft) 1: x50ft, pt with increased HHA  throughout ambulation, unsteadiness noted, Pt denies any dizziness or lightheadedness throughout ambulation.    Transfers  Transfer: Yes  Transfer 1  Transfer From 1: Sit to  Transfer to 1: Stand  Technique 1: Sit to stand, Stand to sit  Transfer Device 1:  (R HHA)  Transfer Level of Assistance 1: Minimum assistance, Minimal verbal cues  Trials/Comments 1: x3 trials, R HHA, cues for hand placement using RUE on bedrail. Pt with decreased eccentric control at time when descending to seated position.    Stairs  Stairs: No    Outcome Measures:  [facility] Basic Mobility  Turning from your back to your side while in a flat bed without using bedrails: A little  Moving from lying on  your back to sitting on the side of a flat bed without using bedrails: A little  Moving to and from bed to chair (including a wheelchair): A little  Standing up from a chair using your arms (e.g. wheelchair or bedside chair): A little  To walk in hospital room: A little  Climbing 3-5 steps with railing: A lot  Basic Mobility - Total Score: 17    Education Documentation  Precautions, taught by Elida Ferraro PTA at 6/22/2024 12:53 PM.  Learner: Patient  Readiness: Acceptance  Method: Explanation  Response: Verbalizes Understanding, Needs Reinforcement    Body Mechanics, taught by Elida Ferraro PTA at 6/22/2024 12:53 PM.  Learner: Patient  Readiness: Acceptance  Method: Explanation  Response: Verbalizes Understanding, Needs Reinforcement    Mobility Training, taught by Elida Ferraro PTA at 6/22/2024 12:53 PM.  Learner: Patient  Readiness: Acceptance  Method: Explanation  Response: Verbalizes Understanding, Needs Reinforcement    Education Comments  No comments found.        OP EDUCATION:       Encounter Problems       Encounter Problems (Active)       Mobility       STG - Patient will ambulate >25ft, least restrictive device, CGA (Progressing)       Start:  06/20/24    Expected End:  07/04/24               PT Transfers       STG - Patient to transfer to and from sit to supine CGA (Progressing)       Start:  06/20/24    Expected End:  07/04/24            STG - Patient will transfer sit to and from stand CGA (Progressing)       Start:  06/20/24    Expected End:  07/04/24               Pain - Adult            06/22/24 at 12:54 PM   Elida Ferraro PTA   Rehab Office: [phone]

## 2024-06-23 ENCOUNTER — DOCUMENTATION (OUTPATIENT)
Dept: HOME HEALTH SERVICES | Facility: HOME HEALTH | Age: 64
End: 2024-06-23
Payer: COMMERCIAL

## 2024-06-23 ENCOUNTER — HOME HEALTH ADMISSION (OUTPATIENT)
Dept: HOME HEALTH SERVICES | Facility: HOME HEALTH | Age: 64
End: 2024-06-23
Payer: COMMERCIAL

## 2024-06-23 VITALS
SYSTOLIC BLOOD PRESSURE: 150 MMHG | OXYGEN SATURATION: 96 % | RESPIRATION RATE: 20 BRPM | DIASTOLIC BLOOD PRESSURE: 88 MMHG | TEMPERATURE: 97.3 F | HEART RATE: 75 BPM

## 2024-06-23 LAB
ABO GROUP (TYPE) IN BLOOD: NORMAL
ANTIBODY SCREEN: NORMAL
BLOOD EXPIRATION DATE: NORMAL
DISPENSE STATUS: NORMAL
ERYTHROCYTE [DISTWIDTH] IN BLOOD BY AUTOMATED COUNT: 15.8 % (ref 11.5–14.5)
HCT VFR BLD AUTO: 27.6 % (ref 36–46)
HGB BLD-MCNC: 9.5 G/DL (ref 12–16)
MCH RBC QN AUTO: 32.5 PG (ref 26–34)
MCHC RBC AUTO-ENTMCNC: 34.4 G/DL (ref 32–36)
MCV RBC AUTO: 95 FL (ref 80–100)
NRBC BLD-RTO: 0 /100 WBCS (ref 0–0)
PLATELET # BLD AUTO: 188 X10*3/UL (ref 150–450)
PRODUCT BLOOD TYPE: 9500
PRODUCT CODE: NORMAL
RBC # BLD AUTO: 2.92 X10*6/UL (ref 4–5.2)
RH FACTOR (ANTIGEN D): NORMAL
UNIT ABO: NORMAL
UNIT NUMBER: NORMAL
UNIT RH: NORMAL
UNIT VOLUME: 284
WBC # BLD AUTO: 7.1 X10*3/UL (ref 4.4–11.3)
XM INTEP: NORMAL

## 2024-06-23 PROCEDURE — 97530 THERAPEUTIC ACTIVITIES: CPT | Mod: GP,CQ

## 2024-06-23 PROCEDURE — 36415 COLL VENOUS BLD VENIPUNCTURE: CPT | Performed by: STUDENT IN AN ORGANIZED HEALTH CARE EDUCATION/TRAINING PROGRAM

## 2024-06-23 PROCEDURE — 2500000001 HC RX 250 WO HCPCS SELF ADMINISTERED DRUGS (ALT 637 FOR MEDICARE OP)

## 2024-06-23 PROCEDURE — 2500000004 HC RX 250 GENERAL PHARMACY W/ HCPCS (ALT 636 FOR OP/ED)

## 2024-06-23 PROCEDURE — 1100000001 HC PRIVATE ROOM DAILY

## 2024-06-23 PROCEDURE — 2500000002 HC RX 250 W HCPCS SELF ADMINISTERED DRUGS (ALT 637 FOR MEDICARE OP, ALT 636 FOR OP/ED): Mod: MUE

## 2024-06-23 PROCEDURE — 86901 BLOOD TYPING SEROLOGIC RH(D): CPT | Performed by: STUDENT IN AN ORGANIZED HEALTH CARE EDUCATION/TRAINING PROGRAM

## 2024-06-23 PROCEDURE — 2500000002 HC RX 250 W HCPCS SELF ADMINISTERED DRUGS (ALT 637 FOR MEDICARE OP, ALT 636 FOR OP/ED)

## 2024-06-23 PROCEDURE — 99232 SBSQ HOSP IP/OBS MODERATE 35: CPT | Performed by: STUDENT IN AN ORGANIZED HEALTH CARE EDUCATION/TRAINING PROGRAM

## 2024-06-23 ASSESSMENT — PAIN SCALES - GENERAL
PAINLEVEL_OUTOF10: 3
PAINLEVEL_OUTOF10: 5 - MODERATE PAIN

## 2024-06-23 ASSESSMENT — PAIN SCALES - PAIN ASSESSMENT IN ADVANCED DEMENTIA (PAINAD): TOTALSCORE: MEDICATION (SEE MAR)

## 2024-06-23 ASSESSMENT — PAIN DESCRIPTION - LOCATION: LOCATION: SHOULDER

## 2024-06-23 ASSESSMENT — COGNITIVE AND FUNCTIONAL STATUS - GENERAL
STANDING UP FROM CHAIR USING ARMS: A LITTLE
TURNING FROM BACK TO SIDE WHILE IN FLAT BAD: A LITTLE
MOVING TO AND FROM BED TO CHAIR: A LITTLE
WALKING IN HOSPITAL ROOM: A LOT
MOVING FROM LYING ON BACK TO SITTING ON SIDE OF FLAT BED WITH BEDRAILS: A LITTLE
MOBILITY SCORE: 15
CLIMB 3 TO 5 STEPS WITH RAILING: TOTAL

## 2024-06-23 ASSESSMENT — PAIN - FUNCTIONAL ASSESSMENT: PAIN_FUNCTIONAL_ASSESSMENT: 0-10

## 2024-06-23 NOTE — SIGNIFICANT EVENT
Rapid Response RN Note    Rapid response RN for RADAR score 6 due to the following VS: T 36 °Celsius; ; RR 18; BP 95/61; SPO2 93%.     Reviewed above VS with bedside RN via phone.  Pt receiving blood, VS within patient's current trends.  Patient denied pain, shortness of breath, dizziness or lightheadedness.  No interventions by rapid response team indicated at this time.      Staff to page rapid response for any concerns or acute change in condition/VS.

## 2024-06-23 NOTE — PROGRESS NOTES
Physical Therapy    Physical Therapy Treatment    Patient Name: Yue Sanz  MRN: [id number]  Today's Date: 6/23/2024  Time Calculation  Start Time: 0940  Stop Time: 0955  Time Calculation (min): 15 min    Assessment/Plan   PT Assessment  PT Assessment Results: Decreased strength, Decreased range of motion, Decreased endurance, Impaired balance, Decreased mobility  Rehab Prognosis: Good  Evaluation/Treatment Tolerance: Patient tolerated treatment well  Medical Staff Made Aware: Yes  End of Session Communication: Bedside nurse  Assessment Comment: Pt requires cues for safety throughout session, demos unsteadiness with transfers and amb, reports minimal ability to use L UE. Remains appropriate for mod intensity therapy  End of Session Patient Position: Bed, 3 rail up, Alarm off, caregiver present  PT Plan  Inpatient/Swing Bed or Outpatient: Inpatient  PT Plan  Treatment/Interventions: Bed mobility, Transfer training, Gait training, Balance training, Strengthening, Endurance training, Therapeutic exercise, Therapeutic activity  PT Plan: Ongoing PT  PT Frequency: 6 times per week  PT Discharge Recommendations: Moderate intensity level of continued care  PT Recommended Transfer Status: Assist x1  PT - OK to Discharge: Yes      General Visit Information:   PT  Visit  PT Received On: 06/23/24  Response to Previous Treatment: Patient with no complaints from previous session.  General  Family/Caregiver Present: Yes  Caregiver Feedback: sitter present  Prior to Session Communication: Bedside nurse  Patient Position Received: Bed, 3 rail up, Alarm off, caregiver present  General Comment: Pt supine in bed, initially refusing therapy, then agreeable with min encouragement  Per handoff with RN, pt is appropriate for therapy, vitals are stable and pain is controlled. Other concerns prior to tx are: none    Subjective   Precautions:  Precautions  UE Weight Bearing Status: Weight Bearing as Tolerated  Medical Precautions: Fall  precautions    Objective   Pain:  Pain Assessment  Pain Assessment: 0-10  0-10 (Numeric) Pain Score: 3  Pain Location: Knee  Pain Orientation: Left  Cognition:  Cognition  Orientation Level: Oriented X4    Treatments:     Balance/Neuromuscular Re-Education  Balance/Neuromuscular Re-Education Activity Performed: Yes  Balance/Neuromuscular Re-Education Activity 1: Sit-stand from EOB to unsupported standing, 5x with CGA and cues    Bed Mobility  Bed Mobility: Yes  Bed Mobility 1  Bed Mobility 1: Supine to sitting, Sitting to supine  Level of Assistance 1: Contact guard, Moderate verbal cues    Ambulation/Gait Training  Ambulation/Gait Training Performed: Yes  Ambulation/Gait Training 1  Surface 1: Level tile  Device 1:  (R HHA)  Assistance 1: Moderate assistance  Comments/Distance (ft) 1: 40'x1, pt unsteady in all directions,  Transfers  Transfer: Yes  Transfer 1  Transfer From 1: Sit to, Stand to  Transfer to 1: Sit  Technique 1: Sit to stand, Stand to sit  Transfer Level of Assistance 1: Contact guard, Moderate verbal cues    Outcome Measures:     [facility] Basic Mobility  Turning from your back to your side while in a flat bed without using bedrails: A little  Moving from lying on your back to sitting on the side of a flat bed without using bedrails: A little  Moving to and from bed to chair (including a wheelchair): A little  Standing up from a chair using your arms (e.g. wheelchair or bedside chair): A little  To walk in hospital room: A lot  Climbing 3-5 steps with railing: Total  Basic Mobility - Total Score: 15    Education Documentation  Precautions, taught by Suzanne Castillo PTA at 6/23/2024 10:31 AM.  Learner: Patient  Readiness: Acceptance  Method: Explanation, Demonstration  Response: Needs Reinforcement    Body Mechanics, taught by Suzanne Castillo PTA at 6/23/2024 10:31 AM.  Learner: Patient  Readiness: Acceptance  Method: Explanation, Demonstration  Response: Needs Reinforcement    Mobility Training,  taught by Suzanne Castillo PTA at 6/23/2024 10:31 AM.  Learner: Patient  Readiness: Acceptance  Method: Explanation, Demonstration  Response: Needs Reinforcement    Education Comments  No comments found.        OP EDUCATION:       Encounter Problems       Encounter Problems (Active)       Mobility       STG - Patient will ambulate >25ft, least restrictive device, CGA (Progressing)       Start:  06/20/24    Expected End:  07/04/24               PT Transfers       STG - Patient to transfer to and from sit to supine CGA (Progressing)       Start:  06/20/24    Expected End:  07/04/24            STG - Patient will transfer sit to and from stand CGA (Progressing)       Start:  06/20/24    Expected End:  07/04/24               Pain - Adult            COREY Velasco

## 2024-06-23 NOTE — HH CARE COORDINATION
Home Care received a Referral for Physical Therapy and Occupational Therapy. We have processed the referral for a Start of Care on 06/24-06/25.     If you have any questions or concerns, please feel free to contact us at [phone]. Follow the prompts, enter your five digit zip code, and you will be directed to your care team on [facility].

## 2024-06-23 NOTE — PROGRESS NOTES
Yue Sanz is a 63 y.o. female on day 5 of admission presenting with Other closed displaced fracture of proximal end of left humerus, initial encounter.    Subjective   NAEO.   Patient without complaint  Per sitter, pt able to raise out  of bed and ambulate to bath/shower.     Objective     Physical Exam    Constitutional: No acute distress, normal appearing  Head/Neck: Normocephalic, Atraumatic, Trachea midline  ENT: Mucous membranes moist, no lesions, no nasal discharge  Cardio: Heart RRR, clear S1 & S2, quiet murmur appreciated, capillary refill < 2 sec  Respiratory: Lungs cta b/l, good respiratory effort, normal aeration, no crackles or wheezes appreciated  Abdominal: Soft, nontender, nondistended  Extremities: No peripheral edema, radial and DP pulses 1+ b/l  Skin: warm and dry, no redness, bruising or bleeding  Neuro: Aox3, CN grossly intact, moving all 4 extremities  Behavioral: appropriate mood and behavior       Last Recorded Vitals  Blood pressure 150/88, pulse 75, temperature 36.3 °C (97.3 °F), temperature source Temporal, resp. rate 20, SpO2 96%.  Intake/Output last 3 Shifts:  I/O last 3 completed shifts:  In: 790 [P.O.:200; Blood:590]  Out: 3500 [Urine:3500]    Relevant Results             Assessment/Plan   Principal Problem:    Other closed displaced fracture of proximal end of left humerus, initial encounter  Active Problems:    CVA (cerebral vascular accident) (Multi)    Closed comminuted fracture of right humerus, initial encounter    Yue Sanz is a 63 y.o. year old female patient with PMHx of  Stroke, alcohol substance misuse, alcohol related seizure, anemia, depression, and HTN who is admitted for L humerus fracture who is now s/p intramedullary nail repair on 6/19 w/ Dr. Patricia. Internal medicine was consulted for Syncope post-op and a fall today.      #Syncope - improved  #Falls - improved   #Anemia  ::Patient reports 1-2 episodes previously, in relation to standing up  ::Patient reported  falling after standing to go to bathroom  ::ECG: NSR w/ incomplete BBB  ::Hgb 7.3, required 1un pRBC yesterday for Hgb 6.1  ::Patient remains orthostatic 6/22  ::MCV high end normal  ::S/p 1 L fluid bolus with LR for orthostatic hypotension  ::Recommend 2 units pRBC for symptomatic anemia  ::Hemolysis labs negative for hemolytic anemia  ::Encourage PO intake  ::Continue folate and thiamine  Please repeat orthostatics, if negative medicine will sign off.         Chepe Mccabe MD

## 2024-06-23 NOTE — PROGRESS NOTES
Orthopaedic Surgery Progress Note    Subjective: Rapid called yesterday for low BP, tachycardia. Now improved. Pain and swelling improved today. Discussed with patient and plan for home w HHC.    Objective:  /87   Pulse 74   Temp 36.1 °C (97 °F)   Resp 16   SpO2 96%     Constitutional: NAD  HEENT: hearing and vision grossly intact, MMM  Resp: breathing comfortably   CV: extremities warm, well perfused  Neuro: alert, sensory and motor grossly intact  Psych: Appropriate mood and affect      MSK:  LUE  - Dressing in place c/d/i  -Tender at site of injury   -Motor intact in axillary/AIN/PIN/ulnar distributions  -SILT axillary/radial/median/ulnar distributions  -Hand warm, well perfused  - Decreased swelling from day prior  -Palpable radial pulse, cap refill brisk  -Compartments soft and compressible        Assessment/Plan: 63 y.o. female s/p L humeral IMN on 6/19/24 with Dr. Patricia. Initially w hypotension, acute blood loss anemia, now improved. Plan for DC w HHC.     Plan:  - Weight bearing: WBAT LUE  - DVT ppx: SCDs, ASA while in house  - CIWA given history of Dts  - Diet: Regular  - Pain: Tylenol, oxycodone 5/10, dilaudid breakthrough  - Antibiotics: perioperative ancef 2g q8hr x3 doses  - FEN: HLIV with good PO intake  - Bowel Regimen: Miralax, senna, dulcolax  - PT/OT  - Pulm: Encourage IS  - Continue home medications  - Medicine consulted for co-management, appreciate recs    Dispo: [facility] pending placement, possibly home today    Ernestine Womack, PGY-3  Orthopaedic Surgery  Orthopaedic Trauma Team    This patient will be followed by the Orthopaedic Trauma service. Please page or Epic Chat the corresponding residents below with questions or concerns.      Ortho Trauma Service (Epic Chat Preferred)  First call: Juan Finch, PGY1  Second call: Darryl Summers PGY2  Third call: Ernestine Womack, PGY3    Please page [phone] on weekends or from 6PM - 6AM for any additional questions or concerns.

## 2024-06-24 ENCOUNTER — PHARMACY VISIT (OUTPATIENT)
Dept: PHARMACY | Facility: CLINIC | Age: 64
End: 2024-06-24
Payer: COMMERCIAL

## 2024-06-24 VITALS
RESPIRATION RATE: 16 BRPM | HEART RATE: 101 BPM | OXYGEN SATURATION: 96 % | SYSTOLIC BLOOD PRESSURE: 103 MMHG | TEMPERATURE: 97.2 F | DIASTOLIC BLOOD PRESSURE: 58 MMHG

## 2024-06-24 LAB — PATH REVIEW-CBC DIFFERENTIAL: NORMAL

## 2024-06-24 PROCEDURE — 2500000004 HC RX 250 GENERAL PHARMACY W/ HCPCS (ALT 636 FOR OP/ED)

## 2024-06-24 PROCEDURE — 2500000001 HC RX 250 WO HCPCS SELF ADMINISTERED DRUGS (ALT 637 FOR MEDICARE OP)

## 2024-06-24 PROCEDURE — 2500000002 HC RX 250 W HCPCS SELF ADMINISTERED DRUGS (ALT 637 FOR MEDICARE OP, ALT 636 FOR OP/ED)

## 2024-06-24 PROCEDURE — 99232 SBSQ HOSP IP/OBS MODERATE 35: CPT

## 2024-06-24 PROCEDURE — 2500000002 HC RX 250 W HCPCS SELF ADMINISTERED DRUGS (ALT 637 FOR MEDICARE OP, ALT 636 FOR OP/ED): Mod: MUE

## 2024-06-24 ASSESSMENT — COGNITIVE AND FUNCTIONAL STATUS - GENERAL
MOVING TO AND FROM BED TO CHAIR: A LITTLE
DRESSING REGULAR LOWER BODY CLOTHING: A LITTLE
WALKING IN HOSPITAL ROOM: A LOT
PERSONAL GROOMING: A LITTLE
EATING MEALS: A LITTLE
DAILY ACTIVITIY SCORE: 16
MOVING FROM LYING ON BACK TO SITTING ON SIDE OF FLAT BED WITH BEDRAILS: A LITTLE
HELP NEEDED FOR BATHING: A LITTLE
DRESSING REGULAR UPPER BODY CLOTHING: A LOT
CLIMB 3 TO 5 STEPS WITH RAILING: TOTAL
TOILETING: A LOT
TURNING FROM BACK TO SIDE WHILE IN FLAT BAD: A LITTLE
MOBILITY SCORE: 15
STANDING UP FROM CHAIR USING ARMS: A LITTLE

## 2024-06-24 ASSESSMENT — PAIN SCALES - GENERAL: PAINLEVEL_OUTOF10: 3

## 2024-06-24 NOTE — DISCHARGE SUMMARY
Discharge Diagnosis  Other closed displaced fracture of proximal end of left humerus, initial encounter    Issues Requiring Follow-Up  Left humerus fracture postoperative visit    Test Results Pending At Discharge  Pending Labs       No current pending labs.            Hospital Course   63 year-old female who presented with a left humerus fracture. Patient is now s/p left humerus intramedullary nail on 6/24/2024 with Dr. Patricia. On the day of surgery, patient was identified in the pre-operative holding area and agreeable to proceed with surgery. Written consent was obtained.  Please see operative note for further details of this procedure. Patient received 24 hours of cookie-operative antibiotics. Patient recovered in the PACU before transfer to a regular nursing floor. Patient was started on oxycodone and tylenol for pain control and  ASA 81 mg bid for DVT prophylaxis. Physical therapy recommended continued recovery at skilled nursing. However, patient elected to go home with continued physical therapy and wound care. On the day of discharge, patient was afebrile with stable vital signs. Patient was neurovascularly intact at time of discharge. Patient was discharged with prescription of aspirin for DVT prophylaxis for 4 weeks. Patient will follow-up with Dr. Patricia in 2 weeks for post-operative visit.      Pertinent Physical Exam At Time of Discharge  Constitutional: NAD  HEENT: hearing and vision grossly intact, MMM  Resp: breathing comfortably   CV: extremities warm, well perfused  Neuro: alert, sensory and motor grossly intact  Psych: Appropriate mood and affect        MSK:  LUE  - Dressing in place c/d/i  -Tender at site of injury   -Motor intact in axillary/AIN/PIN/ulnar distributions  -SILT axillary/radial/median/ulnar distributions  -Hand warm, well perfused  - Decreased swelling from day prior  -Palpable radial pulse, cap refill brisk  -Compartments soft and compressible    Home Medications      Medication List      START taking these medications     acetaminophen 500 mg tablet; Commonly known as: Tylenol; Take 1 tablet   (500 mg) by mouth every 6 hours for 28 days.   oxyCODONE 5 mg immediate release tablet; Commonly known as: Roxicodone;   Take 1 tablet (5 mg) by mouth every 6 hours if needed for severe pain (7 -   10) for up to 7 days.   Oysco 500/D 500 mg-5 mcg (200 unit) tablet; Generic drug: calcium   carbonate-vitamin D3; Take 1 tablet by mouth 2 times a day.   polyethylene glycol 17 gram packet; Commonly known as: Glycolax,   Miralax; Take 17 g by mouth once daily.     CONTINUE taking these medications     aspirin 81 mg EC tablet   atorvastatin 40 mg tablet; Commonly known as: Lipitor; Take 1 tablet (40   mg) by mouth once daily.   Certavite-Antioxidant  mg-mcg tablet; Generic drug: multivitamin   with minerals; Take 1 tablet by mouth once daily.   * chlorhexidine 0.12 % solution; Commonly known as: Peridex; Use 15 mL   in the mouth or throat see administration instructions for 2 doses. Use   the night before and morning of surgery.   clopidogrel 75 mg tablet; Commonly known as: Plavix; Take 1 tablet (75   mg) by mouth once daily.   cyclobenzaprine 10 mg tablet; Commonly known as: Flexeril; Take 1 tablet   (10 mg) by mouth 3 times a day as needed for muscle spasms for up to 10   days.   folic acid 1 mg tablet; Commonly known as: Folvite; Take 1 tablet (1 mg)   by mouth once daily.   hydrALAZINE 10 mg tablet; Commonly known as: Apresoline; Take 1 tablet   (10 mg) by mouth 3 times a day.   levETIRAcetam 500 mg tablet; Commonly known as: Keppra; Take 1 tablet   (500 mg) by mouth 2 times a day.   lisinopril 10 mg tablet; Take 1 tablet (10 mg) by mouth once daily.   metoprolol tartrate 25 mg tablet; Commonly known as: Lopressor; Take 1   tablet (25 mg) by mouth 2 times a day.   sertraline 50 mg tablet; Commonly known as: Zoloft; Take 1 tablet (50   mg) by mouth once daily.   thiamine 100 mg  tablet; Commonly known as: Vitamin B-1; Take 1 tablet   (100 mg) by mouth once daily.  * This list has 1 medication(s) that are the same as other medications   prescribed for you. Read the directions carefully, and ask your doctor or   other care provider to review them with you.     STOP taking these medications     cephalexin 500 mg capsule; Commonly known as: Keflex   oxyCODONE-acetaminophen 5-325 mg tablet; Commonly known as: Percocet     ASK your doctor about these medications     * chlorhexidine 4 % external liquid; Commonly known as: Hibiclens; Apply   topically 2 times a day for 5 days.; Ask about: Should I take this   medication?  * This list has 1 medication(s) that are the same as other medications   prescribed for you. Read the directions carefully, and ask your doctor or   other care provider to review them with you.       Outpatient Follow-Up  Future Appointments   Date Time Provider Department Center   7/9/2024  1:30 PM Sultana Whitlock PA-C [id number]   9/6/2024 11:00 AM Lluvia Alfred Pla, DO [id number]       Ernestine Womack MD

## 2024-06-24 NOTE — PROGRESS NOTES
Patient is medically clear for discharge home with [facility] services SOC 6/25/24 via private transportation with all personal belongings. I did speak with patient's niece [phone] concerning patient returning home concerning safety d/t patient having had several past admissions d/t falling. Carmela states that she does not live in the home with patient, she states that she just goes over to help her aunt out occasionally. Patient is recommended for moderate intensity therapy but is refusing wanting to be discharged to her home, patient is competent and can make her own decisions at this time. I will continue to follow until discharged.

## 2024-06-24 NOTE — CARE PLAN
The patient's goals for the shift include      The clinical goals for the shift include Pt will remain safe and no more BM during night.    Pt remained safe and free of injury during night. Pain controlled with tylenol. Left arm elevated and swelling decreased. Walking to the bathroom several times with assisted.  No other distress noted. Bed alarm on and Call light in reach. Resting quietly at this time.     Kaela Heck RN'

## 2024-06-24 NOTE — PROGRESS NOTES
Yue Sanz is a 63 y.o. female on day 6 of admission presenting with Other closed displaced fracture of proximal end of left humerus, initial encounter.    Subjective   NAEO.   Patient reports feeling well this morning, patient reportedly planned for discharge today.     Objective     Physical Exam    Constitutional: No acute distress, normal appearing  Head/Neck: Normocephalic, Atraumatic, Trachea midline  ENT: Mucous membranes moist, no lesions, no nasal discharge  Cardio: Heart RRR, clear S1 & S2, quiet murmur appreciated, capillary refill < 2 sec  Respiratory: Lungs cta b/l, good respiratory effort, normal aeration, no crackles or wheezes appreciated  Abdominal: Soft, nontender, nondistended  Extremities: No peripheral edema, radial and DP pulses 1+ b/l  Skin: warm and dry, no redness, bruising or bleeding  Neuro: Aox3, CN grossly intact, moving all 4 extremities  Behavioral: appropriate mood and behavior       Last Recorded Vitals  Blood pressure 103/58, pulse 101, temperature 36.2 °C (97.2 °F), temperature source Tympanic, resp. rate 16, SpO2 96%.  Intake/Output last 3 Shifts:  I/O last 3 completed shifts:  In: 790 [P.O.:200; Blood:590]  Out: 1100 [Urine:1100]    Relevant Results             Assessment/Plan   Principal Problem:    Other closed displaced fracture of proximal end of left humerus, initial encounter  Active Problems:    CVA (cerebral vascular accident) (Multi)    Closed comminuted fracture of right humerus, initial encounter    Yue Sanz is a 63 y.o. year old female patient with PMHx of  Stroke, alcohol substance misuse, alcohol related seizure, anemia, depression, and HTN who is admitted for L humerus fracture who is now s/p intramedullary nail repair on 6/19 w/ Dr. Patricia. Internal medicine was consulted for Syncope post-op and a fall today.      #Syncope   #Orthostatic Hypotension  #Falls   #Anemia  ::Patient reports 1-2 episodes previously, in relation to standing up  ::Patient reported  falling after standing to go to bathroom  ::ECG: NSR w/ incomplete BBB  ::MCV high end normal  ::S/p 1 L fluid bolus with LR for orthostatic hypotension  ::Recommend 2 units pRBC for symptomatic anemia  ::Hemolysis labs negative for hemolytic anemia  ::Encourage PO intake  ::Continue folate and thiamine  - Orthostats today Supine: 155/92, Sitting 124/72, Standing 105/58  - Recommend PCP follow up to discuss orthostatic hypotension and supine hypertension  - To address symptoms, encourage slow transitions from sitting/lying to standing, While sitting, pump heels up and down to engage calf muscles prior to standing, have good fluid intake and be sure to use railings when possible  - Patient safe to discharge with proper education and encourage PCP follow up       This patient was seen and discussed with attending physician.       Loi Flower MD  PGY-1

## 2024-06-24 NOTE — PROGRESS NOTES
06/24/24 0733   Current Planned Discharge Disposition   Current Planned Discharge Disposition Home H  (Patient with [facility] soc date 6-24)

## 2024-06-24 NOTE — PROGRESS NOTES
Orthopaedic Surgery Progress Note    Subjective: No acute events overnight. No further episodes of hypotension or falls. Denies having fallen twice while here in house. Adamant that she wants to go home with home PT.    Objective:  BP (!) 143/91   Pulse 75   Temp 36.5 °C (97.7 °F)   Resp 16   SpO2 95%     Constitutional: NAD  HEENT: hearing and vision grossly intact, MMM  Resp: breathing comfortably   CV: extremities warm, well perfused  Neuro: alert, sensory and motor grossly intact  Psych: Appropriate mood and affect      MSK:  LUE  - Dressing in place c/d/i  -Tender at site of injury   -Motor intact in axillary/AIN/PIN/ulnar distributions  -SILT axillary/radial/median/ulnar distributions  -Hand warm, well perfused  - Decreased swelling from day prior  -Palpable radial pulse, cap refill brisk  -Compartments soft and compressible        Assessment/Plan: 63 y.o. female s/p L humeral IMN on 6/19/24 with Dr. Patricia. Initially w hypotension, acute blood loss anemia, now improved. Plan for DC w [facility].     Plan:  - Weight bearing: WBAT LUE  - DVT ppx: SCDs, ASA while in house  - CIWA given history of Dts  - Diet: Regular  - Pain: Tylenol, oxycodone 5/10, dilaudid breakthrough  - Antibiotics: perioperative ancef 2g q8hr x3 doses  - FEN: HLIV with good PO intake  - Bowel Regimen: Miralax, senna, dulcolax  - PT/OT  - Pulm: Encourage IS  - Continue home medications  - Medicine consulted for co-management, appreciate recs    Dispo: [facility] today    Juan Finch MD   Orthopaedic Surgery  Orthopaedic Trauma Team    This patient will be followed by the Orthopaedic Trauma service. Please page or Epic Chat the corresponding residents below with questions or concerns.      Ortho Trauma Service (Epic Chat Preferred)  First call: Juan Finch, PGY1  Second call: Darryl Summers PGY2  Third call: Ernestine Womack, PGY3    Please page [phone] on weekends or from 6PM - 6AM for any additional questions or concerns.

## 2024-06-25 ENCOUNTER — PATIENT OUTREACH (OUTPATIENT)
Dept: CARE COORDINATION | Facility: CLINIC | Age: 64
End: 2024-06-25
Payer: COMMERCIAL

## 2024-06-25 NOTE — PROGRESS NOTES
Discharge Facility:[facility]  Discharge Diagnosis:Closed displaced fracture of proximal end of left humerus  Admission Date:06/18/24  Discharge Date: 06/24/24    PCP Appointment Date:none made sent to pcp office  Specialist Appointment Date:   Hospital Encounter and Summary: Linked   See discharge assessment below for further details  Two attempts were made to reach patient within two business days after discharge. Voicemail left with contact information for patient to call back with any non-emergent questions or concerns.

## 2024-06-26 ENCOUNTER — HOME CARE VISIT (OUTPATIENT)
Dept: HOME HEALTH SERVICES | Facility: HOME HEALTH | Age: 64
End: 2024-06-26
Payer: COMMERCIAL

## 2024-06-26 VITALS — OXYGEN SATURATION: 97 % | RESPIRATION RATE: 16 BRPM | HEART RATE: 90 BPM

## 2024-06-26 PROCEDURE — G0151 HHCP-SERV OF PT,EA 15 MIN: HCPCS | Mod: HHH

## 2024-06-26 PROCEDURE — 169592 NO-PAY CLAIM PROCEDURE

## 2024-06-26 SDOH — HEALTH STABILITY: PHYSICAL HEALTH: EXERCISE COMMENTS: BLE ASSESSED . ROM WFL

## 2024-06-26 SDOH — ECONOMIC STABILITY: HOUSING INSECURITY
HOME SAFETY: UH BOOKLET REVIEWED. CONSENT SIGNED. EEP REVIEWED.PT SHOWN CODE TO SCAN FOR HOME CARE NATIONAL PATIENT SAFETY GOALS ON FRONT OF UHHC FOLDER.

## 2024-06-26 ASSESSMENT — ACTIVITIES OF DAILY LIVING (ADL)
PHYSICAL TRANSFERS ASSESSED: 1
ENTERING_EXITING_HOME: STAND BY ASSIST
AMBULATION ASSISTANCE: STAND BY ASSIST
AMBULATION ASSISTANCE ON FLAT SURFACES: 1
OASIS_M1830: 05
CURRENT_FUNCTION: STAND BY ASSIST
AMBULATION ASSISTANCE: 1
AMBULATION_DISTANCE/DURATION_TOLERATED: 40FT

## 2024-06-26 ASSESSMENT — ENCOUNTER SYMPTOMS
SUBJECTIVE PAIN PROGRESSION: WAXING AND WANING
DENIES PAIN: 1
PERSON REPORTING PAIN: PATIENT

## 2024-06-27 ENCOUNTER — HOME CARE VISIT (OUTPATIENT)
Dept: HOME HEALTH SERVICES | Facility: HOME HEALTH | Age: 64
End: 2024-06-27
Payer: COMMERCIAL

## 2024-06-27 VITALS
HEART RATE: 88 BPM | SYSTOLIC BLOOD PRESSURE: 140 MMHG | OXYGEN SATURATION: 99 % | DIASTOLIC BLOOD PRESSURE: 89 MMHG | TEMPERATURE: 98.1 F

## 2024-06-27 LAB
ATRIAL RATE: 120 BPM
ATRIAL RATE: 122 BPM
ATRIAL RATE: 72 BPM
ATRIAL RATE: 81 BPM
P AXIS: 33 DEGREES
P AXIS: 42 DEGREES
P AXIS: 48 DEGREES
P AXIS: 51 DEGREES
P OFFSET: 167 MS
P OFFSET: 175 MS
P OFFSET: 181 MS
P OFFSET: 183 MS
P ONSET: 121 MS
P ONSET: 131 MS
P ONSET: 132 MS
P ONSET: 136 MS
PR INTERVAL: 174 MS
PR INTERVAL: 174 MS
PR INTERVAL: 180 MS
PR INTERVAL: 196 MS
Q ONSET: 218 MS
Q ONSET: 219 MS
Q ONSET: 222 MS
Q ONSET: 223 MS
QRS COUNT: 12 BEATS
QRS COUNT: 13 BEATS
QRS COUNT: 19 BEATS
QRS COUNT: 20 BEATS
QRS DURATION: 100 MS
QRS DURATION: 82 MS
QRS DURATION: 84 MS
QRS DURATION: 98 MS
QT INTERVAL: 308 MS
QT INTERVAL: 386 MS
QT INTERVAL: 418 MS
QT INTERVAL: 426 MS
QTC CALCULATION(BAZETT): 438 MS
QTC CALCULATION(BAZETT): 466 MS
QTC CALCULATION(BAZETT): 485 MS
QTC CALCULATION(BAZETT): 545 MS
QTC FREDERICIA: 390 MS
QTC FREDERICIA: 452 MS
QTC FREDERICIA: 461 MS
QTC FREDERICIA: 486 MS
R AXIS: 0 DEGREES
R AXIS: 2 DEGREES
R AXIS: 8 DEGREES
R AXIS: 9 DEGREES
T AXIS: 2 DEGREES
T AXIS: 4 DEGREES
T AXIS: 9 DEGREES
T AXIS: 9 DEGREES
T OFFSET: 377 MS
T OFFSET: 411 MS
T OFFSET: 431 MS
T OFFSET: 432 MS
VENTRICULAR RATE: 120 BPM
VENTRICULAR RATE: 122 BPM
VENTRICULAR RATE: 72 BPM
VENTRICULAR RATE: 81 BPM

## 2024-06-27 PROCEDURE — G0152 HHCP-SERV OF OT,EA 15 MIN: HCPCS | Mod: HHH

## 2024-06-27 ASSESSMENT — ACTIVITIES OF DAILY LIVING (ADL)
ADLS_COMMENTS: RIC NEEDS TO BE ADMINISTERED.   2    CAN MANIPULATE AN EATING DEVICE, USUALLY A SPOON, BUT SOMEONE MUST PROVIDE ACTIVE ASSISTANCE DURING THE MEAL.   5    ABLE TO FEED SELF WITH SUPERVISION. ASSISTANCE IS REQUIRED WITH ASSOCIATED TASKS SUCH AS PUTTING
ADLS_COMMENTS: TRAY OR TABLE WHEN SOMEONE PUTS THE FOOD WITHIN REACH. THE PATIENT MUST PUT ON AN ASSISTIVE DEVICE IF NEEDED, CUT FOOD, AND IF DESIRED USE SALT AND PEPPER, SPREAD BUTTER, ETC.    SCORE  88/100    SCORE INTERPRETATION   TOTAL DEPENDENCE     00 - 20  S
ADLS_COMMENTS: BLADDER CONTROL   0    THE PATIENT IS DEPENDENT IN BLADDER MANAGEMENT, IS INCONTINENT, OR HAS INDWELLING CATHETER.   2    THE PATIENT IS INCONTINENT BUT IS ABLE TO ASSIST WITH THE APPLICATION OF AN INTERNAL OR EXTERNAL DEVICE.   5    THE PATIENT IS
ADLS_COMMENTS: AGEMENT.   0    DEPENDENT IN WHEELCHAIR AMBULATION.   1    PATIENT CAN PROPEL SELF SHORT DISTANCES ON FLAT SURFACE, BUT ASSISTANCE IS REQUIRED FOR ALL OTHER STEPS OF WHEELCHAIR MANAGEMENT.  3    PRESENCE OF ONE PERSON IS NECESSARY AND CONSTANT ASSIST
ADLS_COMMENTS: ET PAPER WITHOUT HELP. IF NECESSARY, THE PATIENT MAY USE A BED PAN OR COMMODE OR URINAL AT NIGHT, BUT MUST BE ABLE TO EMPTY IT AND CLEAN IT.     BOWEL CONTROL   0   THE PATIENT IS BOWEL INCONTINENT.   2   THE PATIENT NEEDS HELP TO ASSUME APPROPRIATE
ADLS_COMMENTS: LE TO CONDUCT OWN PERSONAL HYGIENE BUT REQUIRES MIN ASSIST BEFORE AND/OR AFTER THE OPERATION.   5    THE PATIENT CAN WASH HIS/HER HANDS AND FACE, COMB HAIR, CLEAN TEETH AND SHAVE. A MALE PATIENT MAY USE ANY KIND OF RAZOR BUT MUST INSERT THE BLADE, OR
ADLS_COMMENTS: WHERE PATIENTS MOVE FROM DEPENDENCY TO ASSISTED INDEPENDENCE.   60 - 80    IF LIVING ALONE WILL PROBABLY NEED A NUMBER OF COMMUNITY SERVICES TO COPE.   **MORE THAN 85     LIKELY TO BE DISCHARGED TO COMMUNITY LIVING - INDEPENDENT IN TRANSFERS AND ABL
ADLS_COMMENTS: N.   8    ASSISTANCE IS REQUIRED WITH REACHING AIDS AND/OR THEIR MANIPULATION. ONE PERSON IS REQUIRED TO OFFER ASSISTANCE.   12   THE PATIENT IS INDEPENDENT IN AMBULATION BUT UNABLE TO WALK 50 METRES WITHOUT HELP, OR SUPERVISION IS NEEDED FOR CONFIDE
ADLS_COMMENTS: OF DRESSING AND IS UNABLE TO PARTICIPATE IN THE ACTIVITY.   2     THE PATIENT IS ABLE TO PARTICIPATE TO SOME DEGREE, BUT IS DEPENDENT IN ALL ASPECTS OF DRESSING.   5     ASSISTANCE IS NEEDED IN PUTTING ON, AND/OR REMOVING ANY CLOTHING.   8     ONLY M
ADLS_COMMENTS: UIRED WITH EITHER TRANSFER TO SHOWER/BATH OR WITH WASHING OR DRYING; INCLUDING INABILITY TO COMPLETE A TASK BECAUSE OF CONDITION OR DISEASE, ETC.   4    SUPERVISION IS REQUIRED FOR SAFETY IN ADJUSTING THE WATER TEMPERATURE, OR IN THE TRANSFER.   5
ADLS_COMMENTS: THE TRANSFER.   8    THE TRANSFER REQUIRES THE ASSISTANCE OF ONE OTHER PERSON. ASSISTANCE MAY BE REQUIRED IN ANY ASPECT OF THE TRANSFER.   12  THE PRESENCE OF ANOTHER PERSON IS REQUIRED EITHER AS A CONFIDENCE MEASURE, OR TO PROVIDE SUPERVISION FOR S
ADLS_COMMENTS: PLUG IN THE RAZOR WITHOUT HELP, AS WELL AS RETRIEVE IT FROM THE DRAWER OR CABINET. A FEMALE PATIENT MUST APPLY HER OWN MAKE-UP, IF USED, BUT NEED NOT BRAID OR STYLE HER HAIR.     FEEDING    0    DEPENDENT IN ALL ASPECTS AND NEEDS TO BE FED, NASOGAST
ADLS_COMMENTS: AFETY.   15  THE PATIENT CAN SAFELY APPROACH THE BED WALKING OR IN A WHEELCHAIR, LOCK BRAKES, LIFT FOOTRESTS, OR POSITION WALKING AID, MOVE SAFELY TO BED, LIE DOWN, COME TO A SITTING POSITION ON THE SIDE OF THE BED, CHANGE THE POSITION OF THE WHEELCH
ADLS_COMMENTS: GENERALLY DRY BY DAY, BUT NOT AT NIGHT AND NEEDS SOME ASSISTANCE WITH THE DEVICES.   8    GENERALLY DRY BY DAY AND NIGHT, MAY HAVE OCCAS ACCIDENT OR NEED MIN ASSIST WITH INTERNAL OR EXTERNAL DEVICES.   10   THE PATIENT IS ABLE TO CONTROL BLADDER DAY
ADLS_COMMENTS: AND NIGHT, AND/OR IS INDEPENDENT WITH INTERNAL OR EXTERNAL DEVICES.     BATHING   0    TOTAL DEPENDENCE IN BATHING SELF.   1    ASSISTANCE IS REQUIRED IN ALL ASPECTS OF BATHING, BUT PATIENT IS ABLE TO MAKE SOME CONTRIBUTION.   3    ASSISTANCE IS REQ
ADLS_COMMENTS: E TO WALK OR USE WHEELCHAIR INDEPENDENTLY.
ADLS_COMMENTS: 5    TO PROPEL WHEELCHAIR INDEPENDENTLY, THE PATIENT MUST BE ABLE TO GO AROUND CORNERS, TURN AROUND, MANOEUVRE THE CHAIR TO A TABLE, BED, TOILET, ETC. THE PATIENT MUST BE ABLE TO PUSH A CHAIR AT LEAST 50 METRES AND NEGOTIATE KERB.       STAIR CLIMBI
ADLS_COMMENTS: POSITION, AND WITH BOWEL MOVEMENT FACILITATORY TECHNIQUES.   5   THE PATIENT CAN ASSUME APPROPRIATE POSITION, BUT CANNOT USE FACILITATORY TECHNIQUES OR CLEAN SELF WITHOUT ASSISTANCE AND HAS FREQUENT ACCIDENTS.   8    ASSISTANCE IS REQUIRED WITH INCON
ADLS_COMMENTS: EVERE DEPENDENCE     21 - 60  **MODERATE DEPENDENCE     61 - 90  SLIGHT DEPENDENCE      91 - 99  INDEPENDENCE        100    SCORE PREDICTION    LESS THAN 40    UNLIKELY TO GO HOME - DEPENDENT IN MOBILITY - DEPENDENT IN SELF CARE   60    PIVOTAL SCORE
ADLS_COMMENTS: MILK/SUGAR INTO TEA, SALT, PEPPER, SPREADING BUTTER, TURNING A PLATE OR OTHER “SET UP” ACTIVITIES.   8    INDEP WITH PREPARED TRAY, MAY NEED MEAT CUT, MILK CARTON/JAR LID OPENED. ANOTHER PERSON IS NOT REQUIRED  10   THE PATIENT CAN FEED SELF FROM A
ADLS_COMMENTS: INIMAL ASSISTANCE IS REQUIRED WITH FASTENING CLOTHING SUCH AS BUTTONS, ZIPS, BRA, SHOES, ETC.   10   THE PATIENT IS ABLE TO PUT ON, REMOVE, CORSET, BRACES, AS PRESCRIBED.     PERSONAL HYGIENE (GROOMING)   0    THE PATIENT IS UNABLE TO ATTEND TO PERSO
ADLS_COMMENTS: NG   0    THE PATIENT IS UNABLE TO CLIMB STAIRS.   2    ASSISTANCE IS REQUIRED IN ALL ASPECTS OF CHAIR CLIMBING, INCLUDING ASSISTANCE WITH WALKING AIDS.   5    THE PATIENT IS ABLE TO ASCEND/DESCEND BUT IS UNABLE TO CARRY WALKING AIDS AND NEEDS SUPERV
ADLS_COMMENTS: EMENT OF CLOTHING, TRANSFERRING, OR WASHING HANDS.   8    SUP MAY BE REQUIRED FOR SAFETY WITH NORMAL TOILET. BSC MAY BE USED AT NIGHT, ASSIST FOR EMPTYING AND CLEANING.   10   THE PATIENT IS ABLE TO GET ON/OFF THE TOILET, FASTEN CLOTHING AND USE TOIL
ADLS_COMMENTS: ANCE IS REQUIRED TO MANIPULATE CHAIR TO TABLE, BED, ETC.   4    THE PATIENT CAN PROPEL SELF FOR A REASONABLE DURATION OVER REGULARLY ENCOUNTERED TERRAIN. MINIMAL ASSISTANCE MAY STILL BE REQUIRED IN “TIGHT CORNERS” OR TO NEGOTIATE A KERB 100MM HIGH.
ADLS_COMMENTS: ISION AND ASSISTANCE.   8    GENERALLY NO ASSISTANCE IS REQUIRED. AT TIMES SUP IS REQUIRED FOR SAFETY DUE TO MORNING STIFFNESS, SOB, ETC  10   THE PATIENT IS ABLE TO GO UP/DOWN A FLIGHT OF STAIRS SAFELY WITHOUT HELP OR SUPERVISION,USE HAND RAILS, CAN
ADLS_COMMENTS: THE PATIENT MAY USE A BATHTUB, A SHOWER, OR TAKE A COMPLETE SPONGE BATH. THE PATIENT MUST BE ABLE TO DO ALL THE STEPS OF WHICHEVER METHOD IS EMPLOYED WITHOUT ANOTHER PERSON BEING PRESENT.     DRESSING   0     THE PATIENT IS DEPENDENT IN ALL ASPECTS
ADLS_COMMENTS: NCE OR SAFETY IN HAZARDOUS SITUATIONS.   15   THE PATIENT MUST BE ABLE TO WEAR BRACES IF REQUIRED, LOCK AND UNLOCK THESE BRACES ASSUME STANDING POSITION, SIT DOWN, AND PLACE THE NECESSARY AIDS INTO POSITION FOR USE. THE PATIENT MUST BE ABLE TO CRUTCH
ADLS_COMMENTS: CHAIR/BED TRANSFERS  0    UNABLE TO PARTICIPATE IN A TRANSFER. TWO ATTENDANTS ARE REQUIRED TO TRANSFER THE PATIENT WITH OR WITHOUT A MECHANICAL DEVICE.   3    ABLE TO PARTICIPATE BUT MAXIMUM ASSISTANCE OF ONE OTHER PERSON IS REQUIRE IN ALL ASPECTS OF
ADLS_COMMENTS: TINENCE AIDS SUCH AS PAD, ETC. THE PATIENT MAY REQUIRE SUPERVISION WITH THE USE OF SUPPOSITORY OR ENEMA AND HAS OCCASIONAL ACCIDENTS.   10   THE PATIENT CAN CONTROL BOWELS AND HAS NO ACCIDENTS, CAN USE SUPPOSITORY, OR TAKE AN ENEMA WHEN NECESSARY.
ADLS_COMMENTS: NAL HYGIENE AND IS DEPENDENT IN ALL ASPECTS.   1    ASSISTANCE IS REQUIRED IN ALL STEPS OF PERSONAL HYGIENE, BUT PATIENT ABLE TO MAKE SOME CONTRIBUTION.   3    SOME ASSISTANCE IS REQUIRED IN ONE OR MORE STEPS OF PERSONAL HYGIENE.   4    PATIENT IS AB
ADLS_COMMENTS: ES, CANES, OR A WALKARETTE, AND WALK 50 METRES WITHOUT HELP OR SUPERVISION.     AMBULATION/WHEELCHAIR * (IF UNABLE TO WALK) ONLY USE THIS ITEM IF THE PATIENT IS RATED “0” FOR AMBULATION, AND THEN ONLY IF THE PATIENT HAS BEEN TRAINED IN WHEELCHAIR MAN
ADLS_COMMENTS: AIR, TRANSFER BACK INTO IT SAFELY AND/OR GRASP AID AND STAND. THE PATIENT MUST BE INDEPENDENT IN ALL PHASES OF THIS ACTIVITY.     AMBULATION    0    DEPENDENT IN AMBULATION  3    CONSTANT PRESENCE OF ONE OR MORE ASSISTANT IS REQUIRED DURING AMBULATIO

## 2024-06-27 ASSESSMENT — ENCOUNTER SYMPTOMS
PAIN LOCATION - EXACERBATING FACTORS: MOVEMENT
PAIN LOCATION - PAIN FREQUENCY: FREQUENT
PERSON REPORTING PAIN: PATIENT
PAIN LOCATION: LEFT ARM
PAIN: 1
PAIN LOCATION - RELIEVING FACTORS: REST
PAIN LOCATION - PAIN SEVERITY: 5/10
PAIN LOCATION - PAIN QUALITY: ACHING
PAIN LOCATION - PAIN DURATION: ACUTE

## 2024-06-28 ENCOUNTER — HOME CARE VISIT (OUTPATIENT)
Dept: HOME HEALTH SERVICES | Facility: HOME HEALTH | Age: 64
End: 2024-06-28
Payer: COMMERCIAL

## 2024-06-29 ENCOUNTER — HOME CARE VISIT (OUTPATIENT)
Dept: HOME HEALTH SERVICES | Facility: HOME HEALTH | Age: 64
End: 2024-06-29
Payer: COMMERCIAL

## 2024-06-29 PROCEDURE — G0157 HHC PT ASSISTANT EA 15: HCPCS | Mod: CQ,HHH

## 2024-06-29 ASSESSMENT — ENCOUNTER SYMPTOMS
PAIN LOCATION - PAIN SEVERITY: 5/10
PAIN: 1
PAIN LOCATION - PAIN QUALITY: ACHE
SUBJECTIVE PAIN PROGRESSION: GRADUALLY IMPROVING
PERSON REPORTING PAIN: PATIENT
PAIN LOCATION: LEFT SHOULDER
LOWEST PAIN SEVERITY IN PAST 24 HOURS: 4/10
HIGHEST PAIN SEVERITY IN PAST 24 HOURS: 7/10
PAIN LOCATION - RELIEVING FACTORS: PAIN MEDS AND ICE
PAIN LOCATION - EXACERBATING FACTORS: MOVEMENT
PAIN SEVERITY GOAL: 0/10

## 2024-07-02 ENCOUNTER — HOME CARE VISIT (OUTPATIENT)
Dept: HOME HEALTH SERVICES | Facility: HOME HEALTH | Age: 64
End: 2024-07-02
Payer: COMMERCIAL

## 2024-07-02 VITALS
HEART RATE: 71 BPM | OXYGEN SATURATION: 99 % | TEMPERATURE: 98.2 F | DIASTOLIC BLOOD PRESSURE: 82 MMHG | SYSTOLIC BLOOD PRESSURE: 120 MMHG

## 2024-07-02 PROCEDURE — G0152 HHCP-SERV OF OT,EA 15 MIN: HCPCS | Mod: HHH

## 2024-07-02 ASSESSMENT — ENCOUNTER SYMPTOMS
PAIN LOCATION: LEFT ARM
PAIN LOCATION - PAIN SEVERITY: 5/10
PAIN LOCATION - RELIEVING FACTORS: REST
PAIN LOCATION - PAIN QUALITY: ACHING
PERSON REPORTING PAIN: PATIENT
PAIN: 1
PAIN LOCATION - PAIN DURATION: ACUTE
PAIN LOCATION - EXACERBATING FACTORS: ACTIVITY
PAIN LOCATION - PAIN FREQUENCY: CONSTANT

## 2024-07-03 ENCOUNTER — PATIENT OUTREACH (OUTPATIENT)
Dept: CARE COORDINATION | Facility: CLINIC | Age: 64
End: 2024-07-03
Payer: COMMERCIAL

## 2024-07-03 ENCOUNTER — HOME CARE VISIT (OUTPATIENT)
Dept: HOME HEALTH SERVICES | Facility: HOME HEALTH | Age: 64
End: 2024-07-03
Payer: COMMERCIAL

## 2024-07-03 PROCEDURE — G0157 HHC PT ASSISTANT EA 15: HCPCS | Mod: CQ,HHH

## 2024-07-03 NOTE — PROGRESS NOTES
Unable to reach patient for call back after patient's follow up appointment with PCP.   IAINM with call back number for patient to call if needed   If no voicemail available call attempts x 2 were made to contact the patient to assist with any questions or concerns patient may have.      
Unknown if ever smoked

## 2024-07-04 ENCOUNTER — APPOINTMENT (OUTPATIENT)
Dept: HOME HEALTH SERVICES | Facility: HOME HEALTH | Age: 64
End: 2024-07-04
Payer: COMMERCIAL

## 2024-07-05 ENCOUNTER — TELEPHONE (OUTPATIENT)
Dept: PRIMARY CARE | Facility: CLINIC | Age: 64
End: 2024-07-05
Payer: COMMERCIAL

## 2024-07-05 ASSESSMENT — ENCOUNTER SYMPTOMS
DENIES PAIN: 1
PERSON REPORTING PAIN: PATIENT

## 2024-07-08 ENCOUNTER — HOME CARE VISIT (OUTPATIENT)
Dept: HOME HEALTH SERVICES | Facility: HOME HEALTH | Age: 64
End: 2024-07-08
Payer: COMMERCIAL

## 2024-07-08 PROCEDURE — G0157 HHC PT ASSISTANT EA 15: HCPCS | Mod: CQ,HHH

## 2024-07-08 ASSESSMENT — ENCOUNTER SYMPTOMS
PERSON REPORTING PAIN: PATIENT
DENIES PAIN: 1

## 2024-07-09 ENCOUNTER — OFFICE VISIT (OUTPATIENT)
Dept: ORTHOPEDIC SURGERY | Facility: HOSPITAL | Age: 64
End: 2024-07-09
Payer: COMMERCIAL

## 2024-07-09 ENCOUNTER — HOSPITAL ENCOUNTER (OUTPATIENT)
Dept: RADIOLOGY | Facility: HOSPITAL | Age: 64
Discharge: HOME | End: 2024-07-09
Payer: COMMERCIAL

## 2024-07-09 DIAGNOSIS — S42.292A CLOSED FRACTURE OF HEAD OF LEFT HUMERUS, INITIAL ENCOUNTER: ICD-10-CM

## 2024-07-09 DIAGNOSIS — S42.292A CLOSED FRACTURE OF HEAD OF LEFT HUMERUS, INITIAL ENCOUNTER: Primary | ICD-10-CM

## 2024-07-09 PROCEDURE — 73060 X-RAY EXAM OF HUMERUS: CPT | Mod: LT

## 2024-07-09 PROCEDURE — 99211 OFF/OP EST MAY X REQ PHY/QHP: CPT

## 2024-07-09 PROCEDURE — 73030 X-RAY EXAM OF SHOULDER: CPT | Mod: LEFT SIDE | Performed by: RADIOLOGY

## 2024-07-09 PROCEDURE — 73060 X-RAY EXAM OF HUMERUS: CPT | Mod: LEFT SIDE | Performed by: RADIOLOGY

## 2024-07-09 PROCEDURE — 73030 X-RAY EXAM OF SHOULDER: CPT | Mod: LT

## 2024-07-09 PROCEDURE — 1036F TOBACCO NON-USER: CPT

## 2024-07-09 ASSESSMENT — PAIN SCALES - GENERAL: PAINLEVEL_OUTOF10: 5 - MODERATE PAIN

## 2024-07-09 ASSESSMENT — PAIN - FUNCTIONAL ASSESSMENT: PAIN_FUNCTIONAL_ASSESSMENT: 0-10

## 2024-07-09 NOTE — PROGRESS NOTES
Subjective    Patient ID: Yue Sanz is a 63 y.o. female.    Chief Complaint: Post-op of the Left Arm     Last Surgery: insertion of left humerus intramedullary nail  Last Surgery Date: 6/19/2024    HPI  Patient is a 63 y.o. female who is s/p insertion of left humerus intramedullary nail. Date of surgery was 6/19/2024. Patient continues to be weight bearing as tolerated on the left arm at this time and denies issues with incision. Patient continues with therapy sessions, performing exercise program at home. Patient denies fever or chills, N/T or arm pain.     ROS: All other systems have been reviewed and are negative except as previously noted in history of present illness.      IMP:  Problem List Items Addressed This Visit    None  Visit Diagnoses       Closed fracture of head of left humerus, initial encounter    -  Primary    Relevant Orders    XR humerus left (Completed)    XR shoulder left 2+ views (Completed)          Objective   General: Alert and oriented x 3, NAD, respirations easy and unlabored with no audible wheezes, skin warm and dry, speech and dress appropriate for noted age, affect euthymic.     Musculoskeletal: left upper extremity  incision well-healed  compartments soft  mild swelling to upper extremity  sensation intact to light touch  motor intact including R/U/M/AIN/PIN nn.  palpable radial pulse 2+     X-ray: Images of left humerus and shoulder reviewed personally by me today and reveal maintenance of alignment of proximal humerus fracture with hardware in position and no interval change.      Assessment/Plan   Encounter Diagnoses:  Closed fracture of head of left humerus, initial encounter    PLAN: Patient is s/p insertion of left humerus intramedullary nail. Staples were removed at this visit. Patient overall is doing well. She is weight bearing as tolerated on the left arm at this time. She is currently working with home physical therapy. Imaging reveals alignment of proximal humerus  fracture with stable hardware. Patient is educated that she may continue to weight bear as tolerated on the left arm. She will continue to work with physical therapy on progressive range of motion of the left arm. Patient will follow up in 2 months. Patient is in agreement with this plan. Xrays of the left humerus and shoulder will be needed.     Orders Placed This Encounter    XR humerus left    XR shoulder left 2+ views     No follow-ups on file.

## 2024-07-10 ENCOUNTER — HOME CARE VISIT (OUTPATIENT)
Dept: HOME HEALTH SERVICES | Facility: HOME HEALTH | Age: 64
End: 2024-07-10
Payer: COMMERCIAL

## 2024-07-10 VITALS
SYSTOLIC BLOOD PRESSURE: 108 MMHG | HEART RATE: 70 BPM | DIASTOLIC BLOOD PRESSURE: 62 MMHG | TEMPERATURE: 98.2 F | RESPIRATION RATE: 16 BRPM

## 2024-07-10 PROCEDURE — G0151 HHCP-SERV OF PT,EA 15 MIN: HCPCS | Mod: HHH

## 2024-07-10 SDOH — HEALTH STABILITY: PHYSICAL HEALTH: EXERCISE TYPE: SEE COMMENTS

## 2024-07-10 SDOH — HEALTH STABILITY: PHYSICAL HEALTH
EXERCISE COMMENTS: STANDING AT COUNTER  HEEL RAISES, MINI SQUATS, HIP ABD, HIP EXT, HAMSTRING CURLS, MARCHING X 15 REPS  BALANCE EXS, SIDE STEPPING, SINGLE LEG STANCE, CROSS OVERS X 10 REPS

## 2024-07-10 ASSESSMENT — ACTIVITIES OF DAILY LIVING (ADL)
AMBULATION ASSISTANCE ON FLAT SURFACES: 1
AMBULATION ASSISTANCE: 1
AMBULATION_DISTANCE/DURATION_TOLERATED: 300

## 2024-07-10 ASSESSMENT — ENCOUNTER SYMPTOMS
MUSCLE WEAKNESS: 1
LIMITED RANGE OF MOTION: 1

## 2024-07-10 NOTE — CASE COMMUNICATION
DISCHARGE SUMMARY: Pt indep with mobility    DISCIPLINE: PT  DATE OF DISCIPLINE DISCHARGE: 7.10  REASON FOR DISCHARGE: goals achieved  COORDINATION NOTE: DC summary  EVALUATION OF GOALS: Indep HEP, indep mobility  SUMMARY OF CARE PROVIDED: Pt educated on HEP for LE strength and balance, gait training  DISCHARGE INSTRUCTIONS GIVEN: Fall prev and safety, wound care, continue HEP  SERVICES REMAINING: OT  NOMNC OBTAINED: NA

## 2024-07-11 ENCOUNTER — HOME CARE VISIT (OUTPATIENT)
Dept: HOME HEALTH SERVICES | Facility: HOME HEALTH | Age: 64
End: 2024-07-11
Payer: COMMERCIAL

## 2024-07-11 VITALS
DIASTOLIC BLOOD PRESSURE: 85 MMHG | TEMPERATURE: 98.2 F | SYSTOLIC BLOOD PRESSURE: 138 MMHG | OXYGEN SATURATION: 99 % | HEART RATE: 90 BPM

## 2024-07-11 PROCEDURE — G0152 HHCP-SERV OF OT,EA 15 MIN: HCPCS | Mod: HHH

## 2024-07-16 ENCOUNTER — HOME CARE VISIT (OUTPATIENT)
Dept: HOME HEALTH SERVICES | Facility: HOME HEALTH | Age: 64
End: 2024-07-16
Payer: COMMERCIAL

## 2024-07-16 VITALS — HEART RATE: 95 BPM | TEMPERATURE: 97.6 F | OXYGEN SATURATION: 97 %

## 2024-07-16 DIAGNOSIS — M54.50 LUMBAR PAIN WITH RADIATION DOWN LEFT LEG: ICD-10-CM

## 2024-07-16 DIAGNOSIS — R00.0 SINUS TACHYCARDIA: ICD-10-CM

## 2024-07-16 DIAGNOSIS — M79.605 LUMBAR PAIN WITH RADIATION DOWN LEFT LEG: ICD-10-CM

## 2024-07-16 DIAGNOSIS — I10 BENIGN ESSENTIAL HYPERTENSION: ICD-10-CM

## 2024-07-16 PROCEDURE — G0152 HHCP-SERV OF OT,EA 15 MIN: HCPCS | Mod: HHH

## 2024-07-16 RX ORDER — METOPROLOL TARTRATE 25 MG/1
25 TABLET, FILM COATED ORAL 2 TIMES DAILY
Qty: 60 TABLET | Refills: 0 | Status: SHIPPED | OUTPATIENT
Start: 2024-07-16 | End: 2024-08-15

## 2024-07-16 RX ORDER — HYDRALAZINE HYDROCHLORIDE 10 MG/1
10 TABLET, FILM COATED ORAL 3 TIMES DAILY
Qty: 90 TABLET | Refills: 0 | Status: SHIPPED | OUTPATIENT
Start: 2024-07-16 | End: 2024-10-14

## 2024-07-16 RX ORDER — CYCLOBENZAPRINE HCL 10 MG
10 TABLET ORAL 3 TIMES DAILY PRN
Qty: 20 TABLET | Refills: 0 | Status: SHIPPED | OUTPATIENT
Start: 2024-07-16 | End: 2024-07-26

## 2024-07-16 ASSESSMENT — ACTIVITIES OF DAILY LIVING (ADL)
ADLS_COMMENTS: POSITION, AND WITH BOWEL MOVEMENT FACILITATORY TECHNIQUES.   5   THE PATIENT CAN ASSUME APPROPRIATE POSITION, BUT CANNOT USE FACILITATORY TECHNIQUES OR CLEAN SELF WITHOUT ASSISTANCE AND HAS FREQUENT ACCIDENTS.   8    ASSISTANCE IS REQUIRED WITH INCON
OASIS_M1830: 00
ADLS_COMMENTS: UIRED WITH EITHER TRANSFER TO SHOWER/BATH OR WITH WASHING OR DRYING; INCLUDING INABILITY TO COMPLETE A TASK BECAUSE OF CONDITION OR DISEASE, ETC.   4    SUPERVISION IS REQUIRED FOR SAFETY IN ADJUSTING THE WATER TEMPERATURE, OR IN THE TRANSFER.   5
ADLS_COMMENTS: THE TRANSFER.   8    THE TRANSFER REQUIRES THE ASSISTANCE OF ONE OTHER PERSON. ASSISTANCE MAY BE REQUIRED IN ANY ASPECT OF THE TRANSFER.   12  THE PRESENCE OF ANOTHER PERSON IS REQUIRED EITHER AS A CONFIDENCE MEASURE, OR TO PROVIDE SUPERVISION FOR S
ADLS_COMMENTS: E WHERE PATIENTS MOVE FROM DEPENDENCY TO ASSISTED INDEPENDENCE.   60 - 80    IF LIVING ALONE WILL PROBABLY NEED A NUMBER OF COMMUNITY SERVICES TO COPE.   **MORE THAN 85     LIKELY TO BE DISCHARGED TO COMMUNITY LIVING - INDEPENDENT IN TRANSFERS AND AB
ADLS_COMMENTS: ET PAPER WITHOUT HELP. IF NECESSARY, THE PATIENT MAY USE A BED PAN OR COMMODE OR URINAL AT NIGHT, BUT MUST BE ABLE TO EMPTY IT AND CLEAN IT.     BOWEL CONTROL   0   THE PATIENT IS BOWEL INCONTINENT.   2   THE PATIENT NEEDS HELP TO ASSUME APPROPRIATE
ADLS_COMMENTS: ES, CANES, OR A WALKARETTE, AND WALK 50 METRES WITHOUT HELP OR SUPERVISION.     AMBULATION/WHEELCHAIR * (IF UNABLE TO WALK) ONLY USE THIS ITEM IF THE PATIENT IS RATED “0” FOR AMBULATION, AND THEN ONLY IF THE PATIENT HAS BEEN TRAINED IN WHEELCHAIR MAN
ADLS_COMMENTS: TINENCE AIDS SUCH AS PAD, ETC. THE PATIENT MAY REQUIRE SUPERVISION WITH THE USE OF SUPPOSITORY OR ENEMA AND HAS OCCASIONAL ACCIDENTS.   10   THE PATIENT CAN CONTROL BOWELS AND HAS NO ACCIDENTS, CAN USE SUPPOSITORY, OR TAKE AN ENEMA WHEN NECESSARY.
ADLS_COMMENTS: 5    TO PROPEL WHEELCHAIR INDEPENDENTLY, THE PATIENT MUST BE ABLE TO GO AROUND CORNERS, TURN AROUND, MANOEUVRE THE CHAIR TO A TABLE, BED, TOILET, ETC. THE PATIENT MUST BE ABLE TO PUSH A CHAIR AT LEAST 50 METRES AND NEGOTIATE KERB.       STAIR CLIMBI
ADLS_COMMENTS: AFETY.   15  THE PATIENT CAN SAFELY APPROACH THE BED WALKING OR IN A WHEELCHAIR, LOCK BRAKES, LIFT FOOTRESTS, OR POSITION WALKING AID, MOVE SAFELY TO BED, LIE DOWN, COME TO A SITTING POSITION ON THE SIDE OF THE BED, CHANGE THE POSITION OF THE WHEELCH
ADLS_COMMENTS: THE PATIENT MAY USE A BATHTUB, A SHOWER, OR TAKE A COMPLETE SPONGE BATH. THE PATIENT MUST BE ABLE TO DO ALL THE STEPS OF WHICHEVER METHOD IS EMPLOYED WITHOUT ANOTHER PERSON BEING PRESENT.     DRESSING   0     THE PATIENT IS DEPENDENT IN ALL ASPECTS
ADLS_COMMENTS: ANCE IS REQUIRED TO MANIPULATE CHAIR TO TABLE, BED, ETC.   4    THE PATIENT CAN PROPEL SELF FOR A REASONABLE DURATION OVER REGULARLY ENCOUNTERED TERRAIN. MINIMAL ASSISTANCE MAY STILL BE REQUIRED IN “TIGHT CORNERS” OR TO NEGOTIATE A KERB 100MM HIGH.
ADLS_COMMENTS: AND NIGHT, AND/OR IS INDEPENDENT WITH INTERNAL OR EXTERNAL DEVICES.     BATHING   0    TOTAL DEPENDENCE IN BATHING SELF.   1    ASSISTANCE IS REQUIRED IN ALL ASPECTS OF BATHING, BUT PATIENT IS ABLE TO MAKE SOME CONTRIBUTION.   3    ASSISTANCE IS REQ
ADLS_COMMENTS: TRAY OR TABLE WHEN SOMEONE PUTS THE FOOD WITHIN REACH. THE PATIENT MUST PUT ON AN ASSISTIVE DEVICE IF NEEDED, CUT FOOD, AND IF DESIRED USE SALT AND PEPPER, SPREAD BUTTER, ETC.    SCORE  100/100    SCORE INTERPRETATION   TOTAL DEPENDENCE     00 - 20
ADLS_COMMENTS: BLADDER CONTROL   0    THE PATIENT IS DEPENDENT IN BLADDER MANAGEMENT, IS INCONTINENT, OR HAS INDWELLING CATHETER.   2    THE PATIENT IS INCONTINENT BUT IS ABLE TO ASSIST WITH THE APPLICATION OF AN INTERNAL OR EXTERNAL DEVICE.   5    THE PATIENT IS
ADLS_COMMENTS: OF DRESSING AND IS UNABLE TO PARTICIPATE IN THE ACTIVITY.   2     THE PATIENT IS ABLE TO PARTICIPATE TO SOME DEGREE, BUT IS DEPENDENT IN ALL ASPECTS OF DRESSING.   5     ASSISTANCE IS NEEDED IN PUTTING ON, AND/OR REMOVING ANY CLOTHING.   8     ONLY M
ADLS_COMMENTS: ISION AND ASSISTANCE.   8    GENERALLY NO ASSISTANCE IS REQUIRED. AT TIMES SUP IS REQUIRED FOR SAFETY DUE TO MORNING STIFFNESS, SOB, ETC  10   THE PATIENT IS ABLE TO GO UP/DOWN A FLIGHT OF STAIRS SAFELY WITHOUT HELP OR SUPERVISION,USE HAND RAILS, CAN
ADLS_COMMENTS: GENERALLY DRY BY DAY, BUT NOT AT NIGHT AND NEEDS SOME ASSISTANCE WITH THE DEVICES.   8    GENERALLY DRY BY DAY AND NIGHT, MAY HAVE OCCAS ACCIDENT OR NEED MIN ASSIST WITH INTERNAL OR EXTERNAL DEVICES.   10   THE PATIENT IS ABLE TO CONTROL BLADDER DAY
HOME_HEALTH_OASIS: 00
ADLS_COMMENTS: AGEMENT.   0    DEPENDENT IN WHEELCHAIR AMBULATION.   1    PATIENT CAN PROPEL SELF SHORT DISTANCES ON FLAT SURFACE, BUT ASSISTANCE IS REQUIRED FOR ALL OTHER STEPS OF WHEELCHAIR MANAGEMENT.  3    PRESENCE OF ONE PERSON IS NECESSARY AND CONSTANT ASSIST
ADLS_COMMENTS: SEVERE DEPENDENCE     21 - 60  MODERATE DEPENDENCE     61 - 90  SLIGHT DEPENDENCE      91 - 99  **INDEPENDENCE        100    SCORE PREDICTION    LESS THAN 40    UNLIKELY TO GO HOME - DEPENDENT IN MOBILITY - DEPENDENT IN SELF CARE   60    PIVOTAL SCOR
ADLS_COMMENTS: AIR, TRANSFER BACK INTO IT SAFELY AND/OR GRASP AID AND STAND. THE PATIENT MUST BE INDEPENDENT IN ALL PHASES OF THIS ACTIVITY.     AMBULATION    0    DEPENDENT IN AMBULATION  3    CONSTANT PRESENCE OF ONE OR MORE ASSISTANT IS REQUIRED DURING AMBULATIO
ADLS_COMMENTS: NAL HYGIENE AND IS DEPENDENT IN ALL ASPECTS.   1    ASSISTANCE IS REQUIRED IN ALL STEPS OF PERSONAL HYGIENE, BUT PATIENT ABLE TO MAKE SOME CONTRIBUTION.   3    SOME ASSISTANCE IS REQUIRED IN ONE OR MORE STEPS OF PERSONAL HYGIENE.   4    PATIENT IS AB
ADLS_COMMENTS: PLUG IN THE RAZOR WITHOUT HELP, AS WELL AS RETRIEVE IT FROM THE DRAWER OR CABINET. A FEMALE PATIENT MUST APPLY HER OWN MAKE-UP, IF USED, BUT NEED NOT BRAID OR STYLE HER HAIR.     FEEDING    0    DEPENDENT IN ALL ASPECTS AND NEEDS TO BE FED, NASOGAST
ADLS_COMMENTS: N.   8    ASSISTANCE IS REQUIRED WITH REACHING AIDS AND/OR THEIR MANIPULATION. ONE PERSON IS REQUIRED TO OFFER ASSISTANCE.   12   THE PATIENT IS INDEPENDENT IN AMBULATION BUT UNABLE TO WALK 50 METRES WITHOUT HELP, OR SUPERVISION IS NEEDED FOR CONFIDE
ADLS_COMMENTS: NCE OR SAFETY IN HAZARDOUS SITUATIONS.   15   THE PATIENT MUST BE ABLE TO WEAR BRACES IF REQUIRED, LOCK AND UNLOCK THESE BRACES ASSUME STANDING POSITION, SIT DOWN, AND PLACE THE NECESSARY AIDS INTO POSITION FOR USE. THE PATIENT MUST BE ABLE TO CRUTCH
ADLS_COMMENTS: MILK/SUGAR INTO TEA, SALT, PEPPER, SPREADING BUTTER, TURNING A PLATE OR OTHER “SET UP” ACTIVITIES.   8    INDEP WITH PREPARED TRAY, MAY NEED MEAT CUT, MILK CARTON/JAR LID OPENED. ANOTHER PERSON IS NOT REQUIRED  10   THE PATIENT CAN FEED SELF FROM A
ADLS_COMMENTS: NG   0    THE PATIENT IS UNABLE TO CLIMB STAIRS.   2    ASSISTANCE IS REQUIRED IN ALL ASPECTS OF CHAIR CLIMBING, INCLUDING ASSISTANCE WITH WALKING AIDS.   5    THE PATIENT IS ABLE TO ASCEND/DESCEND BUT IS UNABLE TO CARRY WALKING AIDS AND NEEDS SUPERV
ADLS_COMMENTS: CHAIR/BED TRANSFERS  0    UNABLE TO PARTICIPATE IN A TRANSFER. TWO ATTENDANTS ARE REQUIRED TO TRANSFER THE PATIENT WITH OR WITHOUT A MECHANICAL DEVICE.   3    ABLE TO PARTICIPATE BUT MAXIMUM ASSISTANCE OF ONE OTHER PERSON IS REQUIRE IN ALL ASPECTS OF
ADLS_COMMENTS: RIC NEEDS TO BE ADMINISTERED.   2    CAN MANIPULATE AN EATING DEVICE, USUALLY A SPOON, BUT SOMEONE MUST PROVIDE ACTIVE ASSISTANCE DURING THE MEAL.   5    ABLE TO FEED SELF WITH SUPERVISION. ASSISTANCE IS REQUIRED WITH ASSOCIATED TASKS SUCH AS PUTTING
ADLS_COMMENTS: INIMAL ASSISTANCE IS REQUIRED WITH FASTENING CLOTHING SUCH AS BUTTONS, ZIPS, BRA, SHOES, ETC.   10   THE PATIENT IS ABLE TO PUT ON, REMOVE, CORSET, BRACES, AS PRESCRIBED.     PERSONAL HYGIENE (GROOMING)   0    THE PATIENT IS UNABLE TO ATTEND TO PERSO
ADLS_COMMENTS: LE TO WALK OR USE WHEELCHAIR INDEPENDENTLY.
ADLS_COMMENTS: EMENT OF CLOTHING, TRANSFERRING, OR WASHING HANDS.   8    SUP MAY BE REQUIRED FOR SAFETY WITH NORMAL TOILET. BSC MAY BE USED AT NIGHT, ASSIST FOR EMPTYING AND CLEANING.   10   THE PATIENT IS ABLE TO GET ON/OFF THE TOILET, FASTEN CLOTHING AND USE TOIL
ADLS_COMMENTS: LE TO CONDUCT OWN PERSONAL HYGIENE BUT REQUIRES MIN ASSIST BEFORE AND/OR AFTER THE OPERATION.   5    THE PATIENT CAN WASH HIS/HER HANDS AND FACE, COMB HAIR, CLEAN TEETH AND SHAVE. A MALE PATIENT MAY USE ANY KIND OF RAZOR BUT MUST INSERT THE BLADE, OR

## 2024-07-16 ASSESSMENT — ENCOUNTER SYMPTOMS
PAIN LOCATION - PAIN DURATION: ACUTE
PAIN LOCATION - EXACERBATING FACTORS: ACTIVITY
PERSON REPORTING PAIN: PATIENT
PAIN LOCATION: LEFT SHOULDER
PAIN LOCATION - PAIN QUALITY: ACHING
PAIN LOCATION - PAIN SEVERITY: 3/10
PAIN LOCATION - RELIEVING FACTORS: REST
PAIN LOCATION - PAIN FREQUENCY: FREQUENT
PAIN: 1

## 2024-07-16 NOTE — TELEPHONE ENCOUNTER
Patient needs refill of :    Hydralazine 10 mg (0 left )  cyclobenzaprine (Flexeril) 10 mg tablet   metoprolol tartrate (Lopressor) 25 mg tablet     Pharmacy : [facility]     Next appointment : 09-    Thanks...

## 2024-07-22 ENCOUNTER — PATIENT OUTREACH (OUTPATIENT)
Dept: CARE COORDINATION | Facility: CLINIC | Age: 64
End: 2024-07-22
Payer: COMMERCIAL

## 2024-07-24 ENCOUNTER — HOSPITAL ENCOUNTER (INPATIENT)
Facility: HOSPITAL | Age: 64
LOS: 3 days | Discharge: NURSING, REHAB OR LONG-TERM CARE | End: 2024-07-27
Attending: EMERGENCY MEDICINE | Admitting: INTERNAL MEDICINE
Payer: COMMERCIAL

## 2024-07-24 ENCOUNTER — APPOINTMENT (OUTPATIENT)
Dept: RADIOLOGY | Facility: HOSPITAL | Age: 64
End: 2024-07-24
Payer: COMMERCIAL

## 2024-07-24 ENCOUNTER — APPOINTMENT (OUTPATIENT)
Dept: CARDIOLOGY | Facility: HOSPITAL | Age: 64
End: 2024-07-24
Payer: COMMERCIAL

## 2024-07-24 DIAGNOSIS — S79.912A INJURY OF LEFT HIP, INITIAL ENCOUNTER: ICD-10-CM

## 2024-07-24 DIAGNOSIS — F10.10 ALCOHOL ABUSE: ICD-10-CM

## 2024-07-24 DIAGNOSIS — R26.2 UNABLE TO AMBULATE: ICD-10-CM
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
## 2024-07-24 DIAGNOSIS — R60.0 LOCALIZED EDEMA: ICD-10-CM

## 2024-07-24 DIAGNOSIS — E87.6 HYPOKALEMIA: ICD-10-CM

## 2024-07-24 DIAGNOSIS — S42.292A OTHER CLOSED DISPLACED FRACTURE OF PROXIMAL END OF LEFT HUMERUS, INITIAL ENCOUNTER: ICD-10-CM

## 2024-07-24 DIAGNOSIS — N17.9 AKI (ACUTE KIDNEY INJURY) (CMS-HCC): ICD-10-CM

## 2024-07-24 DIAGNOSIS — N30.00 ACUTE CYSTITIS WITHOUT HEMATURIA: ICD-10-CM

## 2024-07-24 LAB
ALBUMIN SERPL BCP-MCNC: 4.1 G/DL (ref 3.4–5)
ALP SERPL-CCNC: 112 U/L (ref 33–136)
ALT SERPL W P-5'-P-CCNC: 13 U/L (ref 7–45)
ANION GAP SERPL CALC-SCNC: 17 MMOL/L (ref 10–20)
AST SERPL W P-5'-P-CCNC: 27 U/L (ref 9–39)
ATRIAL RATE: 63 BPM
BASOPHILS # BLD AUTO: 0.06 X10*3/UL (ref 0–0.1)
BASOPHILS NFR BLD AUTO: 0.8 %
BILIRUB SERPL-MCNC: 1.1 MG/DL (ref 0–1.2)
BUN SERPL-MCNC: 26 MG/DL (ref 6–23)
CALCIUM SERPL-MCNC: 9.4 MG/DL (ref 8.6–10.3)
CHLORIDE SERPL-SCNC: 107 MMOL/L (ref 98–107)
CO2 SERPL-SCNC: 19 MMOL/L (ref 21–32)
CREAT SERPL-MCNC: 1.31 MG/DL (ref 0.5–1.05)
EGFRCR SERPLBLD CKD-EPI 2021: 46 ML/MIN/1.73M*2
EOSINOPHIL # BLD AUTO: 0.22 X10*3/UL (ref 0–0.7)
EOSINOPHIL NFR BLD AUTO: 2.9 %
ERYTHROCYTE [DISTWIDTH] IN BLOOD BY AUTOMATED COUNT: 14.6 % (ref 11.5–14.5)
GLUCOSE SERPL-MCNC: 82 MG/DL (ref 74–99)
HCT VFR BLD AUTO: 33.5 % (ref 36–46)
HGB BLD-MCNC: 11.1 G/DL (ref 12–16)
IMM GRANULOCYTES # BLD AUTO: 0.03 X10*3/UL (ref 0–0.7)
IMM GRANULOCYTES NFR BLD AUTO: 0.4 % (ref 0–0.9)
LYMPHOCYTES # BLD AUTO: 1.84 X10*3/UL (ref 1.2–4.8)
LYMPHOCYTES NFR BLD AUTO: 24.3 %
MCH RBC QN AUTO: 31.6 PG (ref 26–34)
MCHC RBC AUTO-ENTMCNC: 33.1 G/DL (ref 32–36)
MCV RBC AUTO: 95 FL (ref 80–100)
MONOCYTES # BLD AUTO: 0.66 X10*3/UL (ref 0.1–1)
MONOCYTES NFR BLD AUTO: 8.7 %
NEUTROPHILS # BLD AUTO: 4.76 X10*3/UL (ref 1.2–7.7)
NEUTROPHILS NFR BLD AUTO: 62.9 %
NRBC BLD-RTO: 0 /100 WBCS (ref 0–0)
P AXIS: 59 DEGREES
P OFFSET: 162 MS
P ONSET: 115 MS
PLATELET # BLD AUTO: 318 X10*3/UL (ref 150–450)
POTASSIUM SERPL-SCNC: 3.2 MMOL/L (ref 3.5–5.3)
PR INTERVAL: 204 MS
PROT SERPL-MCNC: 7.2 G/DL (ref 6.4–8.2)
Q ONSET: 217 MS
QRS COUNT: 10 BEATS
QRS DURATION: 102 MS
QT INTERVAL: 526 MS
QTC CALCULATION(BAZETT): 538 MS
QTC FREDERICIA: 534 MS
R AXIS: 46 DEGREES
RBC # BLD AUTO: 3.51 X10*6/UL (ref 4–5.2)
SODIUM SERPL-SCNC: 140 MMOL/L (ref 136–145)
T AXIS: 37 DEGREES
T OFFSET: 480 MS
VENTRICULAR RATE: 63 BPM
WBC # BLD AUTO: 7.6 X10*3/UL (ref 4.4–11.3)

## 2024-07-24 PROCEDURE — 99285 EMERGENCY DEPT VISIT HI MDM: CPT

## 2024-07-24 PROCEDURE — 2500000004 HC RX 250 GENERAL PHARMACY W/ HCPCS (ALT 636 FOR OP/ED): Performed by: INTERNAL MEDICINE

## 2024-07-24 PROCEDURE — G0378 HOSPITAL OBSERVATION PER HR: HCPCS

## 2024-07-24 PROCEDURE — 96361 HYDRATE IV INFUSION ADD-ON: CPT

## 2024-07-24 PROCEDURE — 2500000004 HC RX 250 GENERAL PHARMACY W/ HCPCS (ALT 636 FOR OP/ED): Performed by: EMERGENCY MEDICINE

## 2024-07-24 PROCEDURE — 96374 THER/PROPH/DIAG INJ IV PUSH: CPT

## 2024-07-24 PROCEDURE — 73502 X-RAY EXAM HIP UNI 2-3 VIEWS: CPT | Mod: LEFT SIDE | Performed by: RADIOLOGY

## 2024-07-24 PROCEDURE — 80053 COMPREHEN METABOLIC PANEL: CPT | Performed by: EMERGENCY MEDICINE

## 2024-07-24 PROCEDURE — 72192 CT PELVIS W/O DYE: CPT | Performed by: RADIOLOGY

## 2024-07-24 PROCEDURE — 1100000001 HC PRIVATE ROOM DAILY

## 2024-07-24 PROCEDURE — 85025 COMPLETE CBC W/AUTO DIFF WBC: CPT | Performed by: EMERGENCY MEDICINE

## 2024-07-24 PROCEDURE — 2500000001 HC RX 250 WO HCPCS SELF ADMINISTERED DRUGS (ALT 637 FOR MEDICARE OP): Performed by: INTERNAL MEDICINE

## 2024-07-24 PROCEDURE — 72192 CT PELVIS W/O DYE: CPT

## 2024-07-24 PROCEDURE — 36415 COLL VENOUS BLD VENIPUNCTURE: CPT | Performed by: EMERGENCY MEDICINE

## 2024-07-24 PROCEDURE — 73502 X-RAY EXAM HIP UNI 2-3 VIEWS: CPT | Mod: LT

## 2024-07-24 PROCEDURE — 2500000002 HC RX 250 W HCPCS SELF ADMINISTERED DRUGS (ALT 637 FOR MEDICARE OP, ALT 636 FOR OP/ED): Performed by: EMERGENCY MEDICINE

## 2024-07-24 PROCEDURE — 93005 ELECTROCARDIOGRAM TRACING: CPT

## 2024-07-24 RX ORDER — PHENOBARBITAL 32.4 MG/1
32.4 TABLET ORAL 3 TIMES DAILY
Status: DISCONTINUED | OUTPATIENT
Start: 2024-07-26 | End: 2024-07-27 | Stop reason: HOSPADM

## 2024-07-24 RX ORDER — ALUMINUM HYDROXIDE, MAGNESIUM HYDROXIDE, AND SIMETHICONE 1200; 120; 1200 MG/30ML; MG/30ML; MG/30ML
20 SUSPENSION ORAL 4 TIMES DAILY PRN
Status: DISCONTINUED | OUTPATIENT
Start: 2024-07-24 | End: 2024-07-27 | Stop reason: HOSPADM

## 2024-07-24 RX ORDER — MULTIVIT-MIN/IRON FUM/FOLIC AC 7.5 MG-4
1 TABLET ORAL DAILY
Status: DISCONTINUED | OUTPATIENT
Start: 2024-07-24 | End: 2024-07-27 | Stop reason: HOSPADM

## 2024-07-24 RX ORDER — ACETAMINOPHEN 325 MG/1
650 TABLET ORAL EVERY 6 HOURS PRN
Status: DISCONTINUED | OUTPATIENT
Start: 2024-07-24 | End: 2024-07-25

## 2024-07-24 RX ORDER — ONDANSETRON HYDROCHLORIDE 2 MG/ML
4 INJECTION, SOLUTION INTRAVENOUS EVERY 6 HOURS PRN
Status: DISCONTINUED | OUTPATIENT
Start: 2024-07-24 | End: 2024-07-25

## 2024-07-24 RX ORDER — TRAMADOL HYDROCHLORIDE 50 MG/1
50 TABLET ORAL EVERY 6 HOURS PRN
Status: DISCONTINUED | OUTPATIENT
Start: 2024-07-24 | End: 2024-07-27 | Stop reason: HOSPADM

## 2024-07-24 RX ORDER — PHENOBARBITAL 32.4 MG/1
64.8 TABLET ORAL 3 TIMES DAILY
Status: COMPLETED | OUTPATIENT
Start: 2024-07-24 | End: 2024-07-26

## 2024-07-24 RX ORDER — LORAZEPAM 1 MG/1
2 TABLET ORAL EVERY 6 HOURS PRN
Status: DISCONTINUED | OUTPATIENT
Start: 2024-07-24 | End: 2024-07-24

## 2024-07-24 RX ORDER — LORAZEPAM 1 MG/1
0.5 TABLET ORAL EVERY 2 HOUR PRN
Status: DISCONTINUED | OUTPATIENT
Start: 2024-07-24 | End: 2024-07-24

## 2024-07-24 RX ORDER — MORPHINE SULFATE 4 MG/ML
4 INJECTION, SOLUTION INTRAMUSCULAR; INTRAVENOUS ONCE
Status: COMPLETED | OUTPATIENT
Start: 2024-07-24 | End: 2024-07-24

## 2024-07-24 RX ORDER — PANTOPRAZOLE SODIUM 40 MG/1
40 TABLET, DELAYED RELEASE ORAL
Status: DISCONTINUED | OUTPATIENT
Start: 2024-07-25 | End: 2024-07-25

## 2024-07-24 RX ORDER — FOLIC ACID 1 MG/1
1 TABLET ORAL DAILY
Status: DISCONTINUED | OUTPATIENT
Start: 2024-07-24 | End: 2024-07-27 | Stop reason: HOSPADM

## 2024-07-24 RX ORDER — POTASSIUM CHLORIDE 20 MEQ/1
20 TABLET, EXTENDED RELEASE ORAL DAILY
Status: DISCONTINUED | OUTPATIENT
Start: 2024-07-24 | End: 2024-07-27 | Stop reason: HOSPADM

## 2024-07-24 RX ORDER — LANOLIN ALCOHOL/MO/W.PET/CERES
100 CREAM (GRAM) TOPICAL DAILY
Status: DISCONTINUED | OUTPATIENT
Start: 2024-07-27 | End: 2024-07-27 | Stop reason: HOSPADM

## 2024-07-24 RX ORDER — LORAZEPAM 1 MG/1
1 TABLET ORAL EVERY 2 HOUR PRN
Status: DISCONTINUED | OUTPATIENT
Start: 2024-07-24 | End: 2024-07-24

## 2024-07-24 RX ORDER — THIAMINE HYDROCHLORIDE 100 MG/ML
100 INJECTION, SOLUTION INTRAMUSCULAR; INTRAVENOUS DAILY
Status: COMPLETED | OUTPATIENT
Start: 2024-07-24 | End: 2024-07-26

## 2024-07-24 RX ORDER — TALC
3 POWDER (GRAM) TOPICAL NIGHTLY PRN
Status: DISCONTINUED | OUTPATIENT
Start: 2024-07-24 | End: 2024-07-25

## 2024-07-24 RX ADMIN — POTASSIUM CHLORIDE 20 MEQ: 1500 TABLET, EXTENDED RELEASE ORAL at 12:59

## 2024-07-24 RX ADMIN — THIAMINE HYDROCHLORIDE 100 MG: 100 INJECTION, SOLUTION INTRAMUSCULAR; INTRAVENOUS at 23:01

## 2024-07-24 RX ADMIN — FOLIC ACID 1 MG: 1 TABLET ORAL at 19:15

## 2024-07-24 RX ADMIN — Medication 1 TABLET: at 19:15

## 2024-07-24 RX ADMIN — PHENOBARBITAL 64.8 MG: 32.4 TABLET ORAL at 22:43

## 2024-07-24 RX ADMIN — SODIUM CHLORIDE 1000 ML: 9 INJECTION, SOLUTION INTRAVENOUS at 12:59

## 2024-07-24 RX ADMIN — MORPHINE SULFATE 4 MG: 4 INJECTION, SOLUTION INTRAMUSCULAR; INTRAVENOUS at 15:23

## 2024-07-24 SDOH — SOCIAL STABILITY: SOCIAL INSECURITY: HAVE YOU HAD ANY THOUGHTS OF HARMING ANYONE ELSE?: NO

## 2024-07-24 SDOH — SOCIAL STABILITY: SOCIAL INSECURITY: HAS ANYONE EVER THREATENED TO HURT YOUR FAMILY OR YOUR PETS?: NO

## 2024-07-24 SDOH — SOCIAL STABILITY: SOCIAL INSECURITY: DO YOU FEEL UNSAFE GOING BACK TO THE PLACE WHERE YOU ARE LIVING?: NO

## 2024-07-24 SDOH — SOCIAL STABILITY: SOCIAL INSECURITY: ARE YOU OR HAVE YOU BEEN THREATENED OR ABUSED PHYSICALLY, EMOTIONALLY, OR SEXUALLY BY ANYONE?: NO

## 2024-07-24 SDOH — SOCIAL STABILITY: SOCIAL INSECURITY: ABUSE: ADULT

## 2024-07-24 SDOH — SOCIAL STABILITY: SOCIAL INSECURITY: HAVE YOU HAD THOUGHTS OF HARMING ANYONE ELSE?: NO

## 2024-07-24 SDOH — SOCIAL STABILITY: SOCIAL INSECURITY: DO YOU FEEL ANYONE HAS EXPLOITED OR TAKEN ADVANTAGE OF YOU FINANCIALLY OR OF YOUR PERSONAL PROPERTY?: NO

## 2024-07-24 SDOH — SOCIAL STABILITY: SOCIAL INSECURITY: DOES ANYONE TRY TO KEEP YOU FROM HAVING/CONTACTING OTHER FRIENDS OR DOING THINGS OUTSIDE YOUR HOME?: NO

## 2024-07-24 SDOH — SOCIAL STABILITY: SOCIAL INSECURITY: ARE THERE ANY APPARENT SIGNS OF INJURIES/BEHAVIORS THAT COULD BE RELATED TO ABUSE/NEGLECT?: NO

## 2024-07-24 SDOH — SOCIAL STABILITY: SOCIAL INSECURITY: WERE YOU ABLE TO COMPLETE ALL THE BEHAVIORAL HEALTH SCREENINGS?: YES

## 2024-07-24 ASSESSMENT — ACTIVITIES OF DAILY LIVING (ADL)
PATIENT'S MEMORY ADEQUATE TO SAFELY COMPLETE DAILY ACTIVITIES?: YES
JUDGMENT_ADEQUATE_SAFELY_COMPLETE_DAILY_ACTIVITIES: YES
LACK_OF_TRANSPORTATION: NO
FEEDING YOURSELF: NEEDS ASSISTANCE
BATHING: NEEDS ASSISTANCE
HEARING - LEFT EAR: FUNCTIONAL
TOILETING: NEEDS ASSISTANCE
DRESSING YOURSELF: NEEDS ASSISTANCE
HEARING - RIGHT EAR: FUNCTIONAL
WALKS IN HOME: NEEDS ASSISTANCE
ADEQUATE_TO_COMPLETE_ADL: YES
GROOMING: NEEDS ASSISTANCE
ASSISTIVE_DEVICE: WALKER

## 2024-07-24 ASSESSMENT — LIFESTYLE VARIABLES
HOW OFTEN DO YOU HAVE A DRINK CONTAINING ALCOHOL: 2-3 TIMES A WEEK
ORIENTATION AND CLOUDING OF SENSORIUM: DISORIENTED FOR DATE BY MORE THAN 2 CALENDAR DAYS
AUDIT-C TOTAL SCORE: 3
TREMOR: NO TREMOR
PAROXYSMAL SWEATS: NO SWEAT VISIBLE
TOTAL SCORE: 3
HEADACHE, FULLNESS IN HEAD: NOT PRESENT
HOW OFTEN DO YOU HAVE 6 OR MORE DRINKS ON ONE OCCASION: NEVER
AUDITORY DISTURBANCES: NOT PRESENT
AGITATION: NORMAL ACTIVITY
NAUSEA AND VOMITING: NO NAUSEA AND NO VOMITING
VISUAL DISTURBANCES: NOT PRESENT
SKIP TO QUESTIONS 9-10: 1
AUDIT-C TOTAL SCORE: 3
HOW MANY STANDARD DRINKS CONTAINING ALCOHOL DO YOU HAVE ON A TYPICAL DAY: 1 OR 2
ANXIETY: NO ANXIETY, AT EASE

## 2024-07-24 ASSESSMENT — COGNITIVE AND FUNCTIONAL STATUS - GENERAL
TOILETING: A LITTLE
PERSONAL GROOMING: A LITTLE
DRESSING REGULAR UPPER BODY CLOTHING: A LITTLE
PATIENT BASELINE BEDBOUND: NO
MOVING TO AND FROM BED TO CHAIR: A LITTLE
MOBILITY SCORE: 17
HELP NEEDED FOR BATHING: A LITTLE
WALKING IN HOSPITAL ROOM: A LOT
DRESSING REGULAR LOWER BODY CLOTHING: A LITTLE
CLIMB 3 TO 5 STEPS WITH RAILING: TOTAL
STANDING UP FROM CHAIR USING ARMS: A LITTLE
DAILY ACTIVITIY SCORE: 19

## 2024-07-24 ASSESSMENT — PAIN SCALES - GENERAL
PAINLEVEL_OUTOF10: 0 - NO PAIN
PAINLEVEL_OUTOF10: 0 - NO PAIN

## 2024-07-24 ASSESSMENT — PAIN - FUNCTIONAL ASSESSMENT
PAIN_FUNCTIONAL_ASSESSMENT: 0-10
PAIN_FUNCTIONAL_ASSESSMENT: 0-10

## 2024-07-24 NOTE — PROGRESS NOTES
Pharmacy Medication History Review   Spoke to the patient, on ASA and Plavix, and Keppra. Pt has been out of ASA for a week she stated.  Not sure if she is not compliant or what, fill dates don't match.  Pt said she took all morning meds today    Yue Sanz is a 63 y.o. female admitted for No Principal Problem: There is no principal problem currently on the Problem List. Please update the Problem List and refresh.. Pharmacy reviewed the patient's [id number] medications and allergies for accuracy.  Prior to Admission Medications   Prescriptions Last Dose Informant   acetaminophen (Tylenol) 500 mg tablet     Sig: Take 1 tablet (500 mg) by mouth every 6 hours for 28 days.   aspirin 81 mg EC tablet 7/16/2024 Family Member, Self   Sig: Take 1 tablet (81 mg) by mouth once daily. She has been out for a week   atorvastatin (Lipitor) 40 mg tablet 7/24/2024 at am Family Member, Self   Sig: Take 1 tablet (40 mg) by mouth once daily.   calcium carbonate-vitamin D3 500 mg-5 mcg (200 unit) tablet     Sig: Take 1 tablet by mouth 2 times a day.   chlorhexidine (Peridex) 0.12 % solution Not Taking Family Member, Self   Sig: Use 15 mL in the mouth or throat see administration instructions for 2 doses. Use the night before and morning of surgery.   Patient not taking: Reported on 7/24/2024   clopidogrel (Plavix) 75 mg tablet 7/24/2024 at am Family Member, Self   Sig: Take 1 tablet (75 mg) by mouth once daily.   cyclobenzaprine (Flexeril) 10 mg tablet     Sig: Take 1 tablet (10 mg) by mouth 3 times a day as needed for muscle spasms for up to 10 days.   folic acid (Folvite) 1 mg tablet  Family Member, Self   Sig: Take 1 tablet (1 mg) by mouth once daily.   hydrALAZINE (Apresoline) 10 mg tablet 7/24/2024 at am    Sig: Take 1 tablet (10 mg) by mouth 3 times a day.   levETIRAcetam (Keppra) 500 mg tablet 7/24/2024 at am    Sig: Take 1 tablet (500 mg) by mouth 2 times a day.   lisinopril 10 mg tablet 7/24/2024 at am Family Member,  Self   Sig: Take 1 tablet (10 mg) by mouth once daily.   metoprolol tartrate (Lopressor) 25 mg tablet 7/24/2024 at am    Sig: Take 1 tablet (25 mg) by mouth 2 times a day.   polyethylene glycol (Glycolax, Miralax) 17 gram packet     Sig: Take 17 g by mouth once daily.   sertraline (Zoloft) 50 mg tablet 7/24/2024    Sig: Take 1 tablet (50 mg) by mouth once daily.      Facility-Administered Medications: None       The list below reflectives the updated PTA list. Please review each medication in order reconciliation for additional clarification and justification.    The list below reflectives the updated allergy list. Please review each documented allergy for additional clarification and justification.  Allergies  Reviewed by Kim Eastman RN on 7/24/2024        Severity Reactions Comments    Amoxicillin Not Specified Other             Below are additional concerns with the patient's PTA list.      Divina Adorno

## 2024-07-24 NOTE — ED PROVIDER NOTES
HPI   Chief Complaint   Patient presents with    Fall       This is a 63-year-old female who presents to the emergency department after a fall.  Patient states she was walking with her walker and her leg got caught.  She fell onto her left hip.  The patient complains of left hip pain.  She denies hitting her head.  She denies losing consciousness.  She denies feeling faint or dizzy.  She denies feeling lightheaded.  She denies palpitations or shortness of breath.    Past medical history: Hypertension, anemia, depression, alcohol use disorder, stroke              Patient History   Past Medical History:   Diagnosis Date    Acute CVA (cerebrovascular accident) (Multi) 05/30/2023    Alcohol related seizure (Multi) 05/30/2023    Cerebral hemorrhage (Multi) 05/30/2023    Cerebral infarction due to embolism of right middle cerebral artery (Multi) 05/30/2023    Diarrhea, unspecified 08/10/2022    Diarrhea    Encounter for follow-up examination after completed treatment for conditions other than malignant neoplasm 04/20/2020    Hospital discharge follow-up    Encounter for other screening for malignant neoplasm of breast 03/08/2022    Breast screening    Encounter for screening for malignant neoplasm of colon 05/16/2022    Colon cancer screening    Other abnormality of red blood cells 08/10/2022    Elevated MCV    Personal history of other endocrine, nutritional and metabolic disease     History of high cholesterol    Personal history of transient ischemic attack (TIA), and cerebral infarction without residual deficits     History of cerebrovascular accident     Past Surgical History:   Procedure Laterality Date    MR HEAD ANGIO WO IV CONTRAST  5/19/2021    MR HEAD ANGIO WO IV CONTRAST 5/19/2021 [facility] EMERGENCY LEGACY    MR HEAD ANGIO WO IV CONTRAST  7/12/2018    MR HEAD ANGIO WO IV CONTRAST 7/12/2018 [facility] [place] CLINICAL LEGACY    MR NECK ANGIO WO IV CONTRAST  5/19/2021    MR NECK ANGIO WO IV CONTRAST 5/19/2021 [facility] EMERGENCY LEGACY      Family History   Problem Relation Name Age of Onset    Arthritis Mother      No Known Problems Father       Social History     Tobacco Use    Smoking status: Never     Passive exposure: Never    Smokeless tobacco: Never   Vaping Use    Vaping status: Never Used   Substance Use Topics    Alcohol use: Not Currently     Comment: none currenlty    Drug use: Not Currently       Physical Exam   ED Triage Vitals [07/24/24 1008]   Temperature Heart Rate Respirations BP   36.4 °C (97.5 °F) 66 20 85/62      Pulse Ox Temp Source Heart Rate Source Patient Position   98 % Temporal Monitor Lying      BP Location FiO2 (%)     Right arm --       Physical Exam  Vitals and nursing note reviewed.   HENT:      Head: Normocephalic and atraumatic.      Nose: Nose normal.   Eyes:      Conjunctiva/sclera: Conjunctivae normal.   Cardiovascular:      Rate and Rhythm: Normal rate and regular rhythm.      Pulses: Normal pulses.      Heart sounds: Normal heart sounds.   Pulmonary:      Effort: Pulmonary effort is normal.      Breath sounds: Normal breath sounds.   Abdominal:      General: Bowel sounds are normal.      Palpations: Abdomen is soft.   Musculoskeletal:      Cervical back: Normal range of motion and neck supple.      Left hip: Tenderness present. No deformity. Decreased range of motion.   Skin:     Findings: No rash.   Neurological:      General: No focal deficit present.      Mental Status: She is alert and oriented to person, place, and time.   Psychiatric:         Mood and Affect: Mood normal.           ED Course & MDM   Diagnoses as of 07/25/24 1344   Fall, initial encounter   Injury of left hip, initial encounter   Unable to ambulate                       No data recorded                      Medical Decision Making  Differential diagnosis considered: Fracture, sprain, strain, contusion, anemia, electrolyte abnormality, dehydration    This is 63-year-old female who presents to the emergency department after a fall.  She was  evaluated with labs and x-rays.  X-rays of the left hip did not show fracture.  The patient, however, could not ambulate due to pain.  CT of the pelvis was performed.  This also did not show a fracture.  The patient required admission for further evaluation and management.    Amount and/or Complexity of Data Reviewed  ECG/medicine tests: independent interpretation performed.     Details: Normal sinus rhythm, heart rate 63, incomplete right bundle branch block, no change compared to 6/21/2024.        Procedure  Procedures     Vasyl Stone MD  07/25/24 1345       Vasyl Stone MD  08/26/24 0685

## 2024-07-24 NOTE — ED TRIAGE NOTES
Pt from home due to multiple falls at home. Pt states she fell today but has been having multiple falls over the past couple of days. Pt has left sided weakness from a previous stroke. Pt gets around with a walker. Pt has been having poor oral intake lately. Pt would like help at home. aox4

## 2024-07-25 LAB
ALBUMIN SERPL BCP-MCNC: 2.9 G/DL (ref 3.4–5)
ALP SERPL-CCNC: 91 U/L (ref 33–136)
ALT SERPL W P-5'-P-CCNC: 10 U/L (ref 7–45)
ANION GAP SERPL CALC-SCNC: 16 MMOL/L (ref 10–20)
ANION GAP SERPL CALC-SCNC: 19 MMOL/L (ref 10–20)
AST SERPL W P-5'-P-CCNC: 22 U/L (ref 9–39)
BILIRUB SERPL-MCNC: 0.7 MG/DL (ref 0–1.2)
BUN SERPL-MCNC: 22 MG/DL (ref 6–23)
BUN SERPL-MCNC: 23 MG/DL (ref 6–23)
CALCIUM SERPL-MCNC: 7.8 MG/DL (ref 8.6–10.3)
CALCIUM SERPL-MCNC: 8.2 MG/DL (ref 8.6–10.3)
CHLORIDE SERPL-SCNC: 109 MMOL/L (ref 98–107)
CHLORIDE SERPL-SCNC: 112 MMOL/L (ref 98–107)
CO2 SERPL-SCNC: 16 MMOL/L (ref 21–32)
CO2 SERPL-SCNC: 17 MMOL/L (ref 21–32)
CREAT SERPL-MCNC: 0.76 MG/DL (ref 0.5–1.05)
CREAT SERPL-MCNC: 0.83 MG/DL (ref 0.5–1.05)
EGFRCR SERPLBLD CKD-EPI 2021: 79 ML/MIN/1.73M*2
EGFRCR SERPLBLD CKD-EPI 2021: 88 ML/MIN/1.73M*2
ERYTHROCYTE [DISTWIDTH] IN BLOOD BY AUTOMATED COUNT: 14.5 % (ref 11.5–14.5)
ERYTHROCYTE [DISTWIDTH] IN BLOOD BY AUTOMATED COUNT: 14.5 % (ref 11.5–14.5)
GLUCOSE SERPL-MCNC: 140 MG/DL (ref 74–99)
GLUCOSE SERPL-MCNC: 87 MG/DL (ref 74–99)
HCT VFR BLD AUTO: 31.3 % (ref 36–46)
HCT VFR BLD AUTO: 32.2 % (ref 36–46)
HGB BLD-MCNC: 10 G/DL (ref 12–16)
HGB BLD-MCNC: 10.7 G/DL (ref 12–16)
MAGNESIUM SERPL-MCNC: 1.3 MG/DL (ref 1.6–2.4)
MCH RBC QN AUTO: 31.1 PG (ref 26–34)
MCH RBC QN AUTO: 31.8 PG (ref 26–34)
MCHC RBC AUTO-ENTMCNC: 31.9 G/DL (ref 32–36)
MCHC RBC AUTO-ENTMCNC: 33.2 G/DL (ref 32–36)
MCV RBC AUTO: 96 FL (ref 80–100)
MCV RBC AUTO: 97 FL (ref 80–100)
NRBC BLD-RTO: 0 /100 WBCS (ref 0–0)
NRBC BLD-RTO: 0 /100 WBCS (ref 0–0)
PLATELET # BLD AUTO: 254 X10*3/UL (ref 150–450)
PLATELET # BLD AUTO: 299 X10*3/UL (ref 150–450)
POTASSIUM SERPL-SCNC: 3 MMOL/L (ref 3.5–5.3)
POTASSIUM SERPL-SCNC: 3.2 MMOL/L (ref 3.5–5.3)
PROT SERPL-MCNC: 5.1 G/DL (ref 6.4–8.2)
RBC # BLD AUTO: 3.22 X10*6/UL (ref 4–5.2)
RBC # BLD AUTO: 3.37 X10*6/UL (ref 4–5.2)
SODIUM SERPL-SCNC: 141 MMOL/L (ref 136–145)
SODIUM SERPL-SCNC: 142 MMOL/L (ref 136–145)
WBC # BLD AUTO: 6.5 X10*3/UL (ref 4.4–11.3)
WBC # BLD AUTO: 7.3 X10*3/UL (ref 4.4–11.3)

## 2024-07-25 PROCEDURE — 85027 COMPLETE CBC AUTOMATED: CPT | Performed by: INTERNAL MEDICINE

## 2024-07-25 PROCEDURE — 2500000004 HC RX 250 GENERAL PHARMACY W/ HCPCS (ALT 636 FOR OP/ED): Performed by: INTERNAL MEDICINE

## 2024-07-25 PROCEDURE — 97161 PT EVAL LOW COMPLEX 20 MIN: CPT | Mod: GP

## 2024-07-25 PROCEDURE — 36415 COLL VENOUS BLD VENIPUNCTURE: CPT | Performed by: INTERNAL MEDICINE

## 2024-07-25 PROCEDURE — 2500000002 HC RX 250 W HCPCS SELF ADMINISTERED DRUGS (ALT 637 FOR MEDICARE OP, ALT 636 FOR OP/ED): Performed by: EMERGENCY MEDICINE

## 2024-07-25 PROCEDURE — 83735 ASSAY OF MAGNESIUM: CPT | Performed by: NURSE PRACTITIONER

## 2024-07-25 PROCEDURE — 2500000001 HC RX 250 WO HCPCS SELF ADMINISTERED DRUGS (ALT 637 FOR MEDICARE OP): Performed by: INTERNAL MEDICINE

## 2024-07-25 PROCEDURE — 84075 ASSAY ALKALINE PHOSPHATASE: CPT | Performed by: INTERNAL MEDICINE

## 2024-07-25 PROCEDURE — G0378 HOSPITAL OBSERVATION PER HR: HCPCS

## 2024-07-25 PROCEDURE — 1100000001 HC PRIVATE ROOM DAILY

## 2024-07-25 PROCEDURE — 80048 BASIC METABOLIC PNL TOTAL CA: CPT | Mod: CCI | Performed by: INTERNAL MEDICINE

## 2024-07-25 PROCEDURE — 2500000002 HC RX 250 W HCPCS SELF ADMINISTERED DRUGS (ALT 637 FOR MEDICARE OP, ALT 636 FOR OP/ED): Performed by: INTERNAL MEDICINE

## 2024-07-25 PROCEDURE — 97165 OT EVAL LOW COMPLEX 30 MIN: CPT | Mod: GO

## 2024-07-25 PROCEDURE — 99222 1ST HOSP IP/OBS MODERATE 55: CPT | Performed by: INTERNAL MEDICINE

## 2024-07-25 RX ORDER — CYCLOBENZAPRINE HCL 5 MG
10 TABLET ORAL 3 TIMES DAILY PRN
Status: DISCONTINUED | OUTPATIENT
Start: 2024-07-25 | End: 2024-07-27 | Stop reason: HOSPADM

## 2024-07-25 RX ORDER — CLOPIDOGREL BISULFATE 75 MG/1
75 TABLET ORAL DAILY
Status: DISCONTINUED | OUTPATIENT
Start: 2024-07-25 | End: 2024-07-27 | Stop reason: HOSPADM

## 2024-07-25 RX ORDER — ASPIRIN 81 MG/1
81 TABLET ORAL DAILY
Status: DISCONTINUED | OUTPATIENT
Start: 2024-07-25 | End: 2024-07-27 | Stop reason: HOSPADM

## 2024-07-25 RX ORDER — MAGNESIUM SULFATE HEPTAHYDRATE 40 MG/ML
2 INJECTION, SOLUTION INTRAVENOUS ONCE
Status: COMPLETED | OUTPATIENT
Start: 2024-07-25 | End: 2024-07-26

## 2024-07-25 RX ORDER — PANTOPRAZOLE SODIUM 40 MG/10ML
40 INJECTION, POWDER, LYOPHILIZED, FOR SOLUTION INTRAVENOUS
Status: DISCONTINUED | OUTPATIENT
Start: 2024-07-26 | End: 2024-07-27 | Stop reason: HOSPADM

## 2024-07-25 RX ORDER — POTASSIUM CHLORIDE 20 MEQ/1
20 TABLET, EXTENDED RELEASE ORAL ONCE
Status: COMPLETED | OUTPATIENT
Start: 2024-07-25 | End: 2024-07-25

## 2024-07-25 RX ORDER — LISINOPRIL 10 MG/1
10 TABLET ORAL DAILY
Status: DISCONTINUED | OUTPATIENT
Start: 2024-07-25 | End: 2024-07-27 | Stop reason: HOSPADM

## 2024-07-25 RX ORDER — POLYETHYLENE GLYCOL 3350 17 G/17G
17 POWDER, FOR SOLUTION ORAL DAILY PRN
Status: DISCONTINUED | OUTPATIENT
Start: 2024-07-25 | End: 2024-07-25 | Stop reason: SDUPTHER

## 2024-07-25 RX ORDER — LEVETIRACETAM 500 MG/1
500 TABLET ORAL 2 TIMES DAILY
Status: DISCONTINUED | OUTPATIENT
Start: 2024-07-25 | End: 2024-07-27 | Stop reason: HOSPADM

## 2024-07-25 RX ORDER — ONDANSETRON 4 MG/1
4 TABLET, FILM COATED ORAL EVERY 8 HOURS PRN
Status: DISCONTINUED | OUTPATIENT
Start: 2024-07-25 | End: 2024-07-25

## 2024-07-25 RX ORDER — PROCHLORPERAZINE MALEATE 10 MG
5 TABLET ORAL EVERY 6 HOURS PRN
Status: DISCONTINUED | OUTPATIENT
Start: 2024-07-25 | End: 2024-07-27 | Stop reason: HOSPADM

## 2024-07-25 RX ORDER — ONDANSETRON HYDROCHLORIDE 2 MG/ML
4 INJECTION, SOLUTION INTRAVENOUS EVERY 8 HOURS PRN
Status: DISCONTINUED | OUTPATIENT
Start: 2024-07-25 | End: 2024-07-25

## 2024-07-25 RX ORDER — METOPROLOL TARTRATE 25 MG/1
25 TABLET, FILM COATED ORAL 2 TIMES DAILY
Status: DISCONTINUED | OUTPATIENT
Start: 2024-07-25 | End: 2024-07-27 | Stop reason: HOSPADM

## 2024-07-25 RX ORDER — POLYETHYLENE GLYCOL 3350 17 G/17G
17 POWDER, FOR SOLUTION ORAL DAILY
Status: DISCONTINUED | OUTPATIENT
Start: 2024-07-25 | End: 2024-07-27 | Stop reason: HOSPADM

## 2024-07-25 RX ORDER — ACETAMINOPHEN 160 MG/5ML
650 SOLUTION ORAL EVERY 4 HOURS PRN
Status: DISCONTINUED | OUTPATIENT
Start: 2024-07-25 | End: 2024-07-27 | Stop reason: HOSPADM

## 2024-07-25 RX ORDER — SERTRALINE HYDROCHLORIDE 50 MG/1
50 TABLET, FILM COATED ORAL DAILY
Status: DISCONTINUED | OUTPATIENT
Start: 2024-07-25 | End: 2024-07-27 | Stop reason: HOSPADM

## 2024-07-25 RX ORDER — ACETAMINOPHEN 650 MG/1
650 SUPPOSITORY RECTAL EVERY 4 HOURS PRN
Status: DISCONTINUED | OUTPATIENT
Start: 2024-07-25 | End: 2024-07-27 | Stop reason: HOSPADM

## 2024-07-25 RX ORDER — TALC
3 POWDER (GRAM) TOPICAL NIGHTLY PRN
Status: DISCONTINUED | OUTPATIENT
Start: 2024-07-25 | End: 2024-07-27 | Stop reason: HOSPADM

## 2024-07-25 RX ORDER — GUAIFENESIN 600 MG/1
600 TABLET, EXTENDED RELEASE ORAL EVERY 12 HOURS PRN
Status: DISCONTINUED | OUTPATIENT
Start: 2024-07-25 | End: 2024-07-27 | Stop reason: HOSPADM

## 2024-07-25 RX ORDER — FERROUS SULFATE, DRIED 160(50) MG
1 TABLET, EXTENDED RELEASE ORAL 2 TIMES DAILY
Status: DISCONTINUED | OUTPATIENT
Start: 2024-07-25 | End: 2024-07-27 | Stop reason: HOSPADM

## 2024-07-25 RX ORDER — ATORVASTATIN CALCIUM 40 MG/1
40 TABLET, FILM COATED ORAL NIGHTLY
Status: DISCONTINUED | OUTPATIENT
Start: 2024-07-25 | End: 2024-07-27 | Stop reason: HOSPADM

## 2024-07-25 RX ORDER — PROCHLORPERAZINE EDISYLATE 5 MG/ML
5 INJECTION INTRAMUSCULAR; INTRAVENOUS EVERY 6 HOURS PRN
Status: DISCONTINUED | OUTPATIENT
Start: 2024-07-25 | End: 2024-07-27 | Stop reason: HOSPADM

## 2024-07-25 RX ORDER — ENOXAPARIN SODIUM 100 MG/ML
40 INJECTION SUBCUTANEOUS EVERY 24 HOURS
Status: DISCONTINUED | OUTPATIENT
Start: 2024-07-25 | End: 2024-07-27 | Stop reason: HOSPADM

## 2024-07-25 RX ORDER — SODIUM BICARBONATE 650 MG/1
650 TABLET ORAL 2 TIMES DAILY
Status: DISCONTINUED | OUTPATIENT
Start: 2024-07-25 | End: 2024-07-27 | Stop reason: HOSPADM

## 2024-07-25 RX ORDER — ACETAMINOPHEN 325 MG/1
650 TABLET ORAL EVERY 4 HOURS PRN
Status: DISCONTINUED | OUTPATIENT
Start: 2024-07-25 | End: 2024-07-27 | Stop reason: HOSPADM

## 2024-07-25 RX ORDER — PANTOPRAZOLE SODIUM 40 MG/1
40 TABLET, DELAYED RELEASE ORAL
Status: DISCONTINUED | OUTPATIENT
Start: 2024-07-26 | End: 2024-07-27 | Stop reason: HOSPADM

## 2024-07-25 RX ADMIN — PHENOBARBITAL 64.8 MG: 32.4 TABLET ORAL at 21:06

## 2024-07-25 RX ADMIN — Medication 1 TABLET: at 09:15

## 2024-07-25 RX ADMIN — POTASSIUM CHLORIDE 20 MEQ: 1500 TABLET, EXTENDED RELEASE ORAL at 09:16

## 2024-07-25 RX ADMIN — SERTRALINE 50 MG: 50 TABLET, FILM COATED ORAL at 09:15

## 2024-07-25 RX ADMIN — TRAMADOL HYDROCHLORIDE 50 MG: 50 TABLET, COATED ORAL at 23:16

## 2024-07-25 RX ADMIN — CLOPIDOGREL 75 MG: 75 TABLET ORAL at 09:15

## 2024-07-25 RX ADMIN — PANTOPRAZOLE SODIUM 40 MG: 40 TABLET, DELAYED RELEASE ORAL at 06:30

## 2024-07-25 RX ADMIN — LEVETIRACETAM 500 MG: 500 TABLET, FILM COATED ORAL at 21:06

## 2024-07-25 RX ADMIN — PHENOBARBITAL 64.8 MG: 32.4 TABLET ORAL at 09:15

## 2024-07-25 RX ADMIN — ATORVASTATIN CALCIUM 40 MG: 40 TABLET, FILM COATED ORAL at 21:06

## 2024-07-25 RX ADMIN — POTASSIUM CHLORIDE 20 MEQ: 1500 TABLET, EXTENDED RELEASE ORAL at 09:15

## 2024-07-25 RX ADMIN — SODIUM BICARBONATE 650 MG: 650 TABLET ORAL at 21:05

## 2024-07-25 RX ADMIN — FOLIC ACID 1 MG: 1 TABLET ORAL at 09:15

## 2024-07-25 RX ADMIN — LEVETIRACETAM 500 MG: 500 TABLET, FILM COATED ORAL at 09:15

## 2024-07-25 RX ADMIN — Medication 1 TABLET: at 21:06

## 2024-07-25 RX ADMIN — METOPROLOL TARTRATE 25 MG: 25 TABLET, FILM COATED ORAL at 09:15

## 2024-07-25 RX ADMIN — SODIUM CHLORIDE 500 ML: 9 INJECTION, SOLUTION INTRAVENOUS at 21:05

## 2024-07-25 RX ADMIN — THIAMINE HYDROCHLORIDE 100 MG: 100 INJECTION, SOLUTION INTRAMUSCULAR; INTRAVENOUS at 09:32

## 2024-07-25 RX ADMIN — ENOXAPARIN SODIUM 40 MG: 40 INJECTION SUBCUTANEOUS at 09:15

## 2024-07-25 RX ADMIN — POTASSIUM CHLORIDE 20 MEQ: 1500 TABLET, EXTENDED RELEASE ORAL at 12:30

## 2024-07-25 RX ADMIN — LISINOPRIL 10 MG: 10 TABLET ORAL at 09:15

## 2024-07-25 RX ADMIN — PHENOBARBITAL 64.8 MG: 32.4 TABLET ORAL at 15:45

## 2024-07-25 RX ADMIN — MAGNESIUM SULFATE HEPTAHYDRATE 2 G: 40 INJECTION, SOLUTION INTRAVENOUS at 23:17

## 2024-07-25 RX ADMIN — SODIUM BICARBONATE 650 MG: 650 TABLET ORAL at 13:12

## 2024-07-25 RX ADMIN — TRAMADOL HYDROCHLORIDE 50 MG: 50 TABLET, COATED ORAL at 04:00

## 2024-07-25 RX ADMIN — ASPIRIN 81 MG: 81 TABLET, COATED ORAL at 09:16

## 2024-07-25 ASSESSMENT — COGNITIVE AND FUNCTIONAL STATUS - GENERAL
PERSONAL GROOMING: A LITTLE
MOVING FROM LYING ON BACK TO SITTING ON SIDE OF FLAT BED WITH BEDRAILS: A LITTLE
HELP NEEDED FOR BATHING: A LOT
MOVING TO AND FROM BED TO CHAIR: A LITTLE
TURNING FROM BACK TO SIDE WHILE IN FLAT BAD: A LITTLE
HELP NEEDED FOR BATHING: A LOT
TURNING FROM BACK TO SIDE WHILE IN FLAT BAD: A LITTLE
PERSONAL GROOMING: A LITTLE
MOVING TO AND FROM BED TO CHAIR: A LITTLE
CLIMB 3 TO 5 STEPS WITH RAILING: A LOT
WALKING IN HOSPITAL ROOM: A LOT
DAILY ACTIVITIY SCORE: 17
DRESSING REGULAR LOWER BODY CLOTHING: A LITTLE
STANDING UP FROM CHAIR USING ARMS: A LITTLE
TOILETING: TOTAL
STANDING UP FROM CHAIR USING ARMS: A LITTLE
DRESSING REGULAR LOWER BODY CLOTHING: A LITTLE
DRESSING REGULAR LOWER BODY CLOTHING: TOTAL
DAILY ACTIVITIY SCORE: 19
DRESSING REGULAR UPPER BODY CLOTHING: A LOT
HELP NEEDED FOR BATHING: A LITTLE
TOILETING: A LITTLE
WALKING IN HOSPITAL ROOM: A LITTLE
MOVING TO AND FROM BED TO CHAIR: A LITTLE
TURNING FROM BACK TO SIDE WHILE IN FLAT BAD: A LITTLE
CLIMB 3 TO 5 STEPS WITH RAILING: A LITTLE
MOVING FROM LYING ON BACK TO SITTING ON SIDE OF FLAT BED WITH BEDRAILS: A LITTLE
CLIMB 3 TO 5 STEPS WITH RAILING: A LOT
WALKING IN HOSPITAL ROOM: A LOT
MOBILITY SCORE: 19
TOILETING: A LITTLE
STANDING UP FROM CHAIR USING ARMS: A LITTLE
MOBILITY SCORE: 16
DRESSING REGULAR UPPER BODY CLOTHING: A LOT
DRESSING REGULAR UPPER BODY CLOTHING: A LITTLE
MOBILITY SCORE: 16
PERSONAL GROOMING: A LITTLE
DAILY ACTIVITIY SCORE: 13

## 2024-07-25 ASSESSMENT — PAIN - FUNCTIONAL ASSESSMENT
PAIN_FUNCTIONAL_ASSESSMENT: 0-10

## 2024-07-25 ASSESSMENT — LIFESTYLE VARIABLES
HEADACHE, FULLNESS IN HEAD: NOT PRESENT
TOTAL SCORE: 3
ORIENTATION AND CLOUDING OF SENSORIUM: DISORIENTED FOR DATE BY MORE THAN 2 CALENDAR DAYS
TREMOR: NO TREMOR
NAUSEA AND VOMITING: NO NAUSEA AND NO VOMITING
VISUAL DISTURBANCES: NOT PRESENT
ANXIETY: NO ANXIETY, AT EASE
PAROXYSMAL SWEATS: NO SWEAT VISIBLE
AUDITORY DISTURBANCES: NOT PRESENT
AGITATION: NORMAL ACTIVITY

## 2024-07-25 ASSESSMENT — PAIN DESCRIPTION - ORIENTATION
ORIENTATION: LEFT
ORIENTATION: LEFT

## 2024-07-25 ASSESSMENT — PAIN DESCRIPTION - LOCATION
LOCATION: LEG
LOCATION: LEG

## 2024-07-25 ASSESSMENT — PAIN DESCRIPTION - DESCRIPTORS
DESCRIPTORS: BURNING
DESCRIPTORS: BURNING

## 2024-07-25 ASSESSMENT — ACTIVITIES OF DAILY LIVING (ADL)
LACK_OF_TRANSPORTATION: NO
ADL_ASSISTANCE: INDEPENDENT
ADL_ASSISTANCE: INDEPENDENT
BATHING_ASSISTANCE: MAXIMAL

## 2024-07-25 ASSESSMENT — PAIN SCALES - GENERAL
PAINLEVEL_OUTOF10: 8
PAINLEVEL_OUTOF10: 2
PAINLEVEL_OUTOF10: 8
PAINLEVEL_OUTOF10: 0 - NO PAIN

## 2024-07-25 ASSESSMENT — PAIN SCALES - PAIN ASSESSMENT IN ADVANCED DEMENTIA (PAINAD): TOTALSCORE: MEDICATION (SEE MAR)

## 2024-07-25 NOTE — PROGRESS NOTES
07/25/24 1140   Discharge Planning   Living Arrangements Alone   Support Systems Family members   Assistance Needed transportation   Type of Residence Private residence   Number of Stairs to Enter Residence 0   Number of Stairs Within Residence 0   Home or Post Acute Services Post acute facilities (Rehab/SNF/etc)   Type of Post Acute Facility Services Rehab;Skilled nursing   Expected Discharge Disposition IRF   Does the patient need discharge transport arranged? Yes   RoundTrip coordination needed? Yes   Has discharge transport been arranged? No   Financial Resource Strain   How hard is it for you to pay for the very basics like food, housing, medical care, and heating? Not hard   Housing Stability   In the last 12 months, was there a time when you were not able to pay the mortgage or rent on time? N   In the past 12 months, how many times have you moved where you were living? 1   At any time in the past 12 months, were you homeless or living in a shelter (including now)? N   Transportation Needs   In the past 12 months, has lack of transportation kept you from medical appointments or from getting medications? no   In the past 12 months, has lack of transportation kept you from meetings, work, or from getting things needed for daily living? No     Called and spoke with patient. Patient lives at home alone-confirmed address and contacts. Patient does not drive-family provides needed transportation. She has a walker and does not use it much. States no stairs in the home. PCP is Dr Aubrie Alves--sees as needed. Denies issues obtaining/affording medications at home. Discussed dc planning as patient states she has been falling at home due to weakness. States she was just starting with [facility] prior to admit. Discussed AR vs SNF. She has never been anywhere for rehab in the past. She would like to discuss with her family. Await therapy evals and will discuss with patient later today when evals are in. She is agreeable to  this.     1535 Per OT, PT will be seeing this patient next. Per LSW, SNF choices will be made tomorrow after patient speaks to her family.

## 2024-07-25 NOTE — PROGRESS NOTES
7/25/2024 3:15 PM I met with patient She lives alone. Her mother lives next door. I discussed PT recommendations. I discussed that home care does not provide daily rehab services. Patient agrees to SNF I provided  a SNF list. She will discuss with her  brother, mother and nephew and provide three SNF choices tomorrow Nuvia WINSTON

## 2024-07-25 NOTE — PROGRESS NOTES
07/25/24 1520   Discharge Planning   Expected Discharge Disposition SNF   Patient Choice   Provider Choice list and CMS website (https://medicare.gov/care-compare#search) for post-acute Quality and Resource Measure Data were provided and reviewed with: Patient

## 2024-07-25 NOTE — CARE PLAN
The patient's goals for the shift include  rest    The clinical goals for the shift include orient to unit      Problem: Pain - Adult  Goal: Verbalizes/displays adequate comfort level or baseline comfort level  Outcome: Progressing     Problem: Safety - Adult  Goal: Free from fall injury  Outcome: Progressing     Problem: Discharge Planning  Goal: Discharge to home or other facility with appropriate resources  Outcome: Progressing     Problem: Chronic Conditions and Co-morbidities  Goal: Patient's chronic conditions and co-morbidity symptoms are monitored and maintained or improved  Outcome: Progressing     Problem: Skin  Goal: Decreased wound size/increased tissue granulation at next dressing change  Outcome: Progressing  Goal: Participates in plan/prevention/treatment measures  Outcome: Progressing  Goal: Prevent/manage excess moisture  Outcome: Progressing  Goal: Prevent/minimize sheer/friction injuries  Outcome: Progressing  Goal: Promote/optimize nutrition  Outcome: Progressing  Goal: Promote skin healing  Outcome: Progressing

## 2024-07-25 NOTE — PROGRESS NOTES
Occupational Therapy    Evaluation    Patient Name: Yue Sanz  MRN: [id number]  Today's Date: 7/25/2024       Assessment  IP OT Assessment  OT Assessment: OT eval completed. Pt. 63 y.o. F with primary hospital concern of fall. Imaging from 6/13 shows acute comminuted fracture of L humerus. Pt. with decreased endurance, activity tolerance, sitting tolerance, L UE strength and ROM, ADLs, transfers, and mobility. Pt. requires increased time for task completion and verbal cues for sequencing. Pt. would benefit from MOD intensity therapy to increase safe functional participation in ADLs, transfers, and mobility.  Prognosis: Good  Evaluation/Treatment Tolerance: Patient limited by fatigue  Medical Staff Made Aware: Yes  End of Session Communication: Bedside nurse  End of Session Patient Position: Bed, 3 rail up, Alarm on  Plan:  Treatment Interventions: ADL retraining, Functional transfer training, Endurance training, Patient/family training, Equipment evaluation/education, Neuromuscular reeducation  OT Frequency: 3 times per week  OT Discharge Recommendations: Moderate intensity level of continued care  Equipment Recommended upon Discharge:  (hip kit)  OT - OK to Discharge: Yes (Per OT POC)    Subjective   Current Problem:  1. Fall, initial encounter        2. Injury of left hip, initial encounter        3. Unable to ambulate          General:  General  Reason for Referral: Pt. 63 y.o. F with chief complaint of fall.  Referred By: MD Tonny  Past Medical History Relevant to Rehab:   Past Medical History:   Diagnosis Date    Acute CVA (cerebrovascular accident) (Multi) 05/30/2023    Alcohol related seizure (Multi) 05/30/2023    Cerebral hemorrhage (Multi) 05/30/2023    Cerebral infarction due to embolism of right middle cerebral artery (Multi) 05/30/2023    Diarrhea, unspecified 08/10/2022    Diarrhea    Encounter for follow-up examination after completed treatment for conditions other than malignant neoplasm 04/20/2020     Hospital discharge follow-up    Encounter for other screening for malignant neoplasm of breast 03/08/2022    Breast screening    Encounter for screening for malignant neoplasm of colon 05/16/2022    Colon cancer screening    Other abnormality of red blood cells 08/10/2022    Elevated MCV    Personal history of other endocrine, nutritional and metabolic disease     History of high cholesterol    Personal history of transient ischemic attack (TIA), and cerebral infarction without residual deficits     History of cerebrovascular accident      Prior to Session Communication: Bedside nurse  Patient Position Received: Bed, 3 rail up, Alarm on  General Comment: Pt. pleasant and agreeable to OT eval, with decreased endurance and increased fatigue limiting participation, verbal cues for sequencing and increased time for task completion required.    Precautions:  Medical Precautions: Fall precautions (High)  Precautions Comment: 6/13 imaging shows acute comminuted fracture of the proximal humerus    Pain:  Pain Assessment  Pain Assessment: 0-10  0-10 (Numeric) Pain Score:  (Pt. described minor discomfort of L hip d/t bruising, denies pain)  Pain Location: Hip  Pain Orientation: Left    Objective   Cognition:  Overall Cognitive Status: Within Functional Limits  Orientation Level: Disoriented to time (Pt. indirect with answering correct month)  Processing Speed: Delayed     Home Living:  Type of Home: Apartment ([address], 1 level, bed bath same floor, walk in shower, std ht toilet, shower seat, grab bars)  Lives With: Alone  Home Adaptive Equipment: Cane, Walker rolling or standard     Prior Function:  Level of T[place]: Independent with ADLs and functional transfers, Independent with homemaking with ambulation  Receives Help From: Family  ADL Assistance: Independent  Homemaking Assistance: Independent  Ambulatory Assistance: Independent (use of cane sometimes)  Hand Dominance: Right    IADL History:  Homemaking  Responsibilities: Yes (Independent)    ADL:  Eating Assistance: Stand by  Grooming Assistance: Minimal  Bathing Assistance: Maximal  UE Dressing Assistance: Maximal  LE Dressing Assistance: Total  Toileting Assistance with Device: Total  Functional Assistance: Maximal    Activity Tolerance:  Endurance: Tolerates 10 - 20 min exercise with multiple rests    Bed Mobility/Transfers: Bed Mobility  Bed Mobility: Yes  Bed Mobility 1  Bed Mobility 1: Supine to sitting  Level of Assistance 1: Minimum assistance (to scoot to EOB)  Bed Mobility Comments 1: Increased time for task completion  Bed Mobility 2  Bed Mobility  2: Sitting to supine  Level of Assistance 2: Moderate assistance (to assist upper trunk into supine position and manage B LEs)  Bed Mobility Comments 2: Increased time for task completion    Transfers  Transfer: No (Unable to perform STS or transfers, pt. persistently requesting to be returned to supine in bed d/t fatigue.)    Sitting Balance:  Static Sitting Balance  Static Sitting-Balance Support: Feet supported  Static Sitting-Level of Assistance: Close supervision    IADL's:   Homemaking Responsibilities: Yes (Independent)    Vision: Vision - Basic Assessment  Current Vision: Does not wear glasses    Strength:  Strength Comments: 4/5 R UE from shoulders distally to digits, L UE N/E d/t humeral fracture    Coordination:  Movements are Fluid and Coordinated: Yes     Hand Function:  Hand Function  Gross Grasp: Functional  Coordination: Functional    Extremities:   RUE : Within Functional Limits   LUE:  (Shoulder flexion N/E d/t humeral fracture, WFL distally from elbows to digits)    Outcome Measures: [facility] Daily Activity  Putting on and taking off regular lower body clothing: Total  Bathing (including washing, rinsing, drying): A lot  Putting on and taking off regular upper body clothing: A lot  Toileting, which includes using toilet, bedpan or urinal: Total  Taking care of personal grooming such as brushing  teeth: A little  Eating Meals: None  Daily Activity - Total Score: 13      Education Documentation  ADL Training, taught by LIZ Escobar at 7/25/2024 12:06 PM.  Learner: Patient  Readiness: Acceptance  Method: Explanation, Demonstration  Response: Verbalizes Understanding, Needs Reinforcement    Education Comments  No comments found.    Note written by Layla KLINEOT, under supervision of DEO Boyd/L   Goals:   Encounter Problems       Encounter Problems (Active)       ADLs       Patient will perform UB and LB bathing with minimal assist level of assistance and grab bars, shower chair, and long-handled sponge.       Start:  07/25/24    Expected End:  08/08/24            Patient with complete upper body dressing with minimal assist level of assistance donning and doffing all UE clothes with PRN adaptive equipment while edge of bed        Start:  07/25/24    Expected End:  08/08/24            Patient with complete lower body dressing with minimal assist  level of assistance donning and doffing all LE clothes  with reacher, shoe horn, sock-aid, and dressing stick  while edge of bed        Start:  07/25/24    Expected End:  08/08/24            Patient will complete daily grooming tasks brushing teeth and washing face/hair with independent level of assistance and PRN adaptive equipment while edge of bed .       Start:  07/25/24    Expected End:  08/08/24            Patient will complete toileting including hygiene clothing management/hygiene with minimal assist  level of assistance and raised toilet seat and grab bars.       Start:  07/25/24    Expected End:  08/08/24               BALANCE       Patientt will maintain static standing balance during ADL task with contact guard assist level of assistance drop down in order to demonstrate decreased risk of falling and improved postural control.       Start:  07/25/24    Expected End:  08/08/24            Patient will tolerate standing for 3 minutes to contact  guard assist level of assistance with front wheeled walker in order to improve functional activity tolerance for ADL tasks.       Start:  07/25/24    Expected End:  08/08/24               TRANSFERS       Patient will perform bed mobility independent level of assistance and bed rails in order to improve safety and independence with mobility       Start:  07/25/24    Expected End:  08/08/24            Patient will complete functional transfer to all surfaces with front wheeled walker with minimal assist  level of assistance.       Start:  07/25/24    Expected End:  08/08/24            Patient will complete sit to stand transfer with contact guard assist level of assistance and front wheeled walker in order to improve safety and prepare for out of bed mobility.       Start:  07/25/24    Expected End:  08/08/24

## 2024-07-25 NOTE — H&P
Yeu Sanz is a 63 y.o. female   Fall       Patient with a past medical history of alcohol abuse history of CVA history of multiple falls with a recent 1 leading to a left proximal humerus fracture s/p repair history of alcohol-related seizures dyslipidemia comes in after another fall at home which happened after she tripped on her walker and fell  She she denies losing consciousness or hitting her head  Denies any chest pain or palpitations  Did not want to come to the hospital but her mother convince her to come to the hospital because of persistent left hip pain  Imaging in the emergency room was negative for fractures  Continues to drink alcohol whenever she watches [employer]    Past Medical History  Past Medical History:   Diagnosis Date    Acute CVA (cerebrovascular accident) (Multi) 05/30/2023    Alcohol related seizure (Multi) 05/30/2023    Cerebral hemorrhage (Multi) 05/30/2023    Cerebral infarction due to embolism of right middle cerebral artery (Multi) 05/30/2023    Diarrhea, unspecified 08/10/2022    Diarrhea    Encounter for follow-up examination after completed treatment for conditions other than malignant neoplasm 04/20/2020    Hospital discharge follow-up    Encounter for other screening for malignant neoplasm of breast 03/08/2022    Breast screening    Encounter for screening for malignant neoplasm of colon 05/16/2022    Colon cancer screening    Other abnormality of red blood cells 08/10/2022    Elevated MCV    Personal history of other endocrine, nutritional and metabolic disease     History of high cholesterol    Personal history of transient ischemic attack (TIA), and cerebral infarction without residual deficits     History of cerebrovascular accident       Surgical History  Past Surgical History:   Procedure Laterality Date    MR HEAD ANGIO WO IV CONTRAST  5/19/2021    MR HEAD ANGIO WO IV CONTRAST [facility]19/2021 [facility]    MR HEAD ANGIO WO IV CONTRAST  7/12/2018    MR HEAD ANGIO WO IV  CONTRAST 7/12/2018 [facility]    MR NECK ANGIO WO IV CONTRAST  5/19/2021    MR NECK ANGIO WO IV CONTRAST 5/19/2021 [facility]        Social History  She reports that she has never smoked. She has never been exposed to tobacco smoke. She has never used smokeless tobacco. She reports that she does not currently use alcohol. She reports that she does not currently use drugs.    Family History  Family History   Problem Relation Name Age of Onset    Arthritis Mother      No Known Problems Father          Allergies  Amoxicillin    Review of Systems     Constitutional: not feeling poorly, no fever, no recent weight gain and no recent weight loss.   Eyes: no blurred vision and no diplopia.   ENT: no hearing loss, no tinnitus, no earache, no sore throat, no hoarseness and no swollen glands in the neck.   Cardiovascular: no chest pain, no tightness or heavy pressure, no shortness of breath, no palpitations and no lower extremity edema.   Respiratory: no cough, wheezing or shortness of breath at rest or exertion  Gastrointestinal: no change in bowel habits, no diarrhea, no constipation, no bloody stools, no nausea, no vomiting, no abdominal pain, no signs and symptoms of ulcer disease, no ashish colored stools and no intolerance to fatty foods.   Genitourinary: no urinary frequency, no dysuria, no hematuria, no burning sensation during urination, urinary stream is not smaller and urinary stream does not start and stop.   Musculoskeletal: Occultly walking because of pain  Skin: no rashes, no change in skin color and pigmentation, no skin lesions and no skin lumps.   Neurological: no headaches, no dizziness, no seizures, no tingling, no numbness, no signs and symptoms of stroke and no limb weakness.   Psychiatric: no confusion, no memory lapses or loss, no depression and no sleep disturbances.   Endocrine: no goiter, no thyroid disorder, no diabetes mellitus, no excessive thirst, no dry skin, no cold  intolerance, no heat intolerance and no increased urinary frequency.   Hematologic/Lymphatic: is not slow to heal, does not bleed easily, does not bruise easily, no thrombophlebitis, no anemia and no history of blood transfusion.   All other systems have been reviewed and are negative for complaint.     Vitals:    07/25/24 0709   BP: 111/67   Pulse: 74   Resp: 18   Temp: 36.4 °C (97.5 °F)   SpO2: 97%        Scheduled medications  aspirin, 81 mg, oral, Daily  atorvastatin, 40 mg, oral, Nightly  calcium carbonate-vitamin D3, 1 tablet, oral, BID  clopidogrel, 75 mg, oral, Daily  enoxaparin, 40 mg, subcutaneous, q24h  folic acid, 1 mg, oral, Daily  levETIRAcetam, 500 mg, oral, BID  lisinopril, 10 mg, oral, Daily  metoprolol tartrate, 25 mg, oral, BID  multivitamin with minerals, 1 tablet, oral, Daily  [START ON 7/26/2024] pantoprazole, 40 mg, oral, Daily before breakfast   Or  [START ON 7/26/2024] pantoprazole, 40 mg, intravenous, Daily before breakfast  PHENobarbitaL, 64.8 mg, oral, TID   Followed by  [START ON 7/26/2024] PHENobarbitaL, 32.4 mg, oral, TID  polyethylene glycol, 17 g, oral, Daily  potassium chloride CR, 20 mEq, oral, Daily  sertraline, 50 mg, oral, Daily  [START ON 7/27/2024] thiamine, 100 mg, oral, Daily  thiamine, 100 mg, intravenous, Daily      Continuous medications     PRN medications  PRN medications: acetaminophen **OR** acetaminophen **OR** acetaminophen, alum-mag hydroxide-simeth, cyclobenzaprine, guaiFENesin, melatonin, prochlorperazine **OR** prochlorperazine, traMADol    Results from last 7 days   Lab Units 07/25/24  1023 07/25/24  0627 07/24/24  1042   WBC AUTO x10*3/uL 7.3 6.5 7.6   HEMOGLOBIN g/dL 10.7* 10.0* 11.1*   HEMATOCRIT % 32.2* 31.3* 33.5*   PLATELETS AUTO x10*3/uL 299 254 318     Results from last 7 days   Lab Units 07/25/24  1023 07/25/24  0627 07/24/24  1042   SODIUM mmol/L 141 142 140   POTASSIUM mmol/L 3.0* 3.2* 3.2*   CHLORIDE mmol/L 109* 112* 107   CO2 mmol/L 16* 17* 19*    BUN mg/dL 23 22 26*   CREATININE mg/dL 0.83 0.76 1.31*   CALCIUM mg/dL 8.2* 7.8* 9.4   PROTEIN TOTAL g/dL  --  5.1* 7.2   BILIRUBIN TOTAL mg/dL  --  0.7 1.1   ALK PHOS U/L  --  91 112   ALT U/L  --  10 13   AST U/L  --  22 27   GLUCOSE mg/dL 140* 87 82            CT pelvis wo IV contrast   Final Result   1.  No evidence for occult acute fracture. See discussion above.        MACRO:   None        Signed by: Joseph Schoenberger 7/24/2024 3:58 PM   Dictation workstation:   [id number]      XR hip left with pelvis when performed 2 or 3 views   Final Result   1. No acute fracture or dislocation of the pelvis or left hip.   2. Moderate stool and air visualized throughout the colon.   Signed by Neri Gnozalez MD          Physical Exam      Constitutional   General appearance: Alert and in no acute distress.   Eyes   Inspection of eyes: Sclera and conjunctiva were normal.    Pupil exam: Pupils were equal in size. Extraocular movements were intact.   Pulmonary   Respiratory assessment: No respiratory distress, normal respiratory rhythm and effort.    Auscultation of Lungs: Clear bilateral breath sounds.   Cardiovascular   Auscultation of heart: Apical pulse normal, heart rate and rhythm normal, normal S1 and S2, no murmurs and no pericardial rub.    Exam for edema: No peripheral edema.   Abdomen   Abdominal Exam: No bruits, normal bowel sounds, soft, non-tender, no abdominal mass palpated.    Liver and Spleen exam: No hepato-splenomegaly.   Musculoskeletal   Tender left hip  Inspection of digits and nails: No clubbing or cyanosis of the fingernails.    Inspection/palpation of joints, bones and muscles: No joint swelling. Normal movement of all extremities.   Skin   Skin inspection: Normal skin color and pigmentation, normal skin turgor and no visible rash.   Neurologic   Cranial nerves: Nerves 2-12 were intact, no focal neuro defects.   Psychiatric   Orientation: Oriented to person, place, and time.    Mood and affect:  Normal.      Assessment/Plan      #Mechanical fall with left hip bruising  PT OT consulted  Pain control    #Alcohol abuse with history of withdrawals and seizures  Resume antiseizure medication  Start phenobarb  Thiamine/folic acid    #Metabolic acidosis  Start oral bicarb    #History of CVA  #Hypertension/dyslipidemia  Resume home medications

## 2024-07-25 NOTE — PROGRESS NOTES
Physical Therapy    Physical Therapy Evaluation    Patient Name: Yue Sanz  MRN: [id number]  Today's Date: 7/25/2024   Time Calculation  Start Time: 1556  Stop Time: 1622  Time Calculation (min): 26 min    Assessment/Plan   PT Assessment  PT Assessment Results: Decreased strength, Decreased endurance, Impaired balance, Decreased mobility, Decreased coordination  Rehab Prognosis: Good  Barriers to Discharge: Increased assist required for balance, transfers, and ambulation  Evaluation/Treatment Tolerance: Patient tolerated treatment well  Medical Staff Made Aware: Yes  Strengths: Premorbid level of function  Barriers to Participation:  (None identified)  End of Session Communication: Bedside nurse, PCT/NA/CTA  Assessment Comment: Pt presents today with decreased BLE strength, balance, and Hx of recent fall. Currently, pt is below the reported PLOF requiring min assist for transfers and mod assist for ambulation. Pt would benefit from continued PT to adress the above factors and bring the pt closer to the PLOF while reducing fall risk.  End of Session Patient Position: Bed, 3 rail up, Alarm on  IP OR SWING BED PT PLAN  Inpatient or Swing Bed: Inpatient  PT Plan  Treatment/Interventions: Bed mobility, Transfer training, Gait training, Balance training, Neuromuscular re-education, Strengthening, Endurance training, Therapeutic exercise, Therapeutic activity, Home exercise program  PT Plan: Ongoing PT  PT Frequency: 3 times per week  PT Discharge Recommendations: Moderate intensity level of continued care  Equipment Recommended upon Discharge: Wheeled walker  PT Recommended Transfer Status: Assist x1, Assistive device (Min assist)  PT - OK to Discharge: Yes (Per PT POC)    Subjective   General Visit Information:  General  Reason for Referral: 64 y/o F presenting s/p fall at home  Referred By: ABELARDO Lancaster  Past Medical History Relevant to Rehab:   Past Medical History:   Diagnosis Date    Acute CVA (cerebrovascular accident)  (Multi) 05/30/2023    Alcohol related seizure (Multi) 05/30/2023    Cerebral hemorrhage (Multi) 05/30/2023    Cerebral infarction due to embolism of right middle cerebral artery (Multi) 05/30/2023    Diarrhea, unspecified 08/10/2022    Diarrhea    Encounter for follow-up examination after completed treatment for conditions other than malignant neoplasm 04/20/2020    Hospital discharge follow-up    Encounter for other screening for malignant neoplasm of breast 03/08/2022    Breast screening    Encounter for screening for malignant neoplasm of colon 05/16/2022    Colon cancer screening    Other abnormality of red blood cells 08/10/2022    Elevated MCV    Personal history of other endocrine, nutritional and metabolic disease     History of high cholesterol    Personal history of transient ischemic attack (TIA), and cerebral infarction without residual deficits     History of cerebrovascular accident     Family/Caregiver Present: No  Prior to Session Communication: Bedside nurse  Patient Position Received: Bed, 3 rail up, Alarm on  Preferred Learning Style: auditory, kinesthetic, visual  General Comment: Pt supine in bed upon PT arrival/ Cleared to participate with bedside nurse, and agreeable to PT evaluation  Home Living:  Home Living  Type of Home: Apartment (Duplex)  Lives With: Alone  Home Adaptive Equipment: Cane, Walker rolling or standard (Standard walker)  Home Layout: One level  Home Access: Level entry  Bathroom Shower/Tub: Walk-in shower  Bathroom Toilet: Standard  Bathroom Equipment:  (Shower seat)  Prior Level of Function:  Prior Function Per Pt/Caregiver Report  Level of [place]: Independent with ADLs and functional transfers, Independent with homemaking with ambulation  Receives Help From: Parent(s) (PT reports mother lives close by)  ADL Assistance: Independent  Homemaking Assistance: Independent  Ambulatory Assistance: Independent (CAne PRN)  Hand Dominance:  Right  Precautions:  Precautions  Medical Precautions: Fall precautions    Objective   Pain:  Pain Assessment  Pain Assessment: 0-10  0-10 (Numeric) Pain Score: 0 - No pain  Cognition:  Cognition  Orientation Level:  (Oriented to person, place, and time)  Insight: Moderate  Processing Speed: Delayed    General Assessments:  Activity Tolerance  Activity Tolerance Comments: Fair tolerance to sitting and static standing    Sensation  Light Touch: No apparent deficits    Coordination  Movements are Fluid and Coordinated: No  Coordination Comment: Pt demonstrates difficulty coordinating lower body movements with UE movemetns during ambulation trial    Postural Control  Trunk Control: Trunk flexion in sitting and standing    Static Sitting Balance  Static Sitting-Balance Support: Bilateral upper extremity supported, Feet supported  Static Sitting-Level of Assistance: Contact guard  Static Sitting-Comment/Number of Minutes: CGA to prevent posterior/lateral LOB. Pt ablet o improve balance with VC/TC for hand placement on bed for support    Static Standing Balance  Static Standing-Balance Support: Bilateral upper extremity supported  Static Standing-Comment/Number of Minutes: Varying CGA to min assist with FWW support  Dynamic Standing Balance  Dynamic Standing-Balance Support: Bilateral upper extremity supported  Dynamic Standing-Comments: Mod assist with FWW support  Functional Assessments:  Bed Mobility  Bed Mobility: Yes  Bed Mobility 1  Bed Mobility 1: Supine to sitting  Level of Assistance 1: Close supervision  Bed Mobility Comments 1: HOB elevated. PT able to progress BLE off the EOB and use bed rail to bring trunk into upright sitting  Bed Mobility 2  Bed Mobility  2: Sitting to supine  Level of Assistance 2: Contact guard  Bed Mobility Comments 2: HOB lowered. PT able lift BLE into bed with fair eccentric control on descent to the bed.    Transfers  Transfer: Yes  Transfer 1  Transfer From 1: Bed to  Transfer to 1:  Stand  Technique 1: Sit to stand  Transfer Device 1: Walker  Transfer Level of Assistance 1: Minimum assistance, Minimal verbal cues  Trials/Comments 1: x2 Trials. VC for foot placement on floor and BUE push from bed. Assist provided to prevent posterior LOB during transfer  Transfers 2  Transfer From 2: Stand to  Transfer to 2: Bed  Technique 2: Stand to sit  Transfer Device 2: Walker  Transfer Level of Assistance 2: Minimum assistance, Minimal verbal cues  Trials/Comments 2: VC for BUE reach for bed when sitting. Assist provided for control on descent to the bed.    Ambulation/Gait Training  Ambulation/Gait Training Performed: Yes  Ambulation/Gait Training 1  Surface 1: Level tile  Device 1: Rolling walker  Assistance 1: Moderate assistance, Moderate verbal cues  Comments/Distance (ft) 1: 3 sidesteps left, 4 sidesteps right. PT demonstrates decreased anterior/lateral stability requiring assist to maintain balance. Pt also demonstrates difficulty coordinating steps with walker progression with a wide BLAS. MOD VC to intiate steps and sequence with walker progression     Extremity/Trunk Assessments:  RLE   RLE : Exceptions to WFL  Strength RLE  RLE Overall Strength:  (Grossly 3+/5 MMT)  LLE   LLE :  (Grossly 3+/5 MMT)  Outcome Measures:  [facility] Basic Mobility  Turning from your back to your side while in a flat bed without using bedrails: A little  Moving from lying on your back to sitting on the side of a flat bed without using bedrails: A little  Moving to and from bed to chair (including a wheelchair): A little  Standing up from a chair using your arms (e.g. wheelchair or bedside chair): A little  To walk in hospital room: A lot  Climbing 3-5 steps with railing: A lot  Basic Mobility - Total Score: 16    Encounter Problems       Encounter Problems (Active)       Balance       Goal 1 (Progressing)       Start:  07/25/24    Expected End:  08/08/24       Pt performs all sitting balance with supervision and standing  balance with CGA using LRAD            Mobility       STG - Patient will ambulate (Progressing)       Start:  07/25/24    Expected End:  08/08/24       25 ft with CGA using LRAD            PT Problem       PT Goal 1 (Not Progressing)       Start:  07/25/24    Expected End:  08/08/24       Pt performs 2 sets of 12 reps of prescribed BLE HEP with cueing as needed            PT Transfers       STG - Patient to transfer to and from sit to supine (Progressing)       Start:  07/25/24    Expected End:  08/08/24       IND         STG - Patient will transfer sit to and from stand (Progressing)       Start:  07/25/24    Expected End:  08/08/24       With supervision using LRAD              Education Documentation  Body Mechanics, taught by Dc Lucio, PT at 7/25/2024  4:47 PM.  Learner: Patient  Readiness: Acceptance  Method: Explanation, Demonstration  Response: Verbalizes Understanding    Mobility Training, taught by Dc Lucio, PT at 7/25/2024  4:47 PM.  Learner: Patient  Readiness: Acceptance  Method: Explanation, Demonstration  Response: Verbalizes Understanding    Education Comments  No comments found.

## 2024-07-26 ENCOUNTER — APPOINTMENT (OUTPATIENT)
Dept: VASCULAR MEDICINE | Facility: HOSPITAL | Age: 64
End: 2024-07-26
Payer: COMMERCIAL

## 2024-07-26 LAB
ANION GAP SERPL CALC-SCNC: 13 MMOL/L (ref 10–20)
APPEARANCE UR: ABNORMAL
BACTERIA #/AREA URNS AUTO: ABNORMAL /HPF
BILIRUB UR STRIP.AUTO-MCNC: NEGATIVE MG/DL
BUN SERPL-MCNC: 18 MG/DL (ref 6–23)
CALCIUM SERPL-MCNC: 7.8 MG/DL (ref 8.6–10.3)
CAOX CRY #/AREA UR COMP ASSIST: ABNORMAL /HPF
CHLORIDE SERPL-SCNC: 112 MMOL/L (ref 98–107)
CO2 SERPL-SCNC: 17 MMOL/L (ref 21–32)
COLOR UR: ABNORMAL
CREAT SERPL-MCNC: 0.71 MG/DL (ref 0.5–1.05)
EGFRCR SERPLBLD CKD-EPI 2021: >90 ML/MIN/1.73M*2
ERYTHROCYTE [DISTWIDTH] IN BLOOD BY AUTOMATED COUNT: 14.6 % (ref 11.5–14.5)
GLUCOSE SERPL-MCNC: 92 MG/DL (ref 74–99)
GLUCOSE UR STRIP.AUTO-MCNC: NORMAL MG/DL
HCT VFR BLD AUTO: 28.6 % (ref 36–46)
HGB BLD-MCNC: 9.3 G/DL (ref 12–16)
HOLD SPECIMEN: NORMAL
KETONES UR STRIP.AUTO-MCNC: ABNORMAL MG/DL
LEUKOCYTE ESTERASE UR QL STRIP.AUTO: ABNORMAL
MAGNESIUM SERPL-MCNC: 1.7 MG/DL (ref 1.6–2.4)
MCH RBC QN AUTO: 32.4 PG (ref 26–34)
MCHC RBC AUTO-ENTMCNC: 32.5 G/DL (ref 32–36)
MCV RBC AUTO: 100 FL (ref 80–100)
NITRITE UR QL STRIP.AUTO: ABNORMAL
NRBC BLD-RTO: 0 /100 WBCS (ref 0–0)
PH UR STRIP.AUTO: 6 [PH]
PLATELET # BLD AUTO: 245 X10*3/UL (ref 150–450)
POTASSIUM SERPL-SCNC: 3.4 MMOL/L (ref 3.5–5.3)
PROT UR STRIP.AUTO-MCNC: ABNORMAL MG/DL
RBC # BLD AUTO: 2.87 X10*6/UL (ref 4–5.2)
RBC # UR STRIP.AUTO: NEGATIVE /UL
RBC #/AREA URNS AUTO: ABNORMAL /HPF
SODIUM SERPL-SCNC: 139 MMOL/L (ref 136–145)
SP GR UR STRIP.AUTO: 1.04
SQUAMOUS #/AREA URNS AUTO: ABNORMAL /HPF
UROBILINOGEN UR STRIP.AUTO-MCNC: ABNORMAL MG/DL
WBC # BLD AUTO: 5 X10*3/UL (ref 4.4–11.3)
WBC #/AREA URNS AUTO: >50 /HPF
YEAST BUDDING #/AREA UR COMP ASSIST: PRESENT /HPF

## 2024-07-26 PROCEDURE — 2500000002 HC RX 250 W HCPCS SELF ADMINISTERED DRUGS (ALT 637 FOR MEDICARE OP, ALT 636 FOR OP/ED): Performed by: INTERNAL MEDICINE

## 2024-07-26 PROCEDURE — 85027 COMPLETE CBC AUTOMATED: CPT | Performed by: INTERNAL MEDICINE

## 2024-07-26 PROCEDURE — 93971 EXTREMITY STUDY: CPT | Performed by: INTERNAL MEDICINE

## 2024-07-26 PROCEDURE — 99239 HOSP IP/OBS DSCHRG MGMT >30: CPT | Performed by: INTERNAL MEDICINE

## 2024-07-26 PROCEDURE — 87086 URINE CULTURE/COLONY COUNT: CPT | Mod: AHULAB | Performed by: EMERGENCY MEDICINE

## 2024-07-26 PROCEDURE — 93971 EXTREMITY STUDY: CPT

## 2024-07-26 PROCEDURE — 83735 ASSAY OF MAGNESIUM: CPT | Performed by: NURSE PRACTITIONER

## 2024-07-26 PROCEDURE — 82374 ASSAY BLOOD CARBON DIOXIDE: CPT | Performed by: INTERNAL MEDICINE

## 2024-07-26 PROCEDURE — 2500000001 HC RX 250 WO HCPCS SELF ADMINISTERED DRUGS (ALT 637 FOR MEDICARE OP): Performed by: INTERNAL MEDICINE

## 2024-07-26 PROCEDURE — 81001 URINALYSIS AUTO W/SCOPE: CPT | Performed by: EMERGENCY MEDICINE

## 2024-07-26 PROCEDURE — G0378 HOSPITAL OBSERVATION PER HR: HCPCS

## 2024-07-26 PROCEDURE — 1100000001 HC PRIVATE ROOM DAILY

## 2024-07-26 PROCEDURE — 2500000002 HC RX 250 W HCPCS SELF ADMINISTERED DRUGS (ALT 637 FOR MEDICARE OP, ALT 636 FOR OP/ED): Performed by: EMERGENCY MEDICINE

## 2024-07-26 PROCEDURE — 2500000004 HC RX 250 GENERAL PHARMACY W/ HCPCS (ALT 636 FOR OP/ED): Performed by: INTERNAL MEDICINE

## 2024-07-26 PROCEDURE — 80048 BASIC METABOLIC PNL TOTAL CA: CPT | Performed by: INTERNAL MEDICINE

## 2024-07-26 PROCEDURE — 36415 COLL VENOUS BLD VENIPUNCTURE: CPT | Performed by: INTERNAL MEDICINE

## 2024-07-26 RX ORDER — SODIUM BICARBONATE 650 MG/1
650 TABLET ORAL 2 TIMES DAILY
Status: ON HOLD
Start: 2024-07-26 | End: 2024-07-29

## 2024-07-26 RX ORDER — PHENOBARBITAL 32.4 MG/1
TABLET ORAL
Status: ON HOLD
Start: 2024-07-26 | End: 2024-07-29

## 2024-07-26 RX ORDER — CEFTRIAXONE 1 G/50ML
1 INJECTION, SOLUTION INTRAVENOUS DAILY
Status: DISCONTINUED | OUTPATIENT
Start: 2024-07-26 | End: 2024-07-27 | Stop reason: HOSPADM

## 2024-07-26 RX ORDER — POTASSIUM CHLORIDE 20 MEQ/1
20 TABLET, EXTENDED RELEASE ORAL ONCE
Status: COMPLETED | OUTPATIENT
Start: 2024-07-26 | End: 2024-07-26

## 2024-07-26 RX ORDER — POTASSIUM CHLORIDE 20 MEQ/1
20 TABLET, EXTENDED RELEASE ORAL DAILY
Status: ON HOLD
Start: 2024-07-27

## 2024-07-26 RX ORDER — CEPHALEXIN 500 MG/1
500 CAPSULE ORAL 4 TIMES DAILY
Qty: 4 CAPSULE | Status: ON HOLD
Start: 2024-07-26 | End: 2024-07-30

## 2024-07-26 RX ORDER — MULTIVIT-MIN/IRON FUM/FOLIC AC 7.5 MG-4
1 TABLET ORAL DAILY
Status: ON HOLD
Start: 2024-07-27

## 2024-07-26 RX ORDER — POLYETHYLENE GLYCOL 3350 17 G/17G
17 POWDER, FOR SOLUTION ORAL DAILY
Status: ON HOLD
Start: 2024-07-26

## 2024-07-26 RX ORDER — LANOLIN ALCOHOL/MO/W.PET/CERES
100 CREAM (GRAM) TOPICAL DAILY
Status: ON HOLD
Start: 2024-07-27

## 2024-07-26 RX ADMIN — LEVETIRACETAM 500 MG: 500 TABLET, FILM COATED ORAL at 21:55

## 2024-07-26 RX ADMIN — FOLIC ACID 1 MG: 1 TABLET ORAL at 09:10

## 2024-07-26 RX ADMIN — SODIUM BICARBONATE 650 MG: 650 TABLET ORAL at 09:10

## 2024-07-26 RX ADMIN — LEVETIRACETAM 500 MG: 500 TABLET, FILM COATED ORAL at 09:09

## 2024-07-26 RX ADMIN — ATORVASTATIN CALCIUM 40 MG: 40 TABLET, FILM COATED ORAL at 21:55

## 2024-07-26 RX ADMIN — PANTOPRAZOLE SODIUM 40 MG: 40 TABLET, DELAYED RELEASE ORAL at 05:39

## 2024-07-26 RX ADMIN — PHENOBARBITAL 64.8 MG: 32.4 TABLET ORAL at 09:10

## 2024-07-26 RX ADMIN — Medication 1 TABLET: at 09:09

## 2024-07-26 RX ADMIN — THIAMINE HYDROCHLORIDE 100 MG: 100 INJECTION, SOLUTION INTRAMUSCULAR; INTRAVENOUS at 09:32

## 2024-07-26 RX ADMIN — METOPROLOL TARTRATE 25 MG: 25 TABLET, FILM COATED ORAL at 09:10

## 2024-07-26 RX ADMIN — PHENOBARBITAL 64.8 MG: 32.4 TABLET ORAL at 15:47

## 2024-07-26 RX ADMIN — POTASSIUM CHLORIDE 20 MEQ: 1500 TABLET, EXTENDED RELEASE ORAL at 09:09

## 2024-07-26 RX ADMIN — ENOXAPARIN SODIUM 40 MG: 40 INJECTION SUBCUTANEOUS at 09:09

## 2024-07-26 RX ADMIN — CEFTRIAXONE SODIUM 1 G: 1 INJECTION, SOLUTION INTRAVENOUS at 09:14

## 2024-07-26 RX ADMIN — SODIUM BICARBONATE 650 MG: 650 TABLET ORAL at 21:55

## 2024-07-26 RX ADMIN — SERTRALINE 50 MG: 50 TABLET, FILM COATED ORAL at 09:10

## 2024-07-26 RX ADMIN — METOPROLOL TARTRATE 25 MG: 25 TABLET, FILM COATED ORAL at 21:55

## 2024-07-26 RX ADMIN — PHENOBARBITAL 32.4 MG: 32.4 TABLET ORAL at 21:55

## 2024-07-26 RX ADMIN — CLOPIDOGREL 75 MG: 75 TABLET ORAL at 09:10

## 2024-07-26 RX ADMIN — Medication 1 TABLET: at 21:55

## 2024-07-26 RX ADMIN — POTASSIUM CHLORIDE 20 MEQ: 1500 TABLET, EXTENDED RELEASE ORAL at 09:14

## 2024-07-26 RX ADMIN — ASPIRIN 81 MG: 81 TABLET, COATED ORAL at 09:09

## 2024-07-26 ASSESSMENT — COGNITIVE AND FUNCTIONAL STATUS - GENERAL
DAILY ACTIVITIY SCORE: 17
STANDING UP FROM CHAIR USING ARMS: A LITTLE
CLIMB 3 TO 5 STEPS WITH RAILING: A LOT
DRESSING REGULAR UPPER BODY CLOTHING: A LOT
PERSONAL GROOMING: A LITTLE
MOVING FROM LYING ON BACK TO SITTING ON SIDE OF FLAT BED WITH BEDRAILS: A LITTLE
MOVING FROM LYING ON BACK TO SITTING ON SIDE OF FLAT BED WITH BEDRAILS: A LITTLE
TOILETING: A LITTLE
WALKING IN HOSPITAL ROOM: A LITTLE
MOBILITY SCORE: 18
HELP NEEDED FOR BATHING: A LITTLE
DRESSING REGULAR UPPER BODY CLOTHING: A LITTLE
TURNING FROM BACK TO SIDE WHILE IN FLAT BAD: A LITTLE
TURNING FROM BACK TO SIDE WHILE IN FLAT BAD: A LITTLE
EATING MEALS: A LITTLE
DAILY ACTIVITIY SCORE: 18
DRESSING REGULAR LOWER BODY CLOTHING: A LITTLE
TOILETING: A LITTLE
MOVING TO AND FROM BED TO CHAIR: A LITTLE
DRESSING REGULAR LOWER BODY CLOTHING: A LITTLE
WALKING IN HOSPITAL ROOM: A LOT
MOBILITY SCORE: 16
PERSONAL GROOMING: A LITTLE
MOVING TO AND FROM BED TO CHAIR: A LITTLE
CLIMB 3 TO 5 STEPS WITH RAILING: A LITTLE
STANDING UP FROM CHAIR USING ARMS: A LITTLE
HELP NEEDED FOR BATHING: A LOT

## 2024-07-26 ASSESSMENT — PAIN SCALES - GENERAL
PAINLEVEL_OUTOF10: 0 - NO PAIN
PAINLEVEL_OUTOF10: 2

## 2024-07-26 ASSESSMENT — PAIN - FUNCTIONAL ASSESSMENT
PAIN_FUNCTIONAL_ASSESSMENT: 0-10
PAIN_FUNCTIONAL_ASSESSMENT: 0-10

## 2024-07-26 NOTE — PROGRESS NOTES
07/26/24 0909   Patient Choice   Provider Choice list and CMS website (https://medicare.gov/care-compare#search) for post-acute Quality and Resource Measure Data were provided and reviewed with: Family     Called patients room.  States her nephew, brother and or Juilanna will make her SNF choices.  Spoke with Xiang/nephew. He has circled and left  3 choices in [place] and also Emailed the person he spoke with yesterday.  Lala WINSTON notified.

## 2024-07-26 NOTE — PROGRESS NOTES
7/26/2024 10:12 AM I received an email from nephew. He said he is ALLIE Fontaine. His choices are 1) [facility] 2) [facility] and 3) [facility]. I requested referral to be sent. Nuv[facility]

## 2024-07-26 NOTE — PROGRESS NOTES
07/26/24 1613   Discharge Planning   Assistance Needed facility updated no ciwa, just 2 more days of phenobarb, CIWA flow sheet sent to facility, but no scores were indicated   Home or Post Acute Services Post acute facilities (Rehab/SNF/etc)   Type of Post Acute Facility Services Skilled nursing   Expected Discharge Disposition SNF  ([facility])   Does the patient need discharge transport arranged? Yes   RoundTrip coordination needed? Yes   Has discharge transport been arranged? Yes   What day is the transport expected? 07/26/24

## 2024-07-26 NOTE — PROGRESS NOTES
7/26/2024 4:44 PM Nephew notified of 7:00 PM discharge transport time to [facility]. Nuvia WINSTON

## 2024-07-26 NOTE — PROGRESS NOTES
7/26/2024 11:17 AM Nephew confirmed FOC is [facility]. I have requested to start auth. Nuvia WINSTON

## 2024-07-26 NOTE — PROGRESS NOTES
7/26/2024 4:40 PM Nephew informed that I have requested transport for discharge to [facility]. Nuvia WINSTON

## 2024-07-26 NOTE — CARE PLAN
Problem: Pain - Adult  Goal: Verbalizes/displays adequate comfort level or baseline comfort level  Outcome: Progressing     Problem: Safety - Adult  Goal: Free from fall injury  Outcome: Progressing     Problem: Discharge Planning  Goal: Discharge to home or other facility with appropriate resources  Outcome: Progressing     Problem: Chronic Conditions and Co-morbidities  Goal: Patient's chronic conditions and co-morbidity symptoms are monitored and maintained or improved  Outcome: Progressing     Problem: Skin  Goal: Decreased wound size/increased tissue granulation at next dressing change  Outcome: Progressing  Goal: Participates in plan/prevention/treatment measures  Outcome: Progressing  Goal: Prevent/manage excess moisture  Outcome: Progressing  Goal: Prevent/minimize sheer/friction injuries  Outcome: Progressing  Goal: Promote/optimize nutrition  Outcome: Progressing  Goal: Promote skin healing  Outcome: Progressing     Problem: Pain  Goal: Takes deep breaths with improved pain control throughout the shift  Outcome: Progressing  Goal: Turns in bed with improved pain control throughout the shift  Outcome: Progressing  Goal: Walks with improved pain control throughout the shift  Outcome: Progressing  Goal: Performs ADL's with improved pain control throughout shift  Outcome: Progressing  Goal: Participates in PT with improved pain control throughout the shift  Outcome: Progressing  Goal: Free from opioid side effects throughout the shift  Outcome: Progressing  Goal: Free from acute confusion related to pain meds throughout the shift  Outcome: Progressing   The patient's goals for the shift include rest    The clinical goals for the shift include pt will remain free from injury

## 2024-07-26 NOTE — DISCHARGE SUMMARY
Discharge Diagnosis  Fall, initial encounter    Issues Requiring Follow-Up  Therapy  Complete antibiotics    Test Results Pending At Discharge  Pending Labs       Order Current Status    Urine Culture In process            Hospital Course   Patient with a past medical history of alcohol abuse history of CVA history of multiple falls with a recent 1 leading to a left proximal humerus fracture s/p repair history of alcohol-related seizures dyslipidemia comes in after another fall at home which happened after she tripped on her walker and fell  She she denies losing consciousness or hitting her head  Denies any chest pain or palpitations  Did not want to come to the hospital but her mother convince her to come to the hospital because of persistent left hip pain  Imaging in the emergency room was negative for fractures  Continues to drink wine every day  We started the patient on CIWA protocol  Urinalysis came back positive for UTI so we started IV antibiotics  Seen by physical Occupational Therapy and the recommended SNF  Arrangement made for transfer to SNF for therapy  Continue phenobarbital for 2 more days  Complete course of antibiotics    Discharge diagnosis  Mechanical fall with hip contusion  UTI  Alcohol abuse  History of CVA  History of alcohol-related seizures  Dyslipidemia    Pertinent Physical Exam At Time of Discharge  Physical Exam    Constitutional   General appearance: Alert and in no acute distress.     Pulmonary   Respiratory assessment: No respiratory distress, normal respiratory rhythm and effort.    Auscultation of Lungs: Clear bilateral breath sounds.   Cardiovascular   Auscultation of heart: Apical pulse normal, heart rate and rhythm normal, normal S1 and S2, no murmurs and no pericardial rub.    Exam for edema: No peripheral edema.   Abdomen   Abdominal Exam: No bruits, normal bowel sounds, soft, non-tender, no abdominal mass palpated.    Liver and Spleen exam: No hepato-splenomegaly.    Musculoskeletal     Inspection of digits and nails: No clubbing or cyanosis of the fingernails.    Inspection/palpation of joints, bones and muscles: No joint swelling. Normal movement of all extremities.   Skin   Skin inspection: Normal skin color and pigmentation, normal skin turgor and no visible rash.   Neurologic   Cranial nerves: Nerves 2-12 were intact, no focal neuro defects.    Home Medications     Medication List      START taking these medications     cephalexin 500 mg capsule; Commonly known as: Keflex; Take 1 capsule   (500 mg) by mouth 4 times a day for 4 days.   multivitamin with minerals tablet; Take 1 tablet by mouth once daily.;   Start taking on: July 27, 2024   PHENobarbitaL 32.4 mg tablet; Commonly known as: Luminal; Take 2 tablets   (64.8 mg) by mouth 3 times a day for 1 day, THEN 1 tablet (32.4 mg) 3   times a day for 2 days.; Start taking on: July 26, 2024   potassium chloride CR 20 mEq ER tablet; Commonly known as: Klor-Con M20;   Take 1 tablet (20 mEq) by mouth once daily. Monitor K levels at [facility]; Start   taking on: July 27, 2024   sodium bicarbonate 650 mg tablet; Take 1 tablet (650 mg) by mouth 2   times a day for 3 days.   thiamine 100 mg tablet; Commonly known as: Vitamin B-1; Take 1 tablet   (100 mg) by mouth once daily. Do not fill before July 27, 2024.; Start   taking on: July 27, 2024     CONTINUE taking these medications     aspirin 81 mg EC tablet   atorvastatin 40 mg tablet; Commonly known as: Lipitor; Take 1 tablet (40   mg) by mouth once daily.   clopidogrel 75 mg tablet; Commonly known as: Plavix; Take 1 tablet (75   mg) by mouth once daily.   cyclobenzaprine 10 mg tablet; Commonly known as: Flexeril; Take 1 tablet   (10 mg) by mouth 3 times a day as needed for muscle spasms for up to 10   days.   folic acid 1 mg tablet; Commonly known as: Folvite; Take 1 tablet (1 mg)   by mouth once daily.   levETIRAcetam 500 mg tablet; Commonly known as: Keppra; Take 1 tablet   (500 mg)  by mouth 2 times a day.   metoprolol tartrate 25 mg tablet; Commonly known as: Lopressor; Take 1   tablet (25 mg) by mouth 2 times a day.   Oysco 500/D 500 mg-5 mcg (200 unit) tablet; Generic drug: calcium   carbonate-vitamin D3; Take 1 tablet by mouth 2 times a day.   polyethylene glycol 17 gram packet; Commonly known as: Glycolax,   Miralax; Take 17 g by mouth once daily.   sertraline 50 mg tablet; Commonly known as: Zoloft; Take 1 tablet (50   mg) by mouth once daily.     STOP taking these medications     acetaminophen 500 mg tablet; Commonly known as: Tylenol   chlorhexidine 0.12 % solution; Commonly known as: Peridex   hydrALAZINE 10 mg tablet; Commonly known as: Apresoline   lisinopril 10 mg tablet       Outpatient Follow-Up  Future Appointments   Date Time Provider Department Center   8/20/2024  2:30 PM Neville Corona MD [id number] [facility]   9/6/2024 11:00 AM Lluvia Alfred Pla, DO [id number] [facility]   9/24/2024 10:30 AM Jaret Patricia MD [id number] [facility]     Patient seen at bedside. Events from the last visit reviewed. Discussed with staff. Results of tests and investigations from last visit reviewed and discussed with patient/Family. Electronic chart on [facility] reviewed. Input / Recommendations  from consultants  appreciated and reviewed and agreed with.     discharge summary and profile completed. medications reviewed and discussed with patient and family.  scripts completed and signed.     total discharge time in excess of 30 minutes.    Carrie Lancaster MD

## 2024-07-27 VITALS
DIASTOLIC BLOOD PRESSURE: 86 MMHG | OXYGEN SATURATION: 99 % | BODY MASS INDEX: 20.11 KG/M2 | SYSTOLIC BLOOD PRESSURE: 141 MMHG | TEMPERATURE: 98.4 F | RESPIRATION RATE: 18 BRPM | HEART RATE: 71 BPM | HEIGHT: 67 IN | WEIGHT: 128.1 LBS

## 2024-07-27 LAB
ANION GAP SERPL CALC-SCNC: 10 MMOL/L (ref 10–20)
BUN SERPL-MCNC: 15 MG/DL (ref 6–23)
CALCIUM SERPL-MCNC: 8 MG/DL (ref 8.6–10.3)
CHLORIDE SERPL-SCNC: 110 MMOL/L (ref 98–107)
CO2 SERPL-SCNC: 20 MMOL/L (ref 21–32)
CREAT SERPL-MCNC: 0.67 MG/DL (ref 0.5–1.05)
EGFRCR SERPLBLD CKD-EPI 2021: >90 ML/MIN/1.73M*2
ERYTHROCYTE [DISTWIDTH] IN BLOOD BY AUTOMATED COUNT: 14.4 % (ref 11.5–14.5)
GLUCOSE SERPL-MCNC: 94 MG/DL (ref 74–99)
HCT VFR BLD AUTO: 31.4 % (ref 36–46)
HGB BLD-MCNC: 9.9 G/DL (ref 12–16)
MCH RBC QN AUTO: 32.4 PG (ref 26–34)
MCHC RBC AUTO-ENTMCNC: 31.5 G/DL (ref 32–36)
MCV RBC AUTO: 103 FL (ref 80–100)
NRBC BLD-RTO: 0 /100 WBCS (ref 0–0)
PLATELET # BLD AUTO: 240 X10*3/UL (ref 150–450)
POTASSIUM SERPL-SCNC: 4.3 MMOL/L (ref 3.5–5.3)
RBC # BLD AUTO: 3.06 X10*6/UL (ref 4–5.2)
SODIUM SERPL-SCNC: 136 MMOL/L (ref 136–145)
WBC # BLD AUTO: 5.6 X10*3/UL (ref 4.4–11.3)

## 2024-07-27 PROCEDURE — 36415 COLL VENOUS BLD VENIPUNCTURE: CPT | Performed by: INTERNAL MEDICINE

## 2024-07-27 PROCEDURE — G0378 HOSPITAL OBSERVATION PER HR: HCPCS

## 2024-07-27 PROCEDURE — 85027 COMPLETE CBC AUTOMATED: CPT | Performed by: INTERNAL MEDICINE

## 2024-07-27 PROCEDURE — 80048 BASIC METABOLIC PNL TOTAL CA: CPT | Performed by: INTERNAL MEDICINE

## 2024-07-27 NOTE — CARE PLAN
The patient's goals for the shift include rest    The clinical goals for the shift include pt will remain free from injury      Problem: Pain - Adult  Goal: Verbalizes/displays adequate comfort level or baseline comfort level  Outcome: Progressing     Problem: Safety - Adult  Goal: Free from fall injury  Outcome: Progressing     Problem: Discharge Planning  Goal: Discharge to home or other facility with appropriate resources  Outcome: Progressing     Problem: Chronic Conditions and Co-morbidities  Goal: Patient's chronic conditions and co-morbidity symptoms are monitored and maintained or improved  Outcome: Progressing

## 2024-07-27 NOTE — NURSING NOTE
Received a phone call from [facility] that they would be here to pick pt up around 07:30. Reported called to [facility], this Rn spoke to Lluvia the Nurse who will be getting this patient

## 2024-07-27 NOTE — NURSING NOTE
[facility] showed up to transport patient to SNF. I explained that the patient was going to be discharged at 10 am. The  said that they can not pick her up at 10am would need to be noNito choi relay message to Day RN

## 2024-07-28 LAB
BACTERIA UR CULT: ABNORMAL
BACTERIA UR CULT: ABNORMAL

## 2024-07-29 ENCOUNTER — NURSING HOME VISIT (OUTPATIENT)
Dept: POST ACUTE CARE | Facility: EXTERNAL LOCATION | Age: 64
End: 2024-07-29
Payer: COMMERCIAL

## 2024-07-29 DIAGNOSIS — I10 BENIGN ESSENTIAL HYPERTENSION: ICD-10-CM

## 2024-07-29 DIAGNOSIS — R29.6 FREQUENT FALLS: ICD-10-CM

## 2024-07-29 DIAGNOSIS — N39.0 URINARY TRACT INFECTION WITHOUT HEMATURIA, SITE UNSPECIFIED: ICD-10-CM

## 2024-07-29 DIAGNOSIS — R53.1 WEAKNESS: ICD-10-CM

## 2024-07-29 DIAGNOSIS — F41.9 ANXIETY AND DEPRESSION: ICD-10-CM

## 2024-07-29 DIAGNOSIS — E78.2 MIXED HYPERLIPIDEMIA: ICD-10-CM

## 2024-07-29 DIAGNOSIS — G40.909 SEIZURE DISORDER (MULTI): ICD-10-CM

## 2024-07-29 DIAGNOSIS — F32.A ANXIETY AND DEPRESSION: ICD-10-CM

## 2024-07-29 PROCEDURE — 99305 1ST NF CARE MODERATE MDM 35: CPT | Performed by: INTERNAL MEDICINE

## 2024-07-29 NOTE — PROGRESS NOTES
HISTORY & PHYSICAL    Subjective   Chief complaint: Yue Sanz is a 63 y.o. female who is a acute skilled care patient being seen and evaluated for multiple medical problems.  Patient presents for weakness    HPI:  HPI Patient with a past medical history of alcohol abuse history of CVA history of multiple falls with a recent 1 leading to a left proximal humerus fracture s/p repair history of alcohol-related seizures dyslipidemia comes in after another fall at home which happened after she tripped on her walker and fell  She she denies losing consciousness or hitting her head  Denies any chest pain or palpitations  Did not want to come to the hospital but her mother convince her to come to the hospital because of persistent left hip pain  Imaging in the emergency room was negative for fractures  Continues to drink wine every day  We started the patient on CIWA protocol  Urinalysis came back positive for UTI so we started IV antibiotics  Seen by physical Occupational Therapy and the recommended SNF  Arrangement made for transfer to SNF for therapy  Continue phenobarbital for 2 more days  Complete course of antibiotics    Past Medical History:   Diagnosis Date    Acute CVA (cerebrovascular accident) (Multi) 05/30/2023    Alcohol related seizure (Multi) 05/30/2023    Cerebral hemorrhage (Multi) 05/30/2023    Cerebral infarction due to embolism of right middle cerebral artery (Multi) 05/30/2023    Diarrhea, unspecified 08/10/2022    Diarrhea    Encounter for follow-up examination after completed treatment for conditions other than malignant neoplasm 04/20/2020    Hospital discharge follow-up    Encounter for other screening for malignant neoplasm of breast 03/08/2022    Breast screening    Encounter for screening for malignant neoplasm of colon 05/16/2022    Colon cancer screening    Other abnormality of red blood cells 08/10/2022    Elevated MCV    Personal history of other endocrine, nutritional and metabolic disease      History of high cholesterol    Personal history of transient ischemic attack (TIA), and cerebral infarction without residual deficits     History of cerebrovascular accident       Past Surgical History:   Procedure Laterality Date    MR HEAD ANGIO WO IV CONTRAST  5/19/2021    MR HEAD ANGIO WO IV CONTRAST 5/19/2021 [facility]    MR HEAD ANGIO WO IV CONTRAST  7/12/2018    MR HEAD ANGIO WO IV CONTRAST 7/12/2018 [facility] CLINICAL LEGACY    MR NECK ANGIO WO IV CONTRAST  5/19/2021    MR NECK ANGIO WO IV CONTRAST 5/19/2021 [facility]       Family History   Problem Relation Name Age of Onset    Arthritis Mother      No Known Problems Father         Social History     Socioeconomic History    Marital status:    Tobacco Use    Smoking status: Never     Passive exposure: Never    Smokeless tobacco: Never   Vaping Use    Vaping status: Never Used   Substance and Sexual Activity    Alcohol use: Not Currently     Comment: none currenlty    Drug use: Not Currently     Social Determinants of Health     Financial Resource Strain: Low Risk  (7/30/2024)    Overall Financial Resource Strain (CARDIA)     Difficulty of Paying Living Expenses: Not hard at all   Transportation Needs: No Transportation Needs (7/30/2024)    PRAPARE - Transportation     Lack of Transportation (Medical): No     Lack of Transportation (Non-Medical): No   Social Connections: Feeling Socially Integrated (7/16/2024)    OASIS : Social Isolation     Frequency of experiencing loneliness or isolation: Rarely   Housing Stability: Low Risk  (7/30/2024)    Housing Stability Vital Sign     Unable to Pay for Housing in the Last Year: No     Number of Times Moved in the Last Year: 1     Homeless in the Last Year: No       Vital signs: 133/96, 98.6, 71, 18, 95%    Objective   Physical Exam  Constitutional:       General: She is not in acute distress.  Eyes:      Extraocular Movements: Extraocular movements intact.   Cardiovascular:      Rate  and Rhythm: Normal rate and regular rhythm.   Pulmonary:      Effort: Pulmonary effort is normal.      Breath sounds: Normal breath sounds.   Abdominal:      General: Bowel sounds are normal.      Palpations: Abdomen is soft.   Musculoskeletal:      Cervical back: Neck supple.      Right lower leg: No edema.      Left lower leg: No edema.   Neurological:      Mental Status: She is alert.   Psychiatric:         Mood and Affect: Mood normal.         Behavior: Behavior is cooperative.         Assessment/Plan   Problem List Items Addressed This Visit       Seizure disorder (Multi)     Stable           Anxiety and depression     Stable  Refilling medication at same dose.  Recheck in 6 months         Benign essential hypertension     Stable  Recommending home blood pressure monitoring.  Checking basic labs.  Refilling meds.  Recheck in 6 months         Hyperlipidemia     Limit processed foods.  Monitor labs         Frequent falls     therapy         Weakness     therapy         UTI (urinary tract infection)     ATB             Medications, treatments, and labs reviewed  Continue medications and treatments as listed in [facility]    Scribe Attestation  I, Otto Lovettibmadi   attest that this documentation has been prepared under the direction and in the presence of Edson Pinon MD.    Provider Attestation - Scribe documentation  All medical record entries made by the Scribe were at my direction and personally dictated by me. I have reviewed the chart and agree that the record accurately reflects my personal performance of the history, physical exam, discussion and plan.  Edson Pinon MD

## 2024-07-29 NOTE — DOCUMENTATION CLARIFICATION NOTE
PATIENT:               SEMAJ PARHAM  ACCT #:                  [id number]  MRN:                       [id number]  :                       1960  ADMIT DATE:       2024 9:58 AM  DISCH DATE:        2024 8:03 AM  RESPONDING PROVIDER #:        [id number]          PROVIDER RESPONSE TEXT:    Acute Kidney Injury (ALEJANDRO)    CDI QUERY TEXT:    Clarification    Instruction:    Based on your assessment of the patient and the clinical information, please provide the requested documentation by clicking on the appropriate radio button and enter any additional information if prompted.      Question: Is there a diagnosis indicative of the above lab values      When answering this query, please exercise your independent professional judgment. The fact that a question is being asked, does not imply that any particular answer is desired or expected.    The patient's clinical indicators include:  Clinical Information:  64 y/o female [facility]. DX Left Hip Bruising, Metabolic Acidosis.      Clinical Indicators:  ED note per RASHAD Stone MD : ?63-year-old female who presents to the emergency department after a fall. She was evaluated with labs and x-rays.?    Vitals: ED: T 36.4, HR 66, RR 20, SpO2 98, 85/62    Labs  - :  Creatinine 1.31 - 0.76 - 0.83 - 0.71  BUN  -  - 23 - 18  GFR 46 - 88 - 79 - >90      Treatment: IVF Bolus NS 1L  and 500 mL   Risk Factors: PMH: HTN, Anemia, ETOH Use Disorder, Previous alcohol related seizures, recent left humerus FX  Options provided:  -- Acute Kidney Injury (ALEJANDRO)  -- Elevated creatinine clinically insignificant and not requiring treatment or evaluation  -- Other - I will add my own diagnosis  -- Refer to Clinical Documentation Reviewer    Query created by: Niki Isabel on 2024 9:47 AM      Electronically signed by:  DUNG CHOUDHARY MD 2024 10:02 AM

## 2024-07-29 NOTE — LETTER
Patient: Yue Sanz  : 1960    Encounter Date: 2024    HISTORY & PHYSICAL    Subjective  Chief complaint: Yue Sanz is a 63 y.o. female who is a acute skilled care patient being seen and evaluated for multiple medical problems.  Patient presents for weakness    HPI:  HPI Patient with a past medical history of alcohol abuse history of CVA history of multiple falls with a recent 1 leading to a left proximal humerus fracture s/p repair history of alcohol-related seizures dyslipidemia comes in after another fall at home which happened after she tripped on her walker and fell  She she denies losing consciousness or hitting her head  Denies any chest pain or palpitations  Did not want to come to the hospital but her mother convince her to come to the hospital because of persistent left hip pain  Imaging in the emergency room was negative for fractures  Continues to drink wine every day  We started the patient on CIWA protocol  Urinalysis came back positive for UTI so we started IV antibiotics  Seen by physical Occupational Therapy and the recommended SNF  Arrangement made for transfer to SNF for therapy  Continue phenobarbital for 2 more days  Complete course of antibiotics    Past Medical History:   Diagnosis Date   • Acute CVA (cerebrovascular accident) (Multi) 2023   • Alcohol related seizure (Multi) 2023   • Cerebral hemorrhage (Multi) 2023   • Cerebral infarction due to embolism of right middle cerebral artery (Multi) 2023   • Diarrhea, unspecified 08/10/2022    Diarrhea   • Encounter for follow-up examination after completed treatment for conditions other than malignant neoplasm 2020    Hospital discharge follow-up   • Encounter for other screening for malignant neoplasm of breast 2022    Breast screening   • Encounter for screening for malignant neoplasm of colon 2022    Colon cancer screening   • Other abnormality of red blood cells 08/10/2022    Elevated  MCV   • Personal history of other endocrine, nutritional and metabolic disease     History of high cholesterol   • Personal history of transient ischemic attack (TIA), and cerebral infarction without residual deficits     History of cerebrovascular accident       Past Surgical History:   Procedure Laterality Date   • MR HEAD ANGIO WO IV CONTRAST  5/19/2021    MR HEAD ANGIO WO IV CONTRAST 5/19/2021 [facility] EM[facility]GENCY [facility]GAC[facility]   • MR HEAD ANGIO WO IV CONTRAST  7/12/2018    MR HEAD ANGIO WO IV CONTRAST 7/12/2018 [facility] CLINICAL LEGACY   • MR NECK ANGIO WO IV CONTRAST  5/19/2021    MR NECK ANGIO WO IV CONTRAST 5/19/2021 [facility]       Family History   Problem Relation Name Age of Onset   • Arthritis Mother     • No Known Problems Father         Social History     Socioeconomic History   • Marital status:    Tobacco Use   • Smoking status: Never     Passive exposure: Never   • Smokeless tobacco: Never   Vaping Use   • Vaping status: Never Used   Substance and Sexual Activity   • Alcohol use: Not Currently     Comment: none currenlty   • Drug use: Not Currently     Social Determinants of Health     Financial Resource Strain: Low Risk  (7/30/2024)    Overall Financial Resource Strain (CARDIA)    • Difficulty of Paying Living Expenses: Not hard at all   Transportation Needs: No Transportation Needs (7/30/2024)    PRAPARE - Transportation    • Lack of Transportation (Medical): No    • Lack of Transportation (Non-Medical): No   Social Connections: Feeling Socially Integrated (7/16/2024)    OASIS : Social Isolation    • Frequency of experiencing loneliness or isolation: Rarely   Housing Stability: Low Risk  (7/30/2024)    Housing Stability Vital Sign    • Unable to Pay for Housing in the Last Year: No    • Number of Times Moved in the Last Year: 1    • Homeless in the Last Year: No       Vital signs: 133/96, 98.6, 71, 18, 95%    Objective  Physical Exam  Constitutional:       General: She is not in acute  distress.  Eyes:      Extraocular Movements: Extraocular movements intact.   Cardiovascular:      Rate and Rhythm: Normal rate and regular rhythm.   Pulmonary:      Effort: Pulmonary effort is normal.      Breath sounds: Normal breath sounds.   Abdominal:      General: Bowel sounds are normal.      Palpations: Abdomen is soft.   Musculoskeletal:      Cervical back: Neck supple.      Right lower leg: No edema.      Left lower leg: No edema.   Neurological:      Mental Status: She is alert.   Psychiatric:         Mood and Affect: Mood normal.         Behavior: Behavior is cooperative.         Assessment/Plan  Problem List Items Addressed This Visit       Seizure disorder (Multi)     Stable           Anxiety and depression     Stable  Refilling medication at same dose.  Recheck in 6 months         Benign essential hypertension     Stable  Recommending home blood pressure monitoring.  Checking basic labs.  Refilling meds.  Recheck in 6 months         Hyperlipidemia     Limit processed foods.  Monitor labs         Frequent falls     therapy         Weakness     therapy         UTI (urinary tract infection)     ATB             Medications, treatments, and labs reviewed  Continue medications and treatments as listed in [facility]    Scribe Attestation  Huma PARRA Scribe   attest that this documentation has been prepared under the direction and in the presence of Edson Pinon MD.    Provider Attestation - Scribe documentation  All medical record entries made by the Scribe were at my direction and personally dictated by me. I have reviewed the chart and agree that the record accurately reflects my personal performance of the history, physical exam, discussion and plan.  Edson Pinon MD        Electronically Signed By: Edson Pinon MD   7/30/24  3:15 PM

## 2024-07-30 ENCOUNTER — HOSPITAL ENCOUNTER (INPATIENT)
Facility: HOSPITAL | Age: 64
End: 2024-07-30
Attending: EMERGENCY MEDICINE | Admitting: SURGERY
Payer: OTHER MISCELLANEOUS

## 2024-07-30 DIAGNOSIS — S06.5XAA SUBDURAL HEMATOMA (MULTI): Primary | ICD-10-CM

## 2024-07-30 PROBLEM — N39.0 UTI (URINARY TRACT INFECTION): Status: ACTIVE | Noted: 2024-01-01

## 2024-07-30 LAB
ALBUMIN SERPL BCP-MCNC: 3.4 G/DL (ref 3.4–5)
ALBUMIN SERPL BCP-MCNC: 3.6 G/DL (ref 3.4–5)
ALP SERPL-CCNC: 106 U/L (ref 33–136)
ALT SERPL W P-5'-P-CCNC: 17 U/L (ref 7–45)
ANION GAP SERPL CALC-SCNC: 13 MMOL/L (ref 10–20)
ANION GAP SERPL CALC-SCNC: 18 MMOL/L (ref 10–20)
APTT PPP: 36 SECONDS (ref 27–38)
AST SERPL W P-5'-P-CCNC: 31 U/L (ref 9–39)
ATRIAL RATE: 85 BPM
BASOPHILS # BLD AUTO: 0.02 X10*3/UL (ref 0–0.1)
BASOPHILS NFR BLD AUTO: 0.2 %
BILIRUB SERPL-MCNC: 0.7 MG/DL (ref 0–1.2)
BUN SERPL-MCNC: 12 MG/DL (ref 6–23)
BUN SERPL-MCNC: 9 MG/DL (ref 6–23)
CALCIUM SERPL-MCNC: 8.5 MG/DL (ref 8.6–10.3)
CALCIUM SERPL-MCNC: 9 MG/DL (ref 8.6–10.3)
CHLORIDE SERPL-SCNC: 105 MMOL/L (ref 98–107)
CHLORIDE SERPL-SCNC: 105 MMOL/L (ref 98–107)
CO2 SERPL-SCNC: 17 MMOL/L (ref 21–32)
CO2 SERPL-SCNC: 24 MMOL/L (ref 21–32)
CREAT SERPL-MCNC: 0.63 MG/DL (ref 0.5–1.05)
CREAT SERPL-MCNC: 0.74 MG/DL (ref 0.5–1.05)
EGFRCR SERPLBLD CKD-EPI 2021: >90 ML/MIN/1.73M*2
EGFRCR SERPLBLD CKD-EPI 2021: >90 ML/MIN/1.73M*2
EOSINOPHIL # BLD AUTO: 0.14 X10*3/UL (ref 0–0.7)
EOSINOPHIL NFR BLD AUTO: 1.6 %
ERYTHROCYTE [DISTWIDTH] IN BLOOD BY AUTOMATED COUNT: 14.5 % (ref 11.5–14.5)
ERYTHROCYTE [DISTWIDTH] IN BLOOD BY AUTOMATED COUNT: 14.6 % (ref 11.5–14.5)
GLUCOSE BLD MANUAL STRIP-MCNC: 111 MG/DL (ref 74–99)
GLUCOSE BLD MANUAL STRIP-MCNC: 115 MG/DL (ref 74–99)
GLUCOSE BLD MANUAL STRIP-MCNC: 97 MG/DL (ref 74–99)
GLUCOSE SERPL-MCNC: 100 MG/DL (ref 74–99)
GLUCOSE SERPL-MCNC: 89 MG/DL (ref 74–99)
HCT VFR BLD AUTO: 29.4 % (ref 36–46)
HCT VFR BLD AUTO: 31.4 % (ref 36–46)
HGB BLD-MCNC: 10.4 G/DL (ref 12–16)
HGB BLD-MCNC: 9.5 G/DL (ref 12–16)
IMM GRANULOCYTES # BLD AUTO: 0.03 X10*3/UL (ref 0–0.7)
IMM GRANULOCYTES NFR BLD AUTO: 0.3 % (ref 0–0.9)
INR PPP: 1 (ref 0.9–1.1)
LYMPHOCYTES # BLD AUTO: 1.63 X10*3/UL (ref 1.2–4.8)
LYMPHOCYTES NFR BLD AUTO: 18.8 %
MAGNESIUM SERPL-MCNC: 1.5 MG/DL (ref 1.6–2.4)
MCH RBC QN AUTO: 32.2 PG (ref 26–34)
MCH RBC QN AUTO: 32.2 PG (ref 26–34)
MCHC RBC AUTO-ENTMCNC: 32.3 G/DL (ref 32–36)
MCHC RBC AUTO-ENTMCNC: 33.1 G/DL (ref 32–36)
MCV RBC AUTO: 100 FL (ref 80–100)
MCV RBC AUTO: 97 FL (ref 80–100)
MONOCYTES # BLD AUTO: 0.61 X10*3/UL (ref 0.1–1)
MONOCYTES NFR BLD AUTO: 7 %
NEUTROPHILS # BLD AUTO: 6.23 X10*3/UL (ref 1.2–7.7)
NEUTROPHILS NFR BLD AUTO: 72.1 %
NRBC BLD-RTO: 0 /100 WBCS (ref 0–0)
NRBC BLD-RTO: 0 /100 WBCS (ref 0–0)
P AXIS: 61 DEGREES
P OFFSET: 170 MS
P ONSET: 116 MS
PHOSPHATE SERPL-MCNC: 3.9 MG/DL (ref 2.5–4.9)
PLATELET # BLD AUTO: 282 X10*3/UL (ref 150–450)
PLATELET # BLD AUTO: 316 X10*3/UL (ref 150–450)
POTASSIUM SERPL-SCNC: 3.8 MMOL/L (ref 3.5–5.3)
POTASSIUM SERPL-SCNC: 5.2 MMOL/L (ref 3.5–5.3)
PR INTERVAL: 206 MS
PROT SERPL-MCNC: 6.4 G/DL (ref 6.4–8.2)
PROTHROMBIN TIME: 11.2 SECONDS (ref 9.8–12.8)
Q ONSET: 219 MS
QRS COUNT: 14 BEATS
QRS DURATION: 92 MS
QT INTERVAL: 418 MS
QTC CALCULATION(BAZETT): 497 MS
QTC FREDERICIA: 469 MS
R AXIS: -8 DEGREES
RBC # BLD AUTO: 2.95 X10*6/UL (ref 4–5.2)
RBC # BLD AUTO: 3.23 X10*6/UL (ref 4–5.2)
SODIUM SERPL-SCNC: 135 MMOL/L (ref 136–145)
SODIUM SERPL-SCNC: 138 MMOL/L (ref 136–145)
T AXIS: 5 DEGREES
T OFFSET: 428 MS
VENTRICULAR RATE: 85 BPM
WBC # BLD AUTO: 10.8 X10*3/UL (ref 4.4–11.3)
WBC # BLD AUTO: 8.7 X10*3/UL (ref 4.4–11.3)

## 2024-07-30 PROCEDURE — 99285 EMERGENCY DEPT VISIT HI MDM: CPT | Mod: 25

## 2024-07-30 PROCEDURE — 2500000004 HC RX 250 GENERAL PHARMACY W/ HCPCS (ALT 636 FOR OP/ED): Performed by: STUDENT IN AN ORGANIZED HEALTH CARE EDUCATION/TRAINING PROGRAM

## 2024-07-30 PROCEDURE — 99291 CRITICAL CARE FIRST HOUR: CPT

## 2024-07-30 PROCEDURE — 80053 COMPREHEN METABOLIC PANEL: CPT | Performed by: EMERGENCY MEDICINE

## 2024-07-30 PROCEDURE — 85730 THROMBOPLASTIN TIME PARTIAL: CPT | Performed by: EMERGENCY MEDICINE

## 2024-07-30 PROCEDURE — 82947 ASSAY GLUCOSE BLOOD QUANT: CPT

## 2024-07-30 PROCEDURE — 2500000004 HC RX 250 GENERAL PHARMACY W/ HCPCS (ALT 636 FOR OP/ED)

## 2024-07-30 PROCEDURE — 2500000004 HC RX 250 GENERAL PHARMACY W/ HCPCS (ALT 636 FOR OP/ED): Performed by: EMERGENCY MEDICINE

## 2024-07-30 PROCEDURE — 36410 VNPNXR 3YR/> PHY/QHP DX/THER: CPT

## 2024-07-30 PROCEDURE — 96365 THER/PROPH/DIAG IV INF INIT: CPT | Mod: 59

## 2024-07-30 PROCEDURE — 72125 CT NECK SPINE W/O DYE: CPT | Performed by: RADIOLOGY

## 2024-07-30 PROCEDURE — 85025 COMPLETE CBC W/AUTO DIFF WBC: CPT | Performed by: EMERGENCY MEDICINE

## 2024-07-30 PROCEDURE — 2500000004 HC RX 250 GENERAL PHARMACY W/ HCPCS (ALT 636 FOR OP/ED): Performed by: SURGERY

## 2024-07-30 PROCEDURE — 85027 COMPLETE CBC AUTOMATED: CPT | Performed by: STUDENT IN AN ORGANIZED HEALTH CARE EDUCATION/TRAINING PROGRAM

## 2024-07-30 PROCEDURE — 83735 ASSAY OF MAGNESIUM: CPT | Performed by: STUDENT IN AN ORGANIZED HEALTH CARE EDUCATION/TRAINING PROGRAM

## 2024-07-30 PROCEDURE — 2550000001 HC RX 255 CONTRASTS: Performed by: EMERGENCY MEDICINE

## 2024-07-30 PROCEDURE — 70450 CT HEAD/BRAIN W/O DYE: CPT | Performed by: RADIOLOGY

## 2024-07-30 PROCEDURE — 70498 CT ANGIOGRAPHY NECK: CPT | Performed by: RADIOLOGY

## 2024-07-30 PROCEDURE — 99291 CRITICAL CARE FIRST HOUR: CPT | Performed by: EMERGENCY MEDICINE

## 2024-07-30 PROCEDURE — 36415 COLL VENOUS BLD VENIPUNCTURE: CPT | Performed by: STUDENT IN AN ORGANIZED HEALTH CARE EDUCATION/TRAINING PROGRAM

## 2024-07-30 PROCEDURE — 85610 PROTHROMBIN TIME: CPT | Performed by: EMERGENCY MEDICINE

## 2024-07-30 PROCEDURE — 96375 TX/PRO/DX INJ NEW DRUG ADDON: CPT

## 2024-07-30 PROCEDURE — 70496 CT ANGIOGRAPHY HEAD: CPT | Performed by: RADIOLOGY

## 2024-07-30 PROCEDURE — 36415 COLL VENOUS BLD VENIPUNCTURE: CPT | Performed by: EMERGENCY MEDICINE

## 2024-07-30 PROCEDURE — 80069 RENAL FUNCTION PANEL: CPT | Mod: CCI | Performed by: STUDENT IN AN ORGANIZED HEALTH CARE EDUCATION/TRAINING PROGRAM

## 2024-07-30 PROCEDURE — 2020000001 HC ICU ROOM DAILY

## 2024-07-30 RX ORDER — PHENOBARBITAL SODIUM 65 MG/ML
65 INJECTION, SOLUTION INTRAMUSCULAR; INTRAVENOUS EVERY 8 HOURS
Status: DISCONTINUED | OUTPATIENT
Start: 2024-08-02 | End: 2024-07-31

## 2024-07-30 RX ORDER — ACETAMINOPHEN 325 MG/1
975 TABLET ORAL ONCE
Status: COMPLETED | OUTPATIENT
Start: 2024-07-30 | End: 2024-07-30

## 2024-07-30 RX ORDER — DEXMEDETOMIDINE HYDROCHLORIDE 4 UG/ML
INJECTION, SOLUTION INTRAVENOUS
Status: COMPLETED
Start: 2024-07-30 | End: 2024-07-30

## 2024-07-30 RX ORDER — PHENOBARBITAL SODIUM 65 MG/ML
4 INJECTION, SOLUTION INTRAMUSCULAR; INTRAVENOUS ONCE
Status: COMPLETED | OUTPATIENT
Start: 2024-07-30 | End: 2024-07-30

## 2024-07-30 RX ORDER — INSULIN LISPRO 100 [IU]/ML
0-5 INJECTION, SOLUTION INTRAVENOUS; SUBCUTANEOUS EVERY 4 HOURS
Status: DISCONTINUED | OUTPATIENT
Start: 2024-07-30 | End: 2024-08-08

## 2024-07-30 RX ORDER — MULTIVIT-MIN/IRON FUM/FOLIC AC 7.5 MG-4
1 TABLET ORAL DAILY
Status: DISCONTINUED | OUTPATIENT
Start: 2024-07-30 | End: 2024-08-08

## 2024-07-30 RX ORDER — NICARDIPINE HYDROCHLORIDE 0.2 MG/ML
2.5-15 INJECTION INTRAVENOUS CONTINUOUS
Status: DISCONTINUED | OUTPATIENT
Start: 2024-07-30 | End: 2024-07-30

## 2024-07-30 RX ORDER — PHENOBARBITAL SODIUM 65 MG/ML
65 INJECTION, SOLUTION INTRAMUSCULAR; INTRAVENOUS EVERY 6 HOURS PRN
Status: DISCONTINUED | OUTPATIENT
Start: 2024-07-30 | End: 2024-07-31

## 2024-07-30 RX ORDER — PHENOBARBITAL SODIUM 65 MG/ML
3 INJECTION, SOLUTION INTRAMUSCULAR; INTRAVENOUS
Status: COMPLETED | OUTPATIENT
Start: 2024-07-30 | End: 2024-07-30

## 2024-07-30 RX ORDER — PHENOBARBITAL SODIUM 65 MG/ML
32.5 INJECTION, SOLUTION INTRAMUSCULAR; INTRAVENOUS EVERY 8 HOURS
Status: DISCONTINUED | OUTPATIENT
Start: 2024-08-04 | End: 2024-07-31

## 2024-07-30 RX ORDER — LEVETIRACETAM 5 MG/ML
500 INJECTION INTRAVASCULAR EVERY 12 HOURS
Status: DISCONTINUED | OUTPATIENT
Start: 2024-07-30 | End: 2024-08-03

## 2024-07-30 RX ORDER — PHENOBARBITAL SODIUM 65 MG/ML
97.5 INJECTION, SOLUTION INTRAMUSCULAR; INTRAVENOUS EVERY 8 HOURS
Status: DISCONTINUED | OUTPATIENT
Start: 2024-07-31 | End: 2024-07-31

## 2024-07-30 RX ORDER — DEXTROSE 50 % IN WATER (D50W) INTRAVENOUS SYRINGE
12.5
Status: DISCONTINUED | OUTPATIENT
Start: 2024-07-30 | End: 2024-08-08

## 2024-07-30 RX ORDER — HALOPERIDOL 5 MG/ML
INJECTION INTRAMUSCULAR
Status: COMPLETED
Start: 2024-07-30 | End: 2024-07-30

## 2024-07-30 RX ORDER — THIAMINE HYDROCHLORIDE 100 MG/ML
100 INJECTION, SOLUTION INTRAMUSCULAR; INTRAVENOUS ONCE
Status: DISCONTINUED | OUTPATIENT
Start: 2024-07-30 | End: 2024-07-30

## 2024-07-30 RX ORDER — LANOLIN ALCOHOL/MO/W.PET/CERES
100 CREAM (GRAM) TOPICAL DAILY
Status: DISCONTINUED | OUTPATIENT
Start: 2024-08-02 | End: 2024-07-30

## 2024-07-30 RX ORDER — PANTOPRAZOLE SODIUM 40 MG/10ML
40 INJECTION, POWDER, LYOPHILIZED, FOR SOLUTION INTRAVENOUS
Status: DISCONTINUED | OUTPATIENT
Start: 2024-07-31 | End: 2024-08-02

## 2024-07-30 RX ORDER — DEXMEDETOMIDINE HYDROCHLORIDE 4 UG/ML
0-1.5 INJECTION, SOLUTION INTRAVENOUS CONTINUOUS
Status: DISCONTINUED | OUTPATIENT
Start: 2024-07-30 | End: 2024-08-02

## 2024-07-30 RX ORDER — FOLIC ACID 1 MG/1
1 TABLET ORAL DAILY
Status: DISCONTINUED | OUTPATIENT
Start: 2024-07-30 | End: 2024-08-08

## 2024-07-30 RX ORDER — HALOPERIDOL 5 MG/ML
5 INJECTION INTRAMUSCULAR ONCE
Status: COMPLETED | OUTPATIENT
Start: 2024-07-30 | End: 2024-07-30

## 2024-07-30 RX ORDER — DEXTROSE 50 % IN WATER (D50W) INTRAVENOUS SYRINGE
25
Status: DISCONTINUED | OUTPATIENT
Start: 2024-07-30 | End: 2024-08-08

## 2024-07-30 RX ORDER — LANOLIN ALCOHOL/MO/W.PET/CERES
100 CREAM (GRAM) TOPICAL DAILY
Status: DISCONTINUED | OUTPATIENT
Start: 2024-07-30 | End: 2024-07-30

## 2024-07-30 RX ORDER — LEVETIRACETAM 10 MG/ML
1000 INJECTION INTRAVASCULAR ONCE
Status: COMPLETED | OUTPATIENT
Start: 2024-07-30 | End: 2024-07-30

## 2024-07-30 RX ORDER — NICARDIPINE HYDROCHLORIDE 0.2 MG/ML
2.5-15 INJECTION INTRAVENOUS CONTINUOUS
Status: DISCONTINUED | OUTPATIENT
Start: 2024-07-30 | End: 2024-07-31

## 2024-07-30 RX ORDER — ESOMEPRAZOLE MAGNESIUM 40 MG/1
40 GRANULE, DELAYED RELEASE ORAL
Status: DISCONTINUED | OUTPATIENT
Start: 2024-07-31 | End: 2024-08-02

## 2024-07-30 RX ORDER — PANTOPRAZOLE SODIUM 40 MG/1
40 TABLET, DELAYED RELEASE ORAL
Status: DISCONTINUED | OUTPATIENT
Start: 2024-07-31 | End: 2024-08-08

## 2024-07-30 ASSESSMENT — COGNITIVE AND FUNCTIONAL STATUS - GENERAL
STANDING UP FROM CHAIR USING ARMS: A LITTLE
MOVING TO AND FROM BED TO CHAIR: A LITTLE
DAILY ACTIVITIY SCORE: 16
HELP NEEDED FOR BATHING: A LOT
CLIMB 3 TO 5 STEPS WITH RAILING: A LOT
DRESSING REGULAR UPPER BODY CLOTHING: A LITTLE
MOBILITY SCORE: 19
TOILETING: TOTAL
WALKING IN HOSPITAL ROOM: A LITTLE
STANDING UP FROM CHAIR USING ARMS: A LITTLE
PERSONAL GROOMING: A LOT
MOBILITY SCORE: 19
HELP NEEDED FOR BATHING: A LOT
MOVING TO AND FROM BED TO CHAIR: A LITTLE
DRESSING REGULAR UPPER BODY CLOTHING: A LITTLE
TOILETING: TOTAL
PERSONAL GROOMING: A LOT
WALKING IN HOSPITAL ROOM: A LITTLE
DAILY ACTIVITIY SCORE: 16
CLIMB 3 TO 5 STEPS WITH RAILING: A LOT

## 2024-07-30 ASSESSMENT — LIFESTYLE VARIABLES
ORIENTATION AND CLOUDING OF SENSORIUM: DISORIENTED FOR PLACE OR PERSON
NAUSEA AND VOMITING: NO NAUSEA AND NO VOMITING
AUDITORY DISTURBANCES: NOT PRESENT
ORIENTATION AND CLOUDING OF SENSORIUM: DISORIENTED FOR PLACE OR PERSON
TOTAL SCORE: 18
AGITATION: 3
TACTILE DISTURBANCES: SEVERE HALLUCINATIONS
PAROXYSMAL SWEATS: NO SWEAT VISIBLE
TREMOR: NO TREMOR
PAROXYSMAL SWEATS: NO SWEAT VISIBLE
TOTAL SCORE: 11
ANXIETY: MODERATELY ANXIOUS, OR GUARDED, SO ANXIETY IS INFERRED
TREMOR: NO TREMOR
AGITATION: 5
HEADACHE, FULLNESS IN HEAD: NOT PRESENT
NAUSEA AND VOMITING: NO NAUSEA AND NO VOMITING
HEADACHE, FULLNESS IN HEAD: NOT PRESENT
VISUAL DISTURBANCES: NOT PRESENT
ANXIETY: MODERATELY ANXIOUS, OR GUARDED, SO ANXIETY IS INFERRED
VISUAL DISTURBANCES: NOT PRESENT
AUDITORY DISTURBANCES: NOT PRESENT

## 2024-07-30 ASSESSMENT — PAIN - FUNCTIONAL ASSESSMENT
PAIN_FUNCTIONAL_ASSESSMENT: 0-10
PAIN_FUNCTIONAL_ASSESSMENT: 0-10
PAIN_FUNCTIONAL_ASSESSMENT: CPOT (CRITICAL CARE PAIN OBSERVATION TOOL)
PAIN_FUNCTIONAL_ASSESSMENT: WONG-BAKER FACES
PAIN_FUNCTIONAL_ASSESSMENT: WONG-BAKER FACES

## 2024-07-30 ASSESSMENT — PAIN DESCRIPTION - LOCATION
LOCATION: HEAD

## 2024-07-30 ASSESSMENT — PAIN SCALES - GENERAL
PAINLEVEL_OUTOF10: 0 - NO PAIN
PAINLEVEL_OUTOF10: 7
PAINLEVEL_OUTOF10: 4
PAINLEVEL_OUTOF10: 0 - NO PAIN
PAINLEVEL_OUTOF10: 5 - MODERATE PAIN

## 2024-07-30 ASSESSMENT — ACTIVITIES OF DAILY LIVING (ADL): LACK_OF_TRANSPORTATION: NO

## 2024-07-30 ASSESSMENT — PAIN SCALES - WONG BAKER: WONGBAKER_NUMERICALRESPONSE: NO HURT

## 2024-07-30 ASSESSMENT — PAIN DESCRIPTION - PROGRESSION: CLINICAL_PROGRESSION: GRADUALLY IMPROVING

## 2024-07-30 ASSESSMENT — PAIN DESCRIPTION - PAIN TYPE
TYPE: ACUTE PAIN
TYPE: ACUTE PAIN

## 2024-07-30 NOTE — PROGRESS NOTES
Transfer of care note:    I received this patient in signout -   ED Course as of 08/02/24 1204   Tue Jul 30, 2024   0609 Signed out to be pending CT head.  Subdural and subarachnoid hemorrhage from fall.  On Plavix.  Received a gram of Keppra. [JM]   0750 Spoke to radiology, there is concern for possible basilar aneurysm.  Spoke to neurosurgery unclear whether fall with traumatic the however suspicion is higher for traumatic bleed and ruptured aneurysm.  Patient does have worsening mental status over the last hour with more confusion.  , goal less than 140 if needed will start Cardene.  Neurosurgery recommends CT angio head and neck, reaching out to CT for emergent imaging [JM]   0856 Cardene ordered however patient's blood pressure did subsequently dropped to 130 systolic.  Now is uptrending more consistently readings in the 140s will be started on a low-dose.  Reach out to radiology operations regarding CT imaging read which is still pending.  Actively being read by radiologist reportedly.  Neurosurgery scrubbed into the case, message sent will page neurosurgery. [JM]   0910 Prior to Cardene being initiated blood pressure systolic in the 120s.  Patient does have alcohol use history, some delirium may be related to component of alcohol withdrawal however patient is not tachycardic, not tremulous and not currently hypertensive.  Will give IV thiamine.  Neurosurgery reviewed imaging does not see aneurysm can stay here at [facility] per neurosurgery standpoint, recommends ICU admission for every hour neurochecks [JM]   1000 Patient was consistently having blood pressures in the 140s to 150s, was briefly started on Cardene and blood pressure improved to 120s systolics.  Cardene was paused and blood pressure remains controlled.  Intermittently agitated but redirectable. Accepted for admission to ICU for continued management.  Discussed possibility of alcohol withdrawal being part of picture, not markedly hypertensive  and not tachycardic and no tremor, ICU agrees with plan for continue to monitor at this time.  IV thiamine ordered. [JM]      ED Course User Index  [JM] Rosalio Quiroz MD         Diagnoses as of 08/02/24 1204   Subdural hematoma (Multi)     Critical Care    Performed by: Rosalio Quiroz MD  Authorized by: Rosalio Quiroz MD    Critical care provider statement:     Critical care time (minutes):  35    Critical care time was exclusive of:  Separately billable procedures and treating other patients    Critical care was necessary to treat or prevent imminent or life-threatening deterioration of the following conditions:  CNS failure or compromise    Critical care was time spent personally by me on the following activities:  Development of treatment plan with patient or surrogate, discussions with consultants, evaluation of patient's response to treatment, review of old charts, pulse oximetry, ordering and review of radiographic studies and ordering and performing treatments and interventions    Care discussed with: admitting provider

## 2024-07-30 NOTE — PROGRESS NOTES
07/30/24 0736   [facility] Disability Status   Are you deaf or do you have serious difficulty hearing? N   Are you blind or do you have serious difficulty seeing, even when wearing glasses? N   Because of a physical, mental, or emotional condition, do you have serious difficulty concentrating, remembering, or making decisions? (5 years old or older) N   Do you have serious difficulty walking or climbing stairs? Y   Do you have serious difficulty dressing or bathing? Y   Because of a physical, mental, or emotional condition, do you have serious difficulty doing errands alone such as visiting the doctor? Y

## 2024-07-30 NOTE — PROGRESS NOTES
[facility] @ [facility]     07/30/24 0735   Current Planned Discharge Disposition   Current Planned Discharge Disposition SNF

## 2024-07-30 NOTE — CONSULTS
Inpatient consult to Neurosurgery  Consult performed by: Lino Stubbs MD  Consult ordered by: MANDA Waller-CNP          Reason For Consult  tSAH, SDH    History Of Present Illness  Yue Sanz is a 63 y.o. female presenting with h/o HTN, HLD, stroke on Plavix, p/w fall with head strike, 7/30 CTH L sylvian tSAH, basilar cistern tSAH, L convexity SDH, CTA neg, rCTH stable.     Past Medical History  She has a past medical history of Acute CVA (cerebrovascular accident) (Multi) (05/30/2023), Alcohol related seizure (Multi) (05/30/2023), Cerebral hemorrhage (Multi) (05/30/2023), Cerebral infarction due to embolism of right middle cerebral artery (Multi) (05/30/2023), Diarrhea, unspecified (08/10/2022), Encounter for follow-up examination after completed treatment for conditions other than malignant neoplasm (04/20/2020), Encounter for other screening for malignant neoplasm of breast (03/08/2022), Encounter for screening for malignant neoplasm of colon (05/16/2022), Other abnormality of red blood cells (08/10/2022), Personal history of other endocrine, nutritional and metabolic disease, and Personal history of transient ischemic attack (TIA), and cerebral infarction without residual deficits.    Surgical History  She has a past surgical history that includes MR angio head wo IV contrast (5/19/2021); MR angio neck wo IV contrast (5/19/2021); and MR angio head wo IV contrast (7/12/2018).     Social History  She reports that she has never smoked. She has never been exposed to tobacco smoke. She has never used smokeless tobacco. She reports that she does not currently use alcohol. She reports that she does not currently use drugs.    Family History  Family History   Problem Relation Name Age of Onset    Arthritis Mother      No Known Problems Father          Allergies  Amoxicillin    Review of Systems   All other systems reviewed and are negative.       Physical Exam  Constitutional:       Comments: Eyes open  "to noxious stim   HENT:      Head: Normocephalic.   Eyes:      Pupils: Pupils are equal, round, and reactive to light.   Pulmonary:      Effort: Pulmonary effort is normal.   Abdominal:      General: Abdomen is flat.   Musculoskeletal:         General: Normal range of motion.      Cervical back: Normal range of motion.   Skin:     General: Skin is warm.   Neurological:      Comments: Eyes open noxious stim  Moves all extremities spontaneously  both eyes 4R   Psychiatric:         Mood and Affect: Mood normal.          Last Recorded Vitals  Blood pressure 136/88, pulse 92, temperature 35.9 °C (96.6 °F), temperature source Temporal, resp. rate 18, height 1.676 m (5' 6\"), weight 56.6 kg (124 lb 12.5 oz), SpO2 100%.    Relevant Results  ECG 12 lead    Result Date: 7/30/2024  Normal sinus rhythm Incomplete right bundle branch block Cannot rule out Anterior infarct , age undetermined Abnormal ECG When compared with ECG of 24-JUL-2024 11:09, Inverted T waves have replaced nonspecific T wave abnormality in Inferior leads See ED provider note for full interpretation and clinical correlation Confirmed by Heather Harris ([id number]) on 7/30/2024 11:22:30 AM    CT angio head and neck w and wo IV contrast    Result Date: 7/30/2024  Interpreted By:  Roderick Lin, STUDY: CT ANGIO HEAD AND NECK W AND WO IV CONTRAST   INDICATION: Signs/Symptoms:c/f aneurysm   COMPARISON: Head CT 07/30/2024.   ACCESSION NUMBER(S): [id number]   ORDERING CLINICIAN: ERASMO MARLOW   TECHNIQUE: CTA of the head and neck was performed after the intravenous administration of 75 mL Omnipaque 350 nonionic IV contrast. 3-D reconstructions were performed under physician supervision on a separate workstation and reviewed.   FINDINGS: CTA NECK:   AORTIC ARCH: The visualized portion of the aortic arch is unremarkable in appearance. The origins of the great vessels and the vertebral arteries are normal without evidence of stenosis.   CERVICAL CAROTID ARTERIES: The " common carotid arteries are patent bilaterally without evidence of stenosis. Atherosclerotic disease of bilateral carotid bifurcations resulting in mild (less than 50%) luminal stenosis of bilateral proximal cervical ICAs. Remainder of the cervical ICAs are patent throughout their course.   VERTEBRAL ARTERIES: The vertebral arteries are patent bilaterally throughout their course noting slight diminutive caliber of the left vertebral artery.   CTA HEAD:   INTERNAL CAROTID ARTERIES: Atherosclerotic disease of bilateral carotid siphons, which remain overall patent. Intracranial ICAs are otherwise normal.   CEREBRAL ARTERIES: No aneurysm appreciated. Left MCA branches appear patent and grossly symmetric with right MCA branches; no definitive findings to suggest vasospasm. Bilateral ACAs are within normal limits. Bilateral PCAs are predominantly supplied by the posterior communicating arteries.   VERTEBROBASILAR SYSTEM: Left intradural vertebral artery is again diminutive and is severely narrowed in caliber distal to the PICA branch. Right intradural vertebral artery is unremarkable. Basilar artery is patent but is again diminutive secondary to PCOM dominance. No evidence of basilar tip aneurysm.   There is no evidence for saccular aneurysm, vascular malformation, or large vessel occlusion.   OTHER: Known extensive subarachnoid and subdural hemorrhages are better assessed on noncontrast head CT performed earlier today.       No evidence of basilar tip aneurysm. Previously noted rounded hyperdensity corresponds to subarachnoid hemorrhage extending into the supra cerebellar cistern.   Mild scattered atherosclerotic disease of the cervical and intracranial vasculature as detailed.   Please refer to noncontrast head CT for complete evaluation of subarachnoid and subdural hemorrhages. No definitive evidence of vasospasm at this time.   _____________ NASCET criteria for internal carotid artery stenosis: Mild: 0% to 49%  Moderate: 50% to 69% Severe: 70% to 99% Complete Occlusion   Signed by: Roderick Lin 7/30/2024 9:11 AM Dictation workstation:   [id number]    CT head wo IV contrast    Result Date: 7/30/2024  Interpreted By:  Jean Hester, STUDY: CT HEAD WO IV CONTRAST;  7/30/2024 7:32 am   INDICATION: Signs/Symptoms:sah sdh.   COMPARISON: CT Brain, earlier same morning 30 July 2024 at 0122 hours   ACCESSION NUMBER(S): [id number]   ORDERING CLINICIAN: CAMILLA MILLER   TECHNIQUE: CT of the brain from the skull vertex to the skull base, without intravenous contrast   FINDINGS: No interval change to the abnormal CT appearance of the brain. 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 It has not changed in size from earlier this morning   All of the acute intra-axial and extra-axial hemorrhages and mild left-to-right midline shift is all unchanged   No discernible enlarging aneurysm or worsening hemorrhage   MACRO: None   Signed by: Jean Hester 7/30/2024 7:41 AM Dictation workstation:   [id number]    CT head wo IV contrast    Result Date: 7/30/2024  Interpreted By:  Sandro Roe, STUDY: CT HEAD WO IV CONTRAST; CT CERVICAL SPINE WO IV CONTRAST;  7/30/2024 1:24 am   INDICATION: Signs/Symptoms:fall back of head   COMPARISON: None.   ACCESSION NUMBER(S): [id number]; [id number]   ORDERING CLINICIAN:  CAMILLA MILLER   TECHNIQUE: Axial noncontrast CT images of head with coronal and sagittal reconstructed images. Axial noncontrast CT images of the cervical spine with coronal and sagittal reconstructed images.   FINDINGS: CT HEAD:   Abnormal examination. There is a subdural hematoma seen in the left measuring about 5 mm anteriorly and 8-9 mm posteriorly. Additionally there is extensive subarachnoid hemorrhage noted along the basilar cisterns as well as layering along the left cerebral convexity. Superior to the cerebellum there is of focus of hemorrhage measuring up to about 1 cm   No acute skull fracture.   Global volume loss. Evidence of prior chronic small vessel ischemic change seen in the right cerebral hemisphere there may be minimal midline shift to the right of 2-3 mm.   Soft tissue swelling seen in the left occipital parietal region.     CT CERVICAL SPINE:   Demineralization of the bones. Scattered multilevel degenerative disc disease. Robust vascular calcification.   Degenerative disc disease most notable C4-5, C5-6 and C6-7. No evidence of acute cervical spine fracture       Abnormal examination. There is extensive left cerebral hemispheric subarachnoid hemorrhage.. Basilar cistern subarachnoid hemorrhage also noted. There is small subdural hematoma also seen in the left more prominent posteriorly. Minimal midline shift to the right of 2-3 mm   Global volume loss. Evidence of prior ischemic event in the right cerebral hemisphere not significantly changed. No intraventricular hemorrhage.   No findings of acute cervical spine fracture     Sandro Roe discussed the significance and urgency of this critical finding by epic chat with  CAMILLA MILLER on 7/30/2024 at 1:57 am. (**-RCF-**) Findings:  See findings.       Signed by: Sandro Roe 7/30/2024 1:58 AM Dictation workstation:   [id number]    CT cervical spine wo IV contrast    Result Date: 7/30/2024  Interpreted By:  Sandro Roe, STUDY: CT  HEAD WO IV CONTRAST; CT CERVICAL SPINE WO IV CONTRAST;  7/30/2024 1:24 am   INDICATION: Signs/Symptoms:fall back of head   COMPARISON: None.   ACCESSION NUMBER(S): [id number]; [id number]   ORDERING CLINICIAN: CAMILLA MILLER   TECHNIQUE: Axial noncontrast CT images of head with coronal and sagittal reconstructed images. Axial noncontrast CT images of the cervical spine with coronal and sagittal reconstructed images.   FINDINGS: CT HEAD:   Abnormal examination. There is a subdural hematoma seen in the left measuring about 5 mm anteriorly and 8-9 mm posteriorly. Additionally there is extensive subarachnoid hemorrhage noted along the basilar cisterns as well as layering along the left cerebral convexity. Superior to the cerebellum there is of focus of hemorrhage measuring up to about 1 cm   No acute skull fracture.   Global volume loss. Evidence of prior chronic small vessel ischemic change seen in the right cerebral hemisphere there may be minimal midline shift to the right of 2-3 mm.   Soft tissue swelling seen in the left occipital parietal region.     CT CERVICAL SPINE:   Demineralization of the bones. Scattered multilevel degenerative disc disease. Robust vascular calcification.   Degenerative disc disease most notable C4-5, C5-6 and C6-7. No evidence of acute cervical spine fracture       Abnormal examination. There is extensive left cerebral hemispheric subarachnoid hemorrhage.. Basilar cistern subarachnoid hemorrhage also noted. There is small subdural hematoma also seen in the left more prominent posteriorly. Minimal midline shift to the right of 2-3 mm   Global volume loss. Evidence of prior ischemic event in the right cerebral hemisphere not significantly changed. No intraventricular hemorrhage.   No findings of acute cervical spine fracture     Sandro Roe discussed the significance and urgency of this critical finding by epic chat with  CAMILLA MILLER on 7/30/2024 at 1:57 am. (**-RCF-**) Findings:   See findings.       Signed by: Sandro Roe 7/30/2024 1:58 AM Dictation workstation:   [id number]    Lower extremity venous duplex left    Result Date: 7/26/2024             [place] [address]   Tel [phone] and Fax [phone]  Vascular Lab Report VASC US LOWER EXTREMITY VENOUS DUPLEX LEFT  Patient Name:      SEMAJ PARHAM         Laina Physician: [id number] Bhakti Camacho MD Study Date:        7/26/2024           Ordering           [id number] BREANNA BERGERON                                        Physician: MRN/PID:           [id number]            Technologist:      Anny You RVT Accession#:        [id number]        Technologist 2: Date of Birth/Age: 1960 / 63      Encounter#:        [id number]                    years Gender:            F Admission Status:  Inpatient           Location           [facility]                                        Performed:  Diagnosis/ICD: Localized (leg) edema-R60.0 Indication:    Limb pain. CPT Codes:     59325 Peripheral venous duplex scan for DVT Limited  CONCLUSIONS: Right Lower Venous: The right common femoral vein demonstrates normal spontaneous and respirophasic flow. Left Lower Venous: No evidence of acute deep vein thrombus visualized in the left lower extremity. Lymph nodes noted in the left groin.  Imaging & Doppler Findings:  Right        Flow CFV   Spontaneous/Phasic  Left                  Compress Thrombus        Flow Distal External Iliac   Yes      None   Spontaneous/Phasic CFV                     Yes      None   Spontaneous/Phasic PFV                     Yes      None FV Proximal             Yes      None   Spontaneous/Phasic FV Mid                  Yes      None FV Distal               Yes      None Popliteal               Yes      None   Spontaneous/Phasic Peroneal                Yes      None PTV                     Yes      None  [id number]  Bhakti Camacho MD Electronically signed by [id number] Bhakti Camacho MD on 7/26/2024 at 5:46:16 PM  ** Final **     ECG 12 lead    Result Date: 7/24/2024  Normal sinus rhythm Incomplete right bundle branch block Prolonged QT Abnormal ECG When compared with ECG of 21-JUN-2024 09:59, Nonspecific T wave abnormality no longer evident in Anterior leads See ED provider note for full interpretation and clinical correlation Confirmed by Ibis Tejada ([id number]) on 7/24/2024 4:57:43 PM    CT pelvis wo IV contrast    Result Date: 7/24/2024  Interpreted By:  Schoenberger, Joseph, STUDY: CT PELVIS WO IV CONTRAST;  7/24/2024 3:20 pm   INDICATION: Signs/Symptoms:left hip pain after fall.   COMPARISON: None.   ACCESSION NUMBER(S): [id number]   ORDERING CLINICIAN: ELIAS HART   TECHNIQUE: CT of the abdomen and pelvis was performed. Contiguous axial images were obtained at 3 mm slice thickness through the pelvis. Coronal and sagittal reconstructions at 3 mm slice thickness were performed.  No intravenous or oral contrast agents were administered.   FINDINGS: PELVIS:   BLADDER: Normal   REPRODUCTIVE ORGANS: Unremarkable   BOWEL: Visible small bowel loops are unremarkable in appearance. Visible segments of the colon are unremarkable in appearance the appendix appears normal.   VESSELS: Unremarkable other than mild atherosclerotic changes   PERITONEUM/RETROPERITONEUM/LYMPH NODES: No ascites or free air, no fluid collection.  No abdominopelvic lymphadenopathy is present.   ABDOMINAL WALL: Intact     BONES: There is a ossicle associated with the left superolateral labrum which may relate to remote labral trauma. Is well corticated and does not appear acute. There is no convincing evidence for acute pelvic or proximal femur fracture on this exam. There are bilateral L5 pars defects resulting in grade 1 L5-S1 spondylolisthesis which is very slight. Scratch       1.  No evidence for occult acute fracture. See discussion above.   MACRO: None    Signed by: Joseph Schoenberger 7/24/2024 3:58 PM Dictation workstation:   [id number]    XR hip left with pelvis when performed 2 or 3 views    Result Date: 7/24/2024  STUDY: Pelvis and Left Hip Radiographs; 7/24/2024, 11:50 INDICATION: Fall. COMPARISON: None Available. ACCESSION NUMBER(S): [id number] ORDERING CLINICIAN: ELIAS HART TECHNIQUE:  AP view of the pelvis and 2 view(s) of the left hip. FINDINGS:  PELVIS: The pelvic ring is intact.  There is no acute fracture.  There are no sclerotic bone lesions.  Normal SI joints.  No diastasis of the symphysis pubis.  Moderate stool and air visualized throughout the colon LEFT HIP: There is no displaced fracture.  The alignment is anatomic.  No soft tissue abnormality is seen.    1. No acute fracture or dislocation of the pelvis or left hip. 2. Moderate stool and air visualized throughout the colon. Signed by Neri Gonzalez MD    XR shoulder left 2+ views    Result Date: 7/9/2024  Interpreted By:  Raul Perales, STUDY: XR HUMERUS LEFT; XR SHOULDER LEFT 2+ VIEWS; ;  7/9/2024 1:42 pm   INDICATION: Signs/Symptoms:post op; Signs/Symptoms:s/p fracture.   COMPARISON: 06/13/2024   ACCESSION NUMBER(S): [id number]; [id number]   ORDERING CLINICIAN: CELESTINE PEREIRA   FINDINGS: Left shoulder, four views. Left humerus, two views.   Interval open reduction internal fixation of comminuted left proximal humeral fracture with intramedullary kristyn and screws. The hardware is intact. There is improved alignment with minimal displacement persisting. The joints are maintained.       ORIF of comminuted left proximal humeral fracture deformity with intact hardware. Improved alignment     MACRO: None   Signed by: Raul Perales 7/9/2024 2:17 PM Dictation workstation:   [id number]    XR humerus left    Result Date: 7/9/2024  Interpreted By:  Raul Perales, STUDY: XR HUMERUS LEFT; XR SHOULDER LEFT 2+ VIEWS; ;  7/9/2024 1:42 pm   INDICATION: Signs/Symptoms:post op;  Signs/Symptoms:s/p fracture.   COMPARISON: 06/13/2024   ACCESSION NUMBER(S): [id number]; [id number]   ORDERING CLINICIAN: CELESTINE PEREIRA   FINDINGS: Left shoulder, four views. Left humerus, two views.   Interval open reduction internal fixation of comminuted left proximal humeral fracture with intramedullary kristyn and screws. The hardware is intact. There is improved alignment with minimal displacement persisting. The joints are maintained.       ORIF of comminuted left proximal humeral fracture deformity with intact hardware. Improved alignment     MACRO: None   Signed by: Ralu Perales 7/9/2024 2:17 PM Dictation workstation:   [id number]        Assessment/Plan     h/o HTN, HLD, stroke on Plavix, p/w fall with head strike, 7/30 CTH L sylvian tSAH, basilar cistern tSAH, L convexity SDH, CTA neg, rCTH stable    ASSESSMENT  No acute neurosurgical intervention  Intracranial hemorrhage is traumatic in nature and is non-surgical  Recommend Keppra ppx 500BID for 1 week  SBP<160  ASA restart post bleed day 7, plavix restart post bleed day 14  OK for Q4h neurochecks after 24hr stable in ICU  Will arrange 2 week repeat CT head    Staffed w/ Dr. Douglas

## 2024-07-30 NOTE — H&P
History Of Present Illness  Yue Sanz is a 63 y.o. female presented to the ED from SNF ([facility]) after a fall from standing.  On presentation to the ED, patient with left occiput hematoma, A+O x 3 and moving all extremities.  Of note, patient is on Plavix and has a history of seizures (no seizure witnessed pre/post fall).  CT head with left cerebral hemispheric SAH, basilar cistern hemorrhage and left posterior hematoma.     Past Medical History  Past Medical History:   Diagnosis Date    Acute CVA (cerebrovascular accident) (Multi) 05/30/2023    Alcohol related seizure (Multi) 05/30/2023    Cerebral hemorrhage (Multi) 05/30/2023    Cerebral infarction due to embolism of right middle cerebral artery (Multi) 05/30/2023    Diarrhea, unspecified 08/10/2022    Diarrhea    Encounter for follow-up examination after completed treatment for conditions other than malignant neoplasm 04/20/2020    Hospital discharge follow-up    Encounter for other screening for malignant neoplasm of breast 03/08/2022    Breast screening    Encounter for screening for malignant neoplasm of colon 05/16/2022    Colon cancer screening    Other abnormality of red blood cells 08/10/2022    Elevated MCV    Personal history of other endocrine, nutritional and metabolic disease     History of high cholesterol    Personal history of transient ischemic attack (TIA), and cerebral infarction without residual deficits     History of cerebrovascular accident       Surgical History  Past Surgical History:   Procedure Laterality Date    MR HEAD ANGIO WO IV CONTRAST  5/19/2021    MR HEAD ANGIO WO IV CONTRAST 5/19/2021 [facility] EMERGENCY LEGACY    MR HEAD ANGIO WO IV CONTRAST  7/12/2018    MR HEAD ANGIO WO IV CONTRAST 7/12/2018 [facility] CLINICAL LEGACY    MR NECK ANGIO WO IV CONTRAST  5/19/2021    MR NECK ANGIO WO IV CONTRAST 5/19/2021 [facility] EMERGENCY LEGACY        Social History  She reports that she has never smoked. She has never been exposed to tobacco  "smoke. She has never used smokeless tobacco. She reports that she does not currently use alcohol. She reports that she does not currently use drugs.    Family History  Family History   Problem Relation Name Age of Onset    Arthritis Mother      No Known Problems Father          Allergies  Amoxicillin    Review of Systems   Unable to perform ROS: Patient nonverbal        Physical Exam  Eyes:      Pupils: Pupils are equal, round, and reactive to light.   Cardiovascular:      Rate and Rhythm: Normal rate and regular rhythm.      Heart sounds: Normal heart sounds.   Pulmonary:      Breath sounds: Normal breath sounds.   Abdominal:      General: Bowel sounds are normal.      Palpations: Abdomen is soft.   Genitourinary:     Comments: Pauline  Musculoskeletal:      Left lower leg: Edema present.   Skin:     Capillary Refill: Capillary refill takes less than 2 seconds.   Neurological:      Comments: Currently sedated post phenobarbital and haldol          Last Recorded Vitals  Blood pressure 166/90, pulse 84, temperature 35.9 °C (96.6 °F), temperature source Temporal, resp. rate 18, height 1.676 m (5' 6\"), weight 56.6 kg (124 lb 12.5 oz), SpO2 100%.    Relevant Results  Scheduled medications  [START ON 7/31/2024] pantoprazole, 40 mg, oral, Daily before breakfast   Or  [START ON 7/31/2024] esomeprazole, 40 mg, nasoduodenal tube, Daily before breakfast   Or  [START ON 7/31/2024] pantoprazole, 40 mg, intravenous, Daily before breakfast  folic acid, 1 mg, oral, Daily  insulin lispro, 0-5 Units, subcutaneous, q4h  levETIRAcetam, 500 mg, intravenous, q12h  multivitamin with minerals, 1 tablet, oral, Daily  PHENobarbital, 3 mg/kg (Ideal), intravenous, q3h  PHENobarbital, 97.5 mg, intravenous, q8h   Followed by  [START ON 8/1/2024] PHENobarbital, 65 mg, intravenous, q8h   Followed by  [START ON 8/3/2024] PHENobarbital, 32.5 mg, intravenous, q8h  thiamine, 500 mg, intravenous, q8h ROMINA      Continuous medications  niCARdipine, " 2.5-15 mg/hr, Last Rate: Stopped (07/30/24 0950)      PRN medications  PRN medications: dextrose, dextrose, glucagon, glucagon, oxygen, PHENobarbital  Results for orders placed or performed during the hospital encounter of 07/30/24 (from the past 24 hour(s))   CBC and Auto Differential   Result Value Ref Range    WBC 8.7 4.4 - 11.3 x10*3/uL    nRBC 0.0 0.0 - 0.0 /100 WBCs    RBC 3.23 (L) 4.00 - 5.20 x10*6/uL    Hemoglobin 10.4 (L) 12.0 - 16.0 g/dL    Hematocrit 31.4 (L) 36.0 - 46.0 %    MCV 97 80 - 100 fL    MCH 32.2 26.0 - 34.0 pg    MCHC 33.1 32.0 - 36.0 g/dL    RDW 14.6 (H) 11.5 - 14.5 %    Platelets 316 150 - 450 x10*3/uL    Neutrophils % 72.1 40.0 - 80.0 %    Immature Granulocytes %, Automated 0.3 0.0 - 0.9 %    Lymphocytes % 18.8 13.0 - 44.0 %    Monocytes % 7.0 2.0 - 10.0 %    Eosinophils % 1.6 0.0 - 6.0 %    Basophils % 0.2 0.0 - 2.0 %    Neutrophils Absolute 6.23 1.20 - 7.70 x10*3/uL    Immature Granulocytes Absolute, Automated 0.03 0.00 - 0.70 x10*3/uL    Lymphocytes Absolute 1.63 1.20 - 4.80 x10*3/uL    Monocytes Absolute 0.61 0.10 - 1.00 x10*3/uL    Eosinophils Absolute 0.14 0.00 - 0.70 x10*3/uL    Basophils Absolute 0.02 0.00 - 0.10 x10*3/uL   Comprehensive metabolic panel   Result Value Ref Range    Glucose 100 (H) 74 - 99 mg/dL    Sodium 138 136 - 145 mmol/L    Potassium 3.8 3.5 - 5.3 mmol/L    Chloride 105 98 - 107 mmol/L    Bicarbonate 24 21 - 32 mmol/L    Anion Gap 13 10 - 20 mmol/L    Urea Nitrogen 12 6 - 23 mg/dL    Creatinine 0.74 0.50 - 1.05 mg/dL    eGFR >90 >60 mL/min/1.73m*2    Calcium 9.0 8.6 - 10.3 mg/dL    Albumin 3.6 3.4 - 5.0 g/dL    Alkaline Phosphatase 106 33 - 136 U/L    Total Protein 6.4 6.4 - 8.2 g/dL    AST 31 9 - 39 U/L    Bilirubin, Total 0.7 0.0 - 1.2 mg/dL    ALT 17 7 - 45 U/L   Protime-INR   Result Value Ref Range    Protime 11.2 9.8 - 12.8 seconds    INR 1.0 0.9 - 1.1   APTT   Result Value Ref Range    aPTT 36 27 - 38 seconds   ECG 12 lead   Result Value Ref Range     Ventricular Rate 85 BPM    Atrial Rate 85 BPM    HI Interval 206 ms    QRS Duration 92 ms    QT Interval 418 ms    QTC Calculation(Bazett) 497 ms    P Axis 61 degrees    R Axis -8 degrees    T Axis 5 degrees    QRS Count 14 beats    Q Onset 219 ms    P Onset 116 ms    P Offset 170 ms    T Offset 428 ms    QTC Fredericia 469 ms   POCT GLUCOSE   Result Value Ref Range    POCT Glucose 111 (H) 74 - 99 mg/dL     ECG 12 lead    Result Date: 7/30/2024  Normal sinus rhythm Incomplete right bundle branch block Cannot rule out Anterior infarct , age undetermined Abnormal ECG When compared with ECG of 24-JUL-2024 11:09, Inverted T waves have replaced nonspecific T wave abnormality in Inferior leads See ED provider note for full interpretation and clinical correlation Confirmed by Heather Harris ([id number]) on 7/30/2024 11:22:30 AM    CT angio head and neck w and wo IV contrast    Result Date: 7/30/2024  Interpreted By:  Roderick Lin, STUDY: CT ANGIO HEAD AND NECK W AND WO IV CONTRAST   INDICATION: Signs/Symptoms:c/f aneurysm   COMPARISON: Head CT 07/30/2024.   ACCESSION NUMBER(S): [id number]   ORDERING CLINICIAN: ERASMO MARLOW   TECHNIQUE: CTA of the head and neck was performed after the intravenous administration of 75 mL Omnipaque 350 nonionic IV contrast. 3-D reconstructions were performed under physician supervision on a separate workstation and reviewed.   FINDINGS: CTA NECK:   AORTIC ARCH: The visualized portion of the aortic arch is unremarkable in appearance. The origins of the great vessels and the vertebral arteries are normal without evidence of stenosis.   CERVICAL CAROTID ARTERIES: The common carotid arteries are patent bilaterally without evidence of stenosis. Atherosclerotic disease of bilateral carotid bifurcations resulting in mild (less than 50%) luminal stenosis of bilateral proximal cervical ICAs. Remainder of the cervical ICAs are patent throughout their course.   VERTEBRAL ARTERIES: The vertebral arteries  are patent bilaterally throughout their course noting slight diminutive caliber of the left vertebral artery.   CTA HEAD:   INTERNAL CAROTID ARTERIES: Atherosclerotic disease of bilateral carotid siphons, which remain overall patent. Intracranial ICAs are otherwise normal.   CEREBRAL ARTERIES: No aneurysm appreciated. Left MCA branches appear patent and grossly symmetric with right MCA branches; no definitive findings to suggest vasospasm. Bilateral ACAs are within normal limits. Bilateral PCAs are predominantly supplied by the posterior communicating arteries.   VERTEBROBASILAR SYSTEM: Left intradural vertebral artery is again diminutive and is severely narrowed in caliber distal to the PICA branch. Right intradural vertebral artery is unremarkable. Basilar artery is patent but is again diminutive secondary to PCOM dominance. No evidence of basilar tip aneurysm.   There is no evidence for saccular aneurysm, vascular malformation, or large vessel occlusion.   OTHER: Known extensive subarachnoid and subdural hemorrhages are better assessed on noncontrast head CT performed earlier today.       No evidence of basilar tip aneurysm. Previously noted rounded hyperdensity corresponds to subarachnoid hemorrhage extending into the supra cerebellar cistern.   Mild scattered atherosclerotic disease of the cervical and intracranial vasculature as detailed.   Please refer to noncontrast head CT for complete evaluation of subarachnoid and subdural hemorrhages. No definitive evidence of vasospasm at this time.   _____________ NASCET criteria for internal carotid artery stenosis: Mild: 0% to 49% Moderate: 50% to 69% Severe: 70% to 99% Complete Occlusion   Signed by: Roderick Lin 7/30/2024 9:11 AM Dictation workstation:   [id number]    CT head wo IV contrast    Result Date: 7/30/2024  Interpreted By:  Jean Hester, STUDY: CT HEAD WO IV CONTRAST;  7/30/2024 7:32 am   INDICATION: Signs/Symptoms:sah sdh.   COMPARISON: CT Brain,  earlier same morning 30 July 2024 at 0122 hours   ACCESSION NUMBER(S): [id number]   ORDERING CLINICIAN: CAMILLA MILLER   TECHNIQUE: CT of the brain from the skull vertex to the skull base, without intravenous contrast   FINDINGS: No interval change to the abnormal CT appearance of the brain. 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 It has not changed in size from earlier this morning   All of the acute intra-axial and extra-axial hemorrhages and mild left-to-right midline shift is all unchanged   No discernible enlarging aneurysm or worsening hemorrhage   MACRO: None   Signed by: Jean Hester 7/30/2024 7:41 AM Dictation workstation:   [id number]    CT head wo IV contrast    Result Date: 7/30/2024  Interpreted By:  Sandro Roe, STUDY: CT HEAD WO IV CONTRAST; CT CERVICAL SPINE WO IV CONTRAST;  7/30/2024 1:24 am   INDICATION: Signs/Symptoms:fall back of head   COMPARISON: None.   ACCESSION NUMBER(S): [id number]; [id number]   ORDERING CLINICIAN: CAMILLA MILLER   TECHNIQUE: Axial noncontrast CT images of head with coronal and sagittal reconstructed images. Axial noncontrast CT images of the cervical spine with coronal and sagittal reconstructed images.   FINDINGS: CT HEAD:   Abnormal examination. There is a subdural hematoma seen in the left measuring about 5 mm anteriorly and  8-9 mm posteriorly. Additionally there is extensive subarachnoid hemorrhage noted along the basilar cisterns as well as layering along the left cerebral convexity. Superior to the cerebellum there is of focus of hemorrhage measuring up to about 1 cm   No acute skull fracture.   Global volume loss. Evidence of prior chronic small vessel ischemic change seen in the right cerebral hemisphere there may be minimal midline shift to the right of 2-3 mm.   Soft tissue swelling seen in the left occipital parietal region.     CT CERVICAL SPINE:   Demineralization of the bones. Scattered multilevel degenerative disc disease. Robust vascular calcification.   Degenerative disc disease most notable C4-5, C5-6 and C6-7. No evidence of acute cervical spine fracture       Abnormal examination. There is extensive left cerebral hemispheric subarachnoid hemorrhage.. Basilar cistern subarachnoid hemorrhage also noted. There is small subdural hematoma also seen in the left more prominent posteriorly. Minimal midline shift to the right of 2-3 mm   Global volume loss. Evidence of prior ischemic event in the right cerebral hemisphere not significantly changed. No intraventricular hemorrhage.   No findings of acute cervical spine fracture     Sandro Roe discussed the significance and urgency of this critical finding by epic chat with  CAMILLA MILLER on 7/30/2024 at 1:57 am. (**-RCF-**) Findings:  See findings.       Signed by: Sandro Roe 7/30/2024 1:58 AM Dictation workstation:   [id number]    CT cervical spine wo IV contrast    Result Date: 7/30/2024  Interpreted By:  Sandro Roe, STUDY: CT HEAD WO IV CONTRAST; CT CERVICAL SPINE WO IV CONTRAST;  7/30/2024 1:24 am   INDICATION: Signs/Symptoms:fall back of head   COMPARISON: None.   ACCESSION NUMBER(S): [id number]; [id number]   ORDERING CLINICIAN: CAMILLA MILLER   TECHNIQUE: Axial noncontrast CT images of head with coronal and sagittal reconstructed images. Axial  noncontrast CT images of the cervical spine with coronal and sagittal reconstructed images.   FINDINGS: CT HEAD:   Abnormal examination. There is a subdural hematoma seen in the left measuring about 5 mm anteriorly and 8-9 mm posteriorly. Additionally there is extensive subarachnoid hemorrhage noted along the basilar cisterns as well as layering along the left cerebral convexity. Superior to the cerebellum there is of focus of hemorrhage measuring up to about 1 cm   No acute skull fracture.   Global volume loss. Evidence of prior chronic small vessel ischemic change seen in the right cerebral hemisphere there may be minimal midline shift to the right of 2-3 mm.   Soft tissue swelling seen in the left occipital parietal region.     CT CERVICAL SPINE:   Demineralization of the bones. Scattered multilevel degenerative disc disease. Robust vascular calcification.   Degenerative disc disease most notable C4-5, C5-6 and C6-7. No evidence of acute cervical spine fracture       Abnormal examination. There is extensive left cerebral hemispheric subarachnoid hemorrhage.. Basilar cistern subarachnoid hemorrhage also noted. There is small subdural hematoma also seen in the left more prominent posteriorly. Minimal midline shift to the right of 2-3 mm   Global volume loss. Evidence of prior ischemic event in the right cerebral hemisphere not significantly changed. No intraventricular hemorrhage.   No findings of acute cervical spine fracture     Sandro Roe discussed the significance and urgency of this critical finding by epic chat with  CAMILLA MILLER on 7/30/2024 at 1:57 am. (**-RCF-**) Findings:  See findings.       Signed by: Sandro Roe 7/30/2024 1:58 AM Dictation workstation:   [id number]        Assessment/Plan   Principal Problem:    Subdural hematoma (Multi)    Yue Sanz is a 63 y.o. female with PMHx of left humerus fracture s/p intramedullary nail humerus (6/19/24), CVA d/t embolic occlusion of RCA  (1/28/21), EtOH use disorder, EtOH related seizure, anemia, depression, anxiety, HTN, HPL, and anxiety presented to the ED from SNF ([facility]) after a fall from standing.  On presentation to the ED, patient with left occiput hematoma, A+O x 3 and moving all extremities.  Of note, patient is on Plavix and has a history of seizures (no seizure witnessed pre/post fall).  CT head with left cerebral hemispheric SAH, basilar cistern hemorrhage and left posterior hematoma.  Admitted to the ICU for frequent neurological assessments.    Neurologic  H/O CVA, EtOH use disorder, EtOH related seizure, recurrent falls, depression and anxiety  Left cerebral hemispheric SAH  - Q 1 hour neuro checks  - Phenobarbital load and taper with PRN   - MVI, Folic Acid, Thiamine  - A-F bundle  - Repeat CT head at 1500  - Consult Neurosurgery  - Per discussion with Dr. Lino Stubbs (Neurosurgery):   - No additional imaging necessary   - Goal SBP < 160   - Hold ASA 7 days and Plavix 14 days   - Neuro checks can move to Q 4 hours after 24 hours in ICU (7/31 @ 0900)    Cardiovascular  H/O HTN, HPL  - Cardene gtt for target SBP <140  - Hold home Metoprolol until patient able to take PO meds     Pulmonary  No active issues  - Keep O2 > 92%     Renal  No active issues (baseline Cr ~0.90-1.00)   - Daily RFP  - Replete electrolytes PRN    GI/  NPO   - Strict I & Os    Endocrine  Hgb A1C 5.0% (6/17/24)  TSH (6/17/24) 12.45, Free T4 1.09 during hospitalization  - Recommend TSH/Free T4 repeat oupatient     Hematologic/Oncologic  H/O anemia  - Hold home ASA and Plavix in the setting of SAH     ID  Recent admission for complicated cystitis- completed course of Keflex     ICU Checklist  Sedation: Haldol x 1 dose for agitation, Phenobarbital PRN  Delirium/CAM: CAM +  PT/OT: Will order when able to participate  Diet:  NPO  Lines: PIV x 2  DVT Prophylaxis: SCDs, Hold AC in the setting of hemorrhagic stroke  GI Prophylaxis: PPI  Code Status:   Full  Disposition:  Remain in the ICU pending repeat CT head and neurology recommendations         I spent 60 minutes in the professional and overall care of this critically ill patient.      Angelica Zarate, APRN-CNP

## 2024-07-30 NOTE — ED PROVIDER NOTES
HPI   Chief Complaint   Patient presents with    Fall       Patient has history of seizures and is on Plavix.  She apparently states that she slipped and suddenly found to stop on the ground.  She is unaware of having any seizure-like activity.  She has no complaint besides a mild headache.              Patient History   Past Medical History:   Diagnosis Date    Acute CVA (cerebrovascular accident) (Multi) 05/30/2023    Alcohol related seizure (Multi) 05/30/2023    Cerebral hemorrhage (Multi) 05/30/2023    Cerebral infarction due to embolism of right middle cerebral artery (Multi) 05/30/2023    Diarrhea, unspecified 08/10/2022    Diarrhea    Encounter for follow-up examination after completed treatment for conditions other than malignant neoplasm 04/20/2020    Hospital discharge follow-up    Encounter for other screening for malignant neoplasm of breast 03/08/2022    Breast screening    Encounter for screening for malignant neoplasm of colon 05/16/2022    Colon cancer screening    Other abnormality of red blood cells 08/10/2022    Elevated MCV    Personal history of other endocrine, nutritional and metabolic disease     History of high cholesterol    Personal history of transient ischemic attack (TIA), and cerebral infarction without residual deficits     History of cerebrovascular accident     Past Surgical History:   Procedure Laterality Date    MR HEAD ANGIO WO IV CONTRAST  5/19/2021    MR HEAD ANGIO WO IV CONTRAST 5/19/2021 [facility] EMERGENCY LEGACY    MR HEAD ANGIO WO IV CONTRAST  7/12/2018    MR HEAD ANGIO WO IV CONTRAST 7/12/2018 [facility] CLINICAL LEGACY    MR NECK ANGIO WO IV CONTRAST  5/19/2021    MR NECK ANGIO WO IV CONTRAST 5/19/2021 [facility]     Family History   Problem Relation Name Age of Onset    Arthritis Mother      No Known Problems Father       Social History     Tobacco Use    Smoking status: Never     Passive exposure: Never    Smokeless tobacco: Never   Vaping Use    Vaping status: Never  Used   Substance Use Topics    Alcohol use: Not Currently     Comment: none currenlty    Drug use: Not Currently       Physical Exam   ED Triage Vitals   Temp Pulse Resp BP   -- -- -- --      SpO2 Temp src Heart Rate Source Patient Position   -- -- -- --      BP Location FiO2 (%)     -- --       Physical Exam  Vitals and nursing note reviewed.   Constitutional:       General: She is not in acute distress.     Appearance: She is well-developed.   HENT:      Head: Normocephalic.      Comments: Contusion on the posterior occiput  Eyes:      Conjunctiva/sclera: Conjunctivae normal.   Cardiovascular:      Rate and Rhythm: Normal rate and regular rhythm.      Heart sounds: No murmur heard.  Pulmonary:      Effort: Pulmonary effort is normal. No respiratory distress.      Breath sounds: Normal breath sounds.   Abdominal:      Palpations: Abdomen is soft.      Tenderness: There is no abdominal tenderness.   Musculoskeletal:         General: No swelling.      Cervical back: Neck supple.      Comments: Ecchymosis of the left lower extremity which is stated is chronic per patient.   Skin:     General: Skin is warm and dry.      Capillary Refill: Capillary refill takes less than 2 seconds.   Neurological:      Mental Status: She is alert.   Psychiatric:         Mood and Affect: Mood normal.           ED Course & MDM   ED Course as of 08/05/24 1404   Tue Jul 30, 2024   0609 Signed out to be pending CT head.  Subdural and subarachnoid hemorrhage from fall.  On Plavix.  Received a gram of Keppra. [JM]   4451 Spoke to radiology, there is concern for possible basilar aneurysm.  Spoke to neurosurgery unclear whether fall with traumatic the however suspicion is higher for traumatic bleed and ruptured aneurysm.  Patient does have worsening mental status over the last hour with more confusion.  , goal less than 140 if needed will start Cardene.  Neurosurgery recommends CT angio head and neck, reaching out to CT for emergent  imaging [JM]   0856 Cardene ordered however patient's blood pressure did subsequently dropped to 130 systolic.  Now is uptrending more consistently readings in the 140s will be started on a low-dose.  Reach out to radiology operations regarding CT imaging read which is still pending.  Actively being read by radiologist reportedly.  Neurosurgery scrubbed into the case, message sent will page neurosurgery. [JM]   0910 Prior to Cardene being initiated blood pressure systolic in the 120s.  Patient does have alcohol use history, some delirium may be related to component of alcohol withdrawal however patient is not tachycardic, not tremulous and not currently hypertensive.  Will give IV thiamine.  Neurosurgery reviewed imaging does not see aneurysm can stay here at [facility] per neurosurgery standpoint, recommends ICU admission for every hour neurochecks [JM]   1000 Patient was consistently having blood pressures in the 140s to 150s, was briefly started on Cardene and blood pressure improved to 120s systolics.  Cardene was paused and blood pressure remains controlled.  Intermittently agitated but redirectable. Accepted for admission to ICU for continued management.  Discussed possibility of alcohol withdrawal being part of picture, not markedly hypertensive and not tachycardic and no tremor, ICU agrees with plan for continue to monitor at this time.  IV thiamine ordered. [JM]      ED Course User Index  [JM] Rosalio Quiroz MD         Diagnoses as of 08/05/24 1404   Subdural hematoma (Multi)                       [place] Coma Scale Score: 12                        Medical Decision Making  Patient is on Plavix.  She denies any seizure today.  States she simply slipped and fell.  Patient states that her wound on her leg is chronic.  She denies any fever cough or vomiting.  No other systemic symptoms.  Will give her Tylenol and CT of the head and neck.  Patient is found to have a rather large subarachnoid hemorrhage and a small  subdural 5 mm.  No obvious midline shift.  With neurosurgery who does not immediately feel this needs ICU and would like a repeat CT scan to determine if the patient requires ICU here, stepdown or transfer downtown.  Patient is currently alert moving all 4 extremities and she is oriented x 3.    EKG interpreted by myself.  Normal sinus rhythm at a rate of 85 bpm.  Normal intervals.  Normal axis.  No signs of acute ischemia.    Procedure  Critical Care    Performed by: Michael Eldridge MD  Authorized by: Michael Eldridge MD    Critical care provider statement:     Critical care time (minutes):  65    Critical care time was exclusive of:  Separately billable procedures and treating other patients    Critical care was necessary to treat or prevent imminent or life-threatening deterioration of the following conditions:  CNS failure or compromise    Critical care was time spent personally by me on the following activities:  Blood draw for specimens, ordering and performing treatments and interventions, ordering and review of laboratory studies, development of treatment plan with patient or surrogate, discussions with consultants, ordering and review of radiographic studies, pulse oximetry, review of old charts, re-evaluation of patient's condition, examination of patient and evaluation of patient's response to treatment       Michael Eldridge MD  07/30/24 0713       Michael Eldridge MD  08/05/24 140       Michael Eldridge MD  08/17/24 0661

## 2024-07-30 NOTE — ED PROCEDURE NOTE
Procedure  Procedures      There was difficulty obtaining IV access on this patient, and multiple attempts by nursing staff and/or medics have failed. Patient required IV access by ED provider under the assistance of ultrasound.      Site was prepped and sterilized before IV insertion.     Ultrasound images were not saved in the patient's chart demonstrating cannulization.     IV Size:20  IV Side: Left  IV Site: Forearm    DO Fiona Gupta DO  Resident  07/30/24 2003

## 2024-07-30 NOTE — PROGRESS NOTES
Transitional Care Coordination Progress Note:  Plan per Medical/Surgical team: treatment of seizure & fall with IV Keppra  Status: ED  Payor source: [employer] dual   Discharge disposition: [facility]  Potential Barriers: CTSCAN: large subarachnoid hemorrhage and a small subdural   Chronic wound on leg  ADOD: 8/1/2024   KAPIL German RN, BSN Transitional Care Coordinator ED# [phone]     Per SNF: will need a new auth for return      07/30/24 0736   Discharge Planning   Living Arrangements Alone   Support Systems Family members   Assistance Needed seizure precautions   Type of Residence Skilled nursing facility   Do you have animals or pets at home? No   Home or Post Acute Services Post acute facilities (Rehab/SNF/etc)   Type of Post Acute Facility Services Skilled nursing   Expected Discharge Disposition SNF   Does the patient need discharge transport arranged? Yes   RoundTrip coordination needed? Yes   Has discharge transport been arranged? No   Financial Resource Strain   How hard is it for you to pay for the very basics like food, housing, medical care, and heating? Not hard   Housing Stability   In the last 12 months, was there a time when you were not able to pay the mortgage or rent on time? N   In the past 12 months, how many times have you moved where you were living? 1   At any time in the past 12 months, were you homeless or living in a shelter (including now)? N   Transportation Needs   In the past 12 months, has lack of transportation kept you from medical appointments or from getting medications? no   In the past 12 months, has lack of transportation kept you from meetings, work, or from getting things needed for daily living? No

## 2024-07-31 LAB
ALBUMIN SERPL BCP-MCNC: 3.2 G/DL (ref 3.4–5)
ANION GAP SERPL CALC-SCNC: 12 MMOL/L (ref 10–20)
BUN SERPL-MCNC: 9 MG/DL (ref 6–23)
CALCIUM SERPL-MCNC: 8.5 MG/DL (ref 8.6–10.3)
CHLORIDE SERPL-SCNC: 106 MMOL/L (ref 98–107)
CO2 SERPL-SCNC: 23 MMOL/L (ref 21–32)
CREAT SERPL-MCNC: 0.71 MG/DL (ref 0.5–1.05)
EGFRCR SERPLBLD CKD-EPI 2021: >90 ML/MIN/1.73M*2
ERYTHROCYTE [DISTWIDTH] IN BLOOD BY AUTOMATED COUNT: 14.4 % (ref 11.5–14.5)
GLUCOSE BLD MANUAL STRIP-MCNC: 100 MG/DL (ref 74–99)
GLUCOSE BLD MANUAL STRIP-MCNC: 102 MG/DL (ref 74–99)
GLUCOSE BLD MANUAL STRIP-MCNC: 106 MG/DL (ref 74–99)
GLUCOSE BLD MANUAL STRIP-MCNC: 107 MG/DL (ref 74–99)
GLUCOSE BLD MANUAL STRIP-MCNC: 170 MG/DL (ref 74–99)
GLUCOSE BLD MANUAL STRIP-MCNC: 58 MG/DL (ref 74–99)
GLUCOSE BLD MANUAL STRIP-MCNC: 66 MG/DL (ref 74–99)
GLUCOSE BLD MANUAL STRIP-MCNC: 73 MG/DL (ref 74–99)
GLUCOSE BLD MANUAL STRIP-MCNC: 74 MG/DL (ref 74–99)
GLUCOSE BLD MANUAL STRIP-MCNC: 85 MG/DL (ref 74–99)
GLUCOSE BLD MANUAL STRIP-MCNC: 89 MG/DL (ref 74–99)
GLUCOSE SERPL-MCNC: 93 MG/DL (ref 74–99)
HCT VFR BLD AUTO: 27.9 % (ref 36–46)
HGB BLD-MCNC: 8.9 G/DL (ref 12–16)
MAGNESIUM SERPL-MCNC: 1.4 MG/DL (ref 1.6–2.4)
MCH RBC QN AUTO: 31.6 PG (ref 26–34)
MCHC RBC AUTO-ENTMCNC: 31.9 G/DL (ref 32–36)
MCV RBC AUTO: 99 FL (ref 80–100)
NRBC BLD-RTO: 0 /100 WBCS (ref 0–0)
PHOSPHATE SERPL-MCNC: 4.5 MG/DL (ref 2.5–4.9)
PLATELET # BLD AUTO: 257 X10*3/UL (ref 150–450)
POTASSIUM SERPL-SCNC: 3.7 MMOL/L (ref 3.5–5.3)
RBC # BLD AUTO: 2.82 X10*6/UL (ref 4–5.2)
SODIUM SERPL-SCNC: 137 MMOL/L (ref 136–145)
WBC # BLD AUTO: 6.3 X10*3/UL (ref 4.4–11.3)

## 2024-07-31 PROCEDURE — C9113 INJ PANTOPRAZOLE SODIUM, VIA: HCPCS | Performed by: STUDENT IN AN ORGANIZED HEALTH CARE EDUCATION/TRAINING PROGRAM

## 2024-07-31 PROCEDURE — 2500000004 HC RX 250 GENERAL PHARMACY W/ HCPCS (ALT 636 FOR OP/ED)

## 2024-07-31 PROCEDURE — 2500000001 HC RX 250 WO HCPCS SELF ADMINISTERED DRUGS (ALT 637 FOR MEDICARE OP): Performed by: STUDENT IN AN ORGANIZED HEALTH CARE EDUCATION/TRAINING PROGRAM

## 2024-07-31 PROCEDURE — 2020000001 HC ICU ROOM DAILY

## 2024-07-31 PROCEDURE — 2500000004 HC RX 250 GENERAL PHARMACY W/ HCPCS (ALT 636 FOR OP/ED): Performed by: STUDENT IN AN ORGANIZED HEALTH CARE EDUCATION/TRAINING PROGRAM

## 2024-07-31 PROCEDURE — 2500000004 HC RX 250 GENERAL PHARMACY W/ HCPCS (ALT 636 FOR OP/ED): Performed by: NURSE PRACTITIONER

## 2024-07-31 PROCEDURE — 2500000001 HC RX 250 WO HCPCS SELF ADMINISTERED DRUGS (ALT 637 FOR MEDICARE OP): Performed by: SURGERY

## 2024-07-31 PROCEDURE — 2500000002 HC RX 250 W HCPCS SELF ADMINISTERED DRUGS (ALT 637 FOR MEDICARE OP, ALT 636 FOR OP/ED): Performed by: SURGERY

## 2024-07-31 PROCEDURE — 80069 RENAL FUNCTION PANEL: CPT

## 2024-07-31 PROCEDURE — 2500000002 HC RX 250 W HCPCS SELF ADMINISTERED DRUGS (ALT 637 FOR MEDICARE OP, ALT 636 FOR OP/ED): Performed by: NURSE PRACTITIONER

## 2024-07-31 PROCEDURE — 85027 COMPLETE CBC AUTOMATED: CPT

## 2024-07-31 PROCEDURE — 83735 ASSAY OF MAGNESIUM: CPT

## 2024-07-31 PROCEDURE — 99291 CRITICAL CARE FIRST HOUR: CPT | Performed by: NURSE PRACTITIONER

## 2024-07-31 PROCEDURE — 2500000004 HC RX 250 GENERAL PHARMACY W/ HCPCS (ALT 636 FOR OP/ED): Performed by: SURGERY

## 2024-07-31 PROCEDURE — 2500000005 HC RX 250 GENERAL PHARMACY W/O HCPCS: Performed by: STUDENT IN AN ORGANIZED HEALTH CARE EDUCATION/TRAINING PROGRAM

## 2024-07-31 PROCEDURE — 82947 ASSAY GLUCOSE BLOOD QUANT: CPT

## 2024-07-31 PROCEDURE — 2500000001 HC RX 250 WO HCPCS SELF ADMINISTERED DRUGS (ALT 637 FOR MEDICARE OP): Performed by: EMERGENCY MEDICINE

## 2024-07-31 PROCEDURE — 36415 COLL VENOUS BLD VENIPUNCTURE: CPT

## 2024-07-31 RX ORDER — PHENOBARBITAL 32.4 MG/1
64.8 TABLET ORAL 3 TIMES DAILY
Status: DISCONTINUED | OUTPATIENT
Start: 2024-07-31 | End: 2024-08-02

## 2024-07-31 RX ORDER — SERTRALINE HYDROCHLORIDE 50 MG/1
50 TABLET, FILM COATED ORAL DAILY
Status: DISCONTINUED | OUTPATIENT
Start: 2024-07-31 | End: 2024-08-08

## 2024-07-31 RX ORDER — HALOPERIDOL 5 MG/ML
5 INJECTION INTRAMUSCULAR EVERY 6 HOURS PRN
Status: DISCONTINUED | OUTPATIENT
Start: 2024-07-31 | End: 2024-08-03

## 2024-07-31 RX ORDER — POTASSIUM CHLORIDE 20 MEQ/1
20 TABLET, EXTENDED RELEASE ORAL ONCE
Status: COMPLETED | OUTPATIENT
Start: 2024-07-31 | End: 2024-07-31

## 2024-07-31 RX ORDER — MAGNESIUM SULFATE HEPTAHYDRATE 40 MG/ML
2 INJECTION, SOLUTION INTRAVENOUS ONCE
Status: COMPLETED | OUTPATIENT
Start: 2024-07-31 | End: 2024-07-31

## 2024-07-31 RX ORDER — PHENOBARBITAL 32.4 MG/1
32.4 TABLET ORAL 3 TIMES DAILY
Status: DISCONTINUED | OUTPATIENT
Start: 2024-08-02 | End: 2024-08-02

## 2024-07-31 ASSESSMENT — PAIN - FUNCTIONAL ASSESSMENT
PAIN_FUNCTIONAL_ASSESSMENT: 0-10
PAIN_FUNCTIONAL_ASSESSMENT: 0-10

## 2024-07-31 ASSESSMENT — PAIN SCALES - GENERAL
PAINLEVEL_OUTOF10: 0 - NO PAIN
PAINLEVEL_OUTOF10: 0 - NO PAIN

## 2024-07-31 NOTE — PROGRESS NOTES
"Yue Sanz is a 63 y.o. female on day 1 of admission presenting with Subdural hematoma (Multi)    Subjective   Pt seen and examined  Sedated, laying in bed  No acute events overnight    Objective     Physical Exam  Constitutional:       General: She is not in acute distress.     Appearance: She is underweight. She is ill-appearing.      Interventions: She is sedated and restrained.   Cardiovascular:      Comments: TELE: SR 60s    Pulmonary:      Effort: No respiratory distress.      Breath sounds: No wheezing.      Comments: Chest rise equal with respiration  NAD  No audible wheezing  Abdominal:      General: There is no distension.   Genitourinary:     Comments: External Catheter   Musculoskeletal:      Right lower leg: No edema.      Left lower leg: No edema.   Skin:     Coloration: Skin is pale.   Neurological:      Mental Status: She is lethargic.      Comments: sedated         Review of Systems   Reason unable to perform ROS: Sedated.       Last Recorded Vitals   Blood pressure 101/73, pulse 69, temperature 36.5 °C (97.7 °F), resp. rate 16, height 1.676 m (5' 6\"), weight 51.5 kg (113 lb 8.6 oz), SpO2 100%.    Intake/Output Last 3 Shifts  I/O last 3 completed shifts:  In: 255.5 (5 mL/kg) [I.V.:50.5 (1 mL/kg); IV Piggyback:205]  Out: 200 (3.9 mL/kg) [Urine:200 (0.1 mL/kg/hr)]  Weight: 51.5 kg            Lines  External Urinary Catheter Female (Active)   Placement Date/Time: 07/30/24 1200   Earliest Known Present: 07/30/24  Placed by: BENJI Colunga RN  Hand Hygiene Completed: Yes  External Catheter Type: Female   Number of days: 0        Recent Labs  CMP:  Results from last 7 days   Lab Units 07/31/24  0537 07/30/24  1349 07/30/24  0202 07/27/24  0657 07/26/24  0533 07/25/24  1023 07/25/24  0627   SODIUM mmol/L 137 135* 138 136 139 141 142   POTASSIUM mmol/L 3.7 5.2 3.8 4.3 3.4* 3.0* 3.2*   CHLORIDE mmol/L 106 105 105 110* 112* 109* 112*   CO2 mmol/L 23 17* 24 20* 17* 16* 17*   ANION GAP mmol/L 12 18 13 10 13 19 16 "   BUN mg/dL 9 9 12 15 18 23 22   CREATININE mg/dL 0.71 0.63 0.74 0.67 0.71 0.83 0.76   EGFR mL/min/1.73m*2 >90 >90 >90 >90 >90 79 88   GLUCOSE mg/dL 93 89 100* 94 92 140* 87   MAGNESIUM mg/dL 1.40* 1.50*  --   --  1.70 1.30*  --    ALBUMIN g/dL 3.2* 3.4 3.6  --   --   --  2.9*   ALT U/L  --   --  17  --   --   --  10   AST U/L  --   --  31  --   --   --  22   BILIRUBIN TOTAL mg/dL  --   --  0.7  --   --   --  0.7     CBC:  Results from last 7 days   Lab Units 07/31/24  0537 07/30/24  1404 07/30/24  0202 07/27/24  0657 07/26/24  0533 07/25/24  1023 07/25/24  0627   WBC AUTO x10*3/uL 6.3 10.8 8.7 5.6 5.0 7.3 6.5   HEMOGLOBIN g/dL 8.9* 9.5* 10.4* 9.9* 9.3* 10.7* 10.0*   HEMATOCRIT % 27.9* 29.4* 31.4* 31.4* 28.6* 32.2* 31.3*   PLATELETS AUTO x10*3/uL 257 282 316 240 245 299 254   MCV fL 99 100 97 103* 100 96 97     COAG:   Results from last 7 days   Lab Units 07/30/24  0202   INR  1.0     Imaging  CT head wo IV contrast   Final Result   Grossly stable basilar tip aneurysm.        Acute subdural and subarachnoid blood as described, with no   significant change from the exam earlier this morning.        Underlying volume loss.        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        MACRO:   None        Signed by: Varghese Rapp 7/30/2024 4:03 PM   Dictation workstation:   [id number]      CT angio head and neck w and wo IV contrast   Final Result   No evidence of basilar tip aneurysm. Previously noted rounded   hyperdensity corresponds to subarachnoid hemorrhage extending into   the supra cerebellar cistern.        Mild scattered atherosclerotic disease of the cervical and   intracranial vasculature as detailed.        Please refer to noncontrast head CT for complete evaluation of   subarachnoid and subdural hemorrhages. No definitive evidence of   vasospasm at this time.        _____________   NASCET criteria for internal carotid artery stenosis:   Mild: 0% to 49%   Moderate: 50% to 69%   Severe: 70% to  99%   Complete Occlusion        Signed by: Roderick Lin 7/30/2024 9:11 AM   Dictation workstation:   [id number]      CT head wo IV contrast   Final Result   Abnormal but unchanged from earlier this morning. At approximately   9-10 mm maximum diameter berry aneurysm developed at the basilar tip   sometime between CTs brain 6 June 2024 when there was no such   aneurysm and earlier this morning. It has not changed in size from   earlier this morning        All of the acute intra-axial and extra-axial hemorrhages and mild   left-to-right midline shift is all unchanged        No discernible enlarging aneurysm or worsening hemorrhage        MACRO:   None        Signed by: Jean Hester 7/30/2024 7:41 AM   Dictation workstation:   [id number]      CT head wo IV contrast   Final Result   Abnormal examination. There is extensive left cerebral hemispheric   subarachnoid hemorrhage.. Basilar cistern subarachnoid hemorrhage   also noted. There is small subdural hematoma also seen in the left   more prominent posteriorly. Minimal midline shift to the right of 2-3   mm        Global volume loss. Evidence of prior ischemic event in the right   cerebral hemisphere not significantly changed. No intraventricular   hemorrhage.        No findings of acute cervical spine fracture             Sandro Roe discussed the significance and urgency of this critical   finding by epic chat with  CAMILLA MILLER on 7/30/2024 at 1:57 am.   (**-RCF-**) Findings:  See findings.                  Signed by: Sandro Roe 7/30/2024 1:58 AM   Dictation workstation:   [id number]      CT cervical spine wo IV contrast   Final Result   Abnormal examination. There is extensive left cerebral hemispheric   subarachnoid hemorrhage.. Basilar cistern subarachnoid hemorrhage   also noted. There is small subdural hematoma also seen in the left   more prominent posteriorly. Minimal midline shift to the right of 2-3   mm        Global volume loss. Evidence  of prior ischemic event in the right   cerebral hemisphere not significantly changed. No intraventricular   hemorrhage.        No findings of acute cervical spine fracture             Sandro oRe discussed the significance and urgency of this critical   finding by epic chat with  CAMILLA MILLER on 7/30/2024 at 1:57 am.   (**-RCF-**) Findings:  See findings.                  Signed by: Sandro Roe 7/30/2024 1:58 AM   Dictation workstation:   [id number]      CT head wo IV contrast    (Results Pending)        Medications  Scheduled medications  pantoprazole, 40 mg, oral, Daily before breakfast   Or  esomeprazole, 40 mg, nasoduodenal tube, Daily before breakfast   Or  pantoprazole, 40 mg, intravenous, Daily before breakfast  folic acid, 1 mg, oral, Daily  insulin lispro, 0-5 Units, subcutaneous, q4h  levETIRAcetam, 500 mg, intravenous, q12h  multivitamin with minerals, 1 tablet, oral, Daily  PHENobarbitaL, 64.8 mg, oral, TID   Followed by  [START ON 8/2/2024] PHENobarbitaL, 32.4 mg, oral, TID  sertraline, 50 mg, oral, Daily  thiamine, 500 mg, intravenous, q8h ROMINA      Continuous medications  dexmedeTOMIDine, 0-1.5 mcg/kg/hr, Last Rate: 0.25 mcg/kg/hr (07/31/24 1200)  niCARdipine, 2.5-15 mg/hr      PRN medications  PRN medications: dextrose, dextrose, glucagon, glucagon, haloperidol lactate, oxygen    Assessment/Plan   Principal Problem:    Subdural hematoma (Multi)      Yue Sanz is a 63 y.o. female with PMHx of left humerus fracture s/p intramedullary nail humerus (6/19/24), CVA d/t embolic occlusion of RCA (1/28/21), EtOH use disorder, EtOH related seizure, anemia, depression, anxiety, HTN, HPL, and anxiety presented to the ED from [facility] ([place] [facility] [place]) after a fall from standing.  On presentation to the ED, patient with left occiput hematoma, A+O x 3 and moving all extremities.  Of note, patient is on Plavix and has a history of seizures (no seizure witnessed pre/post fall).  CT head with left cerebral  hemispheric SAH, basilar cistern hemorrhage and left posterior hematoma.  Admitted to the ICU for frequent neurological assessments.     Neuro  #Left cerebral hemispheric SAH, basilar cistern hemorrhage, left posterior hematoma  # Metabolic Encephalopathy likely r/t alcohol history  #H/O CVA, EtOH use disorder, EtOH related seizure, recurrent falls, depression and anxiety  - Q 4 hour neuro checks  - Phenobarbital load and taper with PRN  - Wean off precedex, PRN haldol for agitation  - MVI, Folic Acid, Thiamine  - A-F bundle  - Continue home Keppra  - Start home zoloft  - Consult Neurosurgery: Appreciate recs              - No additional imaging necessary              - Goal SBP < 160              - Hold ASA 7 days and Plavix 14 days              CV  #H/O HTN, HLD  - Hold home Metoprolol, restart as HD permits  - Continuous cardiac monitoring    Pulm  - Maintain SpO2 >92%    GI  -Diet: NPO, bedside swallow advance as tolerated  -PPI ppx: Protonix     / Renal  No active issues (baseline Cr ~0.90-1.00)   - Daily RFP, Mg  - Replete electrolytes PRN    Endo   Hgb A1C 5.0% (6/17/24)  TSH (6/17/24) 12.45, Free T4 1.09   - POCT Gluc Q4 while NPO  - Hypoglycemia protocol      Heme  # H/O iron deficiency anemia  - DVT px: SCDs, Hold home ASA and Plavix in the setting of SAH  - Daily CBC     ID  Afebrile, no current indications of infection   Recent admission for complicated cystitis- completed course of Keflex     ICU Checklist  Sedation: Precedex, Haldol PRN  Delirium/CAM: CAM +  PT/OT: Will order when able to participate  Diet:  NPO  Lines: PIV x 2  DVT Prophylaxis: SCDs, Hold AC in the setting of hemorrhagic stroke  GI Prophylaxis: PPI  Code Status:  Full  Dispo: Remain in ICU while on Precedex    Discussed with , Pharm D, during bedside rounds       This critically ill patient continues to be at-risk for clinically significant deterioration / failure due to the above mentioned dysfunctional, unstable organ  systems.  I have personally identified and managed all complex critical care issues with efforts to prevent aforementioned clinical deterioration.  Critical care time is spent at bedside and/or the immediate area and has included, but is not limited to, the review of diagnostic tests, labs, radiographs, serial assessments of hemodynamics, respiratory status, ventilatory management, and family updates.  Time spent in procedures and teaching are reported separately.    CRITICAL CARE TIME: 60 minutes    Jackie STEVENS, [employer] MSN Student     I was present with the student who participated in the documentation of this note. I personally saw and evaluated the patient and performed my own history and examination. I discussed the case with the student. I have reviewed, verified, and revised the note as necessary and agree with the content and plan as written by the student.    Lisha Gustafson, APRN-CNP

## 2024-07-31 NOTE — CARE PLAN
Problem: Skin  Goal: Prevent/manage excess moisture  Outcome: Progressing  Flowsheets (Taken 7/31/2024 0208)  Prevent/manage excess moisture:   Moisturize dry skin   Cleanse incontinence/protect with barrier cream  Goal: Prevent/minimize sheer/friction injuries  Outcome: Progressing     Problem: Fall/Injury  Goal: Not fall by end of shift  Outcome: Progressing  Goal: Be free from injury by end of the shift  Outcome: Progressing     Problem: Respiratory  Goal: Minimal/no exertional discomfort or dyspnea this shift  Outcome: Progressing  Goal: No signs of respiratory distress (eg. Use of accessory muscles. Peds grunting)  Outcome: Progressing     Problem: Seizure  Goal: I will remain free from seizures  Outcome: Progressing

## 2024-08-01 LAB
ALBUMIN SERPL BCP-MCNC: 3.2 G/DL (ref 3.4–5)
ANION GAP SERPL CALC-SCNC: 12 MMOL/L (ref 10–20)
BUN SERPL-MCNC: 10 MG/DL (ref 6–23)
CALCIUM SERPL-MCNC: 8.2 MG/DL (ref 8.6–10.3)
CHLORIDE SERPL-SCNC: 103 MMOL/L (ref 98–107)
CO2 SERPL-SCNC: 24 MMOL/L (ref 21–32)
CREAT SERPL-MCNC: 0.71 MG/DL (ref 0.5–1.05)
EGFRCR SERPLBLD CKD-EPI 2021: >90 ML/MIN/1.73M*2
ERYTHROCYTE [DISTWIDTH] IN BLOOD BY AUTOMATED COUNT: 14.6 % (ref 11.5–14.5)
GLUCOSE BLD MANUAL STRIP-MCNC: 100 MG/DL (ref 74–99)
GLUCOSE BLD MANUAL STRIP-MCNC: 108 MG/DL (ref 74–99)
GLUCOSE BLD MANUAL STRIP-MCNC: 67 MG/DL (ref 74–99)
GLUCOSE BLD MANUAL STRIP-MCNC: 78 MG/DL (ref 74–99)
GLUCOSE BLD MANUAL STRIP-MCNC: 88 MG/DL (ref 74–99)
GLUCOSE SERPL-MCNC: 117 MG/DL (ref 74–99)
HCT VFR BLD AUTO: 19.5 % (ref 36–46)
HCT VFR BLD AUTO: 25.6 % (ref 36–46)
HGB BLD-MCNC: 6.5 G/DL (ref 12–16)
HGB BLD-MCNC: 8.5 G/DL (ref 12–16)
MAGNESIUM SERPL-MCNC: 1.8 MG/DL (ref 1.6–2.4)
MCH RBC QN AUTO: 32.7 PG (ref 26–34)
MCHC RBC AUTO-ENTMCNC: 33.3 G/DL (ref 32–36)
MCV RBC AUTO: 98 FL (ref 80–100)
NRBC BLD-RTO: 0 /100 WBCS (ref 0–0)
PHOSPHATE SERPL-MCNC: 3.5 MG/DL (ref 2.5–4.9)
PLATELET # BLD AUTO: 341 X10*3/UL (ref 150–450)
POTASSIUM SERPL-SCNC: 3.4 MMOL/L (ref 3.5–5.3)
RBC # BLD AUTO: 1.99 X10*6/UL (ref 4–5.2)
SODIUM SERPL-SCNC: 136 MMOL/L (ref 136–145)
WBC # BLD AUTO: 8.5 X10*3/UL (ref 4.4–11.3)

## 2024-08-01 PROCEDURE — 85014 HEMATOCRIT: CPT | Performed by: NURSE PRACTITIONER

## 2024-08-01 PROCEDURE — 2500000004 HC RX 250 GENERAL PHARMACY W/ HCPCS (ALT 636 FOR OP/ED): Performed by: STUDENT IN AN ORGANIZED HEALTH CARE EDUCATION/TRAINING PROGRAM

## 2024-08-01 PROCEDURE — 80069 RENAL FUNCTION PANEL: CPT

## 2024-08-01 PROCEDURE — 2500000001 HC RX 250 WO HCPCS SELF ADMINISTERED DRUGS (ALT 637 FOR MEDICARE OP): Performed by: EMERGENCY MEDICINE

## 2024-08-01 PROCEDURE — 2500000001 HC RX 250 WO HCPCS SELF ADMINISTERED DRUGS (ALT 637 FOR MEDICARE OP): Performed by: STUDENT IN AN ORGANIZED HEALTH CARE EDUCATION/TRAINING PROGRAM

## 2024-08-01 PROCEDURE — 2500000004 HC RX 250 GENERAL PHARMACY W/ HCPCS (ALT 636 FOR OP/ED): Performed by: HOSPITALIST

## 2024-08-01 PROCEDURE — 82947 ASSAY GLUCOSE BLOOD QUANT: CPT

## 2024-08-01 PROCEDURE — 85027 COMPLETE CBC AUTOMATED: CPT

## 2024-08-01 PROCEDURE — 2500000001 HC RX 250 WO HCPCS SELF ADMINISTERED DRUGS (ALT 637 FOR MEDICARE OP): Performed by: HOSPITALIST

## 2024-08-01 PROCEDURE — 2500000002 HC RX 250 W HCPCS SELF ADMINISTERED DRUGS (ALT 637 FOR MEDICARE OP, ALT 636 FOR OP/ED): Performed by: SURGERY

## 2024-08-01 PROCEDURE — 2500000001 HC RX 250 WO HCPCS SELF ADMINISTERED DRUGS (ALT 637 FOR MEDICARE OP): Performed by: NURSE PRACTITIONER

## 2024-08-01 PROCEDURE — 70450 CT HEAD/BRAIN W/O DYE: CPT | Performed by: RADIOLOGY

## 2024-08-01 PROCEDURE — 99291 CRITICAL CARE FIRST HOUR: CPT | Performed by: NURSE PRACTITIONER

## 2024-08-01 PROCEDURE — 1100000001 HC PRIVATE ROOM DAILY

## 2024-08-01 PROCEDURE — 2500000001 HC RX 250 WO HCPCS SELF ADMINISTERED DRUGS (ALT 637 FOR MEDICARE OP): Performed by: SURGERY

## 2024-08-01 PROCEDURE — 83735 ASSAY OF MAGNESIUM: CPT

## 2024-08-01 PROCEDURE — 2500000004 HC RX 250 GENERAL PHARMACY W/ HCPCS (ALT 636 FOR OP/ED): Performed by: SURGERY

## 2024-08-01 PROCEDURE — 2500000004 HC RX 250 GENERAL PHARMACY W/ HCPCS (ALT 636 FOR OP/ED)

## 2024-08-01 PROCEDURE — C9113 INJ PANTOPRAZOLE SODIUM, VIA: HCPCS | Performed by: STUDENT IN AN ORGANIZED HEALTH CARE EDUCATION/TRAINING PROGRAM

## 2024-08-01 PROCEDURE — 36415 COLL VENOUS BLD VENIPUNCTURE: CPT

## 2024-08-01 RX ORDER — INSULIN LISPRO 100 [IU]/ML
0-5 INJECTION, SOLUTION INTRAVENOUS; SUBCUTANEOUS
Status: CANCELLED | OUTPATIENT
Start: 2024-08-01

## 2024-08-01 RX ORDER — METOPROLOL TARTRATE 25 MG/1
25 TABLET, FILM COATED ORAL 2 TIMES DAILY
Status: DISCONTINUED | OUTPATIENT
Start: 2024-08-01 | End: 2024-08-05

## 2024-08-01 RX ORDER — POTASSIUM CHLORIDE 1.5 G/1.58G
40 POWDER, FOR SOLUTION ORAL ONCE
Status: COMPLETED | OUTPATIENT
Start: 2024-08-01 | End: 2024-08-01

## 2024-08-01 SDOH — SOCIAL STABILITY: SOCIAL INSECURITY: HAVE YOU HAD THOUGHTS OF HARMING ANYONE ELSE?: NO

## 2024-08-01 SDOH — SOCIAL STABILITY: SOCIAL INSECURITY: DOES ANYONE TRY TO KEEP YOU FROM HAVING/CONTACTING OTHER FRIENDS OR DOING THINGS OUTSIDE YOUR HOME?: NO

## 2024-08-01 SDOH — SOCIAL STABILITY: SOCIAL INSECURITY: ABUSE: ADULT

## 2024-08-01 SDOH — SOCIAL STABILITY: SOCIAL INSECURITY: DO YOU FEEL UNSAFE GOING BACK TO THE PLACE WHERE YOU ARE LIVING?: NO

## 2024-08-01 SDOH — SOCIAL STABILITY: SOCIAL INSECURITY: HAS ANYONE EVER THREATENED TO HURT YOUR FAMILY OR YOUR PETS?: NO

## 2024-08-01 SDOH — SOCIAL STABILITY: SOCIAL INSECURITY: HAVE YOU HAD ANY THOUGHTS OF HARMING ANYONE ELSE?: NO

## 2024-08-01 SDOH — SOCIAL STABILITY: SOCIAL INSECURITY: WERE YOU ABLE TO COMPLETE ALL THE BEHAVIORAL HEALTH SCREENINGS?: YES

## 2024-08-01 SDOH — SOCIAL STABILITY: SOCIAL INSECURITY: DO YOU FEEL ANYONE HAS EXPLOITED OR TAKEN ADVANTAGE OF YOU FINANCIALLY OR OF YOUR PERSONAL PROPERTY?: NO

## 2024-08-01 SDOH — SOCIAL STABILITY: SOCIAL INSECURITY: ARE THERE ANY APPARENT SIGNS OF INJURIES/BEHAVIORS THAT COULD BE RELATED TO ABUSE/NEGLECT?: NO

## 2024-08-01 SDOH — SOCIAL STABILITY: SOCIAL INSECURITY: ARE YOU OR HAVE YOU BEEN THREATENED OR ABUSED PHYSICALLY, EMOTIONALLY, OR SEXUALLY BY ANYONE?: NO

## 2024-08-01 ASSESSMENT — LIFESTYLE VARIABLES
ANXIETY: 2
NAUSEA AND VOMITING: NO NAUSEA AND NO VOMITING
HOW MANY STANDARD DRINKS CONTAINING ALCOHOL DO YOU HAVE ON A TYPICAL DAY: 1 OR 2
TREMOR: NO TREMOR
ORIENTATION AND CLOUDING OF SENSORIUM: CANNOT DO SERIAL ADDITIONS OR IS UNCERTAIN ABOUT DATE
BLOOD PRESSURE: 168/70
VISUAL DISTURBANCES: NOT PRESENT
AGITATION: SOMEWHAT MORE THAN NORMAL ACTIVITY
SKIP TO QUESTIONS 9-10: 1
PAROXYSMAL SWEATS: BARELY PERCEPTIBLE SWEATING, PALMS MOIST
PULSE: 69
AUDIT-C TOTAL SCORE: 3
HEADACHE, FULLNESS IN HEAD: NOT PRESENT
AUDIT-C TOTAL SCORE: 3
HOW OFTEN DO YOU HAVE 6 OR MORE DRINKS ON ONE OCCASION: NEVER
TOTAL SCORE: 5
AUDITORY DISTURBANCES: NOT PRESENT
HOW OFTEN DO YOU HAVE A DRINK CONTAINING ALCOHOL: 2-3 TIMES A WEEK

## 2024-08-01 ASSESSMENT — ACTIVITIES OF DAILY LIVING (ADL)
FEEDING YOURSELF: NEEDS ASSISTANCE
GROOMING: NEEDS ASSISTANCE
WALKS IN HOME: NEEDS ASSISTANCE
TOILETING: NEEDS ASSISTANCE
ADEQUATE_TO_COMPLETE_ADL: YES
HEARING - LEFT EAR: FUNCTIONAL
HEARING - RIGHT EAR: FUNCTIONAL
JUDGMENT_ADEQUATE_SAFELY_COMPLETE_DAILY_ACTIVITIES: NO
BATHING: NEEDS ASSISTANCE
PATIENT'S MEMORY ADEQUATE TO SAFELY COMPLETE DAILY ACTIVITIES?: YES
ASSISTIVE_DEVICE: WALKER
DRESSING YOURSELF: NEEDS ASSISTANCE

## 2024-08-01 ASSESSMENT — COGNITIVE AND FUNCTIONAL STATUS - GENERAL
PATIENT BASELINE BEDBOUND: NO
EATING MEALS: A LITTLE
HELP NEEDED FOR BATHING: A LITTLE
CLIMB 3 TO 5 STEPS WITH RAILING: A LOT
STANDING UP FROM CHAIR USING ARMS: A LITTLE
HELP NEEDED FOR BATHING: A LITTLE
DRESSING REGULAR UPPER BODY CLOTHING: A LITTLE
DRESSING REGULAR UPPER BODY CLOTHING: A LITTLE
TOILETING: A LOT
DRESSING REGULAR LOWER BODY CLOTHING: A LITTLE
DAILY ACTIVITIY SCORE: 17
MOVING TO AND FROM BED TO CHAIR: A LITTLE
MOBILITY SCORE: 17
MOVING TO AND FROM BED TO CHAIR: A LITTLE
WALKING IN HOSPITAL ROOM: A LITTLE
STANDING UP FROM CHAIR USING ARMS: A LITTLE
CLIMB 3 TO 5 STEPS WITH RAILING: A LOT
MOVING FROM LYING ON BACK TO SITTING ON SIDE OF FLAT BED WITH BEDRAILS: A LITTLE
TOILETING: A LOT
TURNING FROM BACK TO SIDE WHILE IN FLAT BAD: A LITTLE
PERSONAL GROOMING: A LITTLE
WALKING IN HOSPITAL ROOM: A LITTLE
DAILY ACTIVITIY SCORE: 17
EATING MEALS: A LITTLE
DRESSING REGULAR LOWER BODY CLOTHING: A LITTLE
MOVING FROM LYING ON BACK TO SITTING ON SIDE OF FLAT BED WITH BEDRAILS: A LITTLE
PERSONAL GROOMING: A LITTLE

## 2024-08-01 ASSESSMENT — PAIN SCALES - GENERAL
PAINLEVEL_OUTOF10: 0 - NO PAIN

## 2024-08-01 ASSESSMENT — PAIN - FUNCTIONAL ASSESSMENT
PAIN_FUNCTIONAL_ASSESSMENT: 0-10

## 2024-08-01 NOTE — PROGRESS NOTES
"Yue Sanz is a 63 y.o. female on day 2 of admission presenting with Subdural hematoma (Multi)    Subjective   JONH  Remained off Precedex and out of restraints  Follow commands    Objective     Physical Exam  Constitutional:       General: She is not in acute distress.     Appearance: She is underweight. She is ill-appearing.      Interventions: She is sedated and restrained.   Cardiovascular:      Comments: TELE: SR 60s  Pulmonary:      Effort: No respiratory distress.      Breath sounds: No wheezing.      Comments: Chest rise equal with respiration  NAD  No audible wheezing  Abdominal:      General: There is no distension.   Genitourinary:     Comments: External Catheter   Musculoskeletal:      Right lower leg: No edema.      Left lower leg: No edema.   Skin:     Coloration: Skin is pale.   Neurological:      Mental Status: Alert and oriented     Comments: Calm follows commands    Review of Systems  Denies chest pain, palpations, light headedness, dizziness sob, n/v fever, chills    Last Recorded Vitals   Blood pressure 128/84, pulse 79, temperature 36.1 °C (97 °F), temperature source Temporal, resp. rate 14, height 1.676 m (5' 6\"), weight 55.5 kg (122 lb 5.7 oz), SpO2 100%.    Intake/Output Last 3 Shifts  I/O last 3 completed shifts:  In: 901.3 (16.2 mL/kg) [P.O.:250; I.V.:146.3 (2.6 mL/kg); IV Piggyback:505]  Out: 850 (15.3 mL/kg) [Urine:850 (0.4 mL/kg/hr)]  Weight: 55.5 kg     Lines  External Urinary Catheter Female (Active)   Placement Date/Time: 07/30/24 1200   Earliest Known Present: 07/30/24  Placed by: BENJI Colunga RN  Hand Hygiene Completed: Yes  External Catheter Type: Female   Number of days: 1        Recent Labs  CMP:  Results from last 7 days   Lab Units 07/31/24  0537 07/30/24  1349 07/30/24  0202 07/27/24  0657 07/26/24  0533   SODIUM mmol/L 137 135* 138 136 139   POTASSIUM mmol/L 3.7 5.2 3.8 4.3 3.4*   CHLORIDE mmol/L 106 105 105 110* 112*   CO2 mmol/L 23 17* 24 20* 17*   ANION GAP mmol/L 12 18 13 10 " 13   BUN mg/dL 9 9 12 15 18   CREATININE mg/dL 0.71 0.63 0.74 0.67 0.71   EGFR mL/min/1.73m*2 >90 >90 >90 >90 >90   MAGNESIUM mg/dL 1.40* 1.50*  --   --  1.70   ALBUMIN g/dL 3.2* 3.4 3.6  --   --    ALT U/L  --   --  17  --   --    AST U/L  --   --  31  --   --    BILIRUBIN TOTAL mg/dL  --   --  0.7  --   --      CBC:  Results from last 7 days   Lab Units 07/31/24  0537 07/30/24  1404 07/30/24  0202 07/27/24  0657 07/26/24  0533   WBC AUTO x10*3/uL 6.3 10.8 8.7 5.6 5.0   HEMOGLOBIN g/dL 8.9* 9.5* 10.4* 9.9* 9.3*   HEMATOCRIT % 27.9* 29.4* 31.4* 31.4* 28.6*   PLATELETS AUTO x10*3/uL 257 282 316 240 245   MCV fL 99 100 97 103* 100     COAG:   Results from last 7 days   Lab Units 07/30/24  0202   INR  1.0     HEME/ENDO:     CARDIAC:        Imaging  CT head wo IV contrast   Final Result   Grossly stable basilar tip aneurysm.        Acute subdural and subarachnoid blood as described, with no   significant change from the exam earlier this morning.        Underlying volume loss.        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        MACRO:   None        Signed by: Varghese Rapp 7/30/2024 4:03 PM   Dictation workstation:   [id number]      CT angio head and neck w and wo IV contrast   Final Result   No evidence of basilar tip aneurysm. Previously noted rounded   hyperdensity corresponds to subarachnoid hemorrhage extending into   the supra cerebellar cistern.        Mild scattered atherosclerotic disease of the cervical and   intracranial vasculature as detailed.        Please refer to noncontrast head CT for complete evaluation of   subarachnoid and subdural hemorrhages. No definitive evidence of   vasospasm at this time.        _____________   NASCET criteria for internal carotid artery stenosis:   Mild: 0% to 49%   Moderate: 50% to 69%   Severe: 70% to 99%   Complete Occlusion        Signed by: Roderick Lin 7/30/2024 9:11 AM   Dictation workstation:   [id number]      CT head wo IV contrast   Final  Result   Abnormal but unchanged from earlier this morning. At approximately   9-10 mm maximum diameter berry aneurysm developed at the basilar tip   sometime between CTs brain 6 June 2024 when there was no such   aneurysm and earlier this morning. It has not changed in size from   earlier this morning        All of the acute intra-axial and extra-axial hemorrhages and mild   left-to-right midline shift is all unchanged        No discernible enlarging aneurysm or worsening hemorrhage        MACRO:   None        Signed by: Jean Hester 7/30/2024 7:41 AM   Dictation workstation:   [id number]      CT head wo IV contrast   Final Result   Abnormal examination. There is extensive left cerebral hemispheric   subarachnoid hemorrhage.. Basilar cistern subarachnoid hemorrhage   also noted. There is small subdural hematoma also seen in the left   more prominent posteriorly. Minimal midline shift to the right of 2-3   mm        Global volume loss. Evidence of prior ischemic event in the right   cerebral hemisphere not significantly changed. No intraventricular   hemorrhage.        No findings of acute cervical spine fracture             Sandro Roe discussed the significance and urgency of this critical   finding by epic chat with  CAMILLA MILLER on 7/30/2024 at 1:57 am.   (**-RCF-**) Findings:  See findings.                  Signed by: Sandro Roe 7/30/2024 1:58 AM   Dictation workstation:   [id number]      CT cervical spine wo IV contrast   Final Result   Abnormal examination. There is extensive left cerebral hemispheric   subarachnoid hemorrhage.. Basilar cistern subarachnoid hemorrhage   also noted. There is small subdural hematoma also seen in the left   more prominent posteriorly. Minimal midline shift to the right of 2-3   mm        Global volume loss. Evidence of prior ischemic event in the right   cerebral hemisphere not significantly changed. No intraventricular   hemorrhage.        No findings of acute  cervical spine fracture             Sandro Roe discussed the significance and urgency of this critical   finding by epic chat with  CAMILLA MILLER on 7/30/2024 at 1:57 am.   (**-RCF-**) Findings:  See findings.                  Signed by: Sandro Roe 7/30/2024 1:58 AM   Dictation workstation:   [id number]      CT head wo IV contrast    (Results Pending)        Medications  Scheduled medications  pantoprazole, 40 mg, oral, Daily before breakfast   Or  esomeprazole, 40 mg, nasoduodenal tube, Daily before breakfast   Or  pantoprazole, 40 mg, intravenous, Daily before breakfast  folic acid, 1 mg, oral, Daily  insulin lispro, 0-5 Units, subcutaneous, q4h  levETIRAcetam, 500 mg, intravenous, q12h  multivitamin with minerals, 1 tablet, oral, Daily  PHENobarbitaL, 64.8 mg, oral, TID   Followed by  [START ON 8/2/2024] PHENobarbitaL, 32.4 mg, oral, TID  sertraline, 50 mg, oral, Daily  thiamine, 500 mg, intravenous, q8h ROMINA      Continuous medications  dexmedeTOMIDine, 0-1.5 mcg/kg/hr, Last Rate: Stopped (07/31/24 1602)      PRN medications  PRN medications: dextrose, dextrose, glucagon, glucagon, haloperidol lactate, oxygen    Assessment/Plan   Principal Problem:    Subdural hematoma (Multi)  Yue Sanz is a 63 y.o. 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  On presentation to the ED, patient with left occiput hematoma, A+O x 3 and moving all extremities.  Of note, patient is on Plavix and has a history of seizures (no seizure witnessed pre/post fall).  CT head with left cerebral hemispheric SAH, basilar cistern hemorrhage and left posterior hematoma.  Admitted to the ICU for frequent neurological assessments.      Neuro  #Left cerebral hemispheric SAH, basilar  cistern hemorrhage, left posterior hematoma  # Metabolic Encephalopathy likely r/t alcohol history- greatly improved  #H/O CVA, EtOH use disorder, EtOH related seizure, recurrent falls, depression and anxiety  - Q 4 hour neuro checks  - Phenobarbital load and taper with PRN  -  PRN haldol for agitation  - MVI, Folic Acid, Thiamine  - Continue home Keppra  - Home zoloft  - Consult Neurosurgery: Appreciate recs              - No additional imaging necessary              - Goal SBP < 160              - Hold ASA 7 days and Plavix 14 days         CV  #H/O HTN, HLD  - Resume home Metoprolol, restart as HD permits  - Continuous cardiac monitoring     Pulm  - Maintain SpO2 >92%     GI  -Diet: as tolerated  -PPI ppx: Protonix      / Renal  No active issues (baseline Cr ~0.90-1.00)   - Daily RFP, Mg  - Replete electrolytes PRN     Endo   Hgb A1C 5.0% (6/17/24)  TSH (6/17/24) 12.45, Free T4 1.09   - ACHS ssi  - Hypoglycemia protocol        Heme  # H/O iron deficiency anemia  - DVT px: SCDs, Hold home ASA and Plavix in the setting of SAH  - Daily CBC     ID  Afebrile, no current indications of infection   Recent admission for complicated cystitis- completed course of Keflex     ICU Checklist  Diet:  regular  Lines: PIV x 2  DVT Prophylaxis: SCDs, Hold AC in the setting of hemorrhagic stroke  GI Prophylaxis: PPI  Code Status:  Full  Dispo: transfer to [facility] w/ Tele to hospitalist service     Discussed with  during bedside rounds      This critically ill patient continues to be at-risk for clinically significant deterioration / failure due to the above mentioned dysfunctional, unstable organ systems.  I have personally identified and managed all complex critical care issues with efforts to prevent aforementioned clinical deterioration.  Critical care time is spent at bedside and/or the immediate area and has included, but is not limited to, the review of diagnostic tests, labs, radiographs, serial assessments of  hemodynamics, respiratory status, ventilatory management, and family updates.  Time spent in procedures and teaching are reported separately.    CRITICAL CARE TIME: 45 minutes    Lisha Gustafson, APRN-CNP

## 2024-08-01 NOTE — PROGRESS NOTES
Care Coordinator Note:    Plan: Patient in with [facility]. Med enceph related to ETOH. NSGY- no asa for 7 days, no plavix or 14 days. Repeat head CT in two weeks.    Precedex Dc'd. Sitter remains. Plan to tx to [facility] today.     Status: inpatient  Payor: [employer] connect mycare dual  Disposition: Re [facility]. WILL NEED PT OT EVALS when able to cooperate.  Barrier: AUTH  ADOD: early next week    Nuvia Goss [facility]      08/01/24 1523   Discharge Planning   Expected Discharge Disposition SNF

## 2024-08-01 NOTE — CARE PLAN
The patient's goals for the shift include      The clinical goals for the shift include patient will be heamodynamically stable    Over the shift, the patient did not make progress toward the following goals. Barriers to progression include ***. Recommendations to address these barriers include ***.

## 2024-08-02 LAB
ALBUMIN SERPL BCP-MCNC: 3.4 G/DL (ref 3.4–5)
ANION GAP SERPL CALC-SCNC: 13 MMOL/L (ref 10–20)
BUN SERPL-MCNC: 8 MG/DL (ref 6–23)
CALCIUM SERPL-MCNC: 8.9 MG/DL (ref 8.6–10.3)
CHLORIDE SERPL-SCNC: 103 MMOL/L (ref 98–107)
CO2 SERPL-SCNC: 22 MMOL/L (ref 21–32)
CREAT SERPL-MCNC: 0.71 MG/DL (ref 0.5–1.05)
EGFRCR SERPLBLD CKD-EPI 2021: >90 ML/MIN/1.73M*2
ERYTHROCYTE [DISTWIDTH] IN BLOOD BY AUTOMATED COUNT: 14.3 % (ref 11.5–14.5)
GLUCOSE BLD MANUAL STRIP-MCNC: 103 MG/DL (ref 74–99)
GLUCOSE BLD MANUAL STRIP-MCNC: 112 MG/DL (ref 74–99)
GLUCOSE BLD MANUAL STRIP-MCNC: 93 MG/DL (ref 74–99)
GLUCOSE BLD MANUAL STRIP-MCNC: 94 MG/DL (ref 74–99)
GLUCOSE BLD MANUAL STRIP-MCNC: 94 MG/DL (ref 74–99)
GLUCOSE BLD MANUAL STRIP-MCNC: 95 MG/DL (ref 74–99)
GLUCOSE SERPL-MCNC: 101 MG/DL (ref 74–99)
HCT VFR BLD AUTO: 28.7 % (ref 36–46)
HGB BLD-MCNC: 9.1 G/DL (ref 12–16)
MAGNESIUM SERPL-MCNC: 1.7 MG/DL (ref 1.6–2.4)
MCH RBC QN AUTO: 31.9 PG (ref 26–34)
MCHC RBC AUTO-ENTMCNC: 31.7 G/DL (ref 32–36)
MCV RBC AUTO: 101 FL (ref 80–100)
NRBC BLD-RTO: 0 /100 WBCS (ref 0–0)
PHOSPHATE SERPL-MCNC: 3.3 MG/DL (ref 2.5–4.9)
PLATELET # BLD AUTO: 295 X10*3/UL (ref 150–450)
POTASSIUM SERPL-SCNC: 4.2 MMOL/L (ref 3.5–5.3)
RBC # BLD AUTO: 2.85 X10*6/UL (ref 4–5.2)
SODIUM SERPL-SCNC: 134 MMOL/L (ref 136–145)
WBC # BLD AUTO: 6.3 X10*3/UL (ref 4.4–11.3)

## 2024-08-02 PROCEDURE — 2500000001 HC RX 250 WO HCPCS SELF ADMINISTERED DRUGS (ALT 637 FOR MEDICARE OP): Performed by: HOSPITALIST

## 2024-08-02 PROCEDURE — 99232 SBSQ HOSP IP/OBS MODERATE 35: CPT | Performed by: STUDENT IN AN ORGANIZED HEALTH CARE EDUCATION/TRAINING PROGRAM

## 2024-08-02 PROCEDURE — 83735 ASSAY OF MAGNESIUM: CPT | Performed by: HOSPITALIST

## 2024-08-02 PROCEDURE — 1100000001 HC PRIVATE ROOM DAILY

## 2024-08-02 PROCEDURE — 85027 COMPLETE CBC AUTOMATED: CPT | Performed by: HOSPITALIST

## 2024-08-02 PROCEDURE — 2500000001 HC RX 250 WO HCPCS SELF ADMINISTERED DRUGS (ALT 637 FOR MEDICARE OP): Performed by: EMERGENCY MEDICINE

## 2024-08-02 PROCEDURE — 2500000004 HC RX 250 GENERAL PHARMACY W/ HCPCS (ALT 636 FOR OP/ED): Performed by: HOSPITALIST

## 2024-08-02 PROCEDURE — 80069 RENAL FUNCTION PANEL: CPT | Performed by: HOSPITALIST

## 2024-08-02 PROCEDURE — 82947 ASSAY GLUCOSE BLOOD QUANT: CPT

## 2024-08-02 PROCEDURE — 2500000002 HC RX 250 W HCPCS SELF ADMINISTERED DRUGS (ALT 637 FOR MEDICARE OP, ALT 636 FOR OP/ED): Performed by: HOSPITALIST

## 2024-08-02 PROCEDURE — 36415 COLL VENOUS BLD VENIPUNCTURE: CPT | Performed by: HOSPITALIST

## 2024-08-02 RX ORDER — HYDRALAZINE HYDROCHLORIDE 25 MG/1
25 TABLET, FILM COATED ORAL EVERY 8 HOURS PRN
Status: DISCONTINUED | OUTPATIENT
Start: 2024-08-02 | End: 2024-08-08

## 2024-08-02 RX ORDER — HYDRALAZINE HYDROCHLORIDE 20 MG/ML
10 INJECTION INTRAMUSCULAR; INTRAVENOUS EVERY 4 HOURS PRN
Status: DISCONTINUED | OUTPATIENT
Start: 2024-08-02 | End: 2024-08-08

## 2024-08-02 ASSESSMENT — PAIN SCALES - GENERAL
PAINLEVEL_OUTOF10: 0 - NO PAIN

## 2024-08-02 ASSESSMENT — LIFESTYLE VARIABLES
VISUAL DISTURBANCES: NOT PRESENT
PAROXYSMAL SWEATS: NO SWEAT VISIBLE
AGITATION: SOMEWHAT MORE THAN NORMAL ACTIVITY
TREMOR: NO TREMOR
ANXIETY: 2
ANXIETY: 2
VISUAL DISTURBANCES: NOT PRESENT
TREMOR: NO TREMOR
VISUAL DISTURBANCES: NOT PRESENT
HEADACHE, FULLNESS IN HEAD: VERY MILD
ORIENTATION AND CLOUDING OF SENSORIUM: CANNOT DO SERIAL ADDITIONS OR IS UNCERTAIN ABOUT DATE
PAROXYSMAL SWEATS: BARELY PERCEPTIBLE SWEATING, PALMS MOIST
TOTAL SCORE: 3
AGITATION: NORMAL ACTIVITY
NAUSEA AND VOMITING: NO NAUSEA AND NO VOMITING
AUDITORY DISTURBANCES: NOT PRESENT
AGITATION: SOMEWHAT MORE THAN NORMAL ACTIVITY
AUDITORY DISTURBANCES: NOT PRESENT
HEADACHE, FULLNESS IN HEAD: NOT PRESENT
TREMOR: NO TREMOR
NAUSEA AND VOMITING: NO NAUSEA AND NO VOMITING
NAUSEA AND VOMITING: NO NAUSEA AND NO VOMITING
PULSE: 74
ORIENTATION AND CLOUDING OF SENSORIUM: ORIENTED AND CAN DO SERIAL ADDITIONS
PAROXYSMAL SWEATS: BARELY PERCEPTIBLE SWEATING, PALMS MOIST
TOTAL SCORE: 5
ORIENTATION AND CLOUDING OF SENSORIUM: CANNOT DO SERIAL ADDITIONS OR IS UNCERTAIN ABOUT DATE
HEADACHE, FULLNESS IN HEAD: NOT PRESENT
ANXIETY: 2
TOTAL SCORE: 5
BLOOD PRESSURE: 153/90
AUDITORY DISTURBANCES: NOT PRESENT

## 2024-08-02 ASSESSMENT — PAIN SCALES - WONG BAKER
WONGBAKER_NUMERICALRESPONSE: NO HURT

## 2024-08-02 ASSESSMENT — PAIN SCALES - PAIN ASSESSMENT IN ADVANCED DEMENTIA (PAINAD)
BODYLANGUAGE: RELAXED
CONSOLABILITY: NO NEED TO CONSOLE
TOTALSCORE: 0
FACIALEXPRESSION: SMILING OR INEXPRESSIVE
BREATHING: NORMAL

## 2024-08-02 ASSESSMENT — PAIN INTENSITY VAS: VAS_PAIN_BASICVITALS_IP: 0

## 2024-08-02 NOTE — PROGRESS NOTES
[facility] Hospitalist Progress Note        Between 7AM-7PM please message me via Epic Secure Chat.  After 7PM please page Nocturnist on call.        Assessment/Plan     #traumatic L sided subdural hematoma/subarachnoid hemorrhage  #mechanical fall at SNF/deconditioning  #alcohol use disorder not in withdrawal  #hx seizures  #anemia - stable  - NSGY evaluated and no acute intervention needed. Rec holding her ASA for 1 week, plavix for 2 weeks. SBP goal < 160. Will need outpatient follow up and outpatient head CT. Her bleed has been stable here on multiple repeat head CT  - PT/OT likely will need placement again  - stop phenobarb, her POA says she has been in hospital/nursing facility recently and has not been able to drink alcohol  - continue thiamine supplementation      DVT Prophylaxis:  SCDs      Discharge Planning: PT/OT likely SNF    I personally examined the patient and reviewed chart.  Plan of care was discussed with patient, all questions answered.    Total time spent: At least 38 minutes, providing counseling or in coordination of care. Total time on this day of visit includes record and documentation review before and after visit including documentation and time not explicitly included on EMR time stamp.      Subjective     Yue Sanz is a 63 y.o. female on day 3 of admission presenting with Subdural hematoma (Multi).    NAEON. Somnolent today but can open eyes to voice, not really able to provide much meaningful hx    Review of Systems   Unable to perform ROS: Acuity of condition       Objective     Physical Exam  Constitutional:       General: She is not in acute distress.  Cardiovascular:      Rate and Rhythm: Normal rate and regular rhythm.   Pulmonary:      Effort: Pulmonary effort is normal.      Breath sounds: Normal breath sounds.   Abdominal:      General: There is no distension.      Palpations: Abdomen is soft.   Neurological:      Mental Status: She is alert. She is disoriented.         Last  "Recorded Vitals  Blood pressure 156/90, pulse 82, temperature 37.1 °C (98.8 °F), temperature source Temporal, resp. rate 15, height 1.676 m (5' 6\"), weight 55 kg (121 lb 4.1 oz), SpO2 98%.  Intake/Output last 3 Shifts:  I/O last 3 completed shifts:  In: 472.5 (8.6 mL/kg) [P.O.:210; I.V.:62.5 (1.1 mL/kg); IV Piggyback:200]  Out: 1050 (19.1 mL/kg) [Urine:1050 (0.5 mL/kg/hr)]  Weight: 55 kg     Relevant Results  Results for orders placed or performed during the hospital encounter of 07/30/24 (from the past 24 hour(s))   POCT GLUCOSE   Result Value Ref Range    POCT Glucose 88 74 - 99 mg/dL   CBC   Result Value Ref Range    WBC 8.5 4.4 - 11.3 x10*3/uL    nRBC 0.0 0.0 - 0.0 /100 WBCs    RBC 1.99 (L) 4.00 - 5.20 x10*6/uL    Hemoglobin 6.5 (LL) 12.0 - 16.0 g/dL    Hematocrit 19.5 (L) 36.0 - 46.0 %    MCV 98 80 - 100 fL    MCH 32.7 26.0 - 34.0 pg    MCHC 33.3 32.0 - 36.0 g/dL    RDW 14.6 (H) 11.5 - 14.5 %    Platelets 341 150 - 450 x10*3/uL   Renal function panel   Result Value Ref Range    Glucose 117 (H) 74 - 99 mg/dL    Sodium 136 136 - 145 mmol/L    Potassium 3.4 (L) 3.5 - 5.3 mmol/L    Chloride 103 98 - 107 mmol/L    Bicarbonate 24 21 - 32 mmol/L    Anion Gap 12 10 - 20 mmol/L    Urea Nitrogen 10 6 - 23 mg/dL    Creatinine 0.71 0.50 - 1.05 mg/dL    eGFR >90 >60 mL/min/1.73m*2    Calcium 8.2 (L) 8.6 - 10.3 mg/dL    Phosphorus 3.5 2.5 - 4.9 mg/dL    Albumin 3.2 (L) 3.4 - 5.0 g/dL   Magnesium   Result Value Ref Range    Magnesium 1.80 1.60 - 2.40 mg/dL   Hemoglobin and hematocrit, blood   Result Value Ref Range    Hemoglobin 8.5 (L) 12.0 - 16.0 g/dL    Hematocrit 25.6 (L) 36.0 - 46.0 %   POCT GLUCOSE   Result Value Ref Range    POCT Glucose 67 (L) 74 - 99 mg/dL   POCT GLUCOSE   Result Value Ref Range    POCT Glucose 78 74 - 99 mg/dL   POCT GLUCOSE   Result Value Ref Range    POCT Glucose 95 74 - 99 mg/dL   POCT GLUCOSE   Result Value Ref Range    POCT Glucose 94 74 - 99 mg/dL   CBC   Result Value Ref Range    WBC 6.3 4.4 " - 11.3 x10*3/uL    nRBC 0.0 0.0 - 0.0 /100 WBCs    RBC 2.85 (L) 4.00 - 5.20 x10*6/uL    Hemoglobin 9.1 (L) 12.0 - 16.0 g/dL    Hematocrit 28.7 (L) 36.0 - 46.0 %     (H) 80 - 100 fL    MCH 31.9 26.0 - 34.0 pg    MCHC 31.7 (L) 32.0 - 36.0 g/dL    RDW 14.3 11.5 - 14.5 %    Platelets 295 150 - 450 x10*3/uL   Magnesium   Result Value Ref Range    Magnesium 1.70 1.60 - 2.40 mg/dL   Renal function panel   Result Value Ref Range    Glucose 101 (H) 74 - 99 mg/dL    Sodium 134 (L) 136 - 145 mmol/L    Potassium 4.2 3.5 - 5.3 mmol/L    Chloride 103 98 - 107 mmol/L    Bicarbonate 22 21 - 32 mmol/L    Anion Gap 13 10 - 20 mmol/L    Urea Nitrogen 8 6 - 23 mg/dL    Creatinine 0.71 0.50 - 1.05 mg/dL    eGFR >90 >60 mL/min/1.73m*2    Calcium 8.9 8.6 - 10.3 mg/dL    Phosphorus 3.3 2.5 - 4.9 mg/dL    Albumin 3.4 3.4 - 5.0 g/dL   POCT GLUCOSE   Result Value Ref Range    POCT Glucose 103 (H) 74 - 99 mg/dL       Imaging Results  CT head wo IV contrast    Result Date: 8/1/2024  Interpreted By:  Karina Spann, STUDY: CT HEAD WO IV CONTRAST;  8/1/2024 4:25 pm   INDICATION: Signs/Symptoms:New visual field deficit, want to ensure stability of known SAH/subdural.   COMPARISON: 07/30/2024 at 3:33 p.m.   ACCESSION NUMBER(S): [id number]   ORDERING CLINICIAN: SANDIP HINSON   TECHNIQUE: Unenhanced CT images of the head were obtained.   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 Acute subarachnoid hemorrhage involving the left cerebral hemisphere and basal cisterns similar in size but overall slightly less dense than on the previous exam. Acute left-sided subdural hematomas measuring up to 5 mm in the frontal region similar to the prior exam. There is mild diffuse left cerebral edema with sulcal effacement and approximately 5 mm of midline shift at the level of the 3rd ventricle similar to the prior exam. No new acute intracranial hemorrhage. Stable low-density likely remote infarct in the right parietal region with porencephalic  dilatation of the posterior horn of the right lateral ventricle.   Stable subcentimeter lucent lesion in the occipital calvarium just to the left of midline image 16/36.   thickening in the left sphenoid and partially imaged right maxillary sinuses. Remaining visualized paranasal sinuses are clear.       Redemonstration of basilar tip aneurysm with acute subarachnoid and left-sided subdural hematomas,  cerebral edema and mild midline shift. No significant change from 07/30/2024 at 3:33 p.m..   MACRO: None.   Signed by: Karina Spann 8/1/2024 4:48 PM Dictation workstation:   [id number]      Medications:  folic acid, 1 mg, oral, Daily  insulin lispro, 0-5 Units, subcutaneous, q4h  levETIRAcetam, 500 mg, intravenous, q12h  metoprolol tartrate, 25 mg, oral, BID  multivitamin with minerals, 1 tablet, oral, Daily  pantoprazole, 40 mg, oral, Daily before breakfast  sertraline, 50 mg, oral, Daily  thiamine, 500 mg, intravenous, q8h ROMINA       PRN medications: dextrose, dextrose, glucagon, glucagon, haloperidol lactate, oxygen       Artem Arrington MD  [facility] Medicine

## 2024-08-02 NOTE — CARE PLAN
Problem: Skin  Goal: Prevent/manage excess moisture  Outcome: Progressing  Flowsheets (Taken 8/2/2024 1855)  Prevent/manage excess moisture: Cleanse incontinence/protect with barrier cream  Goal: Prevent/minimize sheer/friction injuries  Outcome: Progressing  Flowsheets (Taken 8/2/2024 1855)  Prevent/minimize sheer/friction injuries: HOB 30 degrees or less     Problem: Fall/Injury  Goal: Not fall by end of shift  Outcome: Progressing  Goal: Be free from injury by end of the shift  Outcome: Progressing     Problem: Respiratory  Goal: Minimal/no exertional discomfort or dyspnea this shift  Outcome: Progressing  Goal: No signs of respiratory distress (eg. Use of accessory muscles. Peds grunting)  Outcome: Progressing     Problem: Seizure  Goal: I will remain free from seizures  Outcome: Progressing   The patient's goals for the shift include  remaining free of falls     The clinical goals for the shift include orient to unit, remain without fall

## 2024-08-02 NOTE — CARE PLAN
Problem: Skin  Goal: Prevent/manage excess moisture  Outcome: Progressing  Goal: Prevent/minimize sheer/friction injuries  Outcome: Progressing     Problem: Fall/Injury  Goal: Not fall by end of shift  Outcome: Progressing  Goal: Be free from injury by end of the shift  Outcome: Progressing     Problem: Respiratory  Goal: Minimal/no exertional discomfort or dyspnea this shift  Outcome: Progressing  Goal: No signs of respiratory distress (eg. Use of accessory muscles. Peds grunting)  Outcome: Progressing     Problem: Seizure  Goal: I will remain free from seizures  Outcome: Progressing   The patient's goals for the shift include      The clinical goals for the shift include orient to unit, remain without fall    Over the shift, the patient did not make progress toward the following goals. Barriers to progression include subdural hematoma. Recommendations to address these barriers include q4 neuro checks and re-direction.

## 2024-08-03 LAB
GLUCOSE BLD MANUAL STRIP-MCNC: 100 MG/DL (ref 74–99)
GLUCOSE BLD MANUAL STRIP-MCNC: 110 MG/DL (ref 74–99)
GLUCOSE BLD MANUAL STRIP-MCNC: 112 MG/DL (ref 74–99)
GLUCOSE BLD MANUAL STRIP-MCNC: 124 MG/DL (ref 74–99)
GLUCOSE BLD MANUAL STRIP-MCNC: 99 MG/DL (ref 74–99)

## 2024-08-03 PROCEDURE — 97165 OT EVAL LOW COMPLEX 30 MIN: CPT | Mod: GO

## 2024-08-03 PROCEDURE — 99232 SBSQ HOSP IP/OBS MODERATE 35: CPT | Performed by: STUDENT IN AN ORGANIZED HEALTH CARE EDUCATION/TRAINING PROGRAM

## 2024-08-03 PROCEDURE — 2500000004 HC RX 250 GENERAL PHARMACY W/ HCPCS (ALT 636 FOR OP/ED): Performed by: STUDENT IN AN ORGANIZED HEALTH CARE EDUCATION/TRAINING PROGRAM

## 2024-08-03 PROCEDURE — 2500000001 HC RX 250 WO HCPCS SELF ADMINISTERED DRUGS (ALT 637 FOR MEDICARE OP): Performed by: HOSPITALIST

## 2024-08-03 PROCEDURE — 2500000004 HC RX 250 GENERAL PHARMACY W/ HCPCS (ALT 636 FOR OP/ED): Performed by: HOSPITALIST

## 2024-08-03 PROCEDURE — 82947 ASSAY GLUCOSE BLOOD QUANT: CPT

## 2024-08-03 PROCEDURE — 93010 ELECTROCARDIOGRAM REPORT: CPT | Performed by: STUDENT IN AN ORGANIZED HEALTH CARE EDUCATION/TRAINING PROGRAM

## 2024-08-03 PROCEDURE — 97161 PT EVAL LOW COMPLEX 20 MIN: CPT | Mod: GP

## 2024-08-03 PROCEDURE — 1100000001 HC PRIVATE ROOM DAILY

## 2024-08-03 PROCEDURE — 2500000001 HC RX 250 WO HCPCS SELF ADMINISTERED DRUGS (ALT 637 FOR MEDICARE OP): Performed by: STUDENT IN AN ORGANIZED HEALTH CARE EDUCATION/TRAINING PROGRAM

## 2024-08-03 PROCEDURE — 2500000002 HC RX 250 W HCPCS SELF ADMINISTERED DRUGS (ALT 637 FOR MEDICARE OP, ALT 636 FOR OP/ED): Performed by: HOSPITALIST

## 2024-08-03 PROCEDURE — 2500000001 HC RX 250 WO HCPCS SELF ADMINISTERED DRUGS (ALT 637 FOR MEDICARE OP): Performed by: EMERGENCY MEDICINE

## 2024-08-03 RX ORDER — LEVETIRACETAM 500 MG/1
500 TABLET ORAL 2 TIMES DAILY
Status: DISCONTINUED | OUTPATIENT
Start: 2024-08-03 | End: 2024-08-05

## 2024-08-03 RX ORDER — AMLODIPINE BESYLATE 5 MG/1
5 TABLET ORAL DAILY
Status: DISCONTINUED | OUTPATIENT
Start: 2024-08-04 | End: 2024-08-08

## 2024-08-03 RX ORDER — SODIUM CHLORIDE, SODIUM LACTATE, POTASSIUM CHLORIDE, CALCIUM CHLORIDE 600; 310; 30; 20 MG/100ML; MG/100ML; MG/100ML; MG/100ML
100 INJECTION, SOLUTION INTRAVENOUS CONTINUOUS
Status: DISCONTINUED | OUTPATIENT
Start: 2024-08-03 | End: 2024-08-04

## 2024-08-03 RX ORDER — ACETAMINOPHEN 325 MG/1
650 TABLET ORAL EVERY 6 HOURS PRN
Status: DISCONTINUED | OUTPATIENT
Start: 2024-08-03 | End: 2024-08-08

## 2024-08-03 RX ORDER — AMLODIPINE BESYLATE 5 MG/1
5 TABLET ORAL DAILY
Status: DISCONTINUED | OUTPATIENT
Start: 2024-08-03 | End: 2024-08-03

## 2024-08-03 ASSESSMENT — ACTIVITIES OF DAILY LIVING (ADL)
ADL_ASSISTANCE: INDEPENDENT
ADL_ASSISTANCE: INDEPENDENT
BATHING_ASSISTANCE: MAXIMAL

## 2024-08-03 ASSESSMENT — PAIN SCALES - GENERAL
PAINLEVEL_OUTOF10: 0 - NO PAIN

## 2024-08-03 ASSESSMENT — COGNITIVE AND FUNCTIONAL STATUS - GENERAL
MOVING FROM LYING ON BACK TO SITTING ON SIDE OF FLAT BED WITH BEDRAILS: A LOT
TURNING FROM BACK TO SIDE WHILE IN FLAT BAD: A LOT
EATING MEALS: A LOT
MOBILITY SCORE: 11
STANDING UP FROM CHAIR USING ARMS: A LOT
CLIMB 3 TO 5 STEPS WITH RAILING: TOTAL
DAILY ACTIVITIY SCORE: 11
TURNING FROM BACK TO SIDE WHILE IN FLAT BAD: A LOT
PERSONAL GROOMING: A LOT
TOILETING: A LOT
EATING MEALS: A LITTLE
PERSONAL GROOMING: A LOT
HELP NEEDED FOR BATHING: TOTAL
TOILETING: A LOT
DRESSING REGULAR LOWER BODY CLOTHING: TOTAL
WALKING IN HOSPITAL ROOM: A LOT
STANDING UP FROM CHAIR USING ARMS: A LOT
PERSONAL GROOMING: A LOT
TOILETING: A LOT
MOVING TO AND FROM BED TO CHAIR: A LOT
MOBILITY SCORE: 11
MOBILITY SCORE: 11
STANDING UP FROM CHAIR USING ARMS: A LOT
TURNING FROM BACK TO SIDE WHILE IN FLAT BAD: A LOT
MOVING FROM LYING ON BACK TO SITTING ON SIDE OF FLAT BED WITH BEDRAILS: A LOT
MOVING FROM LYING ON BACK TO SITTING ON SIDE OF FLAT BED WITH BEDRAILS: A LOT
WALKING IN HOSPITAL ROOM: A LOT
DRESSING REGULAR LOWER BODY CLOTHING: A LOT
HELP NEEDED FOR BATHING: TOTAL
DAILY ACTIVITIY SCORE: 10
EATING MEALS: A LOT
CLIMB 3 TO 5 STEPS WITH RAILING: TOTAL
HELP NEEDED FOR BATHING: A LOT
CLIMB 3 TO 5 STEPS WITH RAILING: TOTAL
MOVING TO AND FROM BED TO CHAIR: A LOT
MOVING TO AND FROM BED TO CHAIR: A LOT
WALKING IN HOSPITAL ROOM: A LOT
DRESSING REGULAR UPPER BODY CLOTHING: TOTAL
DRESSING REGULAR UPPER BODY CLOTHING: A LOT
DRESSING REGULAR LOWER BODY CLOTHING: A LOT
DRESSING REGULAR UPPER BODY CLOTHING: A LOT
DAILY ACTIVITIY SCORE: 12

## 2024-08-03 ASSESSMENT — PAIN SCALES - PAIN ASSESSMENT IN ADVANCED DEMENTIA (PAINAD)
BODYLANGUAGE: RELAXED
CONSOLABILITY: NO NEED TO CONSOLE
BREATHING: NORMAL
TOTALSCORE: 0
FACIALEXPRESSION: SMILING OR INEXPRESSIVE

## 2024-08-03 ASSESSMENT — PAIN SCALES - WONG BAKER
WONGBAKER_NUMERICALRESPONSE: NO HURT

## 2024-08-03 ASSESSMENT — PAIN - FUNCTIONAL ASSESSMENT
PAIN_FUNCTIONAL_ASSESSMENT: 0-10
PAIN_FUNCTIONAL_ASSESSMENT: 0-10

## 2024-08-03 NOTE — PROGRESS NOTES
Physical Therapy    Physical Therapy Evaluation    Patient Name: Yue Sanz  MRN: [id number]  Today's Date: 8/3/2024   Time Calculation  Start Time: 0927  Stop Time: 0953  Time Calculation (min): 26 min    Assessment/Plan   PT Assessment  PT Assessment Results: Decreased strength, Decreased endurance, Impaired balance, Decreased mobility, Decreased coordination, Decreased cognition  Rehab Prognosis: Good  Barriers to Discharge: increased assist required with functional mobility with reduced activity tolerance.  Evaluation/Treatment Tolerance: Patient limited by fatigue  Medical Staff Made Aware: Yes  Strengths: Rehab experience  Barriers to Participation: Comorbidities  End of Session Communication: Bedside nurse  Assessment Comment: Pt presents today with decreased BLE strength, balance, endurance, and H of falls. Currently, pt is below the PLOF requiring max assist x 2 for bed mobility and mod assist x 2 for transfer/short distance ambulation. Continued PT would benefit the pt to address the above factors to bring the pt closer to the PLOF while reducing fall risk.  End of Session Patient Position: Up in chair, Alarm on  IP OR SWING BED PT PLAN  Inpatient or Swing Bed: Inpatient  PT Plan  Treatment/Interventions: Bed mobility, Transfer training, Gait training, Balance training, Neuromuscular re-education, Strengthening, Endurance training, Therapeutic exercise, Therapeutic activity, Home exercise program, Postural re-education  PT Plan: Ongoing PT  PT Frequency: 3 times per week  PT Discharge Recommendations: Moderate intensity level of continued care  Equipment Recommended upon Discharge: Wheeled walker  PT Recommended Transfer Status: Assist x2, Assistive device (Mod assist)  PT - OK to Discharge: Yes (Per PT POC)    Subjective   General Visit Information:  General  Reason for Referral: 62 y/o F presenting from SNF s/p fall from standing. Pt found ti have left occiput hematoma  Referred By: BETZAIDA kenney  Medical History Relevant to Rehab:   Past Medical History:   Diagnosis Date    Acute CVA (cerebrovascular accident) (Multi) 05/30/2023    Alcohol related seizure (Multi) 05/30/2023    Cerebral hemorrhage (Multi) 05/30/2023    Cerebral infarction due to embolism of right middle cerebral artery (Multi) 05/30/2023    Diarrhea, unspecified 08/10/2022    Diarrhea    Encounter for follow-up examination after completed treatment for conditions other than malignant neoplasm 04/20/2020    Hospital discharge follow-up    Encounter for other screening for malignant neoplasm of breast 03/08/2022    Breast screening    Encounter for screening for malignant neoplasm of colon 05/16/2022    Colon cancer screening    Other abnormality of red blood cells 08/10/2022    Elevated MCV    Personal history of other endocrine, nutritional and metabolic disease     History of high cholesterol    Personal history of transient ischemic attack (TIA), and cerebral infarction without residual deficits     History of cerebrovascular accident     Family/Caregiver Present: No  Co-Treatment: OT  Co-Treatment Reason: Co-evaluation with OT to maximize pt safety, transfer ability, and participation.  Prior to Session Communication: Bedside nurse  Patient Position Received: Bed, 3 rail up, Alarm on  Preferred Learning Style: auditory, kinesthetic  General Comment: Pt supine in bed upon PT arrival. Cleared to participate with Rn, and agreeable to co-evaluation with PT/OT.  Home Living:  Home Living  Type of Home: House (Duplex)  Lives With: Alone  Home Adaptive Equipment: Walker rolling or standard, Cane (Standard walker)  Home Layout: One level  Home Access: Level entry  Bathroom Shower/Tub: Walk-in shower  Bathroom Toilet: Standard  Bathroom Equipment:  (Shower seat)  Home Living Comments: Pt admitted from SNF  Prior Level of Function:  Prior Function Per Pt/Caregiver Report  Level of [place]: Independent with ADLs and functional transfers,  Independent with homemaking with ambulation  Receives Help From: Family  ADL Assistance: Independent  Homemaking Assistance: Independent  Ambulatory Assistance: Independent (Cane PRN.)  Hand Dominance: Right  Prior Function Comments: Pt admitted from SNF s/p fall in standing  Precautions:  Precautions  Medical Precautions: Fall precautions  Vital Signs:  Vital Signs  Heart Rate: 86  Heart Rate Source: Monitor  SpO2: 99 %  BP: 124/86  MAP (mmHg): 94  BP Location: Right arm  BP Method: Automatic  Patient Position: Sitting    Objective   Pain:  Pain Assessment  Pain Assessment: 0-10  0-10 (Numeric) Pain Score: 0 - No pain  Cognition:  Cognition  Orientation Level: Disoriented to time, Disoriented to situation, Disoriented to place (Pt able to state the correct year with cues. Pt also able to state the current month)  Insight: Moderate  Impulsive: Within functional limits    General Assessments:  General Observation  General Observation: Pt demonstrates decreased responsiveness after transfer from bed to chair. Vitals monitored and RN notified. Pt seen with improved though minimal verbal response with RN upon PT/OT exit of room at the end of the session.    Activity Tolerance  Endurance: Tolerates 10 - 20 min exercise with multiple rests    Sensation  Sensation Comment: Appears intact    Coordination  Movements are Fluid and Coordinated: No  Coordination Comment: Pt demonstrates coordinating upper body and lower body movement during ambulation trial with FWW.    Postural Control  Postural Control: Impaired  Head Control: Significant forward head posture in standing  Trunk Control: Right trunk lean in sitting and standing    Static Sitting Balance  Static Sitting-Balance Support: Bilateral upper extremity supported, Feet supported  Static Sitting-Comment/Number of Minutes: Initially max assist but progressed to min assist with prolonged sitting  Dynamic Sitting Balance  Dynamic Sitting-Comments: Max assist to prevent  posterior/lateral LOB. Pt demonstrates trunk lean to the right    Static Standing Balance  Static Standing-Balance Support: Bilateral upper extremity supported  Static Standing-Level of Assistance: Moderate assistance (2-person assist)  Static Standing-Comment/Number of Minutes: With FWW support  Dynamic Standing Balance  Dynamic Standing-Balance Support: Bilateral upper extremity supported  Dynamic Standing-Comments: Mod assist x 2 with FWW support  Functional Assessments:  Bed Mobility  Bed Mobility: Yes  Bed Mobility 1  Bed Mobility 1: Supine to sitting  Level of Assistance 1: Maximum assistance, +2, Moderate verbal cues, Moderate tactile cues  Bed Mobility Comments 1: pt able to intiate BLE progression toward the EOB but requires assist to bring BLE off the EOB. VC/TC for UE reach/grab on bed rail to assist trunk into sitting. Pt able to grab bed rail with decreased assist in transition. Assist provided to bring trunk into upright sitting.    Transfers  Transfer: Yes  Transfer 1  Transfer From 1: Bed to  Transfer to 1: Stand  Technique 1: Sit to stand  Transfer Device 1: Walker, Gait belt  Transfer Level of Assistance 1: Moderate assistance, +2, Minimal verbal cues  Trials/Comments 1: Assist with foot placement on the floor before transfer. VC for BUE push from the bed to stand instead of pulling from the walker. Pt reaches for walker with the RUE to stand. Increased pt effort to reach standing requiring assist. TC to the posterior right hip in standing to ecnourage improved upright standing posture. VC for increased weight bearing through BUE/FWW to encourage improved trunk posture. Pt with head/neck in flexion and eyes closed intermittently in standing.  Transfers 2  Transfer From 2: Stand to  Transfer to 2: Chair with arms  Technique 2: Stand to sit  Transfer Device 2: Walker, Gait belt  Transfer Level of Assistance 2: Moderate assistance, +2, Minimal verbal cues  Trials/Comments 2: Assist provided with safe  approximation of pt with chair d/t decreased control when sitting, inability to release FWW, and overall decreased balance.    Ambulation/Gait Training  Ambulation/Gait Training Performed: Yes  Ambulation/Gait Training 1  Surface 1: Level tile  Device 1: Rolling walker  Gait Support Devices: Gait belt  Assistance 1: Moderate assistance, Moderate verbal cues  Comments/Distance (ft) 1: 6 steps from bed to chair. Decreased step length/yordy, Assist provided for walker management for turns and to keept FWW in proximity of pt. Pt flexed at the hips, trunk, and head/neck with eyes closed intermittently.    Stairs  Stairs: No  Extremity/Trunk Assessments:  RLE   RLE : Exceptions to WFL  Strength RLE  R Hip Flexion: 3-/5  R Knee Extension: 3+/5  R Ankle Dorsiflexion:  (At least 3-/5 MMT)  R Ankle Plantar Flexion:  (At least 3-/5 MMT)  LLE   LLE : Exceptions to WFL  Strength LLE  L Hip Flexion: 2/5  L Knee Extension: 3+/5  L Ankle Dorsiflexion:  (At least 3-/5 MMT)  L Ankle Plantar Flexion:  (At least 3-/5 MMT)  Outcome Measures:  [facility] Basic Mobility  Turning from your back to your side while in a flat bed without using bedrails: A lot  Moving from lying on your back to sitting on the side of a flat bed without using bedrails: A lot  Moving to and from bed to chair (including a wheelchair): A lot  Standing up from a chair using your arms (e.g. wheelchair or bedside chair): A lot  To walk in hospital room: A lot  Climbing 3-5 steps with railing: Total  Basic Mobility - Total Score: 11    Encounter Problems       Encounter Problems (Active)       Balance       Goal 1 (Progressing)       Start:  08/03/24    Expected End:  08/17/24       Pt performs all sitting balance with supervision and standing balance with min assist x 1 using FWW            Mobility       STG - Patient will ambulate (Progressing)       Start:  08/03/24    Expected End:  08/17/24       25 ft with mod assist x 1 using FWW            PT Transfers       STG  - Patient to transfer to and from sit to supine (Progressing)       Start:  08/03/24    Expected End:  08/17/24       With min assist x 1         STG - Patient will transfer sit to and from stand (Progressing)       Start:  08/03/24    Expected End:  08/17/24       With min assist x 1 using FWW              Education Documentation  Body Mechanics, taught by Dc Lucio PT at 8/3/2024 11:37 AM.  Learner: Patient  Readiness: Acceptance  Method: Explanation, Demonstration  Response: Needs Reinforcement    Mobility Training, taught by Dc Lucio PT at 8/3/2024 11:37 AM.  Learner: Patient  Readiness: Acceptance  Method: Explanation, Demonstration  Response: Needs Reinforcement    Education Comments  No comments found.

## 2024-08-03 NOTE — PROGRESS NOTES
Occupational Therapy    Evaluation    Patient Name: Yue Sanz  MRN: [id number]  Today's Date: 8/3/2024  Time Calculation  Start Time: 0934  Stop Time: 0954  Time Calculation (min): 20 min        Assessment:  OT Assessment: Pt 64 yo F to ED with fall at home. Found to have L SAH, L posterior hematoma, metabolic encephalopathy. Pt currently requiring x 2 person assist for all functional tasks. At risk for further decline d/t decreased balance, overall weakness/fatigue, decreased activity tolerance, and impaired cognition. Pt to benefit from add't OT skilled needs to maximize indep/safety needed for ADL routine.  Prognosis: Good  Evaluation/Treatment Tolerance: Patient limited by fatigue  Medical Staff Made Aware: Yes  End of Session Communication: Bedside nurse  End of Session Patient Position: Up in chair, Alarm on  OT Assessment Results: Decreased ADL status, Decreased upper extremity range of motion, Decreased upper extremity strength, Decreased safe judgment during ADL, Decreased cognition, Decreased endurance, Visual deficit, Decreased functional mobility, Decreased gross motor control, Decreased IADLs, Decreased trunk control for functional activities  Prognosis: Good  Evaluation/Treatment Tolerance: Patient limited by fatigue  Medical Staff Made Aware: Yes  Plan:  OT Frequency: 3 times per week  OT Discharge Recommendations: Moderate intensity level of continued care, 24 hr supervision due to cognition  OT Recommended Transfer Status: Maximum assist, Assist of 2  OT - OK to Discharge: Yes (per OT POC)       Subjective   Current Problem:  1. Subdural hematoma (Multi)  CT head wo IV contrast        General:  General  Reason for Referral: 64 y/o F presenting from SNF s/p fall from standing. Pt found ti have left occiput hematoma  Referred By: BETZAIDA kenney  Past Medical History Relevant to Rehab: PMH: seizures, ETOH disorder, L humerus fx s/p ORIF 6/19/24, CVA 2021, anemia, depression/anxiety  Family/Caregiver  Present: No  Co-Treatment: PT  Co-Treatment Reason: Co-evaluation with PT to maximize pt safety, transfer ability, and participation.  Prior to Session Communication: Bedside nurse  Patient Position Received: Bed, 3 rail up, Alarm on  Preferred Learning Style: auditory, kinesthetic  General Comment: Pt supine in bed upon OT arrival. Cleared to participate with Rn, and agreeable to co-evaluation with PT/OT.  Precautions:  Hearing/Visual Limitations: Pt reporting decreased vision to L eye, able to read name on badge. difficulties with formal assessment d/t acute impaired cognition.  UE Weight Bearing Status:  (per prev documentation, WBAT LUE)  Medical Precautions: Fall precautions     Pain:  Pain Assessment  Pain Assessment: 0-10  0-10 (Numeric) Pain Score: 0 - No pain    Objective   Cognition:  Overall Cognitive Status: Impaired  Orientation Level: Disoriented to time, Disoriented to situation, Disoriented to place (able to state name/. with options, able to ID correct month/year. no awareness to situation.)  Attention:  (very poor attention to task noted. suspected lethargy, as well as add't confusion.)     Home Living:  Type of Home: House (duplex)  Lives With: Alone  Home Adaptive Equipment: Walker rolling or standard, Cane  Home Layout: One level  Home Access: Level entry  Bathroom Shower/Tub: Walk-in shower  Bathroom Toilet: Standard  Bathroom Equipment: Shower chair with back  Home Living Comments: pt admitted from SNF. info above from home setup prior to rehab.  Prior Function:  Level of [place]: Independent with ADLs and functional transfers, Independent with homemaking with ambulation  Receives Help From: Family  ADL Assistance: Independent  Homemaking Assistance: Independent  Ambulatory Assistance: Independent (cane prn)     ADL:  Eating Assistance: Minimal  Grooming Assistance: Moderate  Bathing Assistance: Maximal  UE Dressing Assistance: Moderate  LE Dressing Assistance: Maximal  Toileting  Assistance with Device: Maximal  Functional Assistance: Maximal  ADL Comments: Highly limited this date by lethargy, impaired cognition, decreased balance, overall weakness/fatigue, and decreased activity tolerance.  Activity Tolerance:  Endurance: Tolerates 10 - 20 min exercise with multiple rests  Bed Mobility/Transfers: Bed Mobility  Bed Mobility: Yes  Bed Mobility 1  Bed Mobility 1: Supine to sitting  Level of Assistance 1: Maximum assistance, +2, Moderate verbal cues, Moderate tactile cues  Bed Mobility Comments 1: pt initiating RLE to EOB, no active mvmt to LLE. attempts at bed rail use with RUE, cues for LUE use for reaching.    Transfers  Transfer: Yes  Transfer 1  Transfer From 1: Bed to  Transfer to 1: Stand  Technique 1: Sit to stand  Transfer Device 1: Walker, Gait belt  Transfer Level of Assistance 1: Moderate assistance, +2, Minimal verbal cues  Trials/Comments 1: retropulsion. cues for safe BLE placement for proper body mechs  Transfers 2  Transfer From 2: Stand to  Transfer to 2: Chair with arms  Technique 2: Stand to sit  Transfer Device 2: Walker, Gait belt  Transfer Level of Assistance 2: Moderate assistance, +2, Minimal verbal cues    Functional Mobility:  Functional Mobility  Functional Mobility Performed: Yes  Functional Mobility 1  Surface 1: Level tile  Device 1: Rolling walker  Functional Mobility Support Devices: Gait belt  Assistance 1: Maximum assistance (x 2 person assist)  Quality of Functional Mobility 1: Narrow base of support  Comments 1: poor balance for ~3-4 lateral steps from EOB to bedside chair. MAX A x 2 at fww. assist with fww negotation, cues for balance/BLE placement. retropulsion with activity.  Sitting Balance:  Static Sitting Balance  Static Sitting-Balance Support: Feet supported  Static Sitting-Level of Assistance: Maximum assistance, Minimum assistance  Static Sitting-Comment/Number of Minutes: initially requiring MAX A with heavy R lateral lean at EOB. progressing  "with proper positioning/cues for UE use as able on bed, MIN A.     Gross Grasp: Impaired  Coordination: Impaired  Extremities: RUE   RUE : Within Functional Limits and LUE   LUE: Exceptions to WFL (\"this is my bad arm\" noted decreased prox ROM most likely related to recent humerus fx. distally 2-/5.)    Outcome Measures:[facility] Daily Activity  Putting on and taking off regular lower body clothing: Total  Bathing (including washing, rinsing, drying): Total  Putting on and taking off regular upper body clothing: A lot  Toileting, which includes using toilet, bedpan or urinal: A lot  Taking care of personal grooming such as brushing teeth: A lot  Eating Meals: A little  Daily Activity - Total Score: 11    Education Documentation  ADL Training, taught by Karyn Rojas OT at 8/3/2024  1:14 PM.  Learner: Patient  Readiness: Acceptance  Method: Explanation, Demonstration  Response: Verbalizes Understanding, Needs Reinforcement    Education Comments  No comments found.      Goals:  Encounter Problems       Encounter Problems (Active)       ADLs       Patient with complete upper body dressing with supervision level of assistance donning and doffing all UE clothes with PRN adaptive equipment  (Progressing)       Start:  08/03/24    Expected End:  08/17/24            Patient with complete lower body dressing with supervision level of assistance donning and doffing all LE clothes  with PRN adaptive equipment  (Progressing)       Start:  08/03/24    Expected End:  08/17/24            Patient will complete daily grooming tasks with supervision level of assistance and PRN adaptive equipment. (Progressing)       Start:  08/03/24    Expected End:  08/17/24            Patient will complete toileting including hygiene clothing management/hygiene with supervision level of assistance and PRN adaptive equiment. (Progressing)       Start:  08/03/24    Expected End:  08/17/24               MOBILITY       Patient will perform Functional " mobility  Household distances/Community Distances with supervision level of assistance and front wheeled walker in order to improve safety and functional mobility. (Progressing)       Start:  08/03/24    Expected End:  08/17/24

## 2024-08-03 NOTE — CARE PLAN
Problem: Skin  Goal: Prevent/manage excess moisture  Outcome: Progressing  Flowsheets (Taken 8/3/2024 1212)  Prevent/manage excess moisture:   Cleanse incontinence/protect with barrier cream   Moisturize dry skin   Monitor for/manage infection if present  Goal: Prevent/minimize sheer/friction injuries  Outcome: Progressing     Problem: Fall/Injury  Goal: Not fall by end of shift  Outcome: Progressing  Goal: Be free from injury by end of the shift  Outcome: Progressing     Problem: Respiratory  Goal: Minimal/no exertional discomfort or dyspnea this shift  Outcome: Progressing  Goal: No signs of respiratory distress (eg. Use of accessory muscles. Peds grunting)  Outcome: Progressing     Problem: Seizure  Goal: I will remain free from seizures  Outcome: Progressing   The patient's goals for the shift include      The clinical goals for the shift include Pt will remain HDS

## 2024-08-03 NOTE — CARE PLAN
The patient's goals for the shift include      The clinical goals for the shift include remaining free of falls    Problem: Skin  Goal: Prevent/manage excess moisture  Outcome: Progressing  Flowsheets (Taken 8/2/2024 6730)  Prevent/manage excess moisture: Moisturize dry skin  Goal: Prevent/minimize sheer/friction injuries  Outcome: Progressing     Problem: Fall/Injury  Goal: Not fall by end of shift  Outcome: Progressing  Goal: Be free from injury by end of the shift  Outcome: Progressing     Problem: Respiratory  Goal: Minimal/no exertional discomfort or dyspnea this shift  Outcome: Progressing  Goal: No signs of respiratory distress (eg. Use of accessory muscles. Peds grunting)  Outcome: Progressing     Problem: Seizure  Goal: I will remain free from seizures  Outcome: Progressing

## 2024-08-03 NOTE — PROGRESS NOTES
[facility] Hospitalist Progress Note        Between 7AM-7PM please message me via Epic Secure Chat.  After 7PM please page Nocturnist on call.        Assessment/Plan     #traumatic L sided subdural hematoma/subarachnoid hemorrhage  #mechanical fall at SNF/deconditioning  #alcohol use disorder not in withdrawal  #hx seizures  #anemia - stable  - NSGY evaluated and no acute intervention needed. Rec holding her ASA for 1 week, plavix for 2 weeks. SBP goal < 160. Keppra for 7 days. Will need outpatient follow up and outpatient head CT. Her bleed has been stable here on multiple repeat head CT  - PT/OT likely will need placement again  - stopped phenobarb, her POA says she has been in hospital/nursing facility recently and has not been able to drink alcohol  - continue thiamine supplementation      DVT Prophylaxis:  SCDs      Discharge Planning: PT/OT likely SNF    I personally examined the patient and reviewed chart.  Plan of care was discussed with patient, all questions answered.    Total time spent: At least 38 minutes, providing counseling or in coordination of care. Total time on this day of visit includes record and documentation review before and after visit including documentation and time not explicitly included on EMR time stamp.      Subjective     Yue Sanz is a 63 y.o. female on day 4 of admission presenting with Subdural hematoma (Multi).    NAEON. Unable to provide history    Review of Systems   Unable to perform ROS: Acuity of condition       Objective     Physical Exam  Constitutional:       General: She is not in acute distress.  Cardiovascular:      Rate and Rhythm: Normal rate and regular rhythm.   Pulmonary:      Effort: Pulmonary effort is normal.      Breath sounds: Normal breath sounds.   Abdominal:      General: There is no distension.      Palpations: Abdomen is soft.   Neurological:      Mental Status: She is alert. She is disoriented.         Last Recorded Vitals  Blood pressure (!) 154/93, pulse  "78, temperature 36.7 °C (98.1 °F), temperature source Axillary, resp. rate 17, height 1.676 m (5' 6\"), weight 50.3 kg (110 lb 12.8 oz), SpO2 100%.  Intake/Output last 3 Shifts:  I/O last 3 completed shifts:  In: 100 (1.8 mL/kg) [IV Piggyback:100]  Out: 200 (3.6 mL/kg) [Urine:200 (0.1 mL/kg/hr)]  Weight: 55 kg     Relevant Results  Results for orders placed or performed during the hospital encounter of 07/30/24 (from the past 24 hour(s))   POCT GLUCOSE   Result Value Ref Range    POCT Glucose 94 74 - 99 mg/dL   POCT GLUCOSE   Result Value Ref Range    POCT Glucose 93 74 - 99 mg/dL   POCT GLUCOSE   Result Value Ref Range    POCT Glucose 112 (H) 74 - 99 mg/dL   POCT GLUCOSE   Result Value Ref Range    POCT Glucose 100 (H) 74 - 99 mg/dL       Imaging Results  CT head wo IV contrast    Result Date: 8/1/2024  Interpreted By:  Karina Spann, STUDY: CT HEAD WO IV CONTRAST;  8/1/2024 4:25 pm   INDICATION: Signs/Symptoms:New visual field deficit, want to ensure stability of known SAH/subdural.   COMPARISON: 07/30/2024 at 3:33 p.m.   ACCESSION NUMBER(S): [id number]   ORDERING CLINICIAN: SANDIP HINSON   TECHNIQUE: Unenhanced CT images of the head were obtained.   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 Acute subarachnoid hemorrhage involving the left cerebral hemisphere and basal cisterns similar in size but overall slightly less dense than on the previous exam. Acute left-sided subdural hematomas measuring up to 5 mm in the frontal region similar to the prior exam. There is mild diffuse left cerebral edema with sulcal effacement and approximately 5 mm of midline shift at the level of the 3rd ventricle similar to the prior exam. No new acute intracranial hemorrhage. Stable low-density likely remote infarct in the right parietal region with porencephalic dilatation of the posterior horn of the right lateral ventricle.   Stable subcentimeter lucent lesion in the occipital calvarium just to the left of midline image 16/36. "   thickening in the left sphenoid and partially imaged right maxillary sinuses. Remaining visualized paranasal sinuses are clear.       Redemonstration of basilar tip aneurysm with acute subarachnoid and left-sided subdural hematomas,  cerebral edema and mild midline shift. No significant change from 07/30/2024 at 3:33 p.m..   MACRO: None.   Signed by: Karina Spann 8/1/2024 4:48 PM Dictation workstation:   [id number]      Medications:  [START ON 8/4/2024] amLODIPine, 5 mg, oral, Daily  folic acid, 1 mg, oral, Daily  insulin lispro, 0-5 Units, subcutaneous, q4h  levETIRAcetam, 500 mg, oral, BID  metoprolol tartrate, 25 mg, oral, BID  multivitamin with minerals, 1 tablet, oral, Daily  pantoprazole, 40 mg, oral, Daily before breakfast  sertraline, 50 mg, oral, Daily  thiamine, 500 mg, intravenous, q8h ROMINA       PRN medications: dextrose, dextrose, glucagon, glucagon, hydrALAZINE, hydrALAZINE, oxygen       Artem Arrington MD  [facility] Medicine

## 2024-08-04 VITALS
HEIGHT: 66 IN | BODY MASS INDEX: 18.14 KG/M2 | RESPIRATION RATE: 16 BRPM | HEART RATE: 107 BPM | SYSTOLIC BLOOD PRESSURE: 168 MMHG | OXYGEN SATURATION: 100 % | TEMPERATURE: 97.5 F | WEIGHT: 112.9 LBS | DIASTOLIC BLOOD PRESSURE: 102 MMHG

## 2024-08-04 LAB
GLUCOSE BLD MANUAL STRIP-MCNC: 124 MG/DL (ref 74–99)
GLUCOSE BLD MANUAL STRIP-MCNC: 96 MG/DL (ref 74–99)
GLUCOSE BLD MANUAL STRIP-MCNC: 97 MG/DL (ref 74–99)
GLUCOSE BLD MANUAL STRIP-MCNC: 98 MG/DL (ref 74–99)

## 2024-08-04 PROCEDURE — 2500000004 HC RX 250 GENERAL PHARMACY W/ HCPCS (ALT 636 FOR OP/ED): Performed by: HOSPITALIST

## 2024-08-04 PROCEDURE — 2500000001 HC RX 250 WO HCPCS SELF ADMINISTERED DRUGS (ALT 637 FOR MEDICARE OP): Performed by: HOSPITALIST

## 2024-08-04 PROCEDURE — 99232 SBSQ HOSP IP/OBS MODERATE 35: CPT | Performed by: STUDENT IN AN ORGANIZED HEALTH CARE EDUCATION/TRAINING PROGRAM

## 2024-08-04 PROCEDURE — 2500000002 HC RX 250 W HCPCS SELF ADMINISTERED DRUGS (ALT 637 FOR MEDICARE OP, ALT 636 FOR OP/ED): Performed by: HOSPITALIST

## 2024-08-04 PROCEDURE — 1100000001 HC PRIVATE ROOM DAILY

## 2024-08-04 PROCEDURE — 2500000004 HC RX 250 GENERAL PHARMACY W/ HCPCS (ALT 636 FOR OP/ED): Performed by: STUDENT IN AN ORGANIZED HEALTH CARE EDUCATION/TRAINING PROGRAM

## 2024-08-04 PROCEDURE — 82947 ASSAY GLUCOSE BLOOD QUANT: CPT

## 2024-08-04 PROCEDURE — 2500000001 HC RX 250 WO HCPCS SELF ADMINISTERED DRUGS (ALT 637 FOR MEDICARE OP): Performed by: STUDENT IN AN ORGANIZED HEALTH CARE EDUCATION/TRAINING PROGRAM

## 2024-08-04 PROCEDURE — 2500000005 HC RX 250 GENERAL PHARMACY W/O HCPCS: Performed by: HOSPITALIST

## 2024-08-04 PROCEDURE — 2500000001 HC RX 250 WO HCPCS SELF ADMINISTERED DRUGS (ALT 637 FOR MEDICARE OP): Performed by: EMERGENCY MEDICINE

## 2024-08-04 RX ORDER — METOPROLOL TARTRATE 1 MG/ML
5 INJECTION, SOLUTION INTRAVENOUS ONCE
Status: COMPLETED | OUTPATIENT
Start: 2024-08-04 | End: 2024-08-04

## 2024-08-04 RX ORDER — SODIUM CHLORIDE, SODIUM LACTATE, POTASSIUM CHLORIDE, CALCIUM CHLORIDE 600; 310; 30; 20 MG/100ML; MG/100ML; MG/100ML; MG/100ML
100 INJECTION, SOLUTION INTRAVENOUS CONTINUOUS
Status: DISCONTINUED | OUTPATIENT
Start: 2024-08-04 | End: 2024-08-05

## 2024-08-04 RX ORDER — LEVETIRACETAM 5 MG/ML
500 INJECTION INTRAVASCULAR ONCE
Status: COMPLETED | OUTPATIENT
Start: 2024-08-04 | End: 2024-08-04

## 2024-08-04 ASSESSMENT — COGNITIVE AND FUNCTIONAL STATUS - GENERAL
MOBILITY SCORE: 11
MOVING FROM LYING ON BACK TO SITTING ON SIDE OF FLAT BED WITH BEDRAILS: A LOT
MOVING TO AND FROM BED TO CHAIR: A LOT
WALKING IN HOSPITAL ROOM: A LOT
EATING MEALS: A LOT
DRESSING REGULAR LOWER BODY CLOTHING: A LOT
TURNING FROM BACK TO SIDE WHILE IN FLAT BAD: A LOT
TOILETING: A LOT
STANDING UP FROM CHAIR USING ARMS: A LOT
CLIMB 3 TO 5 STEPS WITH RAILING: TOTAL
PERSONAL GROOMING: A LOT
DRESSING REGULAR UPPER BODY CLOTHING: TOTAL
DAILY ACTIVITIY SCORE: 10
HELP NEEDED FOR BATHING: TOTAL

## 2024-08-04 ASSESSMENT — PAIN SCALES - WONG BAKER
WONGBAKER_NUMERICALRESPONSE: NO HURT
WONGBAKER_NUMERICALRESPONSE: NO HURT

## 2024-08-04 ASSESSMENT — PAIN SCALES - GENERAL
PAINLEVEL_OUTOF10: 0 - NO PAIN
PAINLEVEL_OUTOF10: 0 - NO PAIN

## 2024-08-04 NOTE — NURSING NOTE
2335 pt is drowsy but alert and arousable. Vitals are stable. Pt moving all extremities  Pt calling out for mom.

## 2024-08-04 NOTE — CARE PLAN
The patient's goals for the shift include      The clinical goals for the shift include patient will be safe and hds

## 2024-08-04 NOTE — PROGRESS NOTES
08/04/24 1207   Pre-Auth Planning Checklist   Pre-Authorization Number [id number]     Auth started cedrick

## 2024-08-04 NOTE — CARE PLAN
Problem: Skin  Goal: Prevent/manage excess moisture  Outcome: Progressing  Goal: Prevent/minimize sheer/friction injuries  Outcome: Progressing     Problem: Fall/Injury  Goal: Not fall by end of shift  Outcome: Progressing  Goal: Be free from injury by end of the shift  Outcome: Progressing     Problem: Respiratory  Goal: Minimal/no exertional discomfort or dyspnea this shift  Outcome: Progressing  Goal: No signs of respiratory distress (eg. Use of accessory muscles. Peds grunting)  Outcome: Progressing     Problem: Seizure  Goal: I will remain free from seizures  Outcome: Progressing   The patient's goals for the shift include      The clinical goals for the shift include pt katy remain free from falls throughout the shift

## 2024-08-04 NOTE — PROGRESS NOTES
[facility] Hospitalist Progress Note        Between 7AM-7PM please message me via Epic Secure Chat.  After 7PM please page Nocturnist on call.        Assessment/Plan     #traumatic L sided subdural hematoma/subarachnoid hemorrhage  #encephalopathy  #mechanical fall at SNF/deconditioning  #alcohol use disorder not in withdrawal  #hx seizures  #anemia - stable  - NSGY evaluated and no acute intervention needed. Rec holding her ASA for 1 week, plavix for 2 weeks. SBP goal < 160. Keppra for 7 days. Will need outpatient follow up and outpatient head CT. Her bleed has been stable here on multiple repeat head CT  - PT/OT likely will need placement again  - stopped phenobarb, her POA says she has been in hospital/nursing facility recently and has not been able to drink alcohol  - continue thiamine supplementation      DVT Prophylaxis:  SCDs      Discharge Planning: PT/OT likely SNF    I personally examined the patient and reviewed chart.  Plan of care was discussed with patient, all questions answered.    Total time spent: At least 38 minutes, providing counseling or in coordination of care. Total time on this day of visit includes record and documentation review before and after visit including documentation and time not explicitly included on EMR time stamp.      Bertin Sanz is a 63 y.o. female on day 5 of admission presenting with Subdural hematoma (Multi).    NAEON. Unable to provide history    Review of Systems   Unable to perform ROS: Acuity of condition       Objective     Physical Exam  Constitutional:       General: She is not in acute distress.  Cardiovascular:      Rate and Rhythm: Normal rate and regular rhythm.   Pulmonary:      Effort: Pulmonary effort is normal.      Breath sounds: Normal breath sounds.   Abdominal:      General: There is no distension.      Palpations: Abdomen is soft.   Neurological:      Mental Status: She is alert. She is disoriented.         Last Recorded Vitals  Blood pressure  "(!) 146/91, pulse 107, temperature 36.3 °C (97.3 °F), temperature source Temporal, resp. rate 16, height 1.676 m (5' 6\"), weight 53.4 kg (117 lb 11.2 oz), SpO2 99%.  Intake/Output last 3 Shifts:  I/O last 3 completed shifts:  In: 2233.3 (41.8 mL/kg) [I.V.:1083.3 (20.3 mL/kg); IV Piggyback:1150]  Out: 250 (4.7 mL/kg) [Urine:250 (0.1 mL/kg/hr)]  Weight: 53.4 kg     Relevant Results  Results for orders placed or performed during the hospital encounter of 07/30/24 (from the past 24 hour(s))   POCT GLUCOSE   Result Value Ref Range    POCT Glucose 112 (H) 74 - 99 mg/dL   POCT GLUCOSE   Result Value Ref Range    POCT Glucose 110 (H) 74 - 99 mg/dL   POCT GLUCOSE   Result Value Ref Range    POCT Glucose 99 74 - 99 mg/dL   POCT GLUCOSE   Result Value Ref Range    POCT Glucose 124 (H) 74 - 99 mg/dL   POCT GLUCOSE   Result Value Ref Range    POCT Glucose 124 (H) 74 - 99 mg/dL   POCT GLUCOSE   Result Value Ref Range    POCT Glucose 96 74 - 99 mg/dL       Imaging Results  No results found.     Medications:  amLODIPine, 5 mg, oral, Daily  folic acid, 1 mg, oral, Daily  insulin lispro, 0-5 Units, subcutaneous, q4h  levETIRAcetam, 500 mg, oral, BID  metoprolol tartrate, 25 mg, oral, BID  multivitamin with minerals, 1 tablet, oral, Daily  pantoprazole, 40 mg, oral, Daily before breakfast  sertraline, 50 mg, oral, Daily  thiamine, 500 mg, intravenous, q8h ROMINA       PRN medications: acetaminophen, dextrose, dextrose, glucagon, glucagon, hydrALAZINE, hydrALAZINE, oxygen       Artem Arrington MD  [facility] Medicine  "

## 2024-08-04 NOTE — CONSULTS
Nutrition Assessment Note  Nutrition Assessment      Reason for Assessment  Reason for Assessment: Admission nursing screening MST- 2 (unsure wt loss)    Chart reviewed and pt visited.    Yue is a 63 y.o. female admitted for subdural hematoma    Past Medical History:   Diagnosis Date    Acute CVA (cerebrovascular accident) (Multi) 05/30/2023    Alcohol related seizure (Multi) 05/30/2023    Cerebral hemorrhage (Multi) 05/30/2023    Cerebral infarction due to embolism of right middle cerebral artery (Multi) 05/30/2023    Diarrhea, unspecified 08/10/2022    Diarrhea    Encounter for follow-up examination after completed treatment for conditions other than malignant neoplasm 04/20/2020    Hospital discharge follow-up    Encounter for other screening for malignant neoplasm of breast 03/08/2022    Breast screening    Encounter for screening for malignant neoplasm of colon 05/16/2022    Colon cancer screening    Other abnormality of red blood cells 08/10/2022    Elevated MCV    Personal history of other endocrine, nutritional and metabolic disease     History of high cholesterol    Personal history of transient ischemic attack (TIA), and cerebral infarction without residual deficits     History of cerebrovascular accident     Pt admitted after fall from SNF     Visited pt- pt is in soft restraints.  Pt alert but unable to respond to questions; unable to attain hx from pt.    Scheduled medications  amLODIPine, 5 mg, oral, Daily  folic acid, 1 mg, oral, Daily  insulin lispro, 0-5 Units, subcutaneous, q4h  levETIRAcetam, 500 mg, oral, BID  metoprolol tartrate, 25 mg, oral, BID  multivitamin with minerals, 1 tablet, oral, Daily  pantoprazole, 40 mg, oral, Daily before breakfast  sertraline, 50 mg, oral, Daily  thiamine, 500 mg, intravenous, q8h [facility]      Continuous medications  lactated Ringer's, 100 mL/hr      PRN medications  PRN medications: acetaminophen, dextrose, dextrose, glucagon, glucagon, hydrALAZINE, hydrALAZINE,  "oxygen     Latest Reference Range & Units 08/01/24 13:11 08/02/24 05:46   GLUCOSE 74 - 99 mg/dL 117 (H) 101 (H)   SODIUM 136 - 145 mmol/L 136 134 (L)   POTASSIUM 3.5 - 5.3 mmol/L 3.4 (L) 4.2   CHLORIDE 98 - 107 mmol/L 103 103   Bicarbonate 21 - 32 mmol/L 24 22   Anion Gap 10 - 20 mmol/L 12 13   Blood Urea Nitrogen 6 - 23 mg/dL 10 8   Creatinine 0.50 - 1.05 mg/dL 0.71 0.71   EGFR >60 mL/min/1.73m*2 >90 >90   Calcium 8.6 - 10.3 mg/dL 8.2 (L) 8.9   PHOSPHORUS 2.5 - 4.9 mg/dL 3.5 3.3   Albumin 3.4 - 5.0 g/dL 3.2 (L) 3.4   MAGNESIUM 1.60 - 2.40 mg/dL 1.80 1.70   (H): Data is abnormally high  (L): Data is abnormally low    Dietary Orders (From admission, onward)       Start     Ordered    08/04/24 1415  Oral nutritional supplements  Until discontinued        Question Answer Comment   Deliver with All meals    Select supplement: Ensure Plus High Protein        08/04/24 1414    07/31/24 1547  Adult diet Regular  Diet effective now        Question:  Diet type  Answer:  Regular    07/31/24 1546                  History:  Food and Nutrient History  Food and Nutrient History: Flowsheets show 0-33%; pt non-verbal during visit- unable to ascertain diet dx from pt.  Vitamin/Herbal Supplement Use: Unknown    Anthropometrics:  Height: 167.6 cm (5' 5.98\")  Weight: 51.2 kg (112 lb 14.4 oz)  BMI (Calculated): 18.23    Weight Change: -4.08    Wt Readings from Last 10 Encounters:  08/04/24 51.2 kg (112 lb 14.4 oz)  07/24/24 58.1 kg (128 lb 1.6 oz)  06/17/24 60.1 kg (132 lb 8 oz)  06/13/24 68 kg (150 lb)  06/07/24 57.4 kg (126 lb 8 oz)  06/06/24 68 kg (150 lb)  05/31/24 63.5 kg (140 lb)- office visit  03/06/24 63.5 kg (140 lb 1.6 oz)- office visit  09/08/23 65.5 kg (144 lb 6.4 oz)- office visit  09/04/23 59 kg  05/31/23 65.9 kg (145 lb 3.2 oz)      Unable to verify wts from June; 6/7 was a bedscale wt, 6/6 and 6/13 were stated wts.    Weight         8/1/2024  0622 8/2/2024  0600 8/3/2024  0801 8/4/2024  0600 8/4/2024  1352    Weight: 55.5 " kg (122 lb 5.7 oz) 55 kg (121 lb 4.1 oz) 50.3 kg (110 lb 12.8 oz) 53.4 kg (117 lb 11.2 oz) 51.2 kg (112 lb 14.4 oz)          Weight Change  Significant Weight Loss: Yes  Interpretation of Weight Loss: >7.5% in 3 months       IBW/kg (Dietitian Calculated): 59.1 kg  Percent of IBW: 87 %     Energy Needs:    Estimated Energy Needs  Total Energy Estimated Needs (kCal): 1550 kCal  Total Estimated Energy Need per Day (kCal/kg): 1800 kCal/kg  Method for Estimating Needs: 30-35kcal/kg    Estimated Protein Needs  Total Protein Estimated Needs (g): 60 g  Total Protein Estimated Needs (g/kg): 70 g/kg  Method for Estimating Needs: 1.2-1.4g/kg    Estimated Fluid Needs  Method for Estimating Needs: 1mL/kcal or MD recommendations       Nutrition Focused Physical Findings:  Subcutaneous Fat Loss  Orbital Fat Pads: Mild-Moderate (slight dark circles and slight hollowing) (Visual assessment- pt not appropriate at this time for full NFPE)  Buccal Fat Pads: Mild-Moderate (flat cheeks, minimal bounce)    Muscle Wasting  Temporalis: Mild-Moderate (slight depression)    Edema  Edema: none       Physical Findings (Nutrition Deficiency/Toxicity)  Skin: Negative       Nutrition Diagnosis   Malnutrition Diagnosis  Patient has Malnutrition Diagnosis: Yes  Diagnosis Status: New  Malnutrition Diagnosis: Severe malnutrition related to acute disease or injury  As Evidenced by: greater than 7.5% weight loss in 3 months, prolonged poor intake during hospital admit less than 50% of estimated energy needs for 5 days, mild fat loss and moderate muscle loss.      Nutrition Interventions/Recommendations      Food and/or Nutrient Delivery Interventions  Meals and Snacks: General healthful diet     Medical Food Supplement: Commercial beverage  Goal: Ensure TID    Additional Interventions: Consider appetite stimulant. Should pt intake remain sub optimal, consider alternate route for nutrition vs goals of care discussion.    Nutrition Monitoring and  Evaluation   Food and Nutrient Related History  Energy Intake: Estimated energy intake  Criteria: >75% of EEN via po    Fluid Intake: Estimated fluid intake    Amount of Food: Estimated amout of food, Medical food intake  Criteria: >75% of meals and nutritional supplements    Anthropometrics: Body Composition/Growth/Weight History  Weight: Measured weight, Weight change    Weight Change: Weight change percentage, Weight gain, Weight loss    Body Mass: Body mass index (BMI)       Biochemical Data, Medical Tests and Procedures  Electrolyte and Renal Panel: Calcium, ionized, Calcium, serum, Potassium, Sodium  Criteria: As clinically indicated       Glucose/Endocrine Profile: Glucose, casual  Criteria: As clinically indicated    Nutrition Focused Physical Findings  Adipose: Loss of subcutaneous fat    Muscles: Muscle atrophy       Other: overall appearance and I/Os       Follow Up  Time Spent (min): 60 minutes  Last Date of Nutrition Visit: 08/04/24  Nutrition Follow-Up Needed?: Dietitian to reassess per policy  Follow up Comment: EMILY Welsh

## 2024-08-04 NOTE — CARE PLAN
Problem: Skin  Goal: Prevent/manage excess moisture  8/4/2024 1540 by Lesly Irizarry RN  Outcome: Progressing  8/4/2024 1538 by Lesly Irizarry RN  Outcome: Progressing  Goal: Prevent/minimize sheer/friction injuries  8/4/2024 1540 by Lesly Irizarry RN  Outcome: Progressing  8/4/2024 1538 by Lesly Irizarry RN  Outcome: Progressing     Problem: Fall/Injury  Goal: Not fall by end of shift  8/4/2024 1540 by Lesly Irizarry RN  Outcome: Progressing  8/4/2024 1538 by Lesly Irizarry RN  Outcome: Progressing  Goal: Be free from injury by end of the shift  8/4/2024 1540 by Lesly Irizarry RN  Outcome: Progressing  8/4/2024 1538 by Lesly Irizarry RN  Outcome: Progressing     Problem: Respiratory  Goal: Minimal/no exertional discomfort or dyspnea this shift  8/4/2024 1540 by Lesly Irizarry RN  Outcome: Progressing  8/4/2024 1538 by Lesly Irizarry RN  Outcome: Progressing  Goal: No signs of respiratory distress (eg. Use of accessory muscles. Peds grunting)  8/4/2024 1540 by Lesly Irizarry RN  Outcome: Progressing  8/4/2024 1538 by Lesly Irizarry RN  Outcome: Progressing     Problem: Seizure  Goal: I will remain free from seizures  8/4/2024 1540 by Lesly Irizarry RN  Outcome: Progressing  8/4/2024 1538 by eLsly Irizarry RN  Outcome: Progressing   The patient's goals for the shift include      The clinical goals for the shift include pt will remain safe throughout shift

## 2024-08-05 LAB
ANION GAP SERPL CALC-SCNC: 20 MMOL/L (ref 10–20)
ATRIAL RATE: 74 BPM
BASOPHILS # BLD AUTO: 0.06 X10*3/UL (ref 0–0.1)
BASOPHILS NFR BLD AUTO: 0.6 %
BUN SERPL-MCNC: 11 MG/DL (ref 6–23)
CALCIUM SERPL-MCNC: 8.2 MG/DL (ref 8.6–10.3)
CHLORIDE SERPL-SCNC: 95 MMOL/L (ref 98–107)
CO2 SERPL-SCNC: 16 MMOL/L (ref 21–32)
CREAT SERPL-MCNC: 0.55 MG/DL (ref 0.5–1.05)
EGFRCR SERPLBLD CKD-EPI 2021: >90 ML/MIN/1.73M*2
EOSINOPHIL # BLD AUTO: 0.05 X10*3/UL (ref 0–0.7)
EOSINOPHIL NFR BLD AUTO: 0.5 %
ERYTHROCYTE [DISTWIDTH] IN BLOOD BY AUTOMATED COUNT: 14.1 % (ref 11.5–14.5)
GLUCOSE BLD MANUAL STRIP-MCNC: 105 MG/DL (ref 74–99)
GLUCOSE BLD MANUAL STRIP-MCNC: 111 MG/DL (ref 74–99)
GLUCOSE BLD MANUAL STRIP-MCNC: 91 MG/DL (ref 74–99)
GLUCOSE BLD MANUAL STRIP-MCNC: 91 MG/DL (ref 74–99)
GLUCOSE BLD MANUAL STRIP-MCNC: 92 MG/DL (ref 74–99)
GLUCOSE SERPL-MCNC: 92 MG/DL (ref 74–99)
HCT VFR BLD AUTO: 31.5 % (ref 36–46)
HGB BLD-MCNC: 10.2 G/DL (ref 12–16)
IMM GRANULOCYTES # BLD AUTO: 0.04 X10*3/UL (ref 0–0.7)
IMM GRANULOCYTES NFR BLD AUTO: 0.4 % (ref 0–0.9)
LYMPHOCYTES # BLD AUTO: 1.31 X10*3/UL (ref 1.2–4.8)
LYMPHOCYTES NFR BLD AUTO: 13.8 %
MCH RBC QN AUTO: 32.9 PG (ref 26–34)
MCHC RBC AUTO-ENTMCNC: 32.4 G/DL (ref 32–36)
MCV RBC AUTO: 102 FL (ref 80–100)
MONOCYTES # BLD AUTO: 1.84 X10*3/UL (ref 0.1–1)
MONOCYTES NFR BLD AUTO: 19.4 %
NEUTROPHILS # BLD AUTO: 6.17 X10*3/UL (ref 1.2–7.7)
NEUTROPHILS NFR BLD AUTO: 65.3 %
NRBC BLD-RTO: 0 /100 WBCS (ref 0–0)
P AXIS: 68 DEGREES
P OFFSET: 157 MS
P ONSET: 108 MS
PLATELET # BLD AUTO: 354 X10*3/UL (ref 150–450)
POTASSIUM SERPL-SCNC: 3.6 MMOL/L (ref 3.5–5.3)
PR INTERVAL: 226 MS
Q ONSET: 221 MS
QRS COUNT: 12 BEATS
QRS DURATION: 92 MS
QT INTERVAL: 436 MS
QTC CALCULATION(BAZETT): 483 MS
QTC FREDERICIA: 467 MS
R AXIS: 18 DEGREES
RBC # BLD AUTO: 3.1 X10*6/UL (ref 4–5.2)
SODIUM SERPL-SCNC: 127 MMOL/L (ref 136–145)
T AXIS: 29 DEGREES
T OFFSET: 439 MS
VENTRICULAR RATE: 74 BPM
WBC # BLD AUTO: 9.5 X10*3/UL (ref 4.4–11.3)

## 2024-08-05 PROCEDURE — 70450 CT HEAD/BRAIN W/O DYE: CPT | Performed by: RADIOLOGY

## 2024-08-05 PROCEDURE — 82374 ASSAY BLOOD CARBON DIOXIDE: CPT | Performed by: STUDENT IN AN ORGANIZED HEALTH CARE EDUCATION/TRAINING PROGRAM

## 2024-08-05 PROCEDURE — 99223 1ST HOSP IP/OBS HIGH 75: CPT | Performed by: PSYCHIATRY & NEUROLOGY

## 2024-08-05 PROCEDURE — 99497 ADVNCD CARE PLAN 30 MIN: CPT | Performed by: INTERNAL MEDICINE

## 2024-08-05 PROCEDURE — 99232 SBSQ HOSP IP/OBS MODERATE 35: CPT | Performed by: INTERNAL MEDICINE

## 2024-08-05 PROCEDURE — 82947 ASSAY GLUCOSE BLOOD QUANT: CPT

## 2024-08-05 PROCEDURE — 2500000004 HC RX 250 GENERAL PHARMACY W/ HCPCS (ALT 636 FOR OP/ED): Performed by: STUDENT IN AN ORGANIZED HEALTH CARE EDUCATION/TRAINING PROGRAM

## 2024-08-05 PROCEDURE — 93010 ELECTROCARDIOGRAM REPORT: CPT | Performed by: INTERNAL MEDICINE

## 2024-08-05 PROCEDURE — 80048 BASIC METABOLIC PNL TOTAL CA: CPT | Performed by: STUDENT IN AN ORGANIZED HEALTH CARE EDUCATION/TRAINING PROGRAM

## 2024-08-05 PROCEDURE — 1100000001 HC PRIVATE ROOM DAILY

## 2024-08-05 PROCEDURE — 99232 SBSQ HOSP IP/OBS MODERATE 35: CPT | Performed by: STUDENT IN AN ORGANIZED HEALTH CARE EDUCATION/TRAINING PROGRAM

## 2024-08-05 PROCEDURE — 85025 COMPLETE CBC W/AUTO DIFF WBC: CPT | Performed by: STUDENT IN AN ORGANIZED HEALTH CARE EDUCATION/TRAINING PROGRAM

## 2024-08-05 PROCEDURE — 2500000005 HC RX 250 GENERAL PHARMACY W/O HCPCS: Performed by: STUDENT IN AN ORGANIZED HEALTH CARE EDUCATION/TRAINING PROGRAM

## 2024-08-05 PROCEDURE — 36415 COLL VENOUS BLD VENIPUNCTURE: CPT | Performed by: STUDENT IN AN ORGANIZED HEALTH CARE EDUCATION/TRAINING PROGRAM

## 2024-08-05 RX ORDER — METOPROLOL TARTRATE 1 MG/ML
5 INJECTION, SOLUTION INTRAVENOUS EVERY 6 HOURS
Status: DISCONTINUED | OUTPATIENT
Start: 2024-08-05 | End: 2024-08-08

## 2024-08-05 RX ORDER — SODIUM CHLORIDE 9 MG/ML
75 INJECTION, SOLUTION INTRAVENOUS CONTINUOUS
Status: DISCONTINUED | OUTPATIENT
Start: 2024-08-05 | End: 2024-08-06

## 2024-08-05 RX ORDER — OLANZAPINE 10 MG/2ML
2.5 INJECTION, POWDER, FOR SOLUTION INTRAMUSCULAR ONCE
Status: DISCONTINUED | OUTPATIENT
Start: 2024-08-05 | End: 2024-08-06

## 2024-08-05 ASSESSMENT — PAIN SCALES - GENERAL: PAINLEVEL_OUTOF10: 0 - NO PAIN

## 2024-08-05 NOTE — PROGRESS NOTES
08/05/24 1142   Discharge Planning   Expected Discharge Disposition SNF     Neuro and pal care consults pending to determine dc plan. Auth pending [address].

## 2024-08-05 NOTE — CARE PLAN
The patient's goals for the shift include      The clinical goals for the shift include pt will remain safe throughout shift    Problem: Skin  Goal: Prevent/manage excess moisture  8/5/2024 0649 by Mignon Ness RN  Outcome: Not Progressing  8/5/2024 0642 by Mignon Ness RN  Outcome: Not Progressing  Goal: Prevent/minimize sheer/friction injuries  8/5/2024 0649 by Mignon Ness RN  Outcome: Not Progressing  8/5/2024 0642 by Mignon Ness RN  Outcome: Not Progressing     Problem: Fall/Injury  Goal: Not fall by end of shift  8/5/2024 0649 by Mignon Ness RN  Outcome: Not Progressing  8/5/2024 0642 by Mignon Ness RN  Outcome: Not Progressing  Goal: Be free from injury by end of the shift  8/5/2024 0649 by Mignon Ness RN  Outcome: Not Progressing  8/5/2024 0642 by Mignon Ness RN  Outcome: Not Progressing     Problem: Respiratory  Goal: Minimal/no exertional discomfort or dyspnea this shift  8/5/2024 0649 by Mignon Ness, RN  Outcome: Not Progressing  8/5/2024 0642 by Mignon Ness RN  Outcome: Not Progressing  Goal: No signs of respiratory distress (eg. Use of accessory muscles. Peds grunting)  8/5/2024 0649 by Mignon Ness RN  Outcome: Not Progressing  8/5/2024 0642 by Mignon Ness RN  Outcome: Not Progressing     Problem: Seizure  Goal: I will remain free from seizures  8/5/2024 0649 by Mignon Ness RN  Outcome: Not Progressing  8/5/2024 0642 by Mignon Ness RN  Outcome: Not Progressing

## 2024-08-05 NOTE — CONSULTS
Inpatient consult to Palliative Care  Consult performed by: Dar Montelongo MD  Consult ordered by: Artem Arrington MD        Reason For Consult  Reason for Consult: communication / medical decision making and patient/family support     History Of Present Illness  Yue Sanz is a 63 y.o. 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 CT findings so far have shown no incremental change in size of hemorrhage or hematoma. Started on CIWA protocol d/t concern for alcohol withdrawal until 8/2. On Keppra previously that was continued. Palliative was consulted for assistance with family discussions in setting of current metabolic encephalopathy, cerebrovascular disease.     After her 6/24 surgery, she was discharged home with PT and was progressing well but had a fall onto her hip on 7/24 and presented to the ED then. She was admitted and eventually discharged to SNF ([facility]) due to issues with ambulating related to pain from that fall. Previous to these events she had multiple ED visits related to syncope, cellulitis.         Personal/Social History   10 years ago. No children but helped raise her sisters children. Lived in duplex near family. Has not been drinking the last two weeks and only minimally the month or so prior. History of daily drinking w. Some cirrhotic features on previous abdominal imaging.   She reports that she has never smoked. She has never been exposed to tobacco smoke. She has never used smokeless tobacco. She reports that she does not currently use alcohol. She reports that she does not currently use drugs.    Functional Status  Previously Independent.      Past Medical History  She has a past medical history of Acute CVA (cerebrovascular accident) (Multi) (05/30/2023),  Alcohol related seizure (Multi) (05/30/2023), Cerebral hemorrhage (Multi) (05/30/2023), Cerebral infarction due to embolism of right middle cerebral artery (Multi) (05/30/2023), Diarrhea, unspecified (08/10/2022), Encounter for follow-up examination after completed treatment for conditions other than malignant neoplasm (04/20/2020), Encounter for other screening for malignant neoplasm of breast (03/08/2022), Encounter for screening for malignant neoplasm of colon (05/16/2022), Other abnormality of red blood cells (08/10/2022), Personal history of other endocrine, nutritional and metabolic disease, and Personal history of transient ischemic attack (TIA), and cerebral infarction without residual deficits.    Surgical History  She has a past surgical history that includes MR angio head wo IV contrast (5/19/2021); MR angio neck wo IV contrast (5/19/2021); and MR angio head wo IV contrast (7/12/2018).     Family History  Family History   Problem Relation Name Age of Onset    Arthritis Mother      No Known Problems Father       Allergies  Amoxicillin    Review of Systems     Physical Exam  Constitutional:       General: She is not in acute distress.     Comments: Unresponsive, groans and moves slightly to painful stimuli including sternal rub or nailbed pressure.  EEG electrodes in situ.    HENT:      Head: Normocephalic and atraumatic.   Eyes:      Conjunctiva/sclera: Conjunctivae normal.      Pupils: Pupils are equal, round, and reactive to light.   Cardiovascular:      Rate and Rhythm: Normal rate and regular rhythm.      Comments: 3/6 systolic murmur  Pulmonary:      Effort: Pulmonary effort is normal.      Comments: Bronchial sounds BL.   Abdominal:      General: Abdomen is flat. Bowel sounds are normal. There is no distension.      Palpations: Abdomen is soft.      Tenderness: There is no guarding.   Skin:     General: Skin is warm.   Neurological:      Comments: Unresponsive.    Psychiatric:      Comments: obtunded  "        Last Recorded Vitals  Blood pressure 121/68, pulse 89, temperature 36.8 °C (98.2 °F), temperature source Axillary, resp. rate 18, height 1.676 m (5' 5.98\"), weight 52.2 kg (115 lb), SpO2 95%.    Relevant Results  Results for orders placed or performed during the hospital encounter of 07/30/24 (from the past 24 hour(s))   POCT GLUCOSE   Result Value Ref Range    POCT Glucose 97 74 - 99 mg/dL   POCT GLUCOSE   Result Value Ref Range    POCT Glucose 98 74 - 99 mg/dL   POCT GLUCOSE   Result Value Ref Range    POCT Glucose 111 (H) 74 - 99 mg/dL   POCT GLUCOSE   Result Value Ref Range    POCT Glucose 105 (H) 74 - 99 mg/dL   POCT GLUCOSE   Result Value Ref Range    POCT Glucose 92 74 - 99 mg/dL          Electrocardiogram, 12-lead PRN ACS symptoms    Result Date: 8/5/2024  Sinus rhythm with 1st degree AV block RSR' or QR pattern in V1 suggests right ventricular conduction delay Prolonged QT Abnormal ECG When compared with ECG of 30-JUL-2024 02:17, No significant change was found    CT head wo IV contrast    Result Date: 8/1/2024  Interpreted By:  Karina Spann, STUDY: CT HEAD WO IV CONTRAST;  8/1/2024 4:25 pm   INDICATION: Signs/Symptoms:New visual field deficit, want to ensure stability of known SAH/subdural.   COMPARISON: 07/30/2024 at 3:33 p.m.   ACCESSION NUMBER(S): [id number]   ORDERING CLINICIAN: SANDIP HINSON   TECHNIQUE: Unenhanced CT images of the head were obtained.   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 Acute subarachnoid hemorrhage involving the left cerebral hemisphere and basal cisterns similar in size but overall slightly less dense than on the previous exam. Acute left-sided subdural hematomas measuring up to 5 mm in the frontal region similar to the prior exam. There is mild diffuse left cerebral edema with sulcal effacement and approximately 5 mm of midline shift at the level of the 3rd ventricle similar to the prior exam. No new acute intracranial hemorrhage. Stable low-density likely " remote infarct in the right parietal region with porencephalic dilatation of the posterior horn of the right lateral ventricle.   Stable subcentimeter lucent lesion in the occipital calvarium just to the left of midline image 16/36.   thickening in the left sphenoid and partially imaged right maxillary sinuses. Remaining visualized paranasal sinuses are clear.       Redemonstration of basilar tip aneurysm with acute subarachnoid and left-sided subdural hematomas,  cerebral edema and mild midline shift. No significant change from 07/30/2024 at 3:33 p.m..   MACRO: None.   Signed by: Karina Spann 8/1/2024 4:48 PM Dictation workstation:   [id number]    CT head wo IV contrast    Result Date: 7/30/2024  Interpreted By:  Varghese Rapp, STUDY: CT HEAD WO IV CONTRAST;  7/30/2024 3:35 pm   INDICATION: Signs/Symptoms:6hr eval of SAH, SDH.   COMPARISON: Most recent prior is from 07/30/2024 at 7:31 a.m...   ACCESSION NUMBER(S): [id number]   ORDERING CLINICIAN: JOSE WAITE   TECHNIQUE: Routine axial images were obtained from the skull base through the vertex.  Sagittal and coronal reconstruction images were generated. Brain, subdural, and bone windows were reviewed. N/A Unavailable   FINDINGS:   Stable appearance 9 mm basilar tip aneurysm. There is persistent subdural blood adjacent to the posterior aspect of the falx and along the superior aspect of the left tentorium. There is stable subarachnoid blood along the right side of the mar and midbrain and also along the posterior and lateral aspect of the mar and midbrain on the left. Stable subarachnoid blood in multiple left parietal fissures including the left sylvian fissure. Small grossly stable anterolateral left frontal subdural hematoma measuring 6 mm in thickness; small amount stable acute subdural blood along the anterolateral aspect of the left temporal lobe measuring approximately 2 mm in transverse thickness. Stable lateral and posterior high left parietal  subdural hematoma measuring 7 mm in transverse thickness. There is approximately 3.6 mm of rightward displacement of the midline at the level of the septum pellucidum, compared to 4.2 mm earlier today. There is mild stable anterior inferior bifrontal subarachnoid blood. No gross intraventricular blood.   Underlying mild prominence of ventricles and sulci consistent with volume loss. There is a small to moderate-sized old infarct in the lateral right parietal lobe. No indication of acute infarct.   Small retention cyst in the left sphenoid sinus. The bilateral mastoids were clear. No paranasal sinus fluid level. No destructive calvarial lesion or depressed skull fracture.         Grossly stable basilar tip aneurysm.   Acute subdural and subarachnoid blood as described, with no significant change from the exam earlier this morning.   Underlying volume loss.   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   MACRO: None   Signed by: Varghese Rapp 7/30/2024 4:03 PM Dictation workstation:   [id number]    ECG 12 lead    Result Date: 7/30/2024  Normal sinus rhythm Incomplete right bundle branch block Cannot rule out Anterior infarct , age undetermined Abnormal ECG When compared with ECG of 24-JUL-2024 11:09, Inverted T waves have replaced nonspecific T wave abnormality in Inferior leads See ED provider note for full interpretation and clinical correlation Confirmed by Heather Harris ([id number]) on 7/30/2024 11:22:30 AM    CT angio head and neck w and wo IV contrast    Result Date: 7/30/2024  Interpreted By:  Roderick Lin, STUDY: CT ANGIO HEAD AND NECK W AND WO IV CONTRAST   INDICATION: Signs/Symptoms:c/f aneurysm   COMPARISON: Head CT 07/30/2024.   ACCESSION NUMBER(S): [id number]   ORDERING CLINICIAN: ERASMO MARLOW   TECHNIQUE: CTA of the head and neck was performed after the intravenous administration of 75 mL Omnipaque 350 nonionic IV contrast. 3-D reconstructions were performed under physician supervision on  a separate workstation and reviewed.   FINDINGS: CTA NECK:   AORTIC ARCH: The visualized portion of the aortic arch is unremarkable in appearance. The origins of the great vessels and the vertebral arteries are normal without evidence of stenosis.   CERVICAL CAROTID ARTERIES: The common carotid arteries are patent bilaterally without evidence of stenosis. Atherosclerotic disease of bilateral carotid bifurcations resulting in mild (less than 50%) luminal stenosis of bilateral proximal cervical ICAs. Remainder of the cervical ICAs are patent throughout their course.   VERTEBRAL ARTERIES: The vertebral arteries are patent bilaterally throughout their course noting slight diminutive caliber of the left vertebral artery.   CTA HEAD:   INTERNAL CAROTID ARTERIES: Atherosclerotic disease of bilateral carotid siphons, which remain overall patent. Intracranial ICAs are otherwise normal.   CEREBRAL ARTERIES: No aneurysm appreciated. Left MCA branches appear patent and grossly symmetric with right MCA branches; no definitive findings to suggest vasospasm. Bilateral ACAs are within normal limits. Bilateral PCAs are predominantly supplied by the posterior communicating arteries.   VERTEBROBASILAR SYSTEM: Left intradural vertebral artery is again diminutive and is severely narrowed in caliber distal to the PICA branch. Right intradural vertebral artery is unremarkable. Basilar artery is patent but is again diminutive secondary to PCOM dominance. No evidence of basilar tip aneurysm.   There is no evidence for saccular aneurysm, vascular malformation, or large vessel occlusion.   OTHER: Known extensive subarachnoid and subdural hemorrhages are better assessed on noncontrast head CT performed earlier today.       No evidence of basilar tip aneurysm. Previously noted rounded hyperdensity corresponds to subarachnoid hemorrhage extending into the supra cerebellar cistern.   Mild scattered atherosclerotic disease of the cervical and  intracranial vasculature as detailed.   Please refer to noncontrast head CT for complete evaluation of subarachnoid and subdural hemorrhages. No definitive evidence of vasospasm at this time.   _____________ NASCET criteria for internal carotid artery stenosis: Mild: 0% to 49% Moderate: 50% to 69% Severe: 70% to 99% Complete Occlusion   Signed by: Roderick iLn 7/30/2024 9:11 AM Dictation workstation:   [id number]    CT head wo IV contrast    Result Date: 7/30/2024  Interpreted By:  Jean Hester, STUDY: CT HEAD WO IV CONTRAST;  7/30/2024 7:32 am   INDICATION: Signs/Symptoms:sah sdh.   COMPARISON: CT Brain, earlier same morning 30 July 2024 at 0122 hours   ACCESSION NUMBER(S): [id number]   ORDERING CLINICIAN: CAMILLA MILLER   TECHNIQUE: CT of the brain from the skull vertex to the skull base, without intravenous contrast   FINDINGS: No interval change to the abnormal CT appearance of the brain. 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 It has not changed in size from earlier this morning   All of the acute intra-axial and extra-axial hemorrhages and mild left-to-right midline shift is all unchanged   No discernible enlarging aneurysm or worsening hemorrhage   MACRO: None   Signed by: Jean Hester 7/30/2024 7:41 AM Dictation workstation:   [id number]    CT head wo IV  contrast    Result Date: 7/30/2024  Interpreted By:  Sandro Roe, STUDY: CT HEAD WO IV CONTRAST; CT CERVICAL SPINE WO IV CONTRAST;  7/30/2024 1:24 am   INDICATION: Signs/Symptoms:fall back of head   COMPARISON: None.   ACCESSION NUMBER(S): [id number]; [id number]   ORDERING CLINICIAN: CAMILLA MILLER   TECHNIQUE: Axial noncontrast CT images of head with coronal and sagittal reconstructed images. Axial noncontrast CT images of the cervical spine with coronal and sagittal reconstructed images.   FINDINGS: CT HEAD:   Abnormal examination. There is a subdural hematoma seen in the left measuring about 5 mm anteriorly and 8-9 mm posteriorly. Additionally there is extensive subarachnoid hemorrhage noted along the basilar cisterns as well as layering along the left cerebral convexity. Superior to the cerebellum there is of focus of hemorrhage measuring up to about 1 cm   No acute skull fracture.   Global volume loss. Evidence of prior chronic small vessel ischemic change seen in the right cerebral hemisphere there may be minimal midline shift to the right of 2-3 mm.   Soft tissue swelling seen in the left occipital parietal region.     CT CERVICAL SPINE:   Demineralization of the bones. Scattered multilevel degenerative disc disease. Robust vascular calcification.   Degenerative disc disease most notable C4-5, C5-6 and C6-7. No evidence of acute cervical spine fracture       Abnormal examination. There is extensive left cerebral hemispheric subarachnoid hemorrhage.. Basilar cistern subarachnoid hemorrhage also noted. There is small subdural hematoma also seen in the left more prominent posteriorly. Minimal midline shift to the right of 2-3 mm   Global volume loss. Evidence of prior ischemic event in the right cerebral hemisphere not significantly changed. No intraventricular hemorrhage.   No findings of acute cervical spine fracture     Sandro Roe discussed the significance and urgency of this critical finding by  "epic chat with  CAMILLA MILLER on 2024 at 1:57 am. (**-RCF-**) Findings:  See findings.       Signed by: Sandro Roe 2024 1:58 AM Dictation workstation:   [id number]      Assessment/Plan   IMP:  64 yo F with PMH of AUD, CVA hx, ?cirrhosis(found on imaging) recent humeral fracture 2/2 fall who was admitted after fall at SNF ([facility]) and found to have LT sided SAH and SDH that have been stable on repeat imaging. Seen by NSGY who recommended conservative treatment and starting Keppra in light of her SAH/ SDH. She was treated w/ CIWA protocol with phenobarbital. Palliative was consulted for assistance with GOC and complex decision making.     Her current unresponsiveness is likely metabolic in nature related to her requirement for chemical restraints w/ haldol, CIWA protocol with phenobarbital and possibly exacerbated from chronic keppra use after 2019 stroke.     GOC discussion with nephew and HCPOA Xiang Fontaine:  Brian has ACP documents that he is sending to [facility] our . He reports that he and his sister have been taking lead in caring for Ms Sanz since their grandfather  4 years ago in September. They are not surprised with her dramatic decline the last few months. Yue was  10 years ago after her 's MI. Her first 5 years were difficult and she coped poorly but after her stroke in 2019, she drank less and 6 weeks ago was essentially drinking a few units per week.   We discussed the concern that her decline this admission with her new SAH and SDH could represent a new baseline. I informed him of the plan to rescan Ms Sanz to assess for changes with her SAH and SDH. Also to minimize causes for metabolic encephalopathy. He was in agreement.    Regarding code status, I informed him that it is probable that time could be short for Ms Sanz and that setting \"guard rails\" in place because CPR and intubation would likely lead to harm with little to no benefit and would " be incongruent with her desired quality of life. He was in agreement but wanted to discuss this with family. We agreed to talk again tomorrow after lunch to formalize those orders.   Per her Living Will, she would NOT want artificial feeding in the context of a permanent vegetative state.  Mr Fontaine does agree with NGT feeding temporarily and was informed that this would likely cause increase her risk of delirium, agitation, and aspiration.         Recommendations:  Agree with repeat CT of head   Will order EKG today d/t mildly prolonged EKG on previous EKGs. Haldol may have mostly washed out by now.   If unable to take PO meds, start Zyprexa 2.5mg IM scheduled at 2200  Continue with FULL CODE; will formalize changes tomorrow once HCPOA has talked to family.  Family is OK with NGT for feeding on temporary basis.   ACP documents to be scanned into chart from [facility]        Please follow delirium protocols listed below:       - Minimize use of benzos, opiates, anticholinergics, as these may worsen mental status       - Would use caution with narcotic pain medications       - Would still adequately controlling pain, as uncontrolled pain is also a risk factor for delirium       - Reinforce sleep hygiene; encourage patient to stay awake during the day       - Keep curtains/blinds open during the day and closed at night.       - Would recommend reorienting/redirecting patient as much as possible,        - Aim for consistent staffing, familiar objects, avoiding bright lights and loud noises, etc.       - Can consider melatonin at night before bed.     Symptom Management  Patient is unresponsive at this time. Unable to perform ROS.     Goals of Care  Will continue discussions with family tomorrow.       I spent 90 minutes in the professional and overall care of this patient. 30 minutes of this was spent discussing ACP.       Dar Montelongo MD

## 2024-08-05 NOTE — PROGRESS NOTES
Occupational Therapy                 Therapy Communication Note    Patient Name: Yue Sanz  MRN: [id number]  Today's Date: 8/5/2024     Discipline: Occupational Therapy    Missed Visit Reason: Missed Visit Reason:  (Attempted to see pt> PEr chart possilble palliative care. Aweaitng Neuro consult. Pt not following commands upon arrival turns head  when name called. Pt not appropriate at this time for OT tx)    Missed Time: Attempt

## 2024-08-05 NOTE — CARE PLAN
The patient's goals for the shift include      The clinical goals for the shift include pt will remain safe throughout shift      Problem: Skin  Goal: Prevent/manage excess moisture  Outcome: Not Progressing  Goal: Prevent/minimize sheer/friction injuries  Outcome: Not Progressing     Problem: Fall/Injury  Goal: Not fall by end of shift  Outcome: Not Progressing  Goal: Be free from injury by end of the shift  Outcome: Not Progressing     Problem: Respiratory  Goal: Minimal/no exertional discomfort or dyspnea this shift  Outcome: Not Progressing  Goal: No signs of respiratory distress (eg. Use of accessory muscles. Peds grunting)  Outcome: Not Progressing     Problem: Seizure  Goal: I will remain free from seizures  Outcome: Not Progressing

## 2024-08-05 NOTE — PROGRESS NOTES
[facility] Hospitalist Progress Note        Between 7AM-7PM please message me via Epic Secure Chat.  After 7PM please page Nocturnist on call.        Assessment/Plan     #encephalopathy  #s/p traumatic L sided subdural hematoma/subarachnoid hemorrhage due to mechanical fall at SNF  #deconditioning  #alcohol use disorder not in withdrawal  #hx seizures  #anemia - stable  - NSGY evaluated and no acute intervention needed. Rec holding her ASA for 1 week, plavix for 2 weeks. SBP goal < 160. Keppra for 7 days. Will need outpatient follow up and outpatient head CT. Her bleed has been stable here on multiple repeat head CT  - stopped phenobarb, her POA says she has been in hospital/nursing facility recently and has not been able to drink alcohol  - she completed thiamine supplementation  - PT/OT likely will need placement again  - will consult neuro today due to persistent mental status change, may need EEG to rule out subclinical seizure vs other process. Will repeat head CT today  - consult palliative care to assist with goals of care and family discussions. If she remains this way may need to discuss dobhoff for nutrition    DVT Prophylaxis:  SCDs      Discharge Planning: SNF    I personally examined the patient and reviewed chart.  Plan of care was discussed with patient, all questions answered.    Total time spent: At least 38 minutes, providing counseling or in coordination of care. Total time on this day of visit includes record and documentation review before and after visit including documentation and time not explicitly included on EMR time stamp.      Subjective     Yue Sanz is a 63 y.o. female on day 6 of admission presenting with Subdural hematoma (Multi).    NAEON. Unable to provide history    Review of Systems   Unable to perform ROS: Acuity of condition       Objective     Physical Exam  Constitutional:       General: She is not in acute distress.  Cardiovascular:      Rate and Rhythm: Normal rate and regular  "rhythm.   Pulmonary:      Effort: Pulmonary effort is normal.      Breath sounds: Normal breath sounds.   Abdominal:      General: There is no distension.      Palpations: Abdomen is soft.   Neurological:      Mental Status: She is alert. She is disoriented.         Last Recorded Vitals  Blood pressure 121/68, pulse 89, temperature 36.8 °C (98.2 °F), temperature source Axillary, resp. rate 18, height 1.676 m (5' 5.98\"), weight 52.2 kg (115 lb), SpO2 95%.  Intake/Output last 3 Shifts:  I/O last 3 completed shifts:  In: - (0 mL/kg)   Out: 700 (13.4 mL/kg) [Urine:700 (0.4 mL/kg/hr)]  Weight: 52.2 kg     Relevant Results  Results for orders placed or performed during the hospital encounter of 07/30/24 (from the past 24 hour(s))   POCT GLUCOSE   Result Value Ref Range    POCT Glucose 97 74 - 99 mg/dL   POCT GLUCOSE   Result Value Ref Range    POCT Glucose 98 74 - 99 mg/dL   POCT GLUCOSE   Result Value Ref Range    POCT Glucose 111 (H) 74 - 99 mg/dL   POCT GLUCOSE   Result Value Ref Range    POCT Glucose 105 (H) 74 - 99 mg/dL       Imaging Results  No results found.     Medications:  amLODIPine, 5 mg, oral, Daily  folic acid, 1 mg, oral, Daily  insulin lispro, 0-5 Units, subcutaneous, q4h  levETIRAcetam, 500 mg, oral, BID  metoprolol tartrate, 25 mg, oral, BID  multivitamin with minerals, 1 tablet, oral, Daily  pantoprazole, 40 mg, oral, Daily before breakfast  sertraline, 50 mg, oral, Daily       PRN medications: acetaminophen, dextrose, dextrose, glucagon, glucagon, hydrALAZINE, hydrALAZINE, oxygen       Artem Arrington MD  [facility] Medicine  "

## 2024-08-06 LAB
ALBUMIN SERPL BCP-MCNC: 3.4 G/DL (ref 3.4–5)
ANION GAP SERPL CALC-SCNC: 18 MMOL/L (ref 10–20)
ATRIAL RATE: 100 BPM
BASOPHILS # BLD AUTO: 0.06 X10*3/UL (ref 0–0.1)
BASOPHILS NFR BLD AUTO: 0.9 %
BUN SERPL-MCNC: 8 MG/DL (ref 6–23)
CALCIUM SERPL-MCNC: 8.1 MG/DL (ref 8.6–10.3)
CHLORIDE SERPL-SCNC: 99 MMOL/L (ref 98–107)
CO2 SERPL-SCNC: 18 MMOL/L (ref 21–32)
CREAT SERPL-MCNC: 0.48 MG/DL (ref 0.5–1.05)
EGFRCR SERPLBLD CKD-EPI 2021: >90 ML/MIN/1.73M*2
EOSINOPHIL # BLD AUTO: 0.16 X10*3/UL (ref 0–0.7)
EOSINOPHIL NFR BLD AUTO: 2.4 %
ERYTHROCYTE [DISTWIDTH] IN BLOOD BY AUTOMATED COUNT: 13.9 % (ref 11.5–14.5)
GLUCOSE BLD MANUAL STRIP-MCNC: 100 MG/DL (ref 74–99)
GLUCOSE BLD MANUAL STRIP-MCNC: 85 MG/DL (ref 74–99)
GLUCOSE BLD MANUAL STRIP-MCNC: 91 MG/DL (ref 74–99)
GLUCOSE BLD MANUAL STRIP-MCNC: 94 MG/DL (ref 74–99)
GLUCOSE BLD MANUAL STRIP-MCNC: 94 MG/DL (ref 74–99)
GLUCOSE BLD MANUAL STRIP-MCNC: 98 MG/DL (ref 74–99)
GLUCOSE SERPL-MCNC: 96 MG/DL (ref 74–99)
HCT VFR BLD AUTO: 33.8 % (ref 36–46)
HGB BLD-MCNC: 10.6 G/DL (ref 12–16)
IMM GRANULOCYTES # BLD AUTO: 0.01 X10*3/UL (ref 0–0.7)
IMM GRANULOCYTES NFR BLD AUTO: 0.2 % (ref 0–0.9)
LYMPHOCYTES # BLD AUTO: 1.23 X10*3/UL (ref 1.2–4.8)
LYMPHOCYTES NFR BLD AUTO: 18.5 %
MCH RBC QN AUTO: 32.5 PG (ref 26–34)
MCHC RBC AUTO-ENTMCNC: 31.4 G/DL (ref 32–36)
MCV RBC AUTO: 104 FL (ref 80–100)
MONOCYTES # BLD AUTO: 0.8 X10*3/UL (ref 0.1–1)
MONOCYTES NFR BLD AUTO: 12 %
NEUTROPHILS # BLD AUTO: 4.39 X10*3/UL (ref 1.2–7.7)
NEUTROPHILS NFR BLD AUTO: 66 %
NRBC BLD-RTO: 0 /100 WBCS (ref 0–0)
OSMOLALITY SERPL: 275 MOSM/KG (ref 280–300)
P AXIS: 55 DEGREES
P OFFSET: 174 MS
P ONSET: 129 MS
PHOSPHATE SERPL-MCNC: 3.1 MG/DL (ref 2.5–4.9)
PLATELET # BLD AUTO: 342 X10*3/UL (ref 150–450)
POTASSIUM SERPL-SCNC: 4.2 MMOL/L (ref 3.5–5.3)
PR INTERVAL: 186 MS
Q ONSET: 222 MS
QRS COUNT: 16 BEATS
QRS DURATION: 94 MS
QT INTERVAL: 384 MS
QTC CALCULATION(BAZETT): 495 MS
QTC FREDERICIA: 455 MS
R AXIS: 16 DEGREES
RBC # BLD AUTO: 3.26 X10*6/UL (ref 4–5.2)
SODIUM SERPL-SCNC: 131 MMOL/L (ref 136–145)
T AXIS: -1 DEGREES
T OFFSET: 414 MS
VENTRICULAR RATE: 100 BPM
WBC # BLD AUTO: 6.7 X10*3/UL (ref 4.4–11.3)

## 2024-08-06 PROCEDURE — 82947 ASSAY GLUCOSE BLOOD QUANT: CPT

## 2024-08-06 PROCEDURE — 82746 ASSAY OF FOLIC ACID SERUM: CPT | Mod: AHULAB | Performed by: HOSPITALIST

## 2024-08-06 PROCEDURE — 80069 RENAL FUNCTION PANEL: CPT | Performed by: HOSPITALIST

## 2024-08-06 PROCEDURE — 83930 ASSAY OF BLOOD OSMOLALITY: CPT | Mod: AHULAB | Performed by: HOSPITALIST

## 2024-08-06 PROCEDURE — 97530 THERAPEUTIC ACTIVITIES: CPT | Mod: GO

## 2024-08-06 PROCEDURE — 99232 SBSQ HOSP IP/OBS MODERATE 35: CPT | Performed by: HOSPITALIST

## 2024-08-06 PROCEDURE — 1100000001 HC PRIVATE ROOM DAILY

## 2024-08-06 PROCEDURE — 99497 ADVNCD CARE PLAN 30 MIN: CPT | Performed by: INTERNAL MEDICINE

## 2024-08-06 PROCEDURE — 36415 COLL VENOUS BLD VENIPUNCTURE: CPT | Performed by: HOSPITALIST

## 2024-08-06 PROCEDURE — 85025 COMPLETE CBC W/AUTO DIFF WBC: CPT | Performed by: HOSPITALIST

## 2024-08-06 PROCEDURE — 2500000004 HC RX 250 GENERAL PHARMACY W/ HCPCS (ALT 636 FOR OP/ED): Performed by: INTERNAL MEDICINE

## 2024-08-06 PROCEDURE — 99233 SBSQ HOSP IP/OBS HIGH 50: CPT | Performed by: INTERNAL MEDICINE

## 2024-08-06 PROCEDURE — 2500000005 HC RX 250 GENERAL PHARMACY W/O HCPCS: Performed by: STUDENT IN AN ORGANIZED HEALTH CARE EDUCATION/TRAINING PROGRAM

## 2024-08-06 PROCEDURE — 82607 VITAMIN B-12: CPT | Mod: AHULAB | Performed by: HOSPITALIST

## 2024-08-06 RX ORDER — OLANZAPINE 10 MG/2ML
2.5 INJECTION, POWDER, FOR SOLUTION INTRAMUSCULAR ONCE
Status: COMPLETED | OUTPATIENT
Start: 2024-08-06 | End: 2024-08-06

## 2024-08-06 RX ORDER — OLANZAPINE 10 MG/2ML
2.5 INJECTION, POWDER, FOR SOLUTION INTRAMUSCULAR ONCE AS NEEDED
Status: COMPLETED | OUTPATIENT
Start: 2024-08-06 | End: 2024-08-06

## 2024-08-06 RX ORDER — DEXTROSE MONOHYDRATE AND SODIUM CHLORIDE 5; .9 G/100ML; G/100ML
100 INJECTION, SOLUTION INTRAVENOUS CONTINUOUS
Status: DISCONTINUED | OUTPATIENT
Start: 2024-08-06 | End: 2024-08-06

## 2024-08-06 RX ORDER — TALC
6 POWDER (GRAM) TOPICAL NIGHTLY
Status: DISCONTINUED | OUTPATIENT
Start: 2024-08-06 | End: 2024-08-08

## 2024-08-06 ASSESSMENT — PAIN SCALES - GENERAL
PAINLEVEL_OUTOF10: 0 - NO PAIN

## 2024-08-06 ASSESSMENT — PAIN SCALES - WONG BAKER
WONGBAKER_NUMERICALRESPONSE: NO HURT

## 2024-08-06 ASSESSMENT — COGNITIVE AND FUNCTIONAL STATUS - GENERAL
DRESSING REGULAR LOWER BODY CLOTHING: TOTAL
HELP NEEDED FOR BATHING: TOTAL
PERSONAL GROOMING: TOTAL
DRESSING REGULAR UPPER BODY CLOTHING: TOTAL
TOILETING: TOTAL
EATING MEALS: TOTAL
DAILY ACTIVITIY SCORE: 6

## 2024-08-06 ASSESSMENT — PAIN - FUNCTIONAL ASSESSMENT: PAIN_FUNCTIONAL_ASSESSMENT: 0-10

## 2024-08-06 ASSESSMENT — PAIN INTENSITY VAS: VAS_PAIN_BASICVITALS_IP: 0

## 2024-08-06 NOTE — PROGRESS NOTES
Music Therapy Note    Yue Sanz was referred by FROYLAN Montelongo MD    Therapy Session  Referral Type: New referral this admission  Visit Type: New visit  Session Start Time: 1056  Session End Time: 1118  Intervention Delivery: In-person  Conflict of Service: None  Number of staff members present: 1     Pre-assessment  Unable to Assess Reason: Physical limitation  Mood/Affect:  (Restless)         Treatment/Interventions  Areas of Focus: Relaxation  Music Therapy Interventions: Live music listening, Iso-principle    Post-assessment  Unable to Assess Reason: Physical limitation  Mood/Affect:  (Restless)  Continue Visiting: Yes  Total Session Time (min): 22 minutes    Narrative  Assessment Detail: D/w palliative care IDT prior to session. Pt was seen in room, lying in bed with legs draped over the side rail. Upon assessment pt made eye contact/tracked this MT and MTI but was not verbally responsive to greeting. Pt displayed restlessness in bed via frequent arm and leg movements, reaching out and grabbing this MT's hand. MT provided words of comfort and reassurance.  Plan: MT engaged pt in live music listening and iso principle to decrease restlessness.  Intervention: MT facilitated live familiar music at bedside chosen based on pt's age and demographics, via guitar and voice. MT began by singing and strumming softly to entrain to pt's energy level, and gradually transitioned to fingerpicking and decreasing complexity and tempo in an attempt to elicit pt's relaxation response and promote a calming environment.  Evaluation: Pt continued to appear restless throughout the music and intermittently made eye contact or reached out towards the MT. As the music continued pt demonstrated brief periods of stillness with deepened breathing, though would return to her previous state of restlessness. At the end of the session pt made eye contact and vocalized when offered a farewell by the MT.  Follow-up: MT staff will follow up as  needed.    Education Documentation  No documentation found.

## 2024-08-06 NOTE — DOCUMENTATION CLARIFICATION NOTE
"    PATIENT:               SEMAJ PARHAM  ACCT #:                  [id number]  MRN:                       [id number]  :                       1960  ADMIT DATE:       2024 12:29 AM  DISCH DATE:  RESPONDING PROVIDER #:        [id number]          PROVIDER RESPONSE TEXT:    Left sided subdural hematoma, subarachnoid hemorrhage not related to plavix    CDI QUERY TEXT:    Clarification        Instruction:    Based on your assessment of the patient and the clinical information, please provide the requested documentation by clicking on the appropriate radio button and enter any additional information if prompted.    Question: Please further clarify if a relationship exists between left sided subdural hematoma/subarachnoid hemorrhage and plavix    When answering this query, please exercise your independent professional judgment. The fact that a question is being asked, does not imply that any particular answer is desired or expected.    The patient's clinical indicators include:  Clinical Information: 64 year old woman with fall at SNF. Pt's diagnoses include traumatic left sided subdural hematoma, subarachnoid hemorrhage, and toxic/metabolic encephalopathy. Pt's PMH includes CVA, anemia, alcohol abuse, and seizures.    Clinical Indicators:  Neurosurgery Consult : Dr. Stubbs  \"...p/w fall with head strike,  CTH L sylvian tSAH, basilar cistern tSAH, L convexity SDH, CTA neg, rCTH stable...Intracranial hemorrhage is traumatic in nature and is non-surgical...Recommend Keppra ppx 500BID for 1 week...SBP less [id number]...ASA restart post bleed day 7, plavix restart post bleed day 14\"    Treatment:  -Hold plavix x 14 days  -Hold aspirin x 7 days    Risk Factors: Hx CVA, HTN  Options provided:  -- Left sided subdural hematoma, subarachnoid hemorrhage due to or enhanced by plavix  -- Left sided subdural hematoma, subarachnoid hemorrhage not related to plavix  -- Other - I will add my own diagnosis  -- Refer to " Clinical Documentation Reviewer    Query created by: Ev German on 8/6/2024 9:09 AM      Electronically signed by:  SANDIP HINSON MD 8/6/2024 3:07 PM

## 2024-08-06 NOTE — CARE PLAN
Problem: Skin  Goal: Prevent/manage excess moisture  Outcome: Progressing  Goal: Prevent/minimize sheer/friction injuries  Outcome: Progressing     Problem: Fall/Injury  Goal: Not fall by end of shift  Outcome: Progressing  Goal: Be free from injury by end of the shift  Outcome: Progressing     Problem: Respiratory  Goal: No signs of respiratory distress (eg. Use of accessory muscles. Peds grunting)  Outcome: Progressing   The patient's goals for the shift include      The clinical goals for the shift include pt will remain safe throughout shift

## 2024-08-06 NOTE — PROGRESS NOTES
Occupational Therapy    Occupational Therapy Treatment    Name: Yue Sanz  MRN: [id number]  : 1960  Date: 24  Time Calculation  Start Time: 1125  Stop Time: 1138  Time Calculation (min): 13 min    Assessment:  OT Assessment: OT seen for OT tx not making progress towards goals at this time  Prognosis: Fair  Barriers to Discharge: None  Medical Staff Made Aware: Yes  End of Session Communication: Bedside nurse  End of Session Patient Position: Bed, 4 rail up, Alarm on  Plan:  Treatment Interventions: Cognitive reorientation, Functional transfer training, ADL retraining, Neuromuscular reeducation  OT Frequency: 3 times per week  OT Discharge Recommendations: Moderate intensity level of continued care  OT Recommended Transfer Status: Dependent  OT - OK to Discharge: Yes    Subjective   Previous Visit Info:  OT Last Visit  OT Received On: 24  General:  General  Reason for Referral: 64 y/o F presenting from SNF s/p fall from standing. Pt found ti have left occiput hematoma  Missed Visit: Yes  Missed Visit Reason:  (Attempted to see pt> PEr chart possilble palliative care. Aweaitng Neuro consult. Pt not following commands upon arrival turns head  when name called. Pt not appropriate at this time for OT tx)  Prior to Session Communication: Bedside nurse  Patient Position Received: Bed, 4 rail up, Alarm on  General Comment: Pt supine in bed rossy verbal, legs swung tomi EOB  Precautions:  Medical Precautions: Fall precautions  Vitals:     Pain Assessment:        Objective   Cognition:  Overall Cognitive Status: Impaired  Orientation Level: Unable to assess  Following Commands:  (Pt not following commands)  Attention: Exceptions to WFL (easily distracted)  Impulsive: Severely  Activities of Daily Living: Feeding  Feeding Level of Assistance: Dependent  Feeding Where Assessed: Bed level  Feeding Comments: Pt not taking applesauce     Bed Mobility/Transfers: Bed Mobility  Bed Mobility: Yes  Bed Mobility 1  Bed  Mobility 1: Scooting  Level of Assistance 1: Dependent  Bed Mobility Comments 1: total assist to move to HOB       RUE   RUE :  (spontaneously moving all Jts) and STEPHANIEE   LUE:  (spontaneously moving all Jts)    Outcome Measures:  [facility] Daily Activity  Putting on and taking off regular lower body clothing: Total  Bathing (including washing, rinsing, drying): Total  Putting on and taking off regular upper body clothing: Total  Toileting, which includes using toilet, bedpan or urinal: Total  Taking care of personal grooming such as brushing teeth: Total  Eating Meals: Total  Daily Activity - Total Score: 6        Education Documentation  Body Mechanics, taught by Kerri Queen OT at 8/6/2024 11:47 AM.  Learner: Patient  Readiness: Acceptance  Method: Explanation  Response: No Evidence of Learning, Needs Reinforcement    Precautions, taught by Kerri Queen OT at 8/6/2024 11:47 AM.  Learner: Patient  Readiness: Acceptance  Method: Explanation  Response: No Evidence of Learning, Needs Reinforcement    ADL Training, taught by Kerri Queen OT at 8/6/2024 11:47 AM.  Learner: Patient  Readiness: Acceptance  Method: Explanation  Response: No Evidence of Learning, Needs Reinforcement    Education Comments  No comments found.      Goals:  Encounter Problems       Encounter Problems (Active)       ADLs       Patient with complete upper body dressing with supervision level of assistance donning and doffing all UE clothes with PRN adaptive equipment  (Not Progressing)       Start:  08/03/24    Expected End:  08/17/24            Patient with complete lower body dressing with supervision level of assistance donning and doffing all LE clothes  with PRN adaptive equipment  (Not Progressing)       Start:  08/03/24    Expected End:  08/17/24            Patient will complete daily grooming tasks with supervision level of assistance and PRN adaptive equipment. (Not Progressing)       Start:  08/03/24    Expected End:   08/17/24            Patient will complete toileting including hygiene clothing management/hygiene with supervision level of assistance and PRN adaptive equiment. (Not Progressing)       Start:  08/03/24    Expected End:  08/17/24               MOBILITY       Patient will perform Functional mobility  Household distances/Community Distances with supervision level of assistance and front wheeled walker in order to improve safety and functional mobility. (Not Progressing)       Start:  08/03/24    Expected End:  08/17/24

## 2024-08-06 NOTE — CARE PLAN
The patient's goals for the shift include      The clinical goals for the shift include pT. WILL BE FREE FROM FALLS    Over the shift, the patient did not make progress toward the following goals. Barriers to progression include PT.'S mentation Recommendations to address these barriers include frequent rounding.

## 2024-08-06 NOTE — PROGRESS NOTES
"Yue Sanz is a 63 y.o. female on day 7 of admission presenting with Subdural hematoma (Multi).    Subjective   Patient still very confused and somewhat obtunded. She does not     Symptoms (0 - 10, Best to Worst)  [place] Symptom Assessment System  0-10 (Numeric) Pain Score: 0 - No pain    ROS precluded due to ongoing confusion.        Objective     Physical Exam  Constitutional:       Comments: Obtunded but awake. Motioning towards her side (possibly indicating she needs changing). Unable to converse but making short guttural noises with some intention.    HENT:      Head: Normocephalic and atraumatic.      Mouth/Throat:      Mouth: Mucous membranes are moist.   Eyes:      Extraocular Movements: Extraocular movements intact.      Pupils: Pupils are equal, round, and reactive to light.   Cardiovascular:      Rate and Rhythm: Normal rate and regular rhythm.      Comments: 3/6 Systolic ejection murmur  Pulmonary:      Effort: Pulmonary effort is normal.      Breath sounds: Normal breath sounds.   Abdominal:      General: Abdomen is flat. Bowel sounds are normal.      Palpations: Abdomen is soft.   Musculoskeletal:      Cervical back: Normal range of motion and neck supple.      Right lower leg: No edema.      Left lower leg: No edema.   Skin:     General: Skin is warm and dry.   Neurological:      Comments: Confused, nonverbal. Able to squeeze hand when prompted.    Psychiatric:         Behavior: Behavior normal.       Last Recorded Vitals  Blood pressure 153/75, pulse 79, temperature 36.4 °C (97.5 °F), temperature source Axillary, resp. rate 16, height 1.676 m (5' 5.98\"), weight 52.2 kg (115 lb), SpO2 96%.  Intake/Output last 3 Shifts:  No intake/output data recorded.    Relevant Results  Electrocardiogram, 12-lead PRN ACS symptoms    Result Date: 8/6/2024  Normal sinus rhythm Cannot rule out Inferior infarct , age undetermined Abnormal ECG When compared with ECG of 03-AUG-2024 15:40, AK interval has decreased RSR' " pattern in V1 is no longer Present ST now depressed in Inferior leads Nonspecific T wave abnormality, worse in Inferior leads    Electrocardiogram, 12-lead PRN ACS symptoms    Result Date: 8/5/2024  Sinus rhythm with 1st degree AV block RSR' or QR pattern in V1 suggests right ventricular conduction delay Prolonged QT Abnormal ECG When compared with ECG of 30-JUL-2024 02:17, No significant change was found Confirmed by Brenden Esparza ([id number]) on 8/5/2024 9:36:22 PM    EEG    IMPRESSION Impression This routine EEG is consistent with 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 No seizures are seen. A full report will be scanned into the patient's chart at a later time. This report has been interpreted and electronically signed by    CT head wo IV contrast    Result Date: 8/5/2024  Interpreted By:  Missy Wilson, STUDY: CT HEAD WO IV CONTRAST;  8/5/2024 2:47 pm   INDICATION: Signs/Symptoms:altered mental status. 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   FINDINGS: All caps the periventricular and subcortical white matter changes are present.   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 Left-sided subarachnoid hemorrhage is again noted as well as subarachnoid hemorrhage in the basilar cisterns which appears to be slightly less prominent than on the prior exam..   4 mm left frontal subdural hematoma is again seen, without significant change. Also noted is a left occipital subdural hematoma measuring proximally 4 mm. It is also unchanged.   Diffuse left cerebral edema is noted with effacement of the sulci.   4.6 mm left-to-right midline shift is again seen, which not  significantly changed.   Again noted is large area of hypodensity in the right parietal region, which most likely is related to a prior infarct and is unchanged.   There appears to be a small area of hyperdensity in the right occipital lobe which may be new area of parenchymal or subarachnoid hemorrhage.   The sulci and ventricles are prominent in the right cerebral hemisphere   INTRACRANIAL VESSELS: No significant atherosclerotic calcification.   EXTRACRANIAL SOFT TISSUES: Within normal limits.   PARANASAL SINUSES/MASTOIDS: Within normal limits.   CALVARIUM: Scratch the lucent lesion is again seen in the left occipital bone. Depressed skull fracture.       Possible small area of new parenchymal or subarachnoid hemorrhage in the right occipital lobe.   Redemonstration of left subdural hematomas, which are unchanged.   Redemonstration of left subarachnoid hemorrhage and small amount of subarachnoid hemorrhage in the basilar cisterns which appear to be slightly less prominent than on the prior exam.   Redemonstration of left surgery edema with 4.6 mm left-to-right midline shift, which has not significantly changed   Redemonstration of basilar tip aneurysm.   MACRO: Critical Finding:  See findings. Notification was initiated on 8/5/2024 at 3:07 pm by  Missy Wilson.  (**-OCF-**)   Signed by: Missy Wilson 8/5/2024 3:07 PM Dictation workstation:   [id number]    CT head wo IV contrast    Result Date: 8/1/2024  Interpreted By:  Karina Spann, STUDY: CT HEAD WO IV CONTRAST;  8/1/2024 4:25 pm   INDICATION: Signs/Symptoms:New visual field deficit, want to ensure stability of known SAH/subdural.   COMPARISON: 07/30/2024 at 3:33 p.m.   ACCESSION NUMBER(S): [id number]   ORDERING CLINICIAN: SANDIP HINOSN   TECHNIQUE: Unenhanced CT images of the head were obtained.   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 Acute subarachnoid hemorrhage involving the left cerebral hemisphere and basal cisterns similar in size but overall  slightly less dense than on the previous exam. Acute left-sided subdural hematomas measuring up to 5 mm in the frontal region similar to the prior exam. There is mild diffuse left cerebral edema with sulcal effacement and approximately 5 mm of midline shift at the level of the 3rd ventricle similar to the prior exam. No new acute intracranial hemorrhage. Stable low-density likely remote infarct in the right parietal region with porencephalic dilatation of the posterior horn of the right lateral ventricle.   Stable subcentimeter lucent lesion in the occipital calvarium just to the left of midline image 16/36.   thickening in the left sphenoid and partially imaged right maxillary sinuses. Remaining visualized paranasal sinuses are clear.       Redemonstration of basilar tip aneurysm with acute subarachnoid and left-sided subdural hematomas,  cerebral edema and mild midline shift. No significant change from 07/30/2024 at 3:33 p.m..   MACRO: None.   Signed by: Karina Spann 8/1/2024 4:48 PM Dictation workstation:   [id number]    CT head wo IV contrast    Result Date: 7/30/2024  Interpreted By:  Varghese Rapp, STUDY: CT HEAD WO IV CONTRAST;  7/30/2024 3:35 pm   INDICATION: Signs/Symptoms:6hr eval of SAH, SDH.   COMPARISON: Most recent prior is from 07/30/2024 at 7:31 a.m...   ACCESSION NUMBER(S): [id number]   ORDERING CLINICIAN: JOSE WAITE   TECHNIQUE: Routine axial images were obtained from the skull base through the vertex.  Sagittal and coronal reconstruction images were generated. Brain, subdural, and bone windows were reviewed. N/A Unavailable   FINDINGS:   Stable appearance 9 mm basilar tip aneurysm. There is persistent subdural blood adjacent to the posterior aspect of the falx and along the superior aspect of the left tentorium. There is stable subarachnoid blood along the right side of the mar and midbrain and also along the posterior and lateral aspect of the mar and midbrain on the left. Stable  subarachnoid blood in multiple left parietal fissures including the left sylvian fissure. Small grossly stable anterolateral left frontal subdural hematoma measuring 6 mm in thickness; small amount stable acute subdural blood along the anterolateral aspect of the left temporal lobe measuring approximately 2 mm in transverse thickness. Stable lateral and posterior high left parietal subdural hematoma measuring 7 mm in transverse thickness. There is approximately 3.6 mm of rightward displacement of the midline at the level of the septum pellucidum, compared to 4.2 mm earlier today. There is mild stable anterior inferior bifrontal subarachnoid blood. No gross intraventricular blood.   Underlying mild prominence of ventricles and sulci consistent with volume loss. There is a small to moderate-sized old infarct in the lateral right parietal lobe. No indication of acute infarct.   Small retention cyst in the left sphenoid sinus. The bilateral mastoids were clear. No paranasal sinus fluid level. No destructive calvarial lesion or depressed skull fracture.         Grossly stable basilar tip aneurysm.   Acute subdural and subarachnoid blood as described, with no significant change from the exam earlier this morning.   Underlying volume loss.   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   MACRO: None   Signed by: Varghese Rapp 7/30/2024 4:03 PM Dictation workstation:   [id number]    ECG 12 lead    Result Date: 7/30/2024  Normal sinus rhythm Incomplete right bundle branch block Cannot rule out Anterior infarct , age undetermined Abnormal ECG When compared with ECG of 24-JUL-2024 11:09, Inverted T waves have replaced nonspecific T wave abnormality in Inferior leads See ED provider note for full interpretation and clinical correlation Confirmed by Heather Harris ([id number]) on 7/30/2024 11:22:30 AM    Results for orders placed or performed during the hospital encounter of 07/30/24 (from the past 24 hour(s))   POCT  GLUCOSE   Result Value Ref Range    POCT Glucose 92 74 - 99 mg/dL   CBC and Auto Differential   Result Value Ref Range    WBC 9.5 4.4 - 11.3 x10*3/uL    nRBC 0.0 0.0 - 0.0 /100 WBCs    RBC 3.10 (L) 4.00 - 5.20 x10*6/uL    Hemoglobin 10.2 (L) 12.0 - 16.0 g/dL    Hematocrit 31.5 (L) 36.0 - 46.0 %     (H) 80 - 100 fL    MCH 32.9 26.0 - 34.0 pg    MCHC 32.4 32.0 - 36.0 g/dL    RDW 14.1 11.5 - 14.5 %    Platelets 354 150 - 450 x10*3/uL    Neutrophils % 65.3 40.0 - 80.0 %    Immature Granulocytes %, Automated 0.4 0.0 - 0.9 %    Lymphocytes % 13.8 13.0 - 44.0 %    Monocytes % 19.4 2.0 - 10.0 %    Eosinophils % 0.5 0.0 - 6.0 %    Basophils % 0.6 0.0 - 2.0 %    Neutrophils Absolute 6.17 1.20 - 7.70 x10*3/uL    Immature Granulocytes Absolute, Automated 0.04 0.00 - 0.70 x10*3/uL    Lymphocytes Absolute 1.31 1.20 - 4.80 x10*3/uL    Monocytes Absolute 1.84 (H) 0.10 - 1.00 x10*3/uL    Eosinophils Absolute 0.05 0.00 - 0.70 x10*3/uL    Basophils Absolute 0.06 0.00 - 0.10 x10*3/uL   Basic metabolic panel   Result Value Ref Range    Glucose 92 74 - 99 mg/dL    Sodium 127 (L) 136 - 145 mmol/L    Potassium 3.6 3.5 - 5.3 mmol/L    Chloride 95 (L) 98 - 107 mmol/L    Bicarbonate 16 (L) 21 - 32 mmol/L    Anion Gap 20 10 - 20 mmol/L    Urea Nitrogen 11 6 - 23 mg/dL    Creatinine 0.55 0.50 - 1.05 mg/dL    eGFR >90 >60 mL/min/1.73m*2    Calcium 8.2 (L) 8.6 - 10.3 mg/dL   POCT GLUCOSE   Result Value Ref Range    POCT Glucose 91 74 - 99 mg/dL   POCT GLUCOSE   Result Value Ref Range    POCT Glucose 91 74 - 99 mg/dL   Electrocardiogram, 12-lead PRN ACS symptoms   Result Value Ref Range    Ventricular Rate 100 BPM    Atrial Rate 100 BPM    NJ Interval 186 ms    QRS Duration 94 ms    QT Interval 384 ms    QTC Calculation(Bazett) 495 ms    P Axis 55 degrees    R Axis 16 degrees    T Axis -1 degrees    QRS Count 16 beats    Q Onset 222 ms    P Onset 129 ms    P Offset 174 ms    T Offset 414 ms    QTC Fredericia 455 ms   POCT GLUCOSE   Result  Value Ref Range    POCT Glucose 85 74 - 99 mg/dL   POCT GLUCOSE   Result Value Ref Range    POCT Glucose 100 (H) 74 - 99 mg/dL            Assessment/Plan   IMP:  IMP:  62 yo F with PMH of AUD, CVA hx, ?cirrhosis(found on imaging, no evidence of synthetic dysfunction on recent labs) recent humeral fracture 2/2 fall who was admitted after fall at SNF ([facility]) and found to have LT sided SAH and SDH that have been stable on repeat imaging. Seen by NSGY who recommended conservative treatment and starting Keppra in light of her SAH/ SDH. She was treated w/ CIWA protocol with phenobarbital. Palliative was consulted for assistance with GOC and complex decision making. Repeat CT of head on 8/6/2024 with new possible small area of new parenchymal or subarachnoid hemorrhage in the right occipital lobe.       Patient somewhat improved today and she is now moving voluntarily but with questionable purpose. She is also attempting to communicate. She has had almost 2 complete courses of phenobarbital in recent weeks and this will likely take some time to wash out. Olanzapine at bedtime was not started yesterday and this is appropriate since she did not have behaviors. Would continue to try to optimize circadian rhythm with olanzapine and melatonin.     Recommendations:  Will continue discussion with family to formalize code status.  Continue with delirium recommendations as listed below  Will be reassessing for pain and symptoms over course of day.  Adding one time doses of olanzapine 2.5mg IM at bedtime and once PRN for agitation/behavior.   Adding Melatonin scheduled at bedtime.   Please avoid haldol and use olanzapine preferentially for behaviors.   Discussed avoiding chemical restraints with patient and opting for sitter if need by with RN. Also providing oral care for stimulation.  Will continue to follow patient.     - Recommend Delirium/Dementia Precautions:       - Minimize use of benzos, opiates,  "anticholinergics, as these may worsen mental status       - Would use caution with narcotic pain medications       - Would still adequately controlling pain, as uncontrolled pain is also a risk factor for delirium       - Reinforce sleep hygiene; encourage patient to stay awake during the day       - Keep curtains/blinds open during the day and closed at night.       - Would recommend reorienting/redirecting patient as much as possible,        - Aim for consistent staffing, familiar objects, avoiding bright lights and loud noises, etc.          Patient/proxy preference for information  Prefers full information    Goals of Care  Continue with Full Code. HCPSAVANNAH Colmenaressimba should have discussed adding safeguards in the form of DNR/DNI to prevent undue harm that would lead to drastic reduction in quality of life with questionable benefit.     Other Palliative Support  Music Therapy consulted.     [facility] conversation with family: I spoke with patient's mother (Madie), two sisters (Teena and Fabi), HCPOA and nephew Xiang and Xiang's father Cain regarding her current condition being related to at least her SAH/SDH, hospital delirium and medication side effect. She was unable to be engaged in conversation due to ongoing confusion but is now writhing and using all four extremities and saying \"come here\" while trying to pull herself out of bed.   We agreed that she is improving and that they wanted all measures possible taken to improve her chances of recovery to previous baseline. At the same time we agreed that given her comorbidities, possible new RT occipital bleed, previous level of independence, interventions such as CPR and intubation would likely cause more harm than good so they are agreeable to changing code status to DNR/DNI.       I spent 60 minutes in the professional and overall care of this patient.    I spent 30 minutes in the professional and overall care of this patient related to ACP in addition to other " time spent in assessment, chart review, and coordination of care           Dar Montelongo MD

## 2024-08-06 NOTE — PROGRESS NOTES
Care Coordinator Note:    Plan: patient in with fall and SDH. Pall care is following and adjusting meds. NSGY- no surg intv. Neuro consulted for change in MS- correct low sodium, plans for repeat head CT on 8/7    Status: inpatient  Payor: [employer] connected mycare dual  Disposition: [facility] - auth started 8/4. Not yet ready  Barrier: Head CT tomorrow, neuro clearance  ADOD: 1-2 days    Nuvia Goss TCC      08/06/24 [id number]   Discharge Planning   Expected Discharge Disposition SNF

## 2024-08-06 NOTE — DOCUMENTATION CLARIFICATION NOTE
"    PATIENT:               SEMAJ PARHAM  ACCT #:                  [id number]  MRN:                       [id number]  :                       1960  ADMIT DATE:       2024 12:29 AM  DISCH DATE:  RESPONDING PROVIDER #:        [id number]          PROVIDER RESPONSE TEXT:    Concur with dietician, Severe Protein Calorie Malnutrition    CDI QUERY TEXT:    Clarification        Instruction:    Based on your assessment of the patient and the clinical information, please provide the requested documentation by clicking on the appropriate radio button and enter any additional information if prompted.    Question: Is there a diagnosis for this patient's nutritional status    When answering this query, please exercise your independent professional judgment. The fact that a question is being asked, does not imply that any particular answer is desired or expected.    The patient's clinical indicators include:  Clinical Information: 64 year old woman with fall at SNF. Pt's diagnoses include traumatic left sided subdural hematoma, subarachnoid hemorrhage, and toxic/metabolic encephalopathy. Pt's PMH includes CVA, anemia, alcohol abuse, and seizures.    Clinical Indicators:  Nutrition Consult : CALLIE Galvin  \"Severe malnutrition related to acute disease or injury  As Evidenced by: greater than 7.5% weight loss in 3 months, prolonged poor intake during hospital admit less than 50% of estimated energy needs for 5 days, mild fat loss and moderate muscle loss.\"    Height: 167.6 cm  Weight: 51.2 kg  BMI (Calculated): 18.23    Treatment: Ensure Plus high protein: 3 times daily: -ongoing    Risk Factors: age, hx CVA, SNF resident, hx alcohol abuse  Options provided:  -- Concur with dietician, Severe Protein Calorie Malnutrition  -- Other - I will add my own diagnosis  -- Refer to Clinical Documentation Reviewer    Query created by: Ev German on 2024 9:00 AM      Electronically signed by:  SANDIP HINSON MD 2024 3:07 " PM

## 2024-08-06 NOTE — CONSULTS
Consults    History Of Present Illness  Ms. Sanz is a 63 y.o. woman with PMHx of stroke d/t embolic occlusion of RCA (1/28/21), left humerus fracture s/p intramedullary nail humerus (6/19/24), , EtOH use disorder, EtOH related seizure, anemia, depression, anxiety, HTN, HLD, and anxiety presented to the ED from SNF ([facility]) after a fall from standing.  On presentation to the ED, patient with left occiput hematoma, A+O x 3 and moving all extremities Started on CIWA protocol d/t concern for alcohol withdrawal until 8/2. On Keppra for seizure prophylaxis which was discontinued today.   Past Medical History  Past Medical History:   Diagnosis Date    Acute CVA (cerebrovascular accident) (Multi) 05/30/2023    Alcohol related seizure (Multi) 05/30/2023    Cerebral hemorrhage (Multi) 05/30/2023    Cerebral infarction due to embolism of right middle cerebral artery (Multi) 05/30/2023    Diarrhea, unspecified 08/10/2022    Diarrhea    Encounter for follow-up examination after completed treatment for conditions other than malignant neoplasm 04/20/2020    Hospital discharge follow-up    Encounter for other screening for malignant neoplasm of breast 03/08/2022    Breast screening    Encounter for screening for malignant neoplasm of colon 05/16/2022    Colon cancer screening    Other abnormality of red blood cells 08/10/2022    Elevated MCV    Personal history of other endocrine, nutritional and metabolic disease     History of high cholesterol    Personal history of transient ischemic attack (TIA), and cerebral infarction without residual deficits     History of cerebrovascular accident     Surgical History  Past Surgical History:   Procedure Laterality Date    MR HEAD ANGIO WO IV CONTRAST  5/19/2021    MR HEAD ANGIO WO IV CONTRAST 5/19/2021 [facility]    MR HEAD ANGIO WO IV CONTRAST  7/12/2018    MR HEAD ANGIO WO IV CONTRAST 7/12/2018 [facility] CLINICAL LEGAC[facility]    MR NECK ANGIO WO IV CONTRAST  5/19/2021    MR NECK  ANGIO WO IV CONTRAST 2021 [facility] EMERGENCY LEGAC[facility]     Social History  Social History     Tobacco Use    Smoking status: Never     Passive exposure: Never    Smokeless tobacco: Never   Vaping Use    Vaping status: Never Used   Substance Use Topics    Alcohol use: Not Currently     Comment: none currenlty    Drug use: Not Currently     Allergies  Amoxicillin  Medications Prior to Admission   Medication Sig Dispense Refill Last Dose    aspirin 81 mg EC tablet Take 1 tablet (81 mg) by mouth once daily. She has been out for a week       atorvastatin (Lipitor) 40 mg tablet Take 1 tablet (40 mg) by mouth once daily. 90 tablet 1     calcium carbonate-vitamin D3 500 mg-5 mcg (200 unit) tablet Take 1 tablet by mouth 2 times a day. 180 tablet 0     [] cephalexin (Keflex) 500 mg capsule Take 1 capsule (500 mg) by mouth 4 times a day for 4 days. 4 capsule      clopidogrel (Plavix) 75 mg tablet Take 1 tablet (75 mg) by mouth once daily. 90 tablet 1     cyclobenzaprine (Flexeril) 10 mg tablet Take 1 tablet (10 mg) by mouth 3 times a day as needed for muscle spasms for up to 10 days. 20 tablet 0     folic acid (Folvite) 1 mg tablet Take 1 tablet (1 mg) by mouth once daily. 90 tablet 0     levETIRAcetam (Keppra) 500 mg tablet Take 1 tablet (500 mg) by mouth 2 times a day. 180 tablet 1     metoprolol tartrate (Lopressor) 25 mg tablet Take 1 tablet (25 mg) by mouth 2 times a day. 60 tablet 0     multivitamin with minerals tablet Take 1 tablet by mouth once daily.       PHENobarbitaL (Luminal) 32.4 mg tablet Take 2 tablets (64.8 mg) by mouth 3 times a day for 1 day, THEN 1 tablet (32.4 mg) 3 times a day for 2 days.       polyethylene glycol (Glycolax, Miralax) 17 gram packet Take 17 g by mouth once daily.       potassium chloride CR (Klor-Con M20) 20 mEq ER tablet Take 1 tablet (20 mEq) by mouth once daily. Monitor K levels at [facility]       sertraline (Zoloft) 50 mg tablet Take 1 tablet (50 mg) by mouth once daily. 90 tablet 1      "[] sodium bicarbonate 650 mg tablet Take 1 tablet (650 mg) by mouth 2 times a day for 3 days.       thiamine (Vitamin B-1) 100 mg tablet Take 1 tablet (100 mg) by mouth once daily. Do not fill before 2024.          Review of Systems  Neurological Exam  Physical Exam  General: Nontoxic appearing, in no acute distress  Neurological Exam: Awake, alert. Irritable, pupils equal and reactive to light, partially follows commands, moves all extremities spontaneously, withdraws to noxious stimuli in 4 extremities. No adventitious movements appreciated.   DTRs hyperactive symmetric  Plantar response mute bilaterally  Last Recorded Vitals  Blood pressure 159/79, pulse (!) 111, temperature 36.6 °C (97.9 °F), resp. rate 18, height 1.676 m (5' 5.98\"), weight 52.2 kg (115 lb), SpO2 97%.    Relevant Results  Scheduled medications  [Held by provider] amLODIPine, 5 mg, oral, Daily  [Held by provider] folic acid, 1 mg, oral, Daily  insulin lispro, 0-5 Units, subcutaneous, q4h  metoprolol, 5 mg, intravenous, q6h  [Held by provider] multivitamin with minerals, 1 tablet, oral, Daily  OLANZapine, 2.5 mg, intramuscular, Once  [Held by provider] pantoprazole, 40 mg, oral, Daily before breakfast  [Held by provider] sertraline, 50 mg, oral, Daily      Continuous medications  sodium chloride 0.9%, 75 mL/hr, Last Rate: 75 mL/hr (24 1545)      PRN medications  PRN medications: acetaminophen, dextrose, dextrose, glucagon, glucagon, hydrALAZINE, hydrALAZINE, oxygen   Results for orders placed or performed during the hospital encounter of 24 (from the past 24 hour(s))   POCT GLUCOSE   Result Value Ref Range    POCT Glucose 111 (H) 74 - 99 mg/dL   POCT GLUCOSE   Result Value Ref Range    POCT Glucose 105 (H) 74 - 99 mg/dL   POCT GLUCOSE   Result Value Ref Range    POCT Glucose 92 74 - 99 mg/dL   CBC and Auto Differential   Result Value Ref Range    WBC 9.5 4.4 - 11.3 x10*3/uL    nRBC 0.0 0.0 - 0.0 /100 WBCs    RBC 3.10 " (L) 4.00 - 5.20 x10*6/uL    Hemoglobin 10.2 (L) 12.0 - 16.0 g/dL    Hematocrit 31.5 (L) 36.0 - 46.0 %     (H) 80 - 100 fL    MCH 32.9 26.0 - 34.0 pg    MCHC 32.4 32.0 - 36.0 g/dL    RDW 14.1 11.5 - 14.5 %    Platelets 354 150 - 450 x10*3/uL    Neutrophils % 65.3 40.0 - 80.0 %    Immature Granulocytes %, Automated 0.4 0.0 - 0.9 %    Lymphocytes % 13.8 13.0 - 44.0 %    Monocytes % 19.4 2.0 - 10.0 %    Eosinophils % 0.5 0.0 - 6.0 %    Basophils % 0.6 0.0 - 2.0 %    Neutrophils Absolute 6.17 1.20 - 7.70 x10*3/uL    Immature Granulocytes Absolute, Automated 0.04 0.00 - 0.70 x10*3/uL    Lymphocytes Absolute 1.31 1.20 - 4.80 x10*3/uL    Monocytes Absolute 1.84 (H) 0.10 - 1.00 x10*3/uL    Eosinophils Absolute 0.05 0.00 - 0.70 x10*3/uL    Basophils Absolute 0.06 0.00 - 0.10 x10*3/uL   Basic metabolic panel   Result Value Ref Range    Glucose 92 74 - 99 mg/dL    Sodium 127 (L) 136 - 145 mmol/L    Potassium 3.6 3.5 - 5.3 mmol/L    Chloride 95 (L) 98 - 107 mmol/L    Bicarbonate 16 (L) 21 - 32 mmol/L    Anion Gap 20 10 - 20 mmol/L    Urea Nitrogen 11 6 - 23 mg/dL    Creatinine 0.55 0.50 - 1.05 mg/dL    eGFR >90 >60 mL/min/1.73m*2    Calcium 8.2 (L) 8.6 - 10.3 mg/dL   POCT GLUCOSE   Result Value Ref Range    POCT Glucose 91 74 - 99 mg/dL   POCT GLUCOSE   Result Value Ref Range    POCT Glucose 91 74 - 99 mg/dL      Electrocardiogram, 12-lead PRN ACS symptoms    Result Date: 8/5/2024  Sinus rhythm with 1st degree AV block RSR' or QR pattern in V1 suggests right ventricular conduction delay Prolonged QT Abnormal ECG When compared with ECG of 30-JUL-2024 02:17, No significant change was found Confirmed by Brenden Esparza ([id number]) on 8/5/2024 9:36:22 PM    EEG    IMPRESSION Impression This routine EEG is consistent with 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 No seizures are seen. A full report will be scanned into the patient's chart at a later time. This report has been  interpreted and electronically signed by    CT head wo IV contrast    Result Date: 8/5/2024  Interpreted By:  Missy Wilson, STUDY: CT HEAD WO IV CONTRAST;  8/5/2024 2:47 pm   INDICATION: Signs/Symptoms:altered mental status. 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   FINDINGS: All caps the periventricular and subcortical white matter changes are present.   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 Left-sided subarachnoid hemorrhage is again noted as well as subarachnoid hemorrhage in the basilar cisterns which appears to be slightly less prominent than on the prior exam..   4 mm left frontal subdural hematoma is again seen, without significant change. Also noted is a left occipital subdural hematoma measuring proximally 4 mm. It is also unchanged.   Diffuse left cerebral edema is noted with effacement of the sulci.   4.6 mm left-to-right midline shift is again seen, which not significantly changed.   Again noted is large area of hypodensity in the right parietal region, which most likely is related to a prior infarct and is unchanged.   There appears to be a small area of hyperdensity in the right occipital lobe which may be new area of parenchymal or subarachnoid hemorrhage.   The sulci and ventricles are prominent in the right cerebral hemisphere   INTRACRANIAL VESSELS: No significant atherosclerotic calcification.   EXTRACRANIAL SOFT TISSUES: Within normal limits.   PARANASAL SINUSES/MASTOIDS: Within normal limits.   CALVARIUM: Scratch the lucent lesion is again seen in the left occipital bone. Depressed skull fracture.       Possible small area of new parenchymal or subarachnoid hemorrhage in the right  occipital lobe.   Redemonstration of left subdural hematomas, which are unchanged.   Redemonstration of left subarachnoid hemorrhage and small amount of subarachnoid hemorrhage in the basilar cisterns which appear to be slightly less prominent than on the prior exam.   Redemonstration of left surgery edema with 4.6 mm left-to-right midline shift, which has not significantly changed   Redemonstration of basilar tip aneurysm.   MACRO: Critical Finding:  See findings. Notification was initiated on 8/5/2024 at 3:07 pm by  Missy Wilson.  (**-OCF-**)   Signed by: Missy Wilson 8/5/2024 3:07 PM Dictation workstation:   [id number]    CT head wo IV contrast    Result Date: 8/1/2024  Interpreted By:  Karina Spann, STUDY: CT HEAD WO IV CONTRAST;  8/1/2024 4:25 pm   INDICATION: Signs/Symptoms:New visual field deficit, want to ensure stability of known SAH/subdural.   COMPARISON: 07/30/2024 at 3:33 p.m.   ACCESSION NUMBER(S): [id number]   ORDERING CLINICIAN: SANDIP HINSON   TECHNIQUE: Unenhanced CT images of the head were obtained.   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 Acute subarachnoid hemorrhage involving the left cerebral hemisphere and basal cisterns similar in size but overall slightly less dense than on the previous exam. Acute left-sided subdural hematomas measuring up to 5 mm in the frontal region similar to the prior exam. There is mild diffuse left cerebral edema with sulcal effacement and approximately 5 mm of midline shift at the level of the 3rd ventricle similar to the prior exam. No new acute intracranial hemorrhage. Stable low-density likely remote infarct in the right parietal region with porencephalic dilatation of the posterior horn of the right lateral ventricle.   Stable subcentimeter lucent lesion in the occipital calvarium just to the left of midline image 16/36.   thickening in the left sphenoid and partially imaged right maxillary sinuses. Remaining visualized paranasal sinuses are clear.        Redemonstration of basilar tip aneurysm with acute subarachnoid and left-sided subdural hematomas,  cerebral edema and mild midline shift. No significant change from 07/30/2024 at 3:33 p.m..   MACRO: None.   Signed by: Karina Spann 8/1/2024 4:48 PM Dictation workstation:   [id number]    CT head wo IV contrast    Result Date: 7/30/2024  Interpreted By:  Varghese Rapp, STUDY: CT HEAD WO IV CONTRAST;  7/30/2024 3:35 pm   INDICATION: Signs/Symptoms:6hr eval of SAH, SDH.   COMPARISON: Most recent prior is from 07/30/2024 at 7:31 a.m...   ACCESSION NUMBER(S): [id number]   ORDERING CLINICIAN: JOSE WAITE   TECHNIQUE: Routine axial images were obtained from the skull base through the vertex.  Sagittal and coronal reconstruction images were generated. Brain, subdural, and bone windows were reviewed. N/A Unavailable   FINDINGS:   Stable appearance 9 mm basilar tip aneurysm. There is persistent subdural blood adjacent to the posterior aspect of the falx and along the superior aspect of the left tentorium. There is stable subarachnoid blood along the right side of the mar and midbrain and also along the posterior and lateral aspect of the mar and midbrain on the left. Stable subarachnoid blood in multiple left parietal fissures including the left sylvian fissure. Small grossly stable anterolateral left frontal subdural hematoma measuring 6 mm in thickness; small amount stable acute subdural blood along the anterolateral aspect of the left temporal lobe measuring approximately 2 mm in transverse thickness. Stable lateral and posterior high left parietal subdural hematoma measuring 7 mm in transverse thickness. There is approximately 3.6 mm of rightward displacement of the midline at the level of the septum pellucidum, compared to 4.2 mm earlier today. There is mild stable anterior inferior bifrontal subarachnoid blood. No gross intraventricular blood.   Underlying mild prominence of ventricles and sulci consistent  with volume loss. There is a small to moderate-sized old infarct in the lateral right parietal lobe. No indication of acute infarct.   Small retention cyst in the left sphenoid sinus. The bilateral mastoids were clear. No paranasal sinus fluid level. No destructive calvarial lesion or depressed skull fracture.         Grossly stable basilar tip aneurysm.   Acute subdural and subarachnoid blood as described, with no significant change from the exam earlier this morning.   Underlying volume loss.   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   MACRO: None   Signed by: Varghese Rapp 7/30/2024 4:03 PM Dictation workstation:   [id number]    ECG 12 lead    Result Date: 7/30/2024  Normal sinus rhythm Incomplete right bundle branch block Cannot rule out Anterior infarct , age undetermined Abnormal ECG When compared with ECG of 24-JUL-2024 11:09, Inverted T waves have replaced nonspecific T wave abnormality in Inferior leads See ED provider note for full interpretation and clinical correlation Confirmed by Heather Harris ([id number]) on 7/30/2024 11:22:30 AM    CT angio head and neck w and wo IV contrast    Result Date: 7/30/2024  Interpreted By:  Roderick Lin, STUDY: CT ANGIO HEAD AND NECK W AND WO IV CONTRAST   INDICATION: Signs/Symptoms:c/f aneurysm   COMPARISON: Head CT 07/30/2024.   ACCESSION NUMBER(S): [id number]   ORDERING CLINICIAN: ERASMO MARLOW   TECHNIQUE: CTA of the head and neck was performed after the intravenous administration of 75 mL Omnipaque 350 nonionic IV contrast. 3-D reconstructions were performed under physician supervision on a separate workstation and reviewed.   FINDINGS: CTA NECK:   AORTIC ARCH: The visualized portion of the aortic arch is unremarkable in appearance. The origins of the great vessels and the vertebral arteries are normal without evidence of stenosis.   CERVICAL CAROTID ARTERIES: The common carotid arteries are patent bilaterally without evidence of stenosis.  Atherosclerotic disease of bilateral carotid bifurcations resulting in mild (less than 50%) luminal stenosis of bilateral proximal cervical ICAs. Remainder of the cervical ICAs are patent throughout their course.   VERTEBRAL ARTERIES: The vertebral arteries are patent bilaterally throughout their course noting slight diminutive caliber of the left vertebral artery.   CTA HEAD:   INTERNAL CAROTID ARTERIES: Atherosclerotic disease of bilateral carotid siphons, which remain overall patent. Intracranial ICAs are otherwise normal.   CEREBRAL ARTERIES: No aneurysm appreciated. Left MCA branches appear patent and grossly symmetric with right MCA branches; no definitive findings to suggest vasospasm. Bilateral ACAs are within normal limits. Bilateral PCAs are predominantly supplied by the posterior communicating arteries.   VERTEBROBASILAR SYSTEM: Left intradural vertebral artery is again diminutive and is severely narrowed in caliber distal to the PICA branch. Right intradural vertebral artery is unremarkable. Basilar artery is patent but is again diminutive secondary to PCOM dominance. No evidence of basilar tip aneurysm.   There is no evidence for saccular aneurysm, vascular malformation, or large vessel occlusion.   OTHER: Known extensive subarachnoid and subdural hemorrhages are better assessed on noncontrast head CT performed earlier today.       No evidence of basilar tip aneurysm. Previously noted rounded hyperdensity corresponds to subarachnoid hemorrhage extending into the supra cerebellar cistern.   Mild scattered atherosclerotic disease of the cervical and intracranial vasculature as detailed.   Please refer to noncontrast head CT for complete evaluation of subarachnoid and subdural hemorrhages. No definitive evidence of vasospasm at this time.   _____________ NASCET criteria for internal carotid artery stenosis: Mild: 0% to 49% Moderate: 50% to 69% Severe: 70% to 99% Complete Occlusion   Signed by: Roderick Lin  7/30/2024 9:11 AM Dictation workstation:   [id number]    CT head wo IV contrast    Result Date: 7/30/2024  Interpreted By:  Jean Hester, STUDY: CT HEAD WO IV CONTRAST;  7/30/2024 7:32 am   INDICATION: Signs/Symptoms:sah sdh.   COMPARISON: CT Brain, earlier same morning 30 July 2024 at 0122 hours   ACCESSION NUMBER(S): [id number]   ORDERING CLINICIAN: CAMILLA MILLER   TECHNIQUE: CT of the brain from the skull vertex to the skull base, without intravenous contrast   FINDINGS: No interval change to the abnormal CT appearance of the brain. 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 It has not changed in size from earlier this morning   All of the acute intra-axial and extra-axial hemorrhages and mild left-to-right midline shift is all unchanged   No discernible enlarging aneurysm or worsening hemorrhage   MACRO: None   Signed by: Jean Hester 7/30/2024 7:41 AM Dictation workstation:   [id number]    CT head wo IV contrast    Result Date: 7/30/2024  Interpreted By:  Sandro Roe, STUDY: CT HEAD WO IV CONTRAST; CT CERVICAL SPINE WO IV CONTRAST;  7/30/2024 1:24 am   INDICATION: Signs/Symptoms:fall back of head   COMPARISON: None.   ACCESSION NUMBER(S): [id number]; [id number]   ORDERING CLINICIAN: CAMILLA MILLER   TECHNIQUE: Axial noncontrast CT images of head with coronal and  sagittal reconstructed images. Axial noncontrast CT images of the cervical spine with coronal and sagittal reconstructed images.   FINDINGS: CT HEAD:   Abnormal examination. There is a subdural hematoma seen in the left measuring about 5 mm anteriorly and 8-9 mm posteriorly. Additionally there is extensive subarachnoid hemorrhage noted along the basilar cisterns as well as layering along the left cerebral convexity. Superior to the cerebellum there is of focus of hemorrhage measuring up to about 1 cm   No acute skull fracture.   Global volume loss. Evidence of prior chronic small vessel ischemic change seen in the right cerebral hemisphere there may be minimal midline shift to the right of 2-3 mm.   Soft tissue swelling seen in the left occipital parietal region.     CT CERVICAL SPINE:   Demineralization of the bones. Scattered multilevel degenerative disc disease. Robust vascular calcification.   Degenerative disc disease most notable C4-5, C5-6 and C6-7. No evidence of acute cervical spine fracture       Abnormal examination. There is extensive left cerebral hemispheric subarachnoid hemorrhage.. Basilar cistern subarachnoid hemorrhage also noted. There is small subdural hematoma also seen in the left more prominent posteriorly. Minimal midline shift to the right of 2-3 mm   Global volume loss. Evidence of prior ischemic event in the right cerebral hemisphere not significantly changed. No intraventricular hemorrhage.   No findings of acute cervical spine fracture     Sandro Roe discussed the significance and urgency of this critical finding by epic chat with  CAMILLA MILLER on 7/30/2024 at 1:57 am. (**-RCF-**) Findings:  See findings.       Signed by: Sandro Roe 7/30/2024 1:58 AM Dictation workstation:   [id number]    CT cervical spine wo IV contrast    Result Date: 7/30/2024  Interpreted By:  Sandro Roe, STUDY: CT HEAD WO IV CONTRAST; CT CERVICAL SPINE WO IV CONTRAST;  7/30/2024 1:24 am    INDICATION: Signs/Symptoms:fall back of head   COMPARISON: None.   ACCESSION NUMBER(S): [id number]; [id number]   ORDERING CLINICIAN: CAMILLA MILLER   TECHNIQUE: Axial noncontrast CT images of head with coronal and sagittal reconstructed images. Axial noncontrast CT images of the cervical spine with coronal and sagittal reconstructed images.   FINDINGS: CT HEAD:   Abnormal examination. There is a subdural hematoma seen in the left measuring about 5 mm anteriorly and 8-9 mm posteriorly. Additionally there is extensive subarachnoid hemorrhage noted along the basilar cisterns as well as layering along the left cerebral convexity. Superior to the cerebellum there is of focus of hemorrhage measuring up to about 1 cm   No acute skull fracture.   Global volume loss. Evidence of prior chronic small vessel ischemic change seen in the right cerebral hemisphere there may be minimal midline shift to the right of 2-3 mm.   Soft tissue swelling seen in the left occipital parietal region.     CT CERVICAL SPINE:   Demineralization of the bones. Scattered multilevel degenerative disc disease. Robust vascular calcification.   Degenerative disc disease most notable C4-5, C5-6 and C6-7. No evidence of acute cervical spine fracture       Abnormal examination. There is extensive left cerebral hemispheric subarachnoid hemorrhage.. Basilar cistern subarachnoid hemorrhage also noted. There is small subdural hematoma also seen in the left more prominent posteriorly. Minimal midline shift to the right of 2-3 mm   Global volume loss. Evidence of prior ischemic event in the right cerebral hemisphere not significantly changed. No intraventricular hemorrhage.   No findings of acute cervical spine fracture     Sandro Roe discussed the significance and urgency of this critical finding by epic chat with  CAMILLA MILLER on 7/30/2024 at 1:57 am. (**-RCF-**) Findings:  See findings.       Signed by: Sandro Roe 7/30/2024 1:58 AM Dictation  workstation:   [id number]    Lower extremity venous duplex left    Result Date: 7/26/2024             [place] [address]   Tel [phone] and Fax [phone]  Vascular Lab Report [facility] LOWER EXTREMITY VENOUS DUPLEX LEFT  Patient Name:      SEMAJ PARHAM         Reading Physician: [id number] Camacho MD Study Date:        7/26/2024           Ordering           [id number] BREANNA BERGERON                                        Physician: MRN/PID:           [id number]            Technologist:      Anny You RVT Accession#:        [id number]        Technologist 2: Date of Birth/Age: 1960 / 63      Encounter#:        [id number]                    years Gender:            F Admission Status:  Inpatient           Location           [facility]                                        Performed:  Diagnosis/ICD: Localized (leg) edema-R60.0 Indication:    Limb pain. CPT Codes:     24075 Peripheral venous duplex scan for DVT Limited  CONCLUSIONS: Right Lower Venous: The right common femoral vein demonstrates normal spontaneous and respirophasic flow. Left Lower Venous: No evidence of acute deep vein thrombus visualized in the left lower extremity. Lymph nodes noted in the left groin.  Imaging & Doppler Findings:  Right        Flow CFV   Spontaneous/Phasic  Left                  Compress Thrombus        Flow Distal External Iliac   Yes      None   Spontaneous/Phasic CFV                     Yes      None   Spontaneous/Phasic PFV                     Yes      None FV Proximal             Yes      None   Spontaneous/Phasic FV Mid                  Yes      None FV Distal               Yes      None Popliteal               Yes      None   Spontaneous/Phasic Peroneal                Yes      None PTV                     Yes      None  [id number] Bhakti Camacho MD Electronically signed by [id number] Bhakti Camacho MD on  7/26/2024 at 5:46:16 PM  ** Final **     ECG 12 lead    Result Date: 7/24/2024  Normal sinus rhythm Incomplete right bundle branch block Prolonged QT Abnormal ECG When compared with ECG of 21-JUN-2024 09:59, Nonspecific T wave abnormality no longer evident in Anterior leads See ED provider note for full interpretation and clinical correlation Confirmed by Ibis Tejada ([id number]) on 7/24/2024 4:57:43 PM    I have personally reviewed the following imaging results Electrocardiogram, 12-lead PRN ACS symptoms    Result Date: 8/5/2024  Sinus rhythm with 1st degree AV block RSR' or QR pattern in V1 suggests right ventricular conduction delay Prolonged QT Abnormal ECG When compared with ECG of 30-JUL-2024 02:17, No significant change was found Confirmed by Brenden Esparza ([id number]) on 8/5/2024 9:36:22 PM    EEG    IMPRESSION Impression This routine EEG is consistent with 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 No seizures are seen. A full report will be scanned into the patient's chart at a later time. This report has been interpreted and electronically signed by    CT head wo IV contrast    Result Date: 8/5/2024  Interpreted By:  Missy Wilson, STUDY: CT HEAD WO IV CONTRAST;  8/5/2024 2:47 pm   INDICATION: Signs/Symptoms:altered mental status. 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   FINDINGS: All caps the periventricular and subcortical white matter changes are present.   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 Left-sided subarachnoid hemorrhage is again noted as well as subarachnoid hemorrhage in the basilar cisterns which appears to be slightly less  prominent than on the prior exam..   4 mm left frontal subdural hematoma is again seen, without significant change. Also noted is a left occipital subdural hematoma measuring proximally 4 mm. It is also unchanged.   Diffuse left cerebral edema is noted with effacement of the sulci.   4.6 mm left-to-right midline shift is again seen, which not significantly changed.   Again noted is large area of hypodensity in the right parietal region, which most likely is related to a prior infarct and is unchanged.   There appears to be a small area of hyperdensity in the right occipital lobe which may be new area of parenchymal or subarachnoid hemorrhage.   The sulci and ventricles are prominent in the right cerebral hemisphere   INTRACRANIAL VESSELS: No significant atherosclerotic calcification.   EXTRACRANIAL SOFT TISSUES: Within normal limits.   PARANASAL SINUSES/MASTOIDS: Within normal limits.   CALVARIUM: Scratch the lucent lesion is again seen in the left occipital bone. Depressed skull fracture.       Possible small area of new parenchymal or subarachnoid hemorrhage in the right occipital lobe.   Redemonstration of left subdural hematomas, which are unchanged.   Redemonstration of left subarachnoid hemorrhage and small amount of subarachnoid hemorrhage in the basilar cisterns which appear to be slightly less prominent than on the prior exam.   Redemonstration of left surgery edema with 4.6 mm left-to-right midline shift, which has not significantly changed   Redemonstration of basilar tip aneurysm.   MACRO: Critical Finding:  See findings. Notification was initiated on 8/5/2024 at 3:07 pm by  Missy Wilson.  (**-OCF-**)   Signed by: Missy Wilson 8/5/2024 3:07 PM Dictation workstation:   [id number]    CT head wo IV contrast    Result Date: 8/1/2024  Interpreted By:  Karina Spann, STUDY: CT HEAD WO IV CONTRAST;  8/1/2024 4:25 pm   INDICATION: Signs/Symptoms:New visual field deficit, want to ensure stability of known  SAH/subdural.   COMPARISON: 07/30/2024 at 3:33 p.m.   ACCESSION NUMBER(S): [id number]   ORDERING CLINICIAN: SANDIP HINSON   TECHNIQUE: Unenhanced CT images of the head were obtained.   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 Acute subarachnoid hemorrhage involving the left cerebral hemisphere and basal cisterns similar in size but overall slightly less dense than on the previous exam. Acute left-sided subdural hematomas measuring up to 5 mm in the frontal region similar to the prior exam. There is mild diffuse left cerebral edema with sulcal effacement and approximately 5 mm of midline shift at the level of the 3rd ventricle similar to the prior exam. No new acute intracranial hemorrhage. Stable low-density likely remote infarct in the right parietal region with porencephalic dilatation of the posterior horn of the right lateral ventricle.   Stable subcentimeter lucent lesion in the occipital calvarium just to the left of midline image 16/36.   thickening in the left sphenoid and partially imaged right maxillary sinuses. Remaining visualized paranasal sinuses are clear.       Redemonstration of basilar tip aneurysm with acute subarachnoid and left-sided subdural hematomas,  cerebral edema and mild midline shift. No significant change from 07/30/2024 at 3:33 p.m..   MACRO: None.   Signed by: Karina Spann 8/1/2024 4:48 PM Dictation workstation:   [id number]    CT head wo IV contrast    Result Date: 7/30/2024  Interpreted By:  Varghese Rapp, STUDY: CT HEAD WO IV CONTRAST;  7/30/2024 3:35 pm   INDICATION: Signs/Symptoms:6hr eval of SAH, SDH.   COMPARISON: Most recent prior is from 07/30/2024 at 7:31 a.m...   ACCESSION NUMBER(S): [id number]   ORDERING CLINICIAN: JOSE WAITE   TECHNIQUE: Routine axial images were obtained from the skull base through the vertex.  Sagittal and coronal reconstruction images were generated. Brain, subdural, and bone windows were reviewed. N/A Unavailable   FINDINGS:   Stable  appearance 9 mm basilar tip aneurysm. There is persistent subdural blood adjacent to the posterior aspect of the falx and along the superior aspect of the left tentorium. There is stable subarachnoid blood along the right side of the mar and midbrain and also along the posterior and lateral aspect of the mar and midbrain on the left. Stable subarachnoid blood in multiple left parietal fissures including the left sylvian fissure. Small grossly stable anterolateral left frontal subdural hematoma measuring 6 mm in thickness; small amount stable acute subdural blood along the anterolateral aspect of the left temporal lobe measuring approximately 2 mm in transverse thickness. Stable lateral and posterior high left parietal subdural hematoma measuring 7 mm in transverse thickness. There is approximately 3.6 mm of rightward displacement of the midline at the level of the septum pellucidum, compared to 4.2 mm earlier today. There is mild stable anterior inferior bifrontal subarachnoid blood. No gross intraventricular blood.   Underlying mild prominence of ventricles and sulci consistent with volume loss. There is a small to moderate-sized old infarct in the lateral right parietal lobe. No indication of acute infarct.   Small retention cyst in the left sphenoid sinus. The bilateral mastoids were clear. No paranasal sinus fluid level. No destructive calvarial lesion or depressed skull fracture.         Grossly stable basilar tip aneurysm.   Acute subdural and subarachnoid blood as described, with no significant change from the exam earlier this morning.   Underlying volume loss.   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   MACRO: None   Signed by: Varghese Rapp 7/30/2024 4:03 PM Dictation workstation:   [id number]    ECG 12 lead    Result Date: 7/30/2024  Normal sinus rhythm Incomplete right bundle branch block Cannot rule out Anterior infarct , age undetermined Abnormal ECG When compared with ECG of  24-JUL-2024 11:09, Inverted T waves have replaced nonspecific T wave abnormality in Inferior leads See ED provider note for full interpretation and clinical correlation Confirmed by Heather Harris ([id number]) on 7/30/2024 11:22:30 AM    CT angio head and neck w and wo IV contrast    Result Date: 7/30/2024  Interpreted By:  Roderick Lin, STUDY: CT ANGIO HEAD AND NECK W AND WO IV CONTRAST   INDICATION: Signs/Symptoms:c/f aneurysm   COMPARISON: Head CT 07/30/2024.   ACCESSION NUMBER(S): [id number]   ORDERING CLINICIAN: ERASMO MARLOW   TECHNIQUE: CTA of the head and neck was performed after the intravenous administration of 75 mL Omnipaque 350 nonionic IV contrast. 3-D reconstructions were performed under physician supervision on a separate workstation and reviewed.   FINDINGS: CTA NECK:   AORTIC ARCH: The visualized portion of the aortic arch is unremarkable in appearance. The origins of the great vessels and the vertebral arteries are normal without evidence of stenosis.   CERVICAL CAROTID ARTERIES: The common carotid arteries are patent bilaterally without evidence of stenosis. Atherosclerotic disease of bilateral carotid bifurcations resulting in mild (less than 50%) luminal stenosis of bilateral proximal cervical ICAs. Remainder of the cervical ICAs are patent throughout their course.   VERTEBRAL ARTERIES: The vertebral arteries are patent bilaterally throughout their course noting slight diminutive caliber of the left vertebral artery.   CTA HEAD:   INTERNAL CAROTID ARTERIES: Atherosclerotic disease of bilateral carotid siphons, which remain overall patent. Intracranial ICAs are otherwise normal.   CEREBRAL ARTERIES: No aneurysm appreciated. Left MCA branches appear patent and grossly symmetric with right MCA branches; no definitive findings to suggest vasospasm. Bilateral ACAs are within normal limits. Bilateral PCAs are predominantly supplied by the posterior communicating arteries.   VERTEBROBASILAR SYSTEM: Left  intradural vertebral artery is again diminutive and is severely narrowed in caliber distal to the PICA branch. Right intradural vertebral artery is unremarkable. Basilar artery is patent but is again diminutive secondary to PCOM dominance. No evidence of basilar tip aneurysm.   There is no evidence for saccular aneurysm, vascular malformation, or large vessel occlusion.   OTHER: Known extensive subarachnoid and subdural hemorrhages are better assessed on noncontrast head CT performed earlier today.       No evidence of basilar tip aneurysm. Previously noted rounded hyperdensity corresponds to subarachnoid hemorrhage extending into the supra cerebellar cistern.   Mild scattered atherosclerotic disease of the cervical and intracranial vasculature as detailed.   Please refer to noncontrast head CT for complete evaluation of subarachnoid and subdural hemorrhages. No definitive evidence of vasospasm at this time.   _____________ NASCET criteria for internal carotid artery stenosis: Mild: 0% to 49% Moderate: 50% to 69% Severe: 70% to 99% Complete Occlusion   Signed by: Roderick Lin 7/30/2024 9:11 AM Dictation workstation:   [id number]    CT head wo IV contrast    Result Date: 7/30/2024  Interpreted By:  Jean Hester, STUDY: CT HEAD WO IV CONTRAST;  7/30/2024 7:32 am   INDICATION: Signs/Symptoms:sah sdh.   COMPARISON: CT Brain, earlier same morning 30 July 2024 at 0122 hours   ACCESSION NUMBER(S): [id number]   ORDERING CLINICIAN: CAMILLA MILLER   TECHNIQUE: CT of the brain from the skull vertex to the skull base, without intravenous contrast   FINDINGS: No interval change to the abnormal CT appearance of the brain. The following are all unchanged   Approximately 9 x 7 x 9 mm basilar tip berry aneurysm (which I have annotated on axial series 201, image 15, coronal series 203, image 78 and sagittal series 204, image 62); large amount of acute subarachnoid hemorrhage in the left cerebral hemisphere and basilar cisterns;  acute left subdural hematoma of variable thickness but not exceeding 8-9 mm thickness; mild left-to-right midline shift of just 2-3 mm       Abnormal but unchanged from earlier this morning. 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 It has not changed in size from earlier this morning   All of the acute intra-axial and extra-axial hemorrhages and mild left-to-right midline shift is all unchanged   No discernible enlarging aneurysm or worsening hemorrhage   MACRO: None   Signed by: Jean Hester 7/30/2024 7:41 AM Dictation workstation:   [id number]    CT head wo IV contrast    Result Date: 7/30/2024  Interpreted By:  Sandro Roe, STUDY: CT HEAD WO IV CONTRAST; CT CERVICAL SPINE WO IV CONTRAST;  7/30/2024 1:24 am   INDICATION: Signs/Symptoms:fall back of head   COMPARISON: None.   ACCESSION NUMBER(S): [id number]; [id number]   ORDERING CLINICIAN: CAMILLA MILLER   TECHNIQUE: Axial noncontrast CT images of head with coronal and sagittal reconstructed images. Axial noncontrast CT images of the cervical spine with coronal and sagittal reconstructed images.   FINDINGS: CT HEAD:   Abnormal examination. There is a subdural hematoma seen in the left measuring about 5 mm anteriorly and 8-9 mm posteriorly. Additionally there is extensive subarachnoid hemorrhage noted along the basilar cisterns as well as layering along the left cerebral convexity. Superior to the cerebellum there is of focus of hemorrhage measuring up to about 1 cm   No acute skull fracture.   Global volume loss. Evidence of prior chronic small vessel ischemic change seen in the right cerebral hemisphere there may be minimal midline shift to the right of 2-3 mm.   Soft tissue swelling seen in the left occipital parietal region.     CT CERVICAL SPINE:   Demineralization of the bones. Scattered multilevel  degenerative disc disease. Robust vascular calcification.   Degenerative disc disease most notable C4-5, C5-6 and C6-7. No evidence of acute cervical spine fracture       Abnormal examination. There is extensive left cerebral hemispheric subarachnoid hemorrhage.. Basilar cistern subarachnoid hemorrhage also noted. There is small subdural hematoma also seen in the left more prominent posteriorly. Minimal midline shift to the right of 2-3 mm   Global volume loss. Evidence of prior ischemic event in the right cerebral hemisphere not significantly changed. No intraventricular hemorrhage.   No findings of acute cervical spine fracture     Sandro Roe discussed the significance and urgency of this critical finding by epic chat with  CAMILLA MILLER on 7/30/2024 at 1:57 am. (**-RCF-**) Findings:  See findings.       Signed by: Sandro Roe 7/30/2024 1:58 AM Dictation workstation:   [id number]    CT cervical spine wo IV contrast    Result Date: 7/30/2024  Interpreted By:  Sandro Roe, STUDY: CT HEAD WO IV CONTRAST; CT CERVICAL SPINE WO IV CONTRAST;  7/30/2024 1:24 am   INDICATION: Signs/Symptoms:fall back of head   COMPARISON: None.   ACCESSION NUMBER(S): [id number]; [id number]   ORDERING CLINICIAN: CAMILLA MILLER   TECHNIQUE: Axial noncontrast CT images of head with coronal and sagittal reconstructed images. Axial noncontrast CT images of the cervical spine with coronal and sagittal reconstructed images.   FINDINGS: CT HEAD:   Abnormal examination. There is a subdural hematoma seen in the left measuring about 5 mm anteriorly and 8-9 mm posteriorly. Additionally there is extensive subarachnoid hemorrhage noted along the basilar cisterns as well as layering along the left cerebral convexity. Superior to the cerebellum there is of focus of hemorrhage measuring up to about 1 cm   No acute skull fracture.   Global volume loss. Evidence of prior chronic small vessel ischemic change seen in the right cerebral  hemisphere there may be minimal midline shift to the right of 2-3 mm.   Soft tissue swelling seen in the left occipital parietal region.     CT CERVICAL SPINE:   Demineralization of the bones. Scattered multilevel degenerative disc disease. Robust vascular calcification.   Degenerative disc disease most notable C4-5, C5-6 and C6-7. No evidence of acute cervical spine fracture       Abnormal examination. There is extensive left cerebral hemispheric subarachnoid hemorrhage.. Basilar cistern subarachnoid hemorrhage also noted. There is small subdural hematoma also seen in the left more prominent posteriorly. Minimal midline shift to the right of 2-3 mm   Global volume loss. Evidence of prior ischemic event in the right cerebral hemisphere not significantly changed. No intraventricular hemorrhage.   No findings of acute cervical spine fracture     Sandro Roe discussed the significance and urgency of this critical finding by epic chat with  CAMILLA MILLER on 7/30/2024 at 1:57 am. (**-RCF-**) Findings:  See findings.       Signed by: Sandro Roe 7/30/2024 1:58 AM Dictation workstation:   [id number]    Lower extremity venous duplex left    Result Date: 7/26/2024             [place] [address]   Tel [phone] and Fax [phone]  Vascular Lab Report VAS US LOWER EXTREMITY VENOUS DUPLEX LEFT  Patient Name:      SEMAJ Marina Physician: [id number] Bhakti Camacho MD Study Date:        7/26/2024           Ordering           [id number] BREANNA BERGERON                                        Physician: MRN/PID:           [id number]            Technologist:      Anny You T Accession#:        [id number]        Technologist 2: Date of Birth/Age: 1960 / 63      Encounter#:        [id number]                    years Gender:            F Admission Status:  Inpatient           Location            [facility]                                        Performed:  Diagnosis/ICD: Localized (leg) edema-R60.0 Indication:    Limb pain. CPT Codes:     69096 Peripheral venous duplex scan for DVT Limited  CONCLUSIONS: Right Lower Venous: The right common femoral vein demonstrates normal spontaneous and respirophasic flow. Left Lower Venous: No evidence of acute deep vein thrombus visualized in the left lower extremity. Lymph nodes noted in the left groin.  Imaging & Doppler Findings:  Right        Flow CFV   Spontaneous/Phasic  Left                  Compress Thrombus        Flow Distal External Iliac   Yes      None   Spontaneous/Phasic CFV                     Yes      None   Spontaneous/Phasic PFV                     Yes      None FV Proximal             Yes      None   Spontaneous/Phasic FV Mid                  Yes      None FV Distal               Yes      None Popliteal               Yes      None   Spontaneous/Phasic Peroneal                Yes      None PTV                     Yes      None  [id number] Bhakti Camacho MD Electronically signed by [id number] Bhakti Camacho MD on 7/26/2024 at 5:46:16 PM  ** Final **     ECG 12 lead    Result Date: 7/24/2024  Normal sinus rhythm Incomplete right bundle branch block Prolonged QT Abnormal ECG When compared with ECG of 21-JUN-2024 09:59, Nonspecific T wave abnormality no longer evident in Anterior leads See ED provider note for full interpretation and clinical correlation Confirmed by Ibis Tejada ([id number]) on 7/24/2024 4:57:43 PM    Assessment/Plan   Principal Problem:    Subdural hematoma (Multi)    Ms. Sanz is a 63 y.o. woman with PMHx of stroke d/t embolic occlusion of RCA (1/28/21), left humerus fracture s/p intramedullary nail humerus (6/19/24), , EtOH use disorder, EtOH related seizure, anemia, depression, anxiety, HTN, HLD, and anxiety presented to the ED from [facility]) after a fall from standing. On presentation to the ED, patient with left occiput  hematoma, A+O x 3 and moving all extremities Started on CIWA protocol d/t concern for alcohol withdrawal until 8/2. Neurological examination is limited, patient is alert, irritable, pupils equal and reactive to light, moves all extremities spontaneously, partially follows commands, DTRs are hyperactive symmetric, plantar response mute bilaterally.  CTH wo contrast on 8/5/2024 showed Possible small area of new parenchymal or subarachnoid hemorrhage in the right occipital lobe. Redemonstration of subarachnoid and left-sided subdural hematomas, evidence of prior ischemic event in the right cerebral hemisphere. Global volume loss. No intraventricular hemorrhage. Neurosurgery was consulted and didn't recommend any acute interventions.  Repeat CTH in 2 days or earlier if clinically indicated. EEG today showed right hemispheric highly epileptogenic structural lesion and a severe diffuse encephalopathy, no seizures are seen. patient didn't develop seizures recently, had alcohol withdrawal seizures in the past which is not an indication for long-term AEM. Her current SAH and SDH puts her at high risk of developing seizures, if he develops seizures she should be maintained on AEM. Patient has low sodium level of 127 today, I recommend slow correction with daily limit of 8-12 mmol/L.Given the history and today's evaluation, clinical picture is consistent with toxic/metabolic encephalopathy, contributing factors are recent SAH and SDH.    Recommendations:  SBP<160  Swallowing evaluation  ASA restart post bleed day 7, plavix restart post bleed day 14  Correct hyponatremia with daily limit of 8-12 mmol/L   Neuro checks q4h  Follow-up CTH in 2 days or earlier if clinically indicated.  I agree with palliative care recs for changing Haldol to second generation antipsychotic  Delirium precautions     I spent 73 minutes in the professional and overall care of this patient.      David Porras MD

## 2024-08-06 NOTE — PROGRESS NOTES
PALLIATIVE CARE SOCIAL WORK NOTE     Pt's nephew/HCPOA Xiang Fontaine has emailed me pt's HCPOA paperwork and Living Will. Documents have been printed out and placed in bedside chart. Will be scanned into medical record at time of discharge from the hospital. Thank you .    BOSSMAN Willis

## 2024-08-06 NOTE — PROGRESS NOTES
Physical Therapy                 Therapy Communication Note    Patient Name: Yue Sanz  MRN: [id number]  Today's Date: 8/6/2024     Discipline: Physical Therapy    Missed Visit Reason: Missed Visit Reason: Patient placed on medical hold (Attempted PT f/u tx, per RN report pt unable to follow commands and not apropriate to participate in tx,possible palliative care approach.rec hold tx at this time.)    Missed Time: Attempt    Comment:

## 2024-08-07 LAB
ALBUMIN SERPL BCP-MCNC: 3.5 G/DL (ref 3.4–5)
ALP SERPL-CCNC: 106 U/L (ref 33–136)
ALT SERPL W P-5'-P-CCNC: 14 U/L (ref 7–45)
ANION GAP SERPL CALC-SCNC: 17 MMOL/L (ref 10–20)
AST SERPL W P-5'-P-CCNC: 40 U/L (ref 9–39)
BASOPHILS # BLD AUTO: 0.06 X10*3/UL (ref 0–0.1)
BASOPHILS NFR BLD AUTO: 0.8 %
BILIRUB DIRECT SERPL-MCNC: 0.1 MG/DL (ref 0–0.3)
BILIRUB SERPL-MCNC: 0.7 MG/DL (ref 0–1.2)
BUN SERPL-MCNC: 5 MG/DL (ref 6–23)
CALCIUM SERPL-MCNC: 8.6 MG/DL (ref 8.6–10.3)
CHLORIDE SERPL-SCNC: 95 MMOL/L (ref 98–107)
CO2 SERPL-SCNC: 22 MMOL/L (ref 21–32)
CREAT SERPL-MCNC: 0.44 MG/DL (ref 0.5–1.05)
EGFRCR SERPLBLD CKD-EPI 2021: >90 ML/MIN/1.73M*2
EOSINOPHIL # BLD AUTO: 0.1 X10*3/UL (ref 0–0.7)
EOSINOPHIL NFR BLD AUTO: 1.4 %
ERYTHROCYTE [DISTWIDTH] IN BLOOD BY AUTOMATED COUNT: 13.8 % (ref 11.5–14.5)
FOLATE SERPL-MCNC: >24 NG/ML
GLUCOSE BLD MANUAL STRIP-MCNC: 100 MG/DL (ref 74–99)
GLUCOSE BLD MANUAL STRIP-MCNC: 108 MG/DL (ref 74–99)
GLUCOSE BLD MANUAL STRIP-MCNC: 94 MG/DL (ref 74–99)
GLUCOSE BLD MANUAL STRIP-MCNC: 98 MG/DL (ref 74–99)
GLUCOSE BLD MANUAL STRIP-MCNC: 99 MG/DL (ref 74–99)
GLUCOSE SERPL-MCNC: 99 MG/DL (ref 74–99)
HCT VFR BLD AUTO: 29.6 % (ref 36–46)
HGB BLD-MCNC: 10.2 G/DL (ref 12–16)
IMM GRANULOCYTES # BLD AUTO: 0.03 X10*3/UL (ref 0–0.7)
IMM GRANULOCYTES NFR BLD AUTO: 0.4 % (ref 0–0.9)
INR PPP: 1.2 (ref 0.9–1.1)
LYMPHOCYTES # BLD AUTO: 1.09 X10*3/UL (ref 1.2–4.8)
LYMPHOCYTES NFR BLD AUTO: 15.4 %
MCH RBC QN AUTO: 32.8 PG (ref 26–34)
MCHC RBC AUTO-ENTMCNC: 34.5 G/DL (ref 32–36)
MCV RBC AUTO: 95 FL (ref 80–100)
MONOCYTES # BLD AUTO: 0.72 X10*3/UL (ref 0.1–1)
MONOCYTES NFR BLD AUTO: 10.2 %
NEUTROPHILS # BLD AUTO: 5.09 X10*3/UL (ref 1.2–7.7)
NEUTROPHILS NFR BLD AUTO: 71.8 %
NRBC BLD-RTO: 0 /100 WBCS (ref 0–0)
PHOSPHATE SERPL-MCNC: 2.8 MG/DL (ref 2.5–4.9)
PLATELET # BLD AUTO: 361 X10*3/UL (ref 150–450)
POTASSIUM SERPL-SCNC: 3.1 MMOL/L (ref 3.5–5.3)
PROT SERPL-MCNC: 6.8 G/DL (ref 6.4–8.2)
PROTHROMBIN TIME: 13.2 SECONDS (ref 9.8–12.8)
RBC # BLD AUTO: 3.11 X10*6/UL (ref 4–5.2)
SODIUM SERPL-SCNC: 131 MMOL/L (ref 136–145)
VIT B12 SERPL-MCNC: 671 PG/ML (ref 211–911)
WBC # BLD AUTO: 7.1 X10*3/UL (ref 4.4–11.3)

## 2024-08-07 PROCEDURE — 99233 SBSQ HOSP IP/OBS HIGH 50: CPT | Performed by: INTERNAL MEDICINE

## 2024-08-07 PROCEDURE — 2500000004 HC RX 250 GENERAL PHARMACY W/ HCPCS (ALT 636 FOR OP/ED): Performed by: HOSPITALIST

## 2024-08-07 PROCEDURE — 2500000004 HC RX 250 GENERAL PHARMACY W/ HCPCS (ALT 636 FOR OP/ED): Performed by: INTERNAL MEDICINE

## 2024-08-07 PROCEDURE — 84100 ASSAY OF PHOSPHORUS: CPT | Performed by: HOSPITALIST

## 2024-08-07 PROCEDURE — 82947 ASSAY GLUCOSE BLOOD QUANT: CPT

## 2024-08-07 PROCEDURE — 85025 COMPLETE CBC W/AUTO DIFF WBC: CPT | Performed by: HOSPITALIST

## 2024-08-07 PROCEDURE — 36415 COLL VENOUS BLD VENIPUNCTURE: CPT | Performed by: HOSPITALIST

## 2024-08-07 PROCEDURE — 1100000001 HC PRIVATE ROOM DAILY

## 2024-08-07 PROCEDURE — 70450 CT HEAD/BRAIN W/O DYE: CPT | Performed by: RADIOLOGY

## 2024-08-07 PROCEDURE — 2500000005 HC RX 250 GENERAL PHARMACY W/O HCPCS: Performed by: STUDENT IN AN ORGANIZED HEALTH CARE EDUCATION/TRAINING PROGRAM

## 2024-08-07 PROCEDURE — 92610 EVALUATE SWALLOWING FUNCTION: CPT | Mod: GN

## 2024-08-07 PROCEDURE — 99233 SBSQ HOSP IP/OBS HIGH 50: CPT | Performed by: HOSPITALIST

## 2024-08-07 PROCEDURE — 82248 BILIRUBIN DIRECT: CPT | Performed by: HOSPITALIST

## 2024-08-07 PROCEDURE — 85610 PROTHROMBIN TIME: CPT | Performed by: HOSPITALIST

## 2024-08-07 PROCEDURE — 99222 1ST HOSP IP/OBS MODERATE 55: CPT | Performed by: PSYCHIATRY & NEUROLOGY

## 2024-08-07 RX ORDER — MORPHINE SULFATE 2 MG/ML
2 INJECTION, SOLUTION INTRAMUSCULAR; INTRAVENOUS EVERY 4 HOURS PRN
Status: DISCONTINUED | OUTPATIENT
Start: 2024-08-07 | End: 2024-08-08

## 2024-08-07 RX ORDER — MORPHINE SULFATE 2 MG/ML
2 INJECTION, SOLUTION INTRAMUSCULAR; INTRAVENOUS EVERY 4 HOURS PRN
Status: DISCONTINUED | OUTPATIENT
Start: 2024-08-07 | End: 2024-08-07

## 2024-08-07 RX ORDER — POTASSIUM CHLORIDE 14.9 MG/ML
20 INJECTION INTRAVENOUS
Status: COMPLETED | OUTPATIENT
Start: 2024-08-07 | End: 2024-08-07

## 2024-08-07 RX ORDER — LORAZEPAM 1 MG/1
1 TABLET ORAL EVERY 4 HOURS PRN
Status: DISCONTINUED | OUTPATIENT
Start: 2024-08-07 | End: 2024-08-08 | Stop reason: HOSPADM

## 2024-08-07 RX ORDER — LORAZEPAM 2 MG/ML
0.5 INJECTION INTRAMUSCULAR EVERY 4 HOURS PRN
Status: DISCONTINUED | OUTPATIENT
Start: 2024-08-07 | End: 2024-08-08 | Stop reason: HOSPADM

## 2024-08-07 RX ORDER — MORPHINE SULFATE 2 MG/ML
2 INJECTION, SOLUTION INTRAMUSCULAR; INTRAVENOUS
Status: DISCONTINUED | OUTPATIENT
Start: 2024-08-07 | End: 2024-08-07

## 2024-08-07 RX ORDER — HALOPERIDOL 5 MG/ML
1 INJECTION INTRAMUSCULAR EVERY 4 HOURS PRN
Status: DISCONTINUED | OUTPATIENT
Start: 2024-08-07 | End: 2024-08-08 | Stop reason: HOSPADM

## 2024-08-07 RX ORDER — MORPHINE SULFATE 2 MG/ML
2 INJECTION, SOLUTION INTRAMUSCULAR; INTRAVENOUS EVERY 4 HOURS
Status: DISCONTINUED | OUTPATIENT
Start: 2024-08-07 | End: 2024-08-07

## 2024-08-07 RX ORDER — LORAZEPAM 2 MG/ML
1 INJECTION INTRAMUSCULAR EVERY 4 HOURS PRN
Status: DISCONTINUED | OUTPATIENT
Start: 2024-08-07 | End: 2024-08-07 | Stop reason: ALTCHOICE

## 2024-08-07 ASSESSMENT — COGNITIVE AND FUNCTIONAL STATUS - GENERAL
HELP NEEDED FOR BATHING: TOTAL
TURNING FROM BACK TO SIDE WHILE IN FLAT BAD: A LOT
TOILETING: TOTAL
CLIMB 3 TO 5 STEPS WITH RAILING: TOTAL
EATING MEALS: TOTAL
MOVING TO AND FROM BED TO CHAIR: A LOT
STANDING UP FROM CHAIR USING ARMS: A LOT
DRESSING REGULAR LOWER BODY CLOTHING: TOTAL
WALKING IN HOSPITAL ROOM: A LOT
MOVING FROM LYING ON BACK TO SITTING ON SIDE OF FLAT BED WITH BEDRAILS: A LOT
PERSONAL GROOMING: TOTAL
MOBILITY SCORE: 11
DAILY ACTIVITIY SCORE: 6
DRESSING REGULAR UPPER BODY CLOTHING: TOTAL

## 2024-08-07 ASSESSMENT — PAIN SCALES - PAIN ASSESSMENT IN ADVANCED DEMENTIA (PAINAD): TOTALSCORE: MEDICATION (SEE MAR)

## 2024-08-07 ASSESSMENT — PAIN - FUNCTIONAL ASSESSMENT: PAIN_FUNCTIONAL_ASSESSMENT: UNABLE TO SELF-REPORT

## 2024-08-07 ASSESSMENT — PAIN SCALES - WONG BAKER: WONGBAKER_NUMERICALRESPONSE: HURTS WORST

## 2024-08-07 NOTE — PROGRESS NOTES
Music Therapy Note      Therapy Session  Referral Type: New referral this admission  Visit Type: Follow-up visit  Session Start Time: 1102  Session End Time: 1103  Intervention Delivery: In-person  Conflict of Service: Asleep  Family Present for Session: None               Treatment/Interventions  Music Therapy Interventions: Assessment    Post-assessment  Total Session Time (min): 1 minutes    Narrative  Assessment Detail: Upon arrival, MT/MTI found pt asleep in bed. Pt was not disturbed. MT/MTI followed up at 1311, and found pt still asleep in bed.  Follow-up: MT/MTI will follow up as able.    Education Documentation  No documentation found.        Expressive Therapies Note

## 2024-08-07 NOTE — NURSING NOTE
1330-assumed care of patient, sleeping at this time, snoring. Palliative at bedside also. Bed alarm on, door left open, safety measures in place    1621- 133/86, hr 125, 94%RA, RR22 t99 blood sugar 99. Pt appears have some mottling of skin (hands), not responding to verbal stimuli and unable to follow commands. MD Adorno nofied by this RN. Team came to bedside and called family    1800-RN notified MD Quezaad and Reginald that respirations are in 30's now, HR still in 120's. See new orders; morphine and robinal given. Sister at bedside and updates provided

## 2024-08-07 NOTE — PROGRESS NOTES
"Yue Sanz is a 63 y.o. female on day 8 of admission presenting with Subdural hematoma (Multi).    Subjective   Pt somewhat more responsive but still very lethargic this morning and minimally interactive. Was able to voice \"how are you doing\" today. ROS precluded by aforementioned presentation.     Update: 11AM, patient much more obtunded again w/o PRN administration.     Objective     Physical Exam  Constitutional:       General: She is not in acute distress.     Comments: Somewhat obtunded. Able to verbalize one sentence and squeeze fingers when asked to squeeze hand.    HENT:      Head: Normocephalic and atraumatic.      Mouth/Throat:      Mouth: Mucous membranes are moist.   Eyes:      Extraocular Movements: Extraocular movements intact.      Pupils: Pupils are equal, round, and reactive to light.   Cardiovascular:      Rate and Rhythm: Normal rate and regular rhythm.   Pulmonary:      Effort: Pulmonary effort is normal.      Breath sounds: Rhonchi present.      Comments: Bronchial breath sounds. Retained oral secretions with loud breathing.   Abdominal:      General: Abdomen is flat.      Palpations: Abdomen is soft.   Musculoskeletal:      Cervical back: Normal range of motion and neck supple.      Comments: LT extremity usually flexed. Some resistance to passive movement.    Neurological:      Mental Status: She is disoriented.      Comments: Moves extremities but prefers RT side. Slurred speech.    Psychiatric:      Comments: Intermittently restless or lethargic. Blunted affect.          Last Recorded Vitals  Blood pressure (!) 154/94, pulse 93, temperature 36.8 °C (98.2 °F), temperature source Axillary, resp. rate 16, height 1.676 m (5' 5.98\"), weight 51.5 kg (113 lb 9.6 oz), SpO2 95%.  Intake/Output last 3 Shifts:  I/O last 3 completed shifts:  In: 0 (0 mL/kg)   Out: 800 (15.5 mL/kg) [Urine:800 (0.4 mL/kg/hr)]  Weight: 51.5 kg     Relevant Results  Scheduled medications  [Held by provider] amLODIPine, 5 " mg, oral, Daily  [Held by provider] folic acid, 1 mg, oral, Daily  insulin lispro, 0-5 Units, subcutaneous, q4h  melatonin, 6 mg, oral, Nightly  metoprolol, 5 mg, intravenous, q6h  [Held by provider] multivitamin with minerals, 1 tablet, oral, Daily  [Held by provider] pantoprazole, 40 mg, oral, Daily before breakfast  potassium chloride, 20 mEq, intravenous, q2h  [Held by provider] sertraline, 50 mg, oral, Daily      Continuous medications     PRN medications  PRN medications: acetaminophen, dextrose, dextrose, glucagon, glucagon, hydrALAZINE, hydrALAZINE, oxygen  Results for orders placed or performed during the hospital encounter of 07/30/24 (from the past 24 hour(s))   Renal Function Panel   Result Value Ref Range    Glucose 96 74 - 99 mg/dL    Sodium 131 (L) 136 - 145 mmol/L    Potassium 4.2 3.5 - 5.3 mmol/L    Chloride 99 98 - 107 mmol/L    Bicarbonate 18 (L) 21 - 32 mmol/L    Anion Gap 18 10 - 20 mmol/L    Urea Nitrogen 8 6 - 23 mg/dL    Creatinine 0.48 (L) 0.50 - 1.05 mg/dL    eGFR >90 >60 mL/min/1.73m*2    Calcium 8.1 (L) 8.6 - 10.3 mg/dL    Phosphorus 3.1 2.5 - 4.9 mg/dL    Albumin 3.4 3.4 - 5.0 g/dL   CBC and Auto Differential   Result Value Ref Range    WBC 6.7 4.4 - 11.3 x10*3/uL    nRBC 0.0 0.0 - 0.0 /100 WBCs    RBC 3.26 (L) 4.00 - 5.20 x10*6/uL    Hemoglobin 10.6 (L) 12.0 - 16.0 g/dL    Hematocrit 33.8 (L) 36.0 - 46.0 %     (H) 80 - 100 fL    MCH 32.5 26.0 - 34.0 pg    MCHC 31.4 (L) 32.0 - 36.0 g/dL    RDW 13.9 11.5 - 14.5 %    Platelets 342 150 - 450 x10*3/uL    Neutrophils % 66.0 40.0 - 80.0 %    Immature Granulocytes %, Automated 0.2 0.0 - 0.9 %    Lymphocytes % 18.5 13.0 - 44.0 %    Monocytes % 12.0 2.0 - 10.0 %    Eosinophils % 2.4 0.0 - 6.0 %    Basophils % 0.9 0.0 - 2.0 %    Neutrophils Absolute 4.39 1.20 - 7.70 x10*3/uL    Immature Granulocytes Absolute, Automated 0.01 0.00 - 0.70 x10*3/uL    Lymphocytes Absolute 1.23 1.20 - 4.80 x10*3/uL    Monocytes Absolute 0.80 0.10 - 1.00 x10*3/uL     Eosinophils Absolute 0.16 0.00 - 0.70 x10*3/uL    Basophils Absolute 0.06 0.00 - 0.10 x10*3/uL   Osmolality   Result Value Ref Range    Osmolality, Serum 275 (L) 280 - 300 mOsm/kg   Vitamin B12   Result Value Ref Range    Vitamin B12 671 211 - 911 pg/mL   Folate   Result Value Ref Range    Folate, Serum >24.0 >5.0 ng/mL   POCT GLUCOSE   Result Value Ref Range    POCT Glucose 91 74 - 99 mg/dL   POCT GLUCOSE   Result Value Ref Range    POCT Glucose 94 74 - 99 mg/dL   POCT GLUCOSE   Result Value Ref Range    POCT Glucose 98 74 - 99 mg/dL   POCT GLUCOSE   Result Value Ref Range    POCT Glucose 100 (H) 74 - 99 mg/dL   POCT GLUCOSE   Result Value Ref Range    POCT Glucose 99 74 - 99 mg/dL   CBC and Auto Differential   Result Value Ref Range    WBC 7.1 4.4 - 11.3 x10*3/uL    nRBC 0.0 0.0 - 0.0 /100 WBCs    RBC 3.11 (L) 4.00 - 5.20 x10*6/uL    Hemoglobin 10.2 (L) 12.0 - 16.0 g/dL    Hematocrit 29.6 (L) 36.0 - 46.0 %    MCV 95 80 - 100 fL    MCH 32.8 26.0 - 34.0 pg    MCHC 34.5 32.0 - 36.0 g/dL    RDW 13.8 11.5 - 14.5 %    Platelets 361 150 - 450 x10*3/uL    Neutrophils % 71.8 40.0 - 80.0 %    Immature Granulocytes %, Automated 0.4 0.0 - 0.9 %    Lymphocytes % 15.4 13.0 - 44.0 %    Monocytes % 10.2 2.0 - 10.0 %    Eosinophils % 1.4 0.0 - 6.0 %    Basophils % 0.8 0.0 - 2.0 %    Neutrophils Absolute 5.09 1.20 - 7.70 x10*3/uL    Immature Granulocytes Absolute, Automated 0.03 0.00 - 0.70 x10*3/uL    Lymphocytes Absolute 1.09 (L) 1.20 - 4.80 x10*3/uL    Monocytes Absolute 0.72 0.10 - 1.00 x10*3/uL    Eosinophils Absolute 0.10 0.00 - 0.70 x10*3/uL    Basophils Absolute 0.06 0.00 - 0.10 x10*3/uL   Hepatic function panel   Result Value Ref Range    Albumin 3.5 3.4 - 5.0 g/dL    Bilirubin, Total 0.7 0.0 - 1.2 mg/dL    Bilirubin, Direct 0.1 0.0 - 0.3 mg/dL    Alkaline Phosphatase 106 33 - 136 U/L    ALT 14 7 - 45 U/L    AST 40 (H) 9 - 39 U/L    Total Protein 6.8 6.4 - 8.2 g/dL   Protime-INR   Result Value Ref Range    Protime 13.2  (H) 9.8 - 12.8 seconds    INR 1.2 (H) 0.9 - 1.1   Phosphorus   Result Value Ref Range    Phosphorus 2.8 2.5 - 4.9 mg/dL   Basic Metabolic Panel   Result Value Ref Range    Glucose 99 74 - 99 mg/dL    Sodium 131 (L) 136 - 145 mmol/L    Potassium 3.1 (L) 3.5 - 5.3 mmol/L    Chloride 95 (L) 98 - 107 mmol/L    Bicarbonate 22 21 - 32 mmol/L    Anion Gap 17 10 - 20 mmol/L    Urea Nitrogen 5 (L) 6 - 23 mg/dL    Creatinine 0.44 (L) 0.50 - 1.05 mg/dL    eGFR >90 >60 mL/min/1.73m*2    Calcium 8.6 8.6 - 10.3 mg/dL   POCT GLUCOSE   Result Value Ref Range    POCT Glucose 98 74 - 99 mg/dL     CT head wo IV contrast    Result Date: 8/7/2024  Interpreted By:  July Eli, STUDY: CT HEAD WO IV CONTRAST;  8/7/2024 7:57 am   INDICATION: Signs/Symptoms:follow up SAH/SDH.   COMPARISON: August 5, 2024   ACCESSION NUMBER(S): [id number]   ORDERING CLINICIAN: EMPERATRIZ BOYLE   TECHNIQUE: Noncontrast axial CT scan of head was performed. Angled reformats in brain and bone windows were generated. The images were reviewed in bone, brain, blood and soft tissue windows. Coronal and sagittal reformats are provided for review.   FINDINGS: There is again multifocal subarachnoid hemorrhage remaining most pronounced within left hemispheric sulci and the left sylvian fissure. There is hyperdense subdural hemorrhage overlying the left cerebral hemisphere, most pronounced posteriorly. There is also subdural hemorrhage extending along the dorsal left aspect of the falx and left greater than right tentorium. The component along the posterolateral left cerebral hemisphere measures approximately 5 mm in thickness. The component along the dorsal left falx measures approximately 2 mm in thickness. Hemorrhage along the tentorium measures up to approximately 3 mm in thickness on the left. When allowing for differences in slice positioning and partial volume averaging, these are very similar measurements from the previous examination.   There is hypodensity  with loss of gray/white matter differentiation within the left parietal, left frontal, and left temporal lobes with local mass effect including sulcal effacement. There is also diminished attenuation in the right occipital and posteromedial temporal lobes without evidence of significant volume loss.   There is again asymmetric sulcal effacement on the left with partial effacement of the left lateral ventricle and left perimesencephalic cistern. There is bowing of the septum pellucidum to the right measuring approximately 3 mm, very similar from the previous study.   There is again ex vacuo dilatation of the right lateral ventricle. There is overall prominence of ventricles and sulci compatible with diffuse parenchymal volume loss.   There are again areas of encephalomalacia and gliosis in the right parietooccipital and posterior temporal lobes. Prominent perivascular space is suspected in the inferior left lentiform nucleus.   There are nonspecific areas of diminished attenuation in the subcortical and periventricular white matter. The posterior fossa is degraded by beam hardening artifact.   There is again a lucent lesion in the occipital bone to the left of midline.   Mucosal thickening and fluid are noted in the left sphenoid sinus. There are retention cysts or polyps in the maxillary sinuses, right greater than left. There is opacification of a few mastoid air cells. There is nonspecific soft tissue opacity in the external auditory canals which may represent cerumen or debris.       1. Redemonstration of multifocal subarachnoid hemorrhage remaining most pronounced within left hemispheric sulci and sylvian fissure. There is also again multifocal subdural hemorrhage, very similar from the previous examination. There is persistent mass effect and rightward midline shift. 2. Areas of diminished attenuation with loss of gray/white matter differentiation involving portions of the left frontal, parietal, and temporal  lobes suspicious for evolving infarct in the left MCA distribution. MRI with diffusion-weighted imaging is recommended for further evaluation. 3. Suspected areas of remote ischemic injury in the right MCA distribution and potential subacute ischemic injury in the right PCA distribution. Again MRI with diffusion-weighted imaging is recommended.       MACRO: Critical Finding:  See findings. Notification was initiated on 8/7/2024 at 8:52 am by  July Eli.  (**-OCF-**)   Signed by: July Eli 8/7/2024 8:52 AM Dictation workstation:   [id number]    Electrocardiogram, 12-lead PRN ACS symptoms    Result Date: 8/6/2024  Normal sinus rhythm Cannot rule out Inferior infarct , age undetermined Abnormal ECG When compared with ECG of 03-AUG-2024 15:40, CO interval has decreased RSR' pattern in V1 is no longer Present ST now depressed in Inferior leads Nonspecific T wave abnormality, worse in Inferior leads    Electrocardiogram, 12-lead PRN ACS symptoms    Result Date: 8/5/2024  Sinus rhythm with 1st degree AV block RSR' or QR pattern in V1 suggests right ventricular conduction delay Prolonged QT Abnormal ECG When compared with ECG of 30-JUL-2024 02:17, No significant change was found Confirmed by Brenden Esparza ([id number]) on 8/5/2024 9:36:22 PM    EEG    IMPRESSION Impression This routine EEG is consistent with 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 No seizures are seen. A full report will be scanned into the patient's chart at a later time. This report has been interpreted and electronically signed by    CT head wo IV contrast    Result Date: 8/5/2024  Interpreted By:  Missy Wilson, STUDY: CT HEAD WO IV CONTRAST;  8/5/2024 2:47 pm   INDICATION: Signs/Symptoms:altered mental status. 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   FINDINGS: All caps the periventricular and subcortical white matter changes are present.   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 Left-sided subarachnoid hemorrhage is again noted as well as subarachnoid hemorrhage in the basilar cisterns which appears to be slightly less prominent than on the prior exam..   4 mm left frontal subdural hematoma is again seen, without significant change. Also noted is a left occipital subdural hematoma measuring proximally 4 mm. It is also unchanged.   Diffuse left cerebral edema is noted with effacement of the sulci.   4.6 mm left-to-right midline shift is again seen, which not significantly changed.   Again noted is large area of hypodensity in the right parietal region, which most likely is related to a prior infarct and is unchanged.   There appears to be a small area of hyperdensity in the right occipital lobe which may be new area of parenchymal or subarachnoid hemorrhage.   The sulci and ventricles are prominent in the right cerebral hemisphere   INTRACRANIAL VESSELS: No significant atherosclerotic calcification.   EXTRACRANIAL SOFT TISSUES: Within normal limits.   PARANASAL SINUSES/MASTOIDS: Within normal limits.   CALVARIUM: Scratch the lucent lesion is again seen in the left occipital bone. Depressed skull fracture.       Possible small area of new parenchymal or subarachnoid hemorrhage in the right occipital lobe.   Redemonstration of left subdural hematomas, which are unchanged.   Redemonstration of left subarachnoid hemorrhage and small amount of subarachnoid hemorrhage in the basilar cisterns which appear to be slightly less prominent than on the prior exam.   Redemonstration of left surgery edema with 4.6 mm left-to-right midline shift, which has not significantly changed   Redemonstration of basilar  tip aneurysm.   MACRO: Critical Finding:  See findings. Notification was initiated on 8/5/2024 at 3:07 pm by  Missy Wilson.  (**-OCF-**)   Signed by: Missy Wilson 8/5/2024 3:07 PM Dictation workstation:   [id number]    CT head wo IV contrast    Result Date: 8/1/2024  Interpreted By:  Karina Spann, STUDY: CT HEAD WO IV CONTRAST;  8/1/2024 4:25 pm   INDICATION: Signs/Symptoms:New visual field deficit, want to ensure stability of known SAH/subdural.   COMPARISON: 07/30/2024 at 3:33 p.m.   ACCESSION NUMBER(S): [id number]   ORDERING CLINICIAN: SANDIP HINSON   TECHNIQUE: Unenhanced CT images of the head were obtained.   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 Acute subarachnoid hemorrhage involving the left cerebral hemisphere and basal cisterns similar in size but overall slightly less dense than on the previous exam. Acute left-sided subdural hematomas measuring up to 5 mm in the frontal region similar to the prior exam. There is mild diffuse left cerebral edema with sulcal effacement and approximately 5 mm of midline shift at the level of the 3rd ventricle similar to the prior exam. No new acute intracranial hemorrhage. Stable low-density likely remote infarct in the right parietal region with porencephalic dilatation of the posterior horn of the right lateral ventricle.   Stable subcentimeter lucent lesion in the occipital calvarium just to the left of midline image 16/36.   thickening in the left sphenoid and partially imaged right maxillary sinuses. Remaining visualized paranasal sinuses are clear.       Redemonstration of basilar tip aneurysm with acute subarachnoid and left-sided subdural hematomas,  cerebral edema and mild midline shift. No significant change from 07/30/2024 at 3:33 p.m..   MACRO: None.   Signed by: Karina Spann 8/1/2024 4:48 PM Dictation workstation:   [id number]    CT head wo IV contrast    Result Date: 7/30/2024  Interpreted By:  Varghese Rapp, STUDY: CT HEAD WO IV CONTRAST;   7/30/2024 3:35 pm   INDICATION: Signs/Symptoms:6hr eval of SAH, SDH.   COMPARISON: Most recent prior is from 07/30/2024 at 7:31 a.m...   ACCESSION NUMBER(S): [id number]   ORDERING CLINICIAN: JOSE WAITE   TECHNIQUE: Routine axial images were obtained from the skull base through the vertex.  Sagittal and coronal reconstruction images were generated. Brain, subdural, and bone windows were reviewed. N/A Unavailable   FINDINGS:   Stable appearance 9 mm basilar tip aneurysm. There is persistent subdural blood adjacent to the posterior aspect of the falx and along the superior aspect of the left tentorium. There is stable subarachnoid blood along the right side of the mar and midbrain and also along the posterior and lateral aspect of the mar and midbrain on the left. Stable subarachnoid blood in multiple left parietal fissures including the left sylvian fissure. Small grossly stable anterolateral left frontal subdural hematoma measuring 6 mm in thickness; small amount stable acute subdural blood along the anterolateral aspect of the left temporal lobe measuring approximately 2 mm in transverse thickness. Stable lateral and posterior high left parietal subdural hematoma measuring 7 mm in transverse thickness. There is approximately 3.6 mm of rightward displacement of the midline at the level of the septum pellucidum, compared to 4.2 mm earlier today. There is mild stable anterior inferior bifrontal subarachnoid blood. No gross intraventricular blood.   Underlying mild prominence of ventricles and sulci consistent with volume loss. There is a small to moderate-sized old infarct in the lateral right parietal lobe. No indication of acute infarct.   Small retention cyst in the left sphenoid sinus. The bilateral mastoids were clear. No paranasal sinus fluid level. No destructive calvarial lesion or depressed skull fracture.         Grossly stable basilar tip aneurysm.   Acute subdural and subarachnoid blood as  described, with no significant change from the exam earlier this morning.   Underlying volume loss.   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   MACRO: None   Signed by: Varghese Rapp 7/30/2024 4:03 PM Dictation workstation:   [id number]    ECG 12 lead    Result Date: 7/30/2024  Normal sinus rhythm Incomplete right bundle branch block Cannot rule out Anterior infarct , age undetermined Abnormal ECG When compared with ECG of 24-JUL-2024 11:09, Inverted T waves have replaced nonspecific T wave abnormality in Inferior leads See ED provider note for full interpretation and clinical correlation Confirmed by Heather Harris ([id number]) on 7/30/2024 11:22:30 AM    CT angio head and neck w and wo IV contrast    Result Date: 7/30/2024  Interpreted By:  Roderick Lin, STUDY: CT ANGIO HEAD AND NECK W AND WO IV CONTRAST   INDICATION: Signs/Symptoms:c/f aneurysm   COMPARISON: Head CT 07/30/2024.   ACCESSION NUMBER(S): [id number]   ORDERING CLINICIAN: ERASMO MARLOW   TECHNIQUE: CTA of the head and neck was performed after the intravenous administration of 75 mL Omnipaque 350 nonionic IV contrast. 3-D reconstructions were performed under physician supervision on a separate workstation and reviewed.   FINDINGS: CTA NECK:   AORTIC ARCH: The visualized portion of the aortic arch is unremarkable in appearance. The origins of the great vessels and the vertebral arteries are normal without evidence of stenosis.   CERVICAL CAROTID ARTERIES: The common carotid arteries are patent bilaterally without evidence of stenosis. Atherosclerotic disease of bilateral carotid bifurcations resulting in mild (less than 50%) luminal stenosis of bilateral proximal cervical ICAs. Remainder of the cervical ICAs are patent throughout their course.   VERTEBRAL ARTERIES: The vertebral arteries are patent bilaterally throughout their course noting slight diminutive caliber of the left vertebral artery.   CTA HEAD:   INTERNAL CAROTID ARTERIES:  Atherosclerotic disease of bilateral carotid siphons, which remain overall patent. Intracranial ICAs are otherwise normal.   CEREBRAL ARTERIES: No aneurysm appreciated. Left MCA branches appear patent and grossly symmetric with right MCA branches; no definitive findings to suggest vasospasm. Bilateral ACAs are within normal limits. Bilateral PCAs are predominantly supplied by the posterior communicating arteries.   VERTEBROBASILAR SYSTEM: Left intradural vertebral artery is again diminutive and is severely narrowed in caliber distal to the PICA branch. Right intradural vertebral artery is unremarkable. Basilar artery is patent but is again diminutive secondary to PCOM dominance. No evidence of basilar tip aneurysm.   There is no evidence for saccular aneurysm, vascular malformation, or large vessel occlusion.   OTHER: Known extensive subarachnoid and subdural hemorrhages are better assessed on noncontrast head CT performed earlier today.       No evidence of basilar tip aneurysm. Previously noted rounded hyperdensity corresponds to subarachnoid hemorrhage extending into the supra cerebellar cistern.   Mild scattered atherosclerotic disease of the cervical and intracranial vasculature as detailed.   Please refer to noncontrast head CT for complete evaluation of subarachnoid and subdural hemorrhages. No definitive evidence of vasospasm at this time.   _____________ NASCET criteria for internal carotid artery stenosis: Mild: 0% to 49% Moderate: 50% to 69% Severe: 70% to 99% Complete Occlusion   Signed by: Roderick Lin 7/30/2024 9:11 AM Dictation workstation:   [id number]    CT head wo IV contrast    Result Date: 7/30/2024  Interpreted By:  Jean Hester, STUDY: CT HEAD WO IV CONTRAST;  7/30/2024 7:32 am   INDICATION: Signs/Symptoms:sah sdh.   COMPARISON: CT Brain, earlier same morning 30 July 2024 at 0122 hours   ACCESSION NUMBER(S): [id number]   ORDERING CLINICIAN: CAMILLA MILLER   TECHNIQUE: CT of the brain from  the skull vertex to the skull base, without intravenous contrast   FINDINGS: No interval change to the abnormal CT appearance of the brain. 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 It has not changed in size from earlier this morning   All of the acute intra-axial and extra-axial hemorrhages and mild left-to-right midline shift is all unchanged   No discernible enlarging aneurysm or worsening hemorrhage   MACRO: None   Signed by: Jean Hester 7/30/2024 7:41 AM Dictation workstation:   [id number]    CT head wo IV contrast    Result Date: 7/30/2024  Interpreted By:  Sandro Roe, STUDY: CT HEAD WO IV CONTRAST; CT CERVICAL SPINE WO IV CONTRAST;  7/30/2024 1:24 am   INDICATION: Signs/Symptoms:fall back of head   COMPARISON: None.   ACCESSION NUMBER(S): [id number]; [id number]   ORDERING CLINICIAN: CAMILLA MILLER   TECHNIQUE: Axial noncontrast CT images of head with coronal and sagittal reconstructed images. Axial noncontrast CT images of the cervical spine with coronal and sagittal reconstructed images.   FINDINGS: CT HEAD:   Abnormal examination. There is a subdural hematoma seen in the left measuring about 5 mm anteriorly and 8-9 mm posteriorly. Additionally there is extensive subarachnoid hemorrhage noted along the basilar cisterns as well as layering along the left cerebral  convexity. Superior to the cerebellum there is of focus of hemorrhage measuring up to about 1 cm   No acute skull fracture.   Global volume loss. Evidence of prior chronic small vessel ischemic change seen in the right cerebral hemisphere there may be minimal midline shift to the right of 2-3 mm.   Soft tissue swelling seen in the left occipital parietal region.     CT CERVICAL SPINE:   Demineralization of the bones. Scattered multilevel degenerative disc disease. Robust vascular calcification.   Degenerative disc disease most notable C4-5, C5-6 and C6-7. No evidence of acute cervical spine fracture       Abnormal examination. There is extensive left cerebral hemispheric subarachnoid hemorrhage.. Basilar cistern subarachnoid hemorrhage also noted. There is small subdural hematoma also seen in the left more prominent posteriorly. Minimal midline shift to the right of 2-3 mm   Global volume loss. Evidence of prior ischemic event in the right cerebral hemisphere not significantly changed. No intraventricular hemorrhage.   No findings of acute cervical spine fracture     Sandro Roe discussed the significance and urgency of this critical finding by epic chat with  CAMILLA MILLER on 7/30/2024 at 1:57 am. (**-RCF-**) Findings:  See findings.       Signed by: Sandro Roe 7/30/2024 1:58 AM Dictation workstation:   [id number]        Assessment/Plan   IMP:  IMP:  62 yo F with PMH of AUD, CVA hx, ?cirrhosis(found on imaging, no evidence of synthetic dysfunction on recent labs) recent humeral fracture 2/2 fall who was admitted after fall at SNF ([facility]) and found to have LT sided SAH and SDH that have been stable on repeat imaging. Seen by NSGY who recommended conservative treatment and starting Keppra in light of her SAH/ SDH. She was treated w/ CIWA protocol with phenobarbital. Palliative was consulted for assistance with GOC and complex decision making. Repeat CT of head on 8/6/2024 with new possible  small area of new parenchymal or subarachnoid hemorrhage in the right occipital lobe.       Patient improved from yesterday and able to vocalize different phrase (How are you doing today? Vs Come here repeatedly yesterday). Moving purposefully and still somewhat agitated. Ongoing high fall risk. Feeding precluded by patient wishes not to have long term feeding via PEG and treatment team and families concern NGT will lead to more agitation.  She is also attempting to communicate but has variable awareness, vacillating between limited responsiveness/interaction and being frankly obtunded. Olanzapine given on 8/6/24 but patient again oversedated. Will continue building therapeutic alliance with patient's family.     Recommendations:  DNR/DNI as of 8/6/24  Will be reassessing for pain and symptoms over course of day.  Hold sedating medications for now given ongoing AMS.  Will discuss with speech therapy and PT/OT to assess patient AFTER 12.   Please avoid haldol and use olanzapine preferentially for behaviors.   Discussed avoiding chemical restraints with patient and opting for sitter if need by with RN. Also providing oral care for stimulation.  Continue with delirium recommendations as listed below  Discussed hospice referral with BEATRICE Otoole. Agreed to informational mtg with hospice.  Referral placed.   Will continue to follow patient.      - Recommend Delirium/Dementia Precautions:       - Minimize use of benzos, opiates, anticholinergics, as these may worsen mental status       - Would use caution with narcotic pain medications       - Would still adequately controlling pain, as uncontrolled pain is also a risk factor for delirium       - Reinforce sleep hygiene; encourage patient to stay awake during the day       - Keep curtains/blinds open during the day and closed at night.       - Would recommend reorienting/redirecting patient as much as possible,        - Aim for consistent staffing, familiar objects,  avoiding bright lights and loud noises, etc.      Other Palliative Support  Music Therapy      I spent 90 minutes in the professional and overall care of this patient.      Dar Montelongo MD

## 2024-08-07 NOTE — PROGRESS NOTES
Speech-Language Pathology    Inpatient Speech-Language Pathology Clinical Swallow Evaluation    Patient Name: Yue Sanz  MRN: [id number]  : 1960  Today's Date: 24   Time Calculation  Start Time: 835  Stop Time: 902  Time Calculation (min): 27 min        RECOMMENDATIONS:    Solid Diet Recommendations : NPO  2.   Liquid Diet Recommendations: NPO  3.   Medication Administration Recommendations: Non Oral  4.    SLP TO FOLLOW FOR SWALLOW REASSESSMENT    Assessment:  Assessment Results: Patient seen to assess swallowing function. Patient alert yet groggy and moaning. Verbalization unintelligible with inability to follow commands. Limited oral motor response noted to ice chip bolus placed to labial surface. Placed small droplet of water into oral cavity with delay in bolus manipulation. Swallow onset appeared delayed with wet quality to respirations noted suggestive of aspiration. Deferred additional bolus presentation due to concerns for aspiration. Cognitive status impaired with inability to safely consume foods/liquids at present time. Uncertain regarding GOC. Would consider alternative nutritional support vs palliative/ hospice care contingent on medical prognosis. Will need to follow for reassessment of swallow as medical condition improves. Bed alarm activated prior to SLP departure from room.    Baseline Assessment:  Respiratory Status: Room air  History of Intubation: No        Behavior/Cognition: Confused, Doesn't follow directions (groggy; restless)  Patient Positioning: Upright in Bed  Baseline Vocal Quality: Normal    Oral-Motor Assessment:  Dentition:  (Unable to assess)  Oral Motor: Unable to Complete    Plan:  SLP Plan: Skilled SLP  SLP Frequency: 3x per week  Duration: 2 weeks  SLP Discharge Recommendations: Continue skilled speech therapy services post discharge (as appropriate)  Discussed POC: Nursing  Discussed Risks/Benefits: Yes  Patient/Caregiver Agreeable: Yes    Goals:   Patient will  "tolerate prescribed diet without overt s/s aspiration on 90% of boluses.    General Visit Information:  Patient admitted: 7/30/24    Past Medical History: \"left humerus fracture s/p intramedullary nail humerus (6/19/24), CVA d/t embolic occlusion of RCA (1/28/21), EtOH use disorder, EtOH related seizure, anemia, depression, anxiety, HTN, HPL, and anxiety \"    Chief Complaint/Reason for admission: Admitted following fall while standing at SNF. Exhibited worsening mental status with identification of new infarct.    Relevant Imaging Results: CT head> L SAH; \"evolving infarct in L MCA\" on imaging from 8/7.    Living Environment: Nursing home (skilled/long-term)  Reason for Referral: altered mental status with concern for dysphagia  Ordering Physician: Dr. Quezada  Current Diet : regular/thin ordered    Pain:  Pain Assessment: Unable to self-report      Treatment:    N/A    Inpatient Education:  N/A                                         "

## 2024-08-08 VITALS
BODY MASS INDEX: 18.26 KG/M2 | SYSTOLIC BLOOD PRESSURE: 148 MMHG | OXYGEN SATURATION: 100 % | DIASTOLIC BLOOD PRESSURE: 68 MMHG | WEIGHT: 113.6 LBS | HEART RATE: 125 BPM | RESPIRATION RATE: 25 BRPM | TEMPERATURE: 97.3 F | HEIGHT: 66 IN

## 2024-08-08 PROBLEM — Z51.5 END OF LIFE CARE: Status: ACTIVE | Noted: 2024-01-01

## 2024-08-08 LAB
ALBUMIN SERPL BCP-MCNC: 3.2 G/DL (ref 3.4–5)
ANION GAP SERPL CALC-SCNC: 23 MMOL/L (ref 10–20)
BASOPHILS # BLD AUTO: 0.04 X10*3/UL (ref 0–0.1)
BASOPHILS NFR BLD AUTO: 0.3 %
BUN SERPL-MCNC: 17 MG/DL (ref 6–23)
CALCIUM SERPL-MCNC: 8.3 MG/DL (ref 8.6–10.3)
CHLORIDE SERPL-SCNC: 98 MMOL/L (ref 98–107)
CO2 SERPL-SCNC: 15 MMOL/L (ref 21–32)
CREAT SERPL-MCNC: 1.28 MG/DL (ref 0.5–1.05)
EGFRCR SERPLBLD CKD-EPI 2021: 47 ML/MIN/1.73M*2
EOSINOPHIL # BLD AUTO: 0 X10*3/UL (ref 0–0.7)
EOSINOPHIL NFR BLD AUTO: 0 %
ERYTHROCYTE [DISTWIDTH] IN BLOOD BY AUTOMATED COUNT: 14.4 % (ref 11.5–14.5)
GLUCOSE BLD MANUAL STRIP-MCNC: 90 MG/DL (ref 74–99)
GLUCOSE SERPL-MCNC: 87 MG/DL (ref 74–99)
HCT VFR BLD AUTO: 31.6 % (ref 36–46)
HGB BLD-MCNC: 10.1 G/DL (ref 12–16)
IMM GRANULOCYTES # BLD AUTO: 0.04 X10*3/UL (ref 0–0.7)
IMM GRANULOCYTES NFR BLD AUTO: 0.3 % (ref 0–0.9)
LYMPHOCYTES # BLD AUTO: 0.73 X10*3/UL (ref 1.2–4.8)
LYMPHOCYTES NFR BLD AUTO: 5.3 %
MCH RBC QN AUTO: 32.4 PG (ref 26–34)
MCHC RBC AUTO-ENTMCNC: 32 G/DL (ref 32–36)
MCV RBC AUTO: 101 FL (ref 80–100)
MONOCYTES # BLD AUTO: 0.85 X10*3/UL (ref 0.1–1)
MONOCYTES NFR BLD AUTO: 6.2 %
NEUTROPHILS # BLD AUTO: 12.09 X10*3/UL (ref 1.2–7.7)
NEUTROPHILS NFR BLD AUTO: 87.9 %
NRBC BLD-RTO: 0 /100 WBCS (ref 0–0)
PHOSPHATE SERPL-MCNC: 4.4 MG/DL (ref 2.5–4.9)
PLATELET # BLD AUTO: 355 X10*3/UL (ref 150–450)
POTASSIUM SERPL-SCNC: 4 MMOL/L (ref 3.5–5.3)
RBC # BLD AUTO: 3.12 X10*6/UL (ref 4–5.2)
SODIUM SERPL-SCNC: 132 MMOL/L (ref 136–145)
WBC # BLD AUTO: 13.8 X10*3/UL (ref 4.4–11.3)

## 2024-08-08 PROCEDURE — 85025 COMPLETE CBC W/AUTO DIFF WBC: CPT | Performed by: HOSPITALIST

## 2024-08-08 PROCEDURE — 82947 ASSAY GLUCOSE BLOOD QUANT: CPT

## 2024-08-08 PROCEDURE — 36415 COLL VENOUS BLD VENIPUNCTURE: CPT | Performed by: HOSPITALIST

## 2024-08-08 PROCEDURE — 99233 SBSQ HOSP IP/OBS HIGH 50: CPT | Performed by: INTERNAL MEDICINE

## 2024-08-08 PROCEDURE — 80069 RENAL FUNCTION PANEL: CPT | Performed by: HOSPITALIST

## 2024-08-08 PROCEDURE — 84100 ASSAY OF PHOSPHORUS: CPT | Performed by: HOSPITALIST

## 2024-08-08 PROCEDURE — 2500000004 HC RX 250 GENERAL PHARMACY W/ HCPCS (ALT 636 FOR OP/ED): Performed by: INTERNAL MEDICINE

## 2024-08-08 PROCEDURE — 99239 HOSP IP/OBS DSCHRG MGMT >30: CPT | Performed by: HOSPITALIST

## 2024-08-08 RX ORDER — MORPHINE SULFATE 2 MG/ML
2 INJECTION, SOLUTION INTRAMUSCULAR; INTRAVENOUS EVERY 2 HOUR PRN
Status: DISCONTINUED | OUTPATIENT
Start: 2024-08-08 | End: 2024-08-08

## 2024-08-08 RX ORDER — HYDROMORPHONE HYDROCHLORIDE 1 MG/ML
0.6 INJECTION, SOLUTION INTRAMUSCULAR; INTRAVENOUS; SUBCUTANEOUS EVERY 2 HOUR PRN
Status: DISCONTINUED | OUTPATIENT
Start: 2024-08-08 | End: 2024-08-08 | Stop reason: HOSPADM

## 2024-08-08 RX ORDER — LORAZEPAM 2 MG/ML
0.5 INJECTION INTRAMUSCULAR EVERY 4 HOURS PRN
Status: CANCELLED | OUTPATIENT
Start: 2024-08-08

## 2024-08-08 RX ORDER — HALOPERIDOL 5 MG/ML
1 INJECTION INTRAMUSCULAR EVERY 4 HOURS PRN
Status: CANCELLED | OUTPATIENT
Start: 2024-08-08

## 2024-08-08 RX ORDER — HYDROMORPHONE HYDROCHLORIDE 1 MG/ML
0.6 INJECTION, SOLUTION INTRAMUSCULAR; INTRAVENOUS; SUBCUTANEOUS EVERY 4 HOURS
Status: DISCONTINUED | OUTPATIENT
Start: 2024-08-08 | End: 2024-08-08 | Stop reason: HOSPADM

## 2024-08-08 RX ORDER — LORAZEPAM 1 MG/1
1 TABLET ORAL EVERY 4 HOURS PRN
Status: CANCELLED | OUTPATIENT
Start: 2024-08-08

## 2024-08-08 RX ORDER — HYDROMORPHONE HYDROCHLORIDE 1 MG/ML
0.6 INJECTION, SOLUTION INTRAMUSCULAR; INTRAVENOUS; SUBCUTANEOUS EVERY 2 HOUR PRN
Status: CANCELLED | OUTPATIENT
Start: 2024-08-08

## 2024-08-08 RX ORDER — HYDROMORPHONE HYDROCHLORIDE 1 MG/ML
0.6 INJECTION, SOLUTION INTRAMUSCULAR; INTRAVENOUS; SUBCUTANEOUS EVERY 4 HOURS
Status: CANCELLED | OUTPATIENT
Start: 2024-08-08

## 2024-08-08 ASSESSMENT — COGNITIVE AND FUNCTIONAL STATUS - GENERAL
DAILY ACTIVITIY SCORE: 6
STANDING UP FROM CHAIR USING ARMS: TOTAL
DRESSING REGULAR UPPER BODY CLOTHING: TOTAL
HELP NEEDED FOR BATHING: TOTAL
TOILETING: TOTAL
MOVING TO AND FROM BED TO CHAIR: TOTAL
MOBILITY SCORE: 6
MOVING FROM LYING ON BACK TO SITTING ON SIDE OF FLAT BED WITH BEDRAILS: TOTAL
PERSONAL GROOMING: TOTAL
WALKING IN HOSPITAL ROOM: TOTAL
DRESSING REGULAR LOWER BODY CLOTHING: TOTAL
TURNING FROM BACK TO SIDE WHILE IN FLAT BAD: TOTAL
CLIMB 3 TO 5 STEPS WITH RAILING: TOTAL
EATING MEALS: TOTAL

## 2024-08-08 ASSESSMENT — PAIN SCALES - PAIN ASSESSMENT IN ADVANCED DEMENTIA (PAINAD)
BREATHING: OCCASIONAL LABORED BREATHING, SHORT PERIOD OF HYPERVENTILATION
CONSOLABILITY: NO NEED TO CONSOLE
FACIALEXPRESSION: SMILING OR INEXPRESSIVE
TOTALSCORE: 1
FACIALEXPRESSION: SMILING OR INEXPRESSIVE
BODYLANGUAGE: RELAXED
BODYLANGUAGE: RELAXED
CONSOLABILITY: NO NEED TO CONSOLE
TOTALSCORE: 1
BREATHING: OCCASIONAL LABORED BREATHING, SHORT PERIOD OF HYPERVENTILATION

## 2024-08-08 NOTE — PROGRESS NOTES
Music Therapy Note      Therapy Session  Referral Type: New referral this admission  Visit Type: Follow-up visit  Session Start Time: 1313  Session End Time: 1327  Intervention Delivery: In-person  Conflict of Service: None  Number of family members present: 0  Number of staff members present: 0     Pre-assessment  Unable to Assess Reason: Physical limitation  Mood/Affect: Calm, Tired/lethargic         Treatment/Interventions  Areas of Focus: End of life support, Relaxation  Music Therapy Interventions: Live music listening  Interruption: No  Patient Fell Asleep at End of Session: No    Post-assessment  Unable to Assess Reason: Physical limitation  Mood/Affect: Calm, Tired/lethargic  Continue Visiting: Yes  Total Session Time (min): 14 minutes    Narrative  Assessment Detail: Upon arrival of MT pt was lying in bed  displaying calm/tired affect with no family at bedside. MT made presence known to pt and shared families permission to have session.  Plan: MT engaged pt in live music listening to assist with end of life support and relaxation.  Intervention: MT/MTI facilitated pt in playing live preferred music via guitar and voice. MT/MTI decreased volume to elicit relaxation for pt and engaged in song selection according to pt preference.  Evaluation: Pt remained calm and resting throughout session.  Follow-up: MT will follow up with pt as able.    Education Documentation  No documentation found.

## 2024-08-08 NOTE — PROGRESS NOTES
PALLIATIVE CARE SOCIAL WORK NOTE     Family met with [facility] this morning. Hospice consents signed. Pt has flipped to GIP status with [facility]. Will continue to follow as appropriate. Thank you.    BOSSMAN Willis

## 2024-08-08 NOTE — DISCHARGE SUMMARY
Discharge Diagnosis  Acute metabolic encephalopathy  s/p traumatic L sided subdural hematoma/subarachnoid hemorrhage due to mechanical fall at [facility]  Hyponatremia  Hypokalemia  Alcohol use disorder  Chronic macrocytic anemia  Hx of CVA  Possible cirrhosis    Issues Requiring Follow-Up  Discharge and readmit to [facility]    Discharge Meds     Your medication list        ASK your doctor about these medications        Instructions Last Dose Given Next Dose Due   aspirin 81 mg EC tablet           atorvastatin 40 mg tablet  Commonly known as: Lipitor      Take 1 tablet (40 mg) by mouth once daily.       cephalexin 500 mg capsule  Commonly known as: Keflex  Ask about: Should I take this medication?      Take 1 capsule (500 mg) by mouth 4 times a day for 4 days.       clopidogrel 75 mg tablet  Commonly known as: Plavix      Take 1 tablet (75 mg) by mouth once daily.       cyclobenzaprine 10 mg tablet  Commonly known as: Flexeril      Take 1 tablet (10 mg) by mouth 3 times a day as needed for muscle spasms for up to 10 days.       folic acid 1 mg tablet  Commonly known as: Folvite      Take 1 tablet (1 mg) by mouth once daily.       levETIRAcetam 500 mg tablet  Commonly known as: Keppra      Take 1 tablet (500 mg) by mouth 2 times a day.       metoprolol tartrate 25 mg tablet  Commonly known as: Lopressor      Take 1 tablet (25 mg) by mouth 2 times a day.       multivitamin with minerals tablet      Take 1 tablet by mouth once daily.       Oysco 500/D 500 mg-5 mcg (200 unit) tablet  Generic drug: calcium carbonate-vitamin D3      Take 1 tablet by mouth 2 times a day.       PHENobarbitaL 32.4 mg tablet  Commonly known as: Luminal  Start taking on: July 26, 2024      Take 2 tablets (64.8 mg) by mouth 3 times a day for 1 day, THEN 1 tablet (32.4 mg) 3 times a day for 2 days.       polyethylene glycol 17 gram packet  Commonly known as: Glycolax, Miralax      Take 17 g by mouth once daily.       potassium chloride CR 20 mEq ER  tablet  Commonly known as: Klor-Con M20      Take 1 tablet (20 mEq) by mouth once daily. Monitor K levels at [facility]       sertraline 50 mg tablet  Commonly known as: Zoloft      Take 1 tablet (50 mg) by mouth once daily.       sodium bicarbonate 650 mg tablet  Ask about: Should I take this medication?      Take 1 tablet (650 mg) by mouth 2 times a day for 3 days.       thiamine 100 mg tablet  Commonly known as: Vitamin B-1      Take 1 tablet (100 mg) by mouth once daily. Do not fill before July 27, 2024.                Test Results Pending At Discharge  Pending Labs       No current pending labs.            Hospital Course  63 y.o. female presented to the ED from [facility] ([place] [facility] [place]) after a fall from standing.  On presentation to the ED, patient with left occiput hematoma, A+O x 3 and moving all extremities.  Of note, patient is on Plavix and has a history of seizures (no seizure witnessed pre/post fall).  CT head with left cerebral hemispheric SAH, basilar cistern hemorrhage and left posterior hematoma. Prolonged encephalopathy multifactorial due to SAH/SDH, over sedation with phenobarbital; was on PO phenobarbital taper then loaded with 7mg/kg loading dose on 7/30. Mental status worsening despite holding sedating meds. Overnight on 8/7 she had continued worsening of tachycardia, tachypnea and molting changes. Patient less responsive. Family elected to transition to comfort measures only. She is being transitioned to [facility].    Pertinent Physical Exam At Time of Discharge  Physical Exam  Vitals and nursing note reviewed.   Constitutional:       Appearance: She is ill-appearing.   Cardiovascular:      Rate and Rhythm: Regular rhythm. Tachycardia present.   Pulmonary:      Effort: Respiratory distress present.   Abdominal:      Palpations: Abdomen is soft.         Outpatient Follow-Up  Future Appointments   Date Time Provider Department Center   8/13/2024 10:00 AM DEMI CT [facility] [place]   8/20/2024  2:30 PM  Neville Corona MD [id number] [facility]   9/6/2024 11:00 AM Lluvia Alfred Pla, DO [id number] [facility]   9/24/2024 10:30 AM Jaret Patricia MD [id number] [facility]     DISCHARGE TIME: > 30 minutes    Alexi Quezada DO

## 2024-08-08 NOTE — PROGRESS NOTES
Between 7AM-7PM please message me via Epic Secure Chat.  After 7PM please page Nocturnist on call.    [facility] Hospitalist Progress Note      Yue Sanz    :  1960(63 y.o.)    MRN:  [id number]  Date: 24     Assessment and Plan:     Acute metabolic encephalopathy  s/p traumatic L sided subdural hematoma/subarachnoid hemorrhage due to mechanical fall at SNF  Hyponatremia  Hypokalemia  Alcohol use disorder  Chronic macrocytic anemia  Hx of CVA  Possible cirrhosis    Plan:  - Prolonged encephalopathy multifactorial due to SAH/SDH, over sedation with phenobarbital; was on PO phenobarbital taper then loaded with 7mg/kg loading dose on . Mental status worsening today, discussed guarded prognosis with ALLIE Otoole. Expect patient has hours to days. Family is hoping to focus more on comfort measures than escalating care.   - NSGY evaluated and no acute intervention needed. Rec holding her ASA for 1 week, plavix for 2 weeks. SBP goal < 160. Keppra for 7 days. Repeat head CT on .   - Remains unable to safely take po intake due to difficulty following commands/sedation although level of alertness is slowly improving. SLP rec npo.  - Hyponatremia improving, continue to monitor BMP.       DVT Prophylaxis: SCDs    Disposition: continue to monitor inpatient, await consultant recommendations, await test results, and await clinical improvement    Electronically signed by Alexi Quezada DO on 24 at 10:33 PM     Subjective:      Interval History:   Vitals and chart notes from overnight reviewed.   No acute issues overnight.   Patient seen and evaluated at bedside.   Very lethargic today. Does not awaken. Audible wheezing.    Review of Systems:   Unable: AMS    Current medications:  Scheduled Meds:[Held by provider] amLODIPine, 5 mg, oral, Daily  [Held by provider] folic acid, 1 mg, oral, Daily  insulin lispro, 0-5 Units, subcutaneous, q4h  melatonin, 6 mg, oral, Nightly  metoprolol, 5 mg,  intravenous, q6h  morphine, 2 mg, intravenous, q4h  [Held by provider] multivitamin with minerals, 1 tablet, oral, Daily  oxygen, , inhalation, Continuous - Inhalation  [Held by provider] pantoprazole, 40 mg, oral, Daily before breakfast  [Held by provider] sertraline, 50 mg, oral, Daily      Continuous Infusions:   PRN Meds:PRN medications: acetaminophen, dextrose, dextrose, glucagon, glucagon, glycopyrrolate, haloperidol lactate, hydrALAZINE, hydrALAZINE, LORazepam, LORazepam, morphine, oxygen      Objective:     Heart Rate:  []   Temp:  [36.7 °C (98.1 °F)-37.3 °C (99.1 °F)]   Resp:  [16]   BP: (116-169)/()   Weight:  [51.5 kg (113 lb 9.6 oz)]   SpO2:  [91 %-100 %]          Physical Exam  Vitals and nursing note reviewed.   Constitutional:       Appearance: She is ill-appearing.      Comments: Lethargic   HENT:      Mouth/Throat:      Mouth: Mucous membranes are moist.      Pharynx: Oropharynx is clear.   Cardiovascular:      Rate and Rhythm: Normal rate and regular rhythm.   Pulmonary:      Effort: Pulmonary effort is normal.      Breath sounds: Rales present.   Abdominal:      Palpations: Abdomen is soft.   Neurological:      Mental Status: She is disoriented.      Comments: Does not awaken to verbal cues or light touch. Does not follow commands         Labs:   Lab Results   Component Value Date     (L) 08/07/2024    K 3.1 (L) 08/07/2024    CL 95 (L) 08/07/2024    CO2 22 08/07/2024    BUN 5 (L) 08/07/2024    CREATININE 0.44 (L) 08/07/2024    GLUCOSE 99 08/07/2024    CALCIUM 8.6 08/07/2024    PROT 6.8 08/07/2024    BILITOT 0.7 08/07/2024    ALKPHOS 106 08/07/2024    AST 40 (H) 08/07/2024    ALT 14 08/07/2024       Lab Results   Component Value Date    WBC 7.1 08/07/2024    HGB 10.2 (L) 08/07/2024    HCT 29.6 (L) 08/07/2024    MCV 95 08/07/2024     08/07/2024

## 2024-08-08 NOTE — PROGRESS NOTES
"Yue Sanz is a 63 y.o. female on day 9 of admission presenting with Subdural hematoma (Multi).    Subjective   Tachypneic overnight without agitation. Per conversation with nephew Xiang and his wife, she has barely moved since they got there.     Objective     Physical Exam  Constitutional:       General: She is not in acute distress.     Comments: Obtunded, does not respond to painful stimuli.   HENT:      Head: Normocephalic and atraumatic.      Mouth/Throat:      Mouth: Mucous membranes are moist.   Eyes:      Extraocular Movements: Extraocular movements intact.      Pupils: Pupils are equal, round, and reactive to light.   Cardiovascular:      Rate and Rhythm: Normal rate and regular rhythm.      Pulses: Normal pulses.   Pulmonary:      Effort: Pulmonary effort is normal.      Breath sounds: Rhonchi present.      Comments: Bronchial breath sounds. Retained oral secretions with loud breathing.   Abdominal:      General: Abdomen is flat.      Palpations: Abdomen is soft.   Musculoskeletal:      Cervical back: Normal range of motion and neck supple.      Comments: LT extremity usually flexed. Some resistance to passive movement.    Skin:     Comments: Mottling in BL forearms.    Neurological:      Mental Status: She is disoriented.      Comments: Unresponsive.         Last Recorded Vitals  Blood pressure 128/84, pulse (!) 125, temperature 36.3 °C (97.4 °F), temperature source Oral, resp. rate (!) 29, height 1.676 m (5' 5.98\"), weight 51.5 kg (113 lb 9.6 oz), SpO2 (!) 89%.  Intake/Output last 3 Shifts:  I/O last 3 completed shifts:  In: 0 (0 mL/kg)   Out: 975 (18.9 mL/kg) [Urine:975 (0.5 mL/kg/hr)]  Weight: 51.5 kg     Relevant Results  Scheduled medications  [Held by provider] amLODIPine, 5 mg, oral, Daily  [Held by provider] folic acid, 1 mg, oral, Daily  insulin lispro, 0-5 Units, subcutaneous, q4h  melatonin, 6 mg, oral, Nightly  [Held by provider] multivitamin with minerals, 1 tablet, oral, Daily  [Held by " provider] pantoprazole, 40 mg, oral, Daily before breakfast  [Held by provider] sertraline, 50 mg, oral, Daily      Continuous medications     PRN medications  PRN medications: acetaminophen, dextrose, dextrose, glucagon, glucagon, glycopyrrolate, haloperidol lactate, hydrALAZINE, hydrALAZINE, HYDROmorphone, LORazepam, LORazepam, oxygen, oxygen  Results for orders placed or performed during the hospital encounter of 07/30/24 (from the past 24 hour(s))   POCT GLUCOSE   Result Value Ref Range    POCT Glucose 108 (H) 74 - 99 mg/dL   POCT GLUCOSE   Result Value Ref Range    POCT Glucose 99 74 - 99 mg/dL   POCT GLUCOSE   Result Value Ref Range    POCT Glucose 99 74 - 99 mg/dL   POCT GLUCOSE   Result Value Ref Range    POCT Glucose 94 74 - 99 mg/dL   CBC and Auto Differential   Result Value Ref Range    WBC 13.8 (H) 4.4 - 11.3 x10*3/uL    nRBC 0.0 0.0 - 0.0 /100 WBCs    RBC 3.12 (L) 4.00 - 5.20 x10*6/uL    Hemoglobin 10.1 (L) 12.0 - 16.0 g/dL    Hematocrit 31.6 (L) 36.0 - 46.0 %     (H) 80 - 100 fL    MCH 32.4 26.0 - 34.0 pg    MCHC 32.0 32.0 - 36.0 g/dL    RDW 14.4 11.5 - 14.5 %    Platelets 355 150 - 450 x10*3/uL    Neutrophils % 87.9 40.0 - 80.0 %    Immature Granulocytes %, Automated 0.3 0.0 - 0.9 %    Lymphocytes % 5.3 13.0 - 44.0 %    Monocytes % 6.2 2.0 - 10.0 %    Eosinophils % 0.0 0.0 - 6.0 %    Basophils % 0.3 0.0 - 2.0 %    Neutrophils Absolute 12.09 (H) 1.20 - 7.70 x10*3/uL    Immature Granulocytes Absolute, Automated 0.04 0.00 - 0.70 x10*3/uL    Lymphocytes Absolute 0.73 (L) 1.20 - 4.80 x10*3/uL    Monocytes Absolute 0.85 0.10 - 1.00 x10*3/uL    Eosinophils Absolute 0.00 0.00 - 0.70 x10*3/uL    Basophils Absolute 0.04 0.00 - 0.10 x10*3/uL   Renal Function Panel   Result Value Ref Range    Glucose 87 74 - 99 mg/dL    Sodium 132 (L) 136 - 145 mmol/L    Potassium 4.0 3.5 - 5.3 mmol/L    Chloride 98 98 - 107 mmol/L    Bicarbonate 15 (L) 21 - 32 mmol/L    Anion Gap 23 (H) 10 - 20 mmol/L    Urea Nitrogen 17  6 - 23 mg/dL    Creatinine 1.28 (H) 0.50 - 1.05 mg/dL    eGFR 47 (L) >60 mL/min/1.73m*2    Calcium 8.3 (L) 8.6 - 10.3 mg/dL    Phosphorus 4.4 2.5 - 4.9 mg/dL    Albumin 3.2 (L) 3.4 - 5.0 g/dL   POCT GLUCOSE   Result Value Ref Range    POCT Glucose 90 74 - 99 mg/dL     CT head wo IV contrast    Result Date: 8/7/2024  Interpreted By:  July Eli, STUDY: CT HEAD WO IV CONTRAST;  8/7/2024 7:57 am   INDICATION: Signs/Symptoms:follow up SAH/SDH.   COMPARISON: August 5, 2024   ACCESSION NUMBER(S): [id number]   ORDERING CLINICIAN: EMPERATRIZ BOYLE   TECHNIQUE: Noncontrast axial CT scan of head was performed. Angled reformats in brain and bone windows were generated. The images were reviewed in bone, brain, blood and soft tissue windows. Coronal and sagittal reformats are provided for review.   FINDINGS: There is again multifocal subarachnoid hemorrhage remaining most pronounced within left hemispheric sulci and the left sylvian fissure. There is hyperdense subdural hemorrhage overlying the left cerebral hemisphere, most pronounced posteriorly. There is also subdural hemorrhage extending along the dorsal left aspect of the falx and left greater than right tentorium. The component along the posterolateral left cerebral hemisphere measures approximately 5 mm in thickness. The component along the dorsal left falx measures approximately 2 mm in thickness. Hemorrhage along the tentorium measures up to approximately 3 mm in thickness on the left. When allowing for differences in slice positioning and partial volume averaging, these are very similar measurements from the previous examination.   There is hypodensity with loss of gray/white matter differentiation within the left parietal, left frontal, and left temporal lobes with local mass effect including sulcal effacement. There is also diminished attenuation in the right occipital and posteromedial temporal lobes without evidence of significant volume loss.   There is again  asymmetric sulcal effacement on the left with partial effacement of the left lateral ventricle and left perimesencephalic cistern. There is bowing of the septum pellucidum to the right measuring approximately 3 mm, very similar from the previous study.   There is again ex vacuo dilatation of the right lateral ventricle. There is overall prominence of ventricles and sulci compatible with diffuse parenchymal volume loss.   There are again areas of encephalomalacia and gliosis in the right parietooccipital and posterior temporal lobes. Prominent perivascular space is suspected in the inferior left lentiform nucleus.   There are nonspecific areas of diminished attenuation in the subcortical and periventricular white matter. The posterior fossa is degraded by beam hardening artifact.   There is again a lucent lesion in the occipital bone to the left of midline.   Mucosal thickening and fluid are noted in the left sphenoid sinus. There are retention cysts or polyps in the maxillary sinuses, right greater than left. There is opacification of a few mastoid air cells. There is nonspecific soft tissue opacity in the external auditory canals which may represent cerumen or debris.       1. Redemonstration of multifocal subarachnoid hemorrhage remaining most pronounced within left hemispheric sulci and sylvian fissure. There is also again multifocal subdural hemorrhage, very similar from the previous examination. There is persistent mass effect and rightward midline shift. 2. Areas of diminished attenuation with loss of gray/white matter differentiation involving portions of the left frontal, parietal, and temporal lobes suspicious for evolving infarct in the left MCA distribution. MRI with diffusion-weighted imaging is recommended for further evaluation. 3. Suspected areas of remote ischemic injury in the right MCA distribution and potential subacute ischemic injury in the right PCA distribution. Again MRI with  diffusion-weighted imaging is recommended.       MACRO: Critical Finding:  See findings. Notification was initiated on 8/7/2024 at 8:52 am by  July Eli.  (**-OCF-**)   Signed by: July Eli 8/7/2024 8:52 AM Dictation workstation:   [id number]    Electrocardiogram, 12-lead PRN ACS symptoms    Result Date: 8/6/2024  Normal sinus rhythm Cannot rule out Inferior infarct , age undetermined Abnormal ECG When compared with ECG of 03-AUG-2024 15:40, OK interval has decreased RSR' pattern in V1 is no longer Present ST now depressed in Inferior leads Nonspecific T wave abnormality, worse in Inferior leads    Electrocardiogram, 12-lead PRN ACS symptoms    Result Date: 8/5/2024  Sinus rhythm with 1st degree AV block RSR' or QR pattern in V1 suggests right ventricular conduction delay Prolonged QT Abnormal ECG When compared with ECG of 30-JUL-2024 02:17, No significant change was found Confirmed by Brenden Esparza ([id number]) on 8/5/2024 9:36:22 PM    EEG    IMPRESSION Impression This routine EEG is consistent with 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 No seizures are seen. A full report will be scanned into the patient's chart at a later time. This report has been interpreted and electronically signed by    CT head wo IV contrast    Result Date: 8/5/2024  Interpreted By:  Missy Wilson, STUDY: CT HEAD WO IV CONTRAST;  8/5/2024 2:47 pm   INDICATION: Signs/Symptoms:altered mental status. 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   FINDINGS: All caps the periventricular and subcortical white matter changes are present.   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  Left-sided subarachnoid hemorrhage is again noted as well as subarachnoid hemorrhage in the basilar cisterns which appears to be slightly less prominent than on the prior exam..   4 mm left frontal subdural hematoma is again seen, without significant change. Also noted is a left occipital subdural hematoma measuring proximally 4 mm. It is also unchanged.   Diffuse left cerebral edema is noted with effacement of the sulci.   4.6 mm left-to-right midline shift is again seen, which not significantly changed.   Again noted is large area of hypodensity in the right parietal region, which most likely is related to a prior infarct and is unchanged.   There appears to be a small area of hyperdensity in the right occipital lobe which may be new area of parenchymal or subarachnoid hemorrhage.   The sulci and ventricles are prominent in the right cerebral hemisphere   INTRACRANIAL VESSELS: No significant atherosclerotic calcification.   EXTRACRANIAL SOFT TISSUES: Within normal limits.   PARANASAL SINUSES/MASTOIDS: Within normal limits.   CALVARIUM: Scratch the lucent lesion is again seen in the left occipital bone. Depressed skull fracture.       Possible small area of new parenchymal or subarachnoid hemorrhage in the right occipital lobe.   Redemonstration of left subdural hematomas, which are unchanged.   Redemonstration of left subarachnoid hemorrhage and small amount of subarachnoid hemorrhage in the basilar cisterns which appear to be slightly less prominent than on the prior exam.   Redemonstration of left surgery edema with 4.6 mm left-to-right midline shift, which has not significantly changed   Redemonstration of basilar tip aneurysm.   MACRO: Critical Finding:  See findings. Notification was initiated on 8/5/2024 at 3:07 pm by  Missy Wilson.  (**-OCF-**)   Signed by: Missy Wilson 8/5/2024 3:07 PM Dictation workstation:   [id number]    CT head wo IV contrast    Result Date: 8/1/2024  Interpreted By:  Karina Spann  STUDY: CT HEAD WO IV CONTRAST;  8/1/2024 4:25 pm   INDICATION: Signs/Symptoms:New visual field deficit, want to ensure stability of known SAH/subdural.   COMPARISON: 07/30/2024 at 3:33 p.m.   ACCESSION NUMBER(S): [id number]   ORDERING CLINICIAN: SANDIP HINSON   TECHNIQUE: Unenhanced CT images of the head were obtained.   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 Acute subarachnoid hemorrhage involving the left cerebral hemisphere and basal cisterns similar in size but overall slightly less dense than on the previous exam. Acute left-sided subdural hematomas measuring up to 5 mm in the frontal region similar to the prior exam. There is mild diffuse left cerebral edema with sulcal effacement and approximately 5 mm of midline shift at the level of the 3rd ventricle similar to the prior exam. No new acute intracranial hemorrhage. Stable low-density likely remote infarct in the right parietal region with porencephalic dilatation of the posterior horn of the right lateral ventricle.   Stable subcentimeter lucent lesion in the occipital calvarium just to the left of midline image 16/36.   thickening in the left sphenoid and partially imaged right maxillary sinuses. Remaining visualized paranasal sinuses are clear.       Redemonstration of basilar tip aneurysm with acute subarachnoid and left-sided subdural hematomas,  cerebral edema and mild midline shift. No significant change from 07/30/2024 at 3:33 p.m..   MACRO: None.   Signed by: Karina Spann 8/1/2024 4:48 PM Dictation workstation:   [id number]    CT head wo IV contrast    Result Date: 7/30/2024  Interpreted By:  Varghese Rapp, STUDY: CT HEAD WO IV CONTRAST;  7/30/2024 3:35 pm   INDICATION: Signs/Symptoms:6hr eval of SAH, SDH.   COMPARISON: Most recent prior is from 07/30/2024 at 7:31 a.m...   ACCESSION NUMBER(S): [id number]   ORDERING CLINICIAN: JOSE WAITE   TECHNIQUE: Routine axial images were obtained from the skull base through the vertex.  Sagittal  and coronal reconstruction images were generated. Brain, subdural, and bone windows were reviewed. N/A Unavailable   FINDINGS:   Stable appearance 9 mm basilar tip aneurysm. There is persistent subdural blood adjacent to the posterior aspect of the falx and along the superior aspect of the left tentorium. There is stable subarachnoid blood along the right side of the mar and midbrain and also along the posterior and lateral aspect of the mar and midbrain on the left. Stable subarachnoid blood in multiple left parietal fissures including the left sylvian fissure. Small grossly stable anterolateral left frontal subdural hematoma measuring 6 mm in thickness; small amount stable acute subdural blood along the anterolateral aspect of the left temporal lobe measuring approximately 2 mm in transverse thickness. Stable lateral and posterior high left parietal subdural hematoma measuring 7 mm in transverse thickness. There is approximately 3.6 mm of rightward displacement of the midline at the level of the septum pellucidum, compared to 4.2 mm earlier today. There is mild stable anterior inferior bifrontal subarachnoid blood. No gross intraventricular blood.   Underlying mild prominence of ventricles and sulci consistent with volume loss. There is a small to moderate-sized old infarct in the lateral right parietal lobe. No indication of acute infarct.   Small retention cyst in the left sphenoid sinus. The bilateral mastoids were clear. No paranasal sinus fluid level. No destructive calvarial lesion or depressed skull fracture.         Grossly stable basilar tip aneurysm.   Acute subdural and subarachnoid blood as described, with no significant change from the exam earlier this morning.   Underlying volume loss.   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   MACRO: None   Signed by: Varghese Rapp 7/30/2024 4:03 PM Dictation workstation:   [id number]    ECG 12 lead    Result Date: 7/30/2024  Normal sinus  rhythm Incomplete right bundle branch block Cannot rule out Anterior infarct , age undetermined Abnormal ECG When compared with ECG of 24-JUL-2024 11:09, Inverted T waves have replaced nonspecific T wave abnormality in Inferior leads See ED provider note for full interpretation and clinical correlation Confirmed by Heather Harris ([id number]) on 7/30/2024 11:22:30 AM    CT angio head and neck w and wo IV contrast    Result Date: 7/30/2024  Interpreted By:  Roderick Lin, STUDY: CT ANGIO HEAD AND NECK W AND WO IV CONTRAST   INDICATION: Signs/Symptoms:c/f aneurysm   COMPARISON: Head CT 07/30/2024.   ACCESSION NUMBER(S): [id number]   ORDERING CLINICIAN: ERASMO MARLOW   TECHNIQUE: CTA of the head and neck was performed after the intravenous administration of 75 mL Omnipaque 350 nonionic IV contrast. 3-D reconstructions were performed under physician supervision on a separate workstation and reviewed.   FINDINGS: CTA NECK:   AORTIC ARCH: The visualized portion of the aortic arch is unremarkable in appearance. The origins of the great vessels and the vertebral arteries are normal without evidence of stenosis.   CERVICAL CAROTID ARTERIES: The common carotid arteries are patent bilaterally without evidence of stenosis. Atherosclerotic disease of bilateral carotid bifurcations resulting in mild (less than 50%) luminal stenosis of bilateral proximal cervical ICAs. Remainder of the cervical ICAs are patent throughout their course.   VERTEBRAL ARTERIES: The vertebral arteries are patent bilaterally throughout their course noting slight diminutive caliber of the left vertebral artery.   CTA HEAD:   INTERNAL CAROTID ARTERIES: Atherosclerotic disease of bilateral carotid siphons, which remain overall patent. Intracranial ICAs are otherwise normal.   CEREBRAL ARTERIES: No aneurysm appreciated. Left MCA branches appear patent and grossly symmetric with right MCA branches; no definitive findings to suggest vasospasm. Bilateral ACAs are  within normal limits. Bilateral PCAs are predominantly supplied by the posterior communicating arteries.   VERTEBROBASILAR SYSTEM: Left intradural vertebral artery is again diminutive and is severely narrowed in caliber distal to the PICA branch. Right intradural vertebral artery is unremarkable. Basilar artery is patent but is again diminutive secondary to PCOM dominance. No evidence of basilar tip aneurysm.   There is no evidence for saccular aneurysm, vascular malformation, or large vessel occlusion.   OTHER: Known extensive subarachnoid and subdural hemorrhages are better assessed on noncontrast head CT performed earlier today.       No evidence of basilar tip aneurysm. Previously noted rounded hyperdensity corresponds to subarachnoid hemorrhage extending into the supra cerebellar cistern.   Mild scattered atherosclerotic disease of the cervical and intracranial vasculature as detailed.   Please refer to noncontrast head CT for complete evaluation of subarachnoid and subdural hemorrhages. No definitive evidence of vasospasm at this time.   _____________ NASCET criteria for internal carotid artery stenosis: Mild: 0% to 49% Moderate: 50% to 69% Severe: 70% to 99% Complete Occlusion   Signed by: Roderick Lin 7/30/2024 9:11 AM Dictation workstation:   [id number]    CT head wo IV contrast    Result Date: 7/30/2024  Interpreted By:  Jean Hester, STUDY: CT HEAD WO IV CONTRAST;  7/30/2024 7:32 am   INDICATION: Signs/Symptoms:sah sdh.   COMPARISON: CT Brain, earlier same morning 30 July 2024 at 0122 hours   ACCESSION NUMBER(S): [id number]   ORDERING CLINICIAN: CAMILLA MILLER   TECHNIQUE: CT of the brain from the skull vertex to the skull base, without intravenous contrast   FINDINGS: No interval change to the abnormal CT appearance of the brain. The following are all unchanged   Approximately 9 x 7 x 9 mm basilar tip berry aneurysm (which I have annotated on axial series 201, image 15, coronal series 203, image 78  and sagittal series 204, image 62); large amount of acute subarachnoid hemorrhage in the left cerebral hemisphere and basilar cisterns; acute left subdural hematoma of variable thickness but not exceeding 8-9 mm thickness; mild left-to-right midline shift of just 2-3 mm       Abnormal but unchanged from earlier this morning. 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 It has not changed in size from earlier this morning   All of the acute intra-axial and extra-axial hemorrhages and mild left-to-right midline shift is all unchanged   No discernible enlarging aneurysm or worsening hemorrhage   MACRO: None   Signed by: Jean Hester 7/30/2024 7:41 AM Dictation workstation:   [id number]    CT head wo IV contrast    Result Date: 7/30/2024  Interpreted By:  Sandro Roe, STUDY: CT HEAD WO IV CONTRAST; CT CERVICAL SPINE WO IV CONTRAST;  7/30/2024 1:24 am   INDICATION: Signs/Symptoms:fall back of head   COMPARISON: None.   ACCESSION NUMBER(S): [id number]; [id number]   ORDERING CLINICIAN: CAMILLA MILLER   TECHNIQUE: Axial noncontrast CT images of head with coronal and sagittal reconstructed images. Axial noncontrast CT images of the cervical spine with coronal and sagittal reconstructed images.   FINDINGS: CT HEAD:   Abnormal examination. There is a subdural hematoma seen in the left measuring about 5 mm anteriorly and 8-9 mm posteriorly. Additionally there is extensive subarachnoid hemorrhage noted along the basilar cisterns as well as layering along the left cerebral convexity. Superior to the cerebellum there is of focus of hemorrhage measuring up to about 1 cm   No acute skull fracture.   Global volume loss. Evidence of prior chronic small vessel ischemic change seen in the right cerebral hemisphere there may be minimal midline shift to the right of 2-3 mm.   Soft tissue  swelling seen in the left occipital parietal region.     CT CERVICAL SPINE:   Demineralization of the bones. Scattered multilevel degenerative disc disease. Robust vascular calcification.   Degenerative disc disease most notable C4-5, C5-6 and C6-7. No evidence of acute cervical spine fracture       Abnormal examination. There is extensive left cerebral hemispheric subarachnoid hemorrhage.. Basilar cistern subarachnoid hemorrhage also noted. There is small subdural hematoma also seen in the left more prominent posteriorly. Minimal midline shift to the right of 2-3 mm   Global volume loss. Evidence of prior ischemic event in the right cerebral hemisphere not significantly changed. No intraventricular hemorrhage.   No findings of acute cervical spine fracture     Sandro Roe discussed the significance and urgency of this critical finding by epic chat with  CAMILLA MILLER on 7/30/2024 at 1:57 am. (**-RCF-**) Findings:  See findings.       Signed by: Sandro Roe 7/30/2024 1:58 AM Dictation workstation:   [id number]        Assessment/Plan   IMP:  IMP:  64 yo F with PMH of AUD, CVA hx, ?cirrhosis(found on imaging, no evidence of synthetic dysfunction on recent labs) recent humeral fracture 2/2 fall who was admitted after fall at SNF ([facility]) and found to have LT sided SAH and SDH that have been stable on repeat imaging. Seen by NSGY who recommended conservative treatment and starting Keppra in light of her SAH/ SDH. She was treated w/ CIWA protocol with phenobarbital. Palliative was consulted for assistance with GOC and complex decision making. Repeat CT of head on 8/6/2024 with new possible small area of new parenchymal or subarachnoid hemorrhage in the right occipital lobe.       Yesterday, willian agreed to hospice referral for informational purposes while we continue to treat patient's SAH/SDH, hyponatremia, AMS.  Patient had decline after said conversation with mottling in UE and worsening vitals.  Another provider discussed transitioning to DNR-CC with family and this was agreed. We continued with labs this morning and creatinine has risen to 1.2 idicating acute kidney injury. Morphine was stopped and switched to dialudid for indicaiton of tachypnea as patient has been breathing in the upper 20s. Given worsening labs and lack of improvement with symptom control, conversation has been to continue with care here in the hospital per last conversation with primary team and hospice RN. Family is now in contact with [facility] and will be able to benefit from their bereavement service.     3x doses of morphine 2mg IV and 1x dose of hydromorphone 0.4mg IV for tachypnea in last 16 hours.   24 hour OME equivalent is ~30 OME    Recommendations:  DNR-CC as of 8/7/24  Patient converted to [facility] care here.   Morphine converted to Dilaudid due to concern for ALEJANDRO   Scheduling hydromorphone 0.4mg IV q4h to control tachypnea   Continue with hydromorphone 0.4mg IV q3h prn breakthrough tachypnea RR>22  Continue with lorazepam 0.5mg IV q4h prn for agitation, restlessness  Continue wit haloperidol 1mg IV q4h prn for nause or vomiting  Continue with glycopyrrolate 0.2mg IV q4h for secretions  Will be reassessing for pain and symptoms over course of day.  Will continue to follow patient.      Other Palliative Support  Music Therapy   Support      I spent 30 minutes in the professional and overall care of this patient.      Dar Montelongo MD

## 2024-08-08 NOTE — CARE PLAN
The patient's goals for the shift include  pt. Unable to express    The clinical goals for the shift include Maintain pt. safety    Over the shift, the patient did not make progress toward the following goals. Barriers to progression include patient's  mentation. Recommendations to address these barriers include frequent rounding .

## 2024-08-08 NOTE — NURSING NOTE
Patient provided comfort at this time: mouth care complete, prn given, and family is at the bedside. No other requests per family at this time. Will continue to monitor

## 2024-08-08 NOTE — PROGRESS NOTES
Speech-Language Pathology                 Therapy Communication Note    Patient Name: Yue Sanz  MRN: [id number]  Today's Date: 8/8/2024     Discipline: Speech Language Pathology    Missed Time: Attempt    Comment: Attempted to reassess swallowing function. Further decline in medical condition occurred with patient now non responsive. Family present and awaiting meeting with hospice representative. Patient condition precludes SLP involvement at this time. Will discontinue SLP services at this time.

## 2024-08-08 NOTE — PROGRESS NOTES
Physical Therapy                 Therapy Communication Note    Patient Name: Yue Sanz  MRN: [id number]  Today's Date: 8/8/2024     Discipline: Physical Therapy    Missed Visit Reason: Missed Visit Reason: Other (Comment) Hospice will be meeting with family this a.m. Family requesting to stop therapy at this time.    Attempt made.                                                                                                                                                                                                                                                                                                                                                                                                                                                                                                                                                                                                                                                                                                        Missed Time: Attempt

## 2024-08-08 NOTE — PROGRESS NOTES
24 0807   Discharge Planning   Type of Residence Skilled nursing facility;Other (Comment)     Plan was for patient to dc to [facility], auth  8-7. Son now agreeing to informal hospice meeting, pal care team following. Per neuro note patient's son/hcpoa wishes to focus on comfort care and withdraw aggressive medical care. Per last night's md note patient with agonal breathing. TCC to continue to follow.     Addendum 854- Message from hospice SW that referral placed for [facility],.

## 2024-08-08 NOTE — H&P
Between 7AM-7PM please message me via Epic Secure Chat.  After 7PM please page Nocturnist on call.    [facility] History and Physical    Yue Sanz    :  1960(63 y.o.)    MRN:  [id number]  Date: 24     Assessment and Plan:      End of life care  Acute metabolic encephalopathy  s/p traumatic L sided subdural hematoma/subarachnoid hemorrhage due to mechanical fall at [facility]  Hyponatremia  Hypokalemia  Alcohol use disorder  Chronic macrocytic anemia  Hx of CVA  Possible cirrhosis    - Hospice and palliative care teams following to assist with symptom mgmt  - DNR-CC    DVT Prophylaxis: contraindicated in hospice    Disposition: [facility] hospice, hours to days. If longer may need to consider hospice house    Electronically signed by Alexi Quezada DO on 24 at 3:56 PM     Subjective:      Chief Complaint: end of life care  PCP: Lluvia Alfred Pla, DO     HPI:    63 y.o. female presented to the ED from SNF ([facility]) after a fall from standing.  On presentation to the ED, patient with left occiput hematoma, A+O x 3 and moving all extremities.  Of note, patient is on Plavix and has a history of seizures (no seizure witnessed pre/post fall).  CT head with left cerebral hemispheric SAH, basilar cistern hemorrhage and left posterior hematoma. Prolonged encephalopathy multifactorial due to SAH/SDH, over sedation with phenobarbital; was on PO phenobarbital taper then loaded with 7mg/kg loading dose on . Mental status worsening despite holding sedating meds. Overnight on  she had continued worsening of tachycardia, tachypnea and molting changes. Patient less responsive. Family elected to transition to comfort measures only. She is being transitioned to [facility].       Past Medical History:   Diagnosis Date    Acute CVA (cerebrovascular accident) (Multi) 2023    Alcohol related seizure (Multi) 2023    Cerebral hemorrhage (Multi) 2023    Cerebral infarction due to  embolism of right middle cerebral artery (Multi) 05/30/2023    Diarrhea, unspecified 08/10/2022    Diarrhea    Encounter for follow-up examination after completed treatment for conditions other than malignant neoplasm 04/20/2020    Hospital discharge follow-up    Encounter for other screening for malignant neoplasm of breast 03/08/2022    Breast screening    Encounter for screening for malignant neoplasm of colon 05/16/2022    Colon cancer screening    Other abnormality of red blood cells 08/10/2022    Elevated MCV    Personal history of other endocrine, nutritional and metabolic disease     History of high cholesterol    Personal history of transient ischemic attack (TIA), and cerebral infarction without residual deficits     History of cerebrovascular accident       Past Surgical History:   Procedure Laterality Date    MR HEAD ANGIO WO IV CONTRAST  5/19/2021    MR HEAD ANGIO WO IV CONTRAST 5/19/2021 [facility]    MR HEAD ANGIO WO IV CONTRAST  7/12/2018    MR HEAD ANGIO WO IV CONTRAST 7/12/2018 [facility]ICAL [facility]GAC[facility]    MR NECK ANGIO WO IV CONTRAST  5/19/2021    MR NECK ANGIO WO IV CONTRAST 5/19/2021 [facility]       Family History   Problem Relation Name Age of Onset    Arthritis Mother      No Known Problems Father         Social History     Socioeconomic History    Marital status:      Spouse name: Not on file    Number of children: Not on file    Years of education: Not on file    Highest education level: Not on file   Occupational History    Not on file   Tobacco Use    Smoking status: Never     Passive exposure: Never    Smokeless tobacco: Never   Vaping Use    Vaping status: Never Used   Substance and Sexual Activity    Alcohol use: Not Currently     Comment: none currenlty    Drug use: Not Currently    Sexual activity: Not on file   Other Topics Concern    Not on file   Social History Narrative    Not on file     Social Determinants of Health     Financial Resource Strain: Low Risk   (7/30/2024)    Overall Financial Resource Strain (CARDIA)     Difficulty of Paying Living Expenses: Not hard at all   Food Insecurity: Not on file   Transportation Needs: No Transportation Needs (7/30/2024)    PRAPARE - Transportation     Lack of Transportation (Medical): No     Lack of Transportation (Non-Medical): No   Physical Activity: Not on file   Stress: Not on file   Social Connections: Feeling Socially Integrated (7/16/2024)    OASIS : Social Isolation     Frequency of experiencing loneliness or isolation: Rarely   Intimate Partner Violence: Not on file   Housing Stability: Low Risk  (7/30/2024)    Housing Stability Vital Sign     Unable to Pay for Housing in the Last Year: No     Number of Times Moved in the Last Year: 1     Homeless in the Last Year: No       Allergies   Allergen Reactions    Amoxicillin Other       Prior to Admission medications    Medication Sig Start Date End Date Taking? Authorizing Provider   aspirin 81 mg EC tablet Take 1 tablet (81 mg) by mouth once daily. She has been out for a week 7/13/18   Historical Provider, MD   atorvastatin (Lipitor) 40 mg tablet Take 1 tablet (40 mg) by mouth once daily. 3/6/24   Lluvia Alfred Long, DO   calcium carbonate-vitamin D3 500 mg-5 mcg (200 unit) tablet Take 1 tablet by mouth 2 times a day. 6/20/24 9/22/24  Juan Finch MD   clopidogrel (Plavix) 75 mg tablet Take 1 tablet (75 mg) by mouth once daily. 3/6/24   Lluvia Alfred Long, DO   cyclobenzaprine (Flexeril) 10 mg tablet Take 1 tablet (10 mg) by mouth 3 times a day as needed for muscle spasms for up to 10 days. 7/16/24 7/26/24  Lluvia Alfred Long, DO   folic acid (Folvite) 1 mg tablet Take 1 tablet (1 mg) by mouth once daily. 1/2/24   Lluvia Alfred Long, DO   levETIRAcetam (Keppra) 500 mg tablet Take 1 tablet (500 mg) by mouth 2 times a day. 3/6/24 7/24/24  Lluvia Alfred Long, DO   metoprolol tartrate (Lopressor) 25 mg tablet Take 1 tablet (25 mg) by mouth 2 times a day. 7/16/24  8/15/24  Lluvia Alfred Long, DO   multivitamin with minerals tablet Take 1 tablet by mouth once daily. 7/27/24   LOVE Bermudez   PHENobarbitaL (Luminal) 32.4 mg tablet Take 2 tablets (64.8 mg) by mouth 3 times a day for 1 day, THEN 1 tablet (32.4 mg) 3 times a day for 2 days. 7/26/24 7/29/24  LOVE Bermudez   polyethylene glycol (Glycolax, Miralax) 17 gram packet Take 17 g by mouth once daily. 7/26/24   LOVE Bermudez   potassium chloride CR (Klor-Con M20) 20 mEq ER tablet Take 1 tablet (20 mEq) by mouth once daily. Monitor K levels at [facility] 7/27/24   LOVE Bermudez   sertraline (Zoloft) 50 mg tablet Take 1 tablet (50 mg) by mouth once daily. 3/6/24 7/24/24  Lluvia Alfred [facility], DO   thiamine (Vitamin B-1) 100 mg tablet Take 1 tablet (100 mg) by mouth once daily. Do not fill before July 27, 2024. 7/27/24   LOVE Bermudez       Review of systems:   Unable: AMS    Objective:      There were no vitals filed for this visit.     Physical Exam  Constitutional:       Appearance: She is ill-appearing.   Cardiovascular:      Rate and Rhythm: Regular rhythm. Tachycardia present.   Pulmonary:      Effort: Respiratory distress present.   Abdominal:      Palpations: Abdomen is soft.     Labs:   Lab Results   Component Value Date     (L) 08/08/2024    K 4.0 08/08/2024    CL 98 08/08/2024    CO2 15 (L) 08/08/2024    BUN 17 08/08/2024    CREATININE 1.28 (H) 08/08/2024    GLUCOSE 87 08/08/2024    CALCIUM 8.3 (L) 08/08/2024    PROT 6.8 08/07/2024    BILITOT 0.7 08/07/2024    ALKPHOS 106 08/07/2024    AST 40 (H) 08/07/2024    ALT 14 08/07/2024       Lab Results   Component Value Date    WBC 13.8 (H) 08/08/2024    HGB 10.1 (L) 08/08/2024    HCT 31.6 (L) 08/08/2024     (H) 08/08/2024     08/08/2024       Imaging:   No results found.

## 2024-08-08 NOTE — PROGRESS NOTES
History Of Present Illness  Ms. Sanz is a 63 y.o. woman with PMHx of stroke d/t embolic occlusion of RCA (1/28/21), left humerus fracture s/p intramedullary nail humerus (6/19/24), , EtOH use disorder, EtOH related seizure, anemia, depression, anxiety, HTN, HLD, and anxiety presented to the ED from SNF ([facility]) after a fall from standing.  On presentation to the ED, patient with left occiput hematoma, A+O x 3 and moving all extremities Started on CIWA protocol d/t concern for alcohol withdrawal until 8/2. On Keppra for seizure prophylaxis which was discontinued today.   Past Medical History  Medical History        Past Medical History:   Diagnosis Date    Acute CVA (cerebrovascular accident) (Multi) 05/30/2023    Alcohol related seizure (Multi) 05/30/2023    Cerebral hemorrhage (Multi) 05/30/2023    Cerebral infarction due to embolism of right middle cerebral artery (Multi) 05/30/2023    Diarrhea, unspecified 08/10/2022     Diarrhea    Encounter for follow-up examination after completed treatment for conditions other than malignant neoplasm 04/20/2020     Hospital discharge follow-up    Encounter for other screening for malignant neoplasm of breast 03/08/2022     Breast screening    Encounter for screening for malignant neoplasm of colon 05/16/2022     Colon cancer screening    Other abnormality of red blood cells 08/10/2022     Elevated MCV    Personal history of other endocrine, nutritional and metabolic disease       History of high cholesterol    Personal history of transient ischemic attack (TIA), and cerebral infarction without residual deficits       History of cerebrovascular accident         Surgical History  Surgical History         Past Surgical History:   Procedure Laterality Date    MR HEAD ANGIO WO IV CONTRAST   5/19/2021     MR HEAD ANGIO WO IV CONTRAST 5/19/2021 [facility]    MR HEAD ANGIO WO IV CONTRAST   7/12/2018     MR HEAD ANGIO WO IV CONTRAST 7/12/2018 [facility]     MR NECK ANGIO WO IV CONTRAST   2021     MR NECK ANGIO WO IV CONTRAST 2021 [facility]         Social History  Social History   Social History            Tobacco Use    Smoking status: Never       Passive exposure: Never    Smokeless tobacco: Never   Vaping Use    Vaping status: Never Used   Substance Use Topics    Alcohol use: Not Currently       Comment: none currenlty    Drug use: Not Currently         Allergies  Amoxicillin  Prescriptions Prior to Admission           Medications Prior to Admission   Medication Sig Dispense Refill Last Dose    aspirin 81 mg EC tablet Take 1 tablet (81 mg) by mouth once daily. She has been out for a week          atorvastatin (Lipitor) 40 mg tablet Take 1 tablet (40 mg) by mouth once daily. 90 tablet 1      calcium carbonate-vitamin D3 500 mg-5 mcg (200 unit) tablet Take 1 tablet by mouth 2 times a day. 180 tablet 0      [] cephalexin (Keflex) 500 mg capsule Take 1 capsule (500 mg) by mouth 4 times a day for 4 days. 4 capsule        clopidogrel (Plavix) 75 mg tablet Take 1 tablet (75 mg) by mouth once daily. 90 tablet 1      cyclobenzaprine (Flexeril) 10 mg tablet Take 1 tablet (10 mg) by mouth 3 times a day as needed for muscle spasms for up to 10 days. 20 tablet 0      folic acid (Folvite) 1 mg tablet Take 1 tablet (1 mg) by mouth once daily. 90 tablet 0      levETIRAcetam (Keppra) 500 mg tablet Take 1 tablet (500 mg) by mouth 2 times a day. 180 tablet 1      metoprolol tartrate (Lopressor) 25 mg tablet Take 1 tablet (25 mg) by mouth 2 times a day. 60 tablet 0      multivitamin with minerals tablet Take 1 tablet by mouth once daily.          PHENobarbitaL (Luminal) 32.4 mg tablet Take 2 tablets (64.8 mg) by mouth 3 times a day for 1 day, THEN 1 tablet (32.4 mg) 3 times a day for 2 days.          polyethylene glycol (Glycolax, Miralax) 17 gram packet Take 17 g by mouth once daily.          potassium chloride CR (Klor-Con M20) 20 mEq ER tablet Take 1  tablet (20 mEq) by mouth once daily. Monitor K levels at [facility]          sertraline (Zoloft) 50 mg tablet Take 1 tablet (50 mg) by mouth once daily. 90 tablet 1      [] sodium bicarbonate 650 mg tablet Take 1 tablet (650 mg) by mouth 2 times a day for 3 days.          thiamine (Vitamin B-1) 100 mg tablet Take 1 tablet (100 mg) by mouth once daily. Do not fill before 2024.                  Review of Systems  Neurological Exam  Physical Exam  Neurological Exam: confused, arousable with sternal rub,  Eyes and head deviated to the left, pupils equal and reactive to light, doesn't follow commands, withdraws to noxious stimuli in 4 extremities. No adventitious movements appreciated.   DTRs hyperactive symmetric  Plantar response mute bilaterally     Relevant Results  Scheduled medications    Scheduled Medications   [Held by provider] amLODIPine, 5 mg, oral, Daily  [Held by provider] folic acid, 1 mg, oral, Daily  insulin lispro, 0-5 Units, subcutaneous, q4h  metoprolol, 5 mg, intravenous, q6h  [Held by provider] multivitamin with minerals, 1 tablet, oral, Daily  OLANZapine, 2.5 mg, intramuscular, Once  [Held by provider] pantoprazole, 40 mg, oral, Daily before breakfast  [Held by provider] sertraline, 50 mg, oral, Daily         Continuous medications    Continuous Medications   sodium chloride 0.9%, 75 mL/hr, Last Rate: 75 mL/hr (24 1545)         PRN medications  PRN Medications   PRN medications: acetaminophen, dextrose, dextrose, glucagon, glucagon, hydrALAZINE, hydrALAZINE, oxygen            Results for orders placed or performed during the hospital encounter of 24 (from the past 24 hour(s))   POCT GLUCOSE   Result Value Ref Range     POCT Glucose 111 (H) 74 - 99 mg/dL   POCT GLUCOSE   Result Value Ref Range     POCT Glucose 105 (H) 74 - 99 mg/dL   POCT GLUCOSE   Result Value Ref Range     POCT Glucose 92 74 - 99 mg/dL   CBC and Auto Differential   Result Value Ref Range     WBC 9.5 4.4 - 11.3  x10*3/uL     nRBC 0.0 0.0 - 0.0 /100 WBCs     RBC 3.10 (L) 4.00 - 5.20 x10*6/uL     Hemoglobin 10.2 (L) 12.0 - 16.0 g/dL     Hematocrit 31.5 (L) 36.0 - 46.0 %      (H) 80 - 100 fL     MCH 32.9 26.0 - 34.0 pg     MCHC 32.4 32.0 - 36.0 g/dL     RDW 14.1 11.5 - 14.5 %     Platelets 354 150 - 450 x10*3/uL     Neutrophils % 65.3 40.0 - 80.0 %     Immature Granulocytes %, Automated 0.4 0.0 - 0.9 %     Lymphocytes % 13.8 13.0 - 44.0 %     Monocytes % 19.4 2.0 - 10.0 %     Eosinophils % 0.5 0.0 - 6.0 %     Basophils % 0.6 0.0 - 2.0 %     Neutrophils Absolute 6.17 1.20 - 7.70 x10*3/uL     Immature Granulocytes Absolute, Automated 0.04 0.00 - 0.70 x10*3/uL     Lymphocytes Absolute 1.31 1.20 - 4.80 x10*3/uL     Monocytes Absolute 1.84 (H) 0.10 - 1.00 x10*3/uL     Eosinophils Absolute 0.05 0.00 - 0.70 x10*3/uL     Basophils Absolute 0.06 0.00 - 0.10 x10*3/uL   Basic metabolic panel   Result Value Ref Range     Glucose 92 74 - 99 mg/dL     Sodium 127 (L) 136 - 145 mmol/L     Potassium 3.6 3.5 - 5.3 mmol/L     Chloride 95 (L) 98 - 107 mmol/L     Bicarbonate 16 (L) 21 - 32 mmol/L     Anion Gap 20 10 - 20 mmol/L     Urea Nitrogen 11 6 - 23 mg/dL     Creatinine 0.55 0.50 - 1.05 mg/dL     eGFR >90 >60 mL/min/1.73m*2     Calcium 8.2 (L) 8.6 - 10.3 mg/dL   POCT GLUCOSE   Result Value Ref Range     POCT Glucose 91 74 - 99 mg/dL   POCT GLUCOSE   Result Value Ref Range     POCT Glucose 91 74 - 99 mg/dL      Electrocardiogram, 12-lead PRN ACS symptoms     Result Date: 8/5/2024  Sinus rhythm with 1st degree AV block RSR' or QR pattern in V1 suggests right ventricular conduction delay Prolonged QT Abnormal ECG When compared with ECG of 30-JUL-2024 02:17, No significant change was found Confirmed by Brenden Esparza ([id number]) on 8/5/2024 9:36:22 PM     EEG     IMPRESSION Impression This routine EEG is consistent with 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 No seizures are seen. A full  report will be scanned into the patient's chart at a later time. This report has been interpreted and electronically signed by     CT head wo IV contrast     Result Date: 8/5/2024  Interpreted By:  Missy Wilson, STUDY: CT HEAD WO IV CONTRAST;  8/5/2024 2:47 pm   INDICATION: Signs/Symptoms:altered mental status. 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   FINDINGS: All caps the periventricular and subcortical white matter changes are present.   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 Left-sided subarachnoid hemorrhage is again noted as well as subarachnoid hemorrhage in the basilar cisterns which appears to be slightly less prominent than on the prior exam..   4 mm left frontal subdural hematoma is again seen, without significant change. Also noted is a left occipital subdural hematoma measuring proximally 4 mm. It is also unchanged.   Diffuse left cerebral edema is noted with effacement of the sulci.   4.6 mm left-to-right midline shift is again seen, which not significantly changed.   Again noted is large area of hypodensity in the right parietal region, which most likely is related to a prior infarct and is unchanged.   There appears to be a small area of hyperdensity in the right occipital lobe which may be new area of parenchymal or subarachnoid hemorrhage.   The sulci and ventricles are prominent in the right cerebral hemisphere   INTRACRANIAL VESSELS: No significant atherosclerotic calcification.   EXTRACRANIAL SOFT TISSUES: Within normal limits.   PARANASAL SINUSES/MASTOIDS: Within normal limits.   CALVARIUM: Scratch the lucent lesion is again seen in the left occipital bone. Depressed skull fracture.         Possible small area of new parenchymal or subarachnoid hemorrhage in the right occipital lobe.   Redemonstration of left subdural hematomas, which are unchanged.   Redemonstration of left subarachnoid hemorrhage and small amount of subarachnoid hemorrhage in the basilar cisterns which appear to be slightly less prominent than on the prior exam.   Redemonstration of left surgery edema with 4.6 mm left-to-right midline shift, which has not significantly changed   Redemonstration of basilar tip aneurysm.   MACRO: Critical Finding:  See findings. Notification was initiated on 8/5/2024 at 3:07 pm by  Missy Wilson.  (**-OCF-**)   Signed by: Missy Wilson 8/5/2024 3:07 PM Dictation workstation:   [id number]     CT head wo IV contrast     Result Date: 8/1/2024  Interpreted By:  Karina Spann, STUDY: CT HEAD WO IV CONTRAST;  8/1/2024 4:25 pm   INDICATION: Signs/Symptoms:New visual field deficit, want to ensure stability of known SAH/subdural.   COMPARISON: 07/30/2024 at 3:33 p.m.   ACCESSION NUMBER(S): [id number]   ORDERING CLINICIAN: SANDIP HINSON   TECHNIQUE: Unenhanced CT images of the head were obtained.   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 Acute subarachnoid hemorrhage involving the left cerebral hemisphere and basal cisterns similar in size but overall slightly less dense than on the previous exam. Acute left-sided subdural hematomas measuring up to 5 mm in the frontal region similar to the prior exam. There is mild diffuse left cerebral edema with sulcal effacement and approximately 5 mm of midline shift at the level of the 3rd ventricle similar to the prior exam. No new acute intracranial hemorrhage. Stable low-density likely remote infarct in the right parietal region with porencephalic dilatation of the posterior horn of the right lateral ventricle.   Stable subcentimeter lucent lesion in the occipital calvarium just to the left of midline image 16/36.   thickening in the left sphenoid and  partially imaged right maxillary sinuses. Remaining visualized paranasal sinuses are clear.        Redemonstration of basilar tip aneurysm with acute subarachnoid and left-sided subdural hematomas,  cerebral edema and mild midline shift. No significant change from 07/30/2024 at 3:33 p.m..   MACRO: None.   Signed by: Karina Spann 8/1/2024 4:48 PM Dictation workstation:   [id number]     CT head wo IV contrast     Result Date: 7/30/2024  Interpreted By:  Varghese Rapp, STUDY: CT HEAD WO IV CONTRAST;  7/30/2024 3:35 pm   INDICATION: Signs/Symptoms:6hr eval of SAH, SDH.   COMPARISON: Most recent prior is from 07/30/2024 at 7:31 a.m...   ACCESSION NUMBER(S): [id number]   ORDERING CLINICIAN: JOSE WAITE   TECHNIQUE: Routine axial images were obtained from the skull base through the vertex.  Sagittal and coronal reconstruction images were generated. Brain, subdural, and bone windows were reviewed. N/A Unavailable   FINDINGS:   Stable appearance 9 mm basilar tip aneurysm. There is persistent subdural blood adjacent to the posterior aspect of the falx and along the superior aspect of the left tentorium. There is stable subarachnoid blood along the right side of the mar and midbrain and also along the posterior and lateral aspect of the mar and midbrain on the left. Stable subarachnoid blood in multiple left parietal fissures including the left sylvian fissure. Small grossly stable anterolateral left frontal subdural hematoma measuring 6 mm in thickness; small amount stable acute subdural blood along the anterolateral aspect of the left temporal lobe measuring approximately 2 mm in transverse thickness. Stable lateral and posterior high left parietal subdural hematoma measuring 7 mm in transverse thickness. There is approximately 3.6 mm of rightward displacement of the midline at the level of the septum pellucidum, compared to 4.2 mm earlier today. There is mild stable anterior inferior bifrontal subarachnoid blood.  No gross intraventricular blood.   Underlying mild prominence of ventricles and sulci consistent with volume loss. There is a small to moderate-sized old infarct in the lateral right parietal lobe. No indication of acute infarct.   Small retention cyst in the left sphenoid sinus. The bilateral mastoids were clear. No paranasal sinus fluid level. No destructive calvarial lesion or depressed skull fracture.          Grossly stable basilar tip aneurysm.   Acute subdural and subarachnoid blood as described, with no significant change from the exam earlier this morning.   Underlying volume loss.   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   MACRO: None   Signed by: Varghese Rapp 7/30/2024 4:03 PM Dictation workstation:   [id number]     ECG 12 lead     Result Date: 7/30/2024  Normal sinus rhythm Incomplete right bundle branch block Cannot rule out Anterior infarct , age undetermined Abnormal ECG When compared with ECG of 24-JUL-2024 11:09, Inverted T waves have replaced nonspecific T wave abnormality in Inferior leads See ED provider note for full interpretation and clinical correlation Confirmed by Heather Harris ([id number]) on 7/30/2024 11:22:30 AM     CT angio head and neck w and wo IV contrast     Result Date: 7/30/2024  Interpreted By:  Roderick Lin, STUDY: CT ANGIO HEAD AND NECK W AND WO IV CONTRAST   INDICATION: Signs/Symptoms:c/f aneurysm   COMPARISON: Head CT 07/30/2024.   ACCESSION NUMBER(S): [id number]   ORDERING CLINICIAN: ERASMO MARLOW   TECHNIQUE: CTA of the head and neck was performed after the intravenous administration of 75 mL Omnipaque 350 nonionic IV contrast. 3-D reconstructions were performed under physician supervision on a separate workstation and reviewed.   FINDINGS: CTA NECK:   AORTIC ARCH: The visualized portion of the aortic arch is unremarkable in appearance. The origins of the great vessels and the vertebral arteries are normal without evidence of stenosis.   CERVICAL  CAROTID ARTERIES: The common carotid arteries are patent bilaterally without evidence of stenosis. Atherosclerotic disease of bilateral carotid bifurcations resulting in mild (less than 50%) luminal stenosis of bilateral proximal cervical ICAs. Remainder of the cervical ICAs are patent throughout their course.   VERTEBRAL ARTERIES: The vertebral arteries are patent bilaterally throughout their course noting slight diminutive caliber of the left vertebral artery.   CTA HEAD:   INTERNAL CAROTID ARTERIES: Atherosclerotic disease of bilateral carotid siphons, which remain overall patent. Intracranial ICAs are otherwise normal.   CEREBRAL ARTERIES: No aneurysm appreciated. Left MCA branches appear patent and grossly symmetric with right MCA branches; no definitive findings to suggest vasospasm. Bilateral ACAs are within normal limits. Bilateral PCAs are predominantly supplied by the posterior communicating arteries.   VERTEBROBASILAR SYSTEM: Left intradural vertebral artery is again diminutive and is severely narrowed in caliber distal to the PICA branch. Right intradural vertebral artery is unremarkable. Basilar artery is patent but is again diminutive secondary to PCOM dominance. No evidence of basilar tip aneurysm.   There is no evidence for saccular aneurysm, vascular malformation, or large vessel occlusion.   OTHER: Known extensive subarachnoid and subdural hemorrhages are better assessed on noncontrast head CT performed earlier today.        No evidence of basilar tip aneurysm. Previously noted rounded hyperdensity corresponds to subarachnoid hemorrhage extending into the supra cerebellar cistern.   Mild scattered atherosclerotic disease of the cervical and intracranial vasculature as detailed.   Please refer to noncontrast head CT for complete evaluation of subarachnoid and subdural hemorrhages. No definitive evidence of vasospasm at this time.   _____________ NASCET criteria for internal carotid artery stenosis:  Mild: 0% to 49% Moderate: 50% to 69% Severe: 70% to 99% Complete Occlusion   Signed by: Roderick Lin 7/30/2024 9:11 AM Dictation workstation:   [id number]     CT head wo IV contrast     Result Date: 7/30/2024  Interpreted By:  Jean Hester, STUDY: CT HEAD WO IV CONTRAST;  7/30/2024 7:32 am   INDICATION: Signs/Symptoms:sah sdh.   COMPARISON: CT Brain, earlier same morning 30 July 2024 at 0122 hours   ACCESSION NUMBER(S): [id number]   ORDERING CLINICIAN: CAMILLA MILLER   TECHNIQUE: CT of the brain from the skull vertex to the skull base, without intravenous contrast   FINDINGS: No interval change to the abnormal CT appearance of the brain. The following are all unchanged   Approximately 9 x 7 x 9 mm basilar tip berry aneurysm (which I have annotated on axial series 201, image 15, coronal series 203, image 78 and sagittal series 204, image 62); large amount of acute subarachnoid hemorrhage in the left cerebral hemisphere and basilar cisterns; acute left subdural hematoma of variable thickness but not exceeding 8-9 mm thickness; mild left-to-right midline shift of just 2-3 mm        Abnormal but unchanged from earlier this morning. 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 It has not changed in size from earlier this morning   All of the acute intra-axial and extra-axial hemorrhages and mild left-to-right midline shift is all unchanged   No discernible enlarging aneurysm or worsening hemorrhage   MACRO: None   Signed by: Jean Hester 7/30/2024 7:41 AM Dictation workstation:   [id number]     CT head wo IV contrast     Result Date: 7/30/2024  Interpreted By:  Sandro Roe, STUDY: CT HEAD WO IV CONTRAST; CT CERVICAL SPINE WO IV CONTRAST;  7/30/2024 1:24 am   INDICATION: Signs/Symptoms:fall back of head   COMPARISON: None.   ACCESSION NUMBER(S): [id number]; [id number]    ORDERING CLINICIAN: CAMILLA MILLER   TECHNIQUE: Axial noncontrast CT images of head with coronal and sagittal reconstructed images. Axial noncontrast CT images of the cervical spine with coronal and sagittal reconstructed images.   FINDINGS: CT HEAD:   Abnormal examination. There is a subdural hematoma seen in the left measuring about 5 mm anteriorly and 8-9 mm posteriorly. Additionally there is extensive subarachnoid hemorrhage noted along the basilar cisterns as well as layering along the left cerebral convexity. Superior to the cerebellum there is of focus of hemorrhage measuring up to about 1 cm   No acute skull fracture.   Global volume loss. Evidence of prior chronic small vessel ischemic change seen in the right cerebral hemisphere there may be minimal midline shift to the right of 2-3 mm.   Soft tissue swelling seen in the left occipital parietal region.     CT CERVICAL SPINE:   Demineralization of the bones. Scattered multilevel degenerative disc disease. Robust vascular calcification.   Degenerative disc disease most notable C4-5, C5-6 and C6-7. No evidence of acute cervical spine fracture        Abnormal examination. There is extensive left cerebral hemispheric subarachnoid hemorrhage.. Basilar cistern subarachnoid hemorrhage also noted. There is small subdural hematoma also seen in the left more prominent posteriorly. Minimal midline shift to the right of 2-3 mm   Global volume loss. Evidence of prior ischemic event in the right cerebral hemisphere not significantly changed. No intraventricular hemorrhage.   No findings of acute cervical spine fracture     Sandro Roe discussed the significance and urgency of this critical finding by epic chat with  CAMILLA MILLER on 7/30/2024 at 1:57 am. (**-RCF-**) Findings:  See findings.       Signed by: Sandro Roe 7/30/2024 1:58 AM Dictation workstation:   [id number]     CT cervical spine wo IV contrast     Result Date: 7/30/2024  Interpreted By:   Sandro Roe, STUDY: CT HEAD WO IV CONTRAST; CT CERVICAL SPINE WO IV CONTRAST;  7/30/2024 1:24 am   INDICATION: Signs/Symptoms:fall back of head   COMPARISON: None.   ACCESSION NUMBER(S): [id number]; [id number]   ORDERING CLINICIAN: CAMILLA MILLER   TECHNIQUE: Axial noncontrast CT images of head with coronal and sagittal reconstructed images. Axial noncontrast CT images of the cervical spine with coronal and sagittal reconstructed images.   FINDINGS: CT HEAD:   Abnormal examination. There is a subdural hematoma seen in the left measuring about 5 mm anteriorly and 8-9 mm posteriorly. Additionally there is extensive subarachnoid hemorrhage noted along the basilar cisterns as well as layering along the left cerebral convexity. Superior to the cerebellum there is of focus of hemorrhage measuring up to about 1 cm   No acute skull fracture.   Global volume loss. Evidence of prior chronic small vessel ischemic change seen in the right cerebral hemisphere there may be minimal midline shift to the right of 2-3 mm.   Soft tissue swelling seen in the left occipital parietal region.     CT CERVICAL SPINE:   Demineralization of the bones. Scattered multilevel degenerative disc disease. Robust vascular calcification.   Degenerative disc disease most notable C4-5, C5-6 and C6-7. No evidence of acute cervical spine fracture        Abnormal examination. There is extensive left cerebral hemispheric subarachnoid hemorrhage.. Basilar cistern subarachnoid hemorrhage also noted. There is small subdural hematoma also seen in the left more prominent posteriorly. Minimal midline shift to the right of 2-3 mm   Global volume loss. Evidence of prior ischemic event in the right cerebral hemisphere not significantly changed. No intraventricular hemorrhage.   No findings of acute cervical spine fracture     Sandro Roe discussed the significance and urgency of this critical finding by epic chat with  CAMILLA MILLER on 7/30/2024 at  1:57 am. (**-RCF-**) Findings:  See findings.       Signed by: Sandro Roe 7/30/2024 1:58 AM Dictation workstation:   [id number]     Lower extremity venous duplex left     Result Date: 7/26/2024             [place] [address]   Tel [phone] and Fax [phone]  Vascular Lab Report VASC US LOWER EXTREMITY VENOUS DUPLEX LEFT  Patient Name:      SEMAJ JOHNSONWILDA Marina Physician: [id number] Bhakti Camacho MD Study Date:        7/26/2024           Ordering           [id number] BREANNA BERGERON                                        Physician: MRN/PID:           [id number]            Technologist:      Anny You RVT Accession#:        [id number]        Technologist 2: Date of Birth/Age: 1960 / 63      Encounter#:        [id number]                    years Gender:            F Admission Status:  Inpatient           Location           [facility]                                        Performed:  Diagnosis/ICD: Localized (leg) edema-R60.0 Indication:    Limb pain. CPT Codes:     39508 Peripheral venous duplex scan for DVT Limited  CONCLUSIONS: Right Lower Venous: The right common femoral vein demonstrates normal spontaneous and respirophasic flow. Left Lower Venous: No evidence of acute deep vein thrombus visualized in the left lower extremity. Lymph nodes noted in the left groin.  Imaging & Doppler Findings:  Right        Flow CFV   Spontaneous/Phasic  Left                  Compress Thrombus        Flow Distal External Iliac   Yes      None   Spontaneous/Phasic CFV                     Yes      None   Spontaneous/Phasic PFV                     Yes      None FV Proximal             Yes      None   Spontaneous/Phasic FV Mid                  Yes      None FV Distal               Yes      None Popliteal               Yes      None   Spontaneous/Phasic Peroneal                Yes      None PTV                      Yes      None  [id number] Bhakti Camacho MD Electronically signed by [id number] Bhakti Camacho MD on 7/26/2024 at 5:46:16 PM  ** Final **      ECG 12 lead     Result Date: 7/24/2024  Normal sinus rhythm Incomplete right bundle branch block Prolonged QT Abnormal ECG When compared with ECG of 21-JUN-2024 09:59, Nonspecific T wave abnormality no longer evident in Anterior leads See ED provider note for full interpretation and clinical correlation Confirmed by Ibis Tejada ([id number]) on 7/24/2024 4:57:43 PM     I have personally reviewed the following imaging results Electrocardiogram, 12-lead PRN ACS symptoms     Result Date: 8/5/2024  Sinus rhythm with 1st degree AV block RSR' or QR pattern in V1 suggests right ventricular conduction delay Prolonged QT Abnormal ECG When compared with ECG of 30-JUL-2024 02:17, No significant change was found Confirmed by Brenden Esparza ([id number]) on 8/5/2024 9:36:22 PM     EEG     IMPRESSION Impression This routine EEG is consistent with 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 No seizures are seen. A full report will be scanned into the patient's chart at a later time. This report has been interpreted and electronically signed by     CT head wo IV contrast     Result Date: 8/5/2024  Interpreted By:  Missy Wilson, STUDY: CT HEAD WO IV CONTRAST;  8/5/2024 2:47 pm   INDICATION: Signs/Symptoms:altered mental status. 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   FINDINGS: All caps the periventricular and subcortical white matter changes are present.   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 Left-sided subarachnoid hemorrhage is again  noted as well as subarachnoid hemorrhage in the basilar cisterns which appears to be slightly less prominent than on the prior exam..   4 mm left frontal subdural hematoma is again seen, without significant change. Also noted is a left occipital subdural hematoma measuring proximally 4 mm. It is also unchanged.   Diffuse left cerebral edema is noted with effacement of the sulci.   4.6 mm left-to-right midline shift is again seen, which not significantly changed.   Again noted is large area of hypodensity in the right parietal region, which most likely is related to a prior infarct and is unchanged.   There appears to be a small area of hyperdensity in the right occipital lobe which may be new area of parenchymal or subarachnoid hemorrhage.   The sulci and ventricles are prominent in the right cerebral hemisphere   INTRACRANIAL VESSELS: No significant atherosclerotic calcification.   EXTRACRANIAL SOFT TISSUES: Within normal limits.   PARANASAL SINUSES/MASTOIDS: Within normal limits.   CALVARIUM: Scratch the lucent lesion is again seen in the left occipital bone. Depressed skull fracture.        Possible small area of new parenchymal or subarachnoid hemorrhage in the right occipital lobe.   Redemonstration of left subdural hematomas, which are unchanged.   Redemonstration of left subarachnoid hemorrhage and small amount of subarachnoid hemorrhage in the basilar cisterns which appear to be slightly less prominent than on the prior exam.   Redemonstration of left surgery edema with 4.6 mm left-to-right midline shift, which has not significantly changed   Redemonstration of basilar tip aneurysm.   MACRO: Critical Finding:  See findings. Notification was initiated on 8/5/2024 at 3:07 pm by  Missy Wilson.  (**-OCF-**)   Signed by: Missy Wilson 8/5/2024 3:07 PM Dictation workstation:   [id number]     CT head wo IV contrast     Result Date: 8/1/2024  Interpreted By:  Karina Spann, STUDY: CT HEAD WO IV CONTRAST;  8/1/2024  4:25 pm   INDICATION: Signs/Symptoms:New visual field deficit, want to ensure stability of known SAH/subdural.   COMPARISON: 07/30/2024 at 3:33 p.m.   ACCESSION NUMBER(S): [id number]   ORDERING CLINICIAN: SANDIP HINSON   TECHNIQUE: Unenhanced CT images of the head were obtained.   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 Acute subarachnoid hemorrhage involving the left cerebral hemisphere and basal cisterns similar in size but overall slightly less dense than on the previous exam. Acute left-sided subdural hematomas measuring up to 5 mm in the frontal region similar to the prior exam. There is mild diffuse left cerebral edema with sulcal effacement and approximately 5 mm of midline shift at the level of the 3rd ventricle similar to the prior exam. No new acute intracranial hemorrhage. Stable low-density likely remote infarct in the right parietal region with porencephalic dilatation of the posterior horn of the right lateral ventricle.   Stable subcentimeter lucent lesion in the occipital calvarium just to the left of midline image 16/36.   thickening in the left sphenoid and partially imaged right maxillary sinuses. Remaining visualized paranasal sinuses are clear.        Redemonstration of basilar tip aneurysm with acute subarachnoid and left-sided subdural hematomas,  cerebral edema and mild midline shift. No significant change from 07/30/2024 at 3:33 p.m..   MACRO: None.   Signed by: Karina Spann 8/1/2024 4:48 PM Dictation workstation:   [id number]     CT head wo IV contrast     Result Date: 7/30/2024  Interpreted By:  Varghese Rapp, STUDY: CT HEAD WO IV CONTRAST;  7/30/2024 3:35 pm   INDICATION: Signs/Symptoms:6hr eval of SAH, SDH.   COMPARISON: Most recent prior is from 07/30/2024 at 7:31 a.m...   ACCESSION NUMBER(S): [id number]   ORDERING CLINICIAN: JOSE WAITE   TECHNIQUE: Routine axial images were obtained from the skull base through the vertex.  Sagittal and coronal reconstruction images  were generated. Brain, subdural, and bone windows were reviewed. N/A Unavailable   FINDINGS:   Stable appearance 9 mm basilar tip aneurysm. There is persistent subdural blood adjacent to the posterior aspect of the falx and along the superior aspect of the left tentorium. There is stable subarachnoid blood along the right side of the mar and midbrain and also along the posterior and lateral aspect of the mar and midbrain on the left. Stable subarachnoid blood in multiple left parietal fissures including the left sylvian fissure. Small grossly stable anterolateral left frontal subdural hematoma measuring 6 mm in thickness; small amount stable acute subdural blood along the anterolateral aspect of the left temporal lobe measuring approximately 2 mm in transverse thickness. Stable lateral and posterior high left parietal subdural hematoma measuring 7 mm in transverse thickness. There is approximately 3.6 mm of rightward displacement of the midline at the level of the septum pellucidum, compared to 4.2 mm earlier today. There is mild stable anterior inferior bifrontal subarachnoid blood. No gross intraventricular blood.   Underlying mild prominence of ventricles and sulci consistent with volume loss. There is a small to moderate-sized old infarct in the lateral right parietal lobe. No indication of acute infarct.   Small retention cyst in the left sphenoid sinus. The bilateral mastoids were clear. No paranasal sinus fluid level. No destructive calvarial lesion or depressed skull fracture.          Grossly stable basilar tip aneurysm.   Acute subdural and subarachnoid blood as described, with no significant change from the exam earlier this morning.   Underlying volume loss.   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   MACRO: None   Signed by: Varghese Rapp 7/30/2024 4:03 PM Dictation workstation:   [id number]     ECG 12 lead     Result Date: 7/30/2024  Normal sinus rhythm Incomplete right bundle  branch block Cannot rule out Anterior infarct , age undetermined Abnormal ECG When compared with ECG of 24-JUL-2024 11:09, Inverted T waves have replaced nonspecific T wave abnormality in Inferior leads See ED provider note for full interpretation and clinical correlation Confirmed by Heather Harris ([id number]) on 7/30/2024 11:22:30 AM     CT angio head and neck w and wo IV contrast     Result Date: 7/30/2024  Interpreted By:  Roderick Lin, STUDY: CT ANGIO HEAD AND NECK W AND WO IV CONTRAST   INDICATION: Signs/Symptoms:c/f aneurysm   COMPARISON: Head CT 07/30/2024.   ACCESSION NUMBER(S): [id number]   ORDERING CLINICIAN: ERASMO MARLOW   TECHNIQUE: CTA of the head and neck was performed after the intravenous administration of 75 mL Omnipaque 350 nonionic IV contrast. 3-D reconstructions were performed under physician supervision on a separate workstation and reviewed.   FINDINGS: CTA NECK:   AORTIC ARCH: The visualized portion of the aortic arch is unremarkable in appearance. The origins of the great vessels and the vertebral arteries are normal without evidence of stenosis.   CERVICAL CAROTID ARTERIES: The common carotid arteries are patent bilaterally without evidence of stenosis. Atherosclerotic disease of bilateral carotid bifurcations resulting in mild (less than 50%) luminal stenosis of bilateral proximal cervical ICAs. Remainder of the cervical ICAs are patent throughout their course.   VERTEBRAL ARTERIES: The vertebral arteries are patent bilaterally throughout their course noting slight diminutive caliber of the left vertebral artery.   CTA HEAD:   INTERNAL CAROTID ARTERIES: Atherosclerotic disease of bilateral carotid siphons, which remain overall patent. Intracranial ICAs are otherwise normal.   CEREBRAL ARTERIES: No aneurysm appreciated. Left MCA branches appear patent and grossly symmetric with right MCA branches; no definitive findings to suggest vasospasm. Bilateral ACAs are within normal limits.  Bilateral PCAs are predominantly supplied by the posterior communicating arteries.   VERTEBROBASILAR SYSTEM: Left intradural vertebral artery is again diminutive and is severely narrowed in caliber distal to the PICA branch. Right intradural vertebral artery is unremarkable. Basilar artery is patent but is again diminutive secondary to PCOM dominance. No evidence of basilar tip aneurysm.   There is no evidence for saccular aneurysm, vascular malformation, or large vessel occlusion.   OTHER: Known extensive subarachnoid and subdural hemorrhages are better assessed on noncontrast head CT performed earlier today.        No evidence of basilar tip aneurysm. Previously noted rounded hyperdensity corresponds to subarachnoid hemorrhage extending into the supra cerebellar cistern.   Mild scattered atherosclerotic disease of the cervical and intracranial vasculature as detailed.   Please refer to noncontrast head CT for complete evaluation of subarachnoid and subdural hemorrhages. No definitive evidence of vasospasm at this time.   _____________ NASCET criteria for internal carotid artery stenosis: Mild: 0% to 49% Moderate: 50% to 69% Severe: 70% to 99% Complete Occlusion   Signed by: Roderick Lin 7/30/2024 9:11 AM Dictation workstation:   [id number]     CT head wo IV contrast     Result Date: 7/30/2024  Interpreted By:  Jean Hester, STUDY: CT HEAD WO IV CONTRAST;  7/30/2024 7:32 am   INDICATION: Signs/Symptoms:sah sdh.   COMPARISON: CT Brain, earlier same morning 30 July 2024 at 0122 hours   ACCESSION NUMBER(S): [id number]   ORDERING CLINICIAN: CAMILLA MILLER   TECHNIQUE: CT of the brain from the skull vertex to the skull base, without intravenous contrast   FINDINGS: No interval change to the abnormal CT appearance of the brain. The following are all unchanged   Approximately 9 x 7 x 9 mm basilar tip berry aneurysm (which I have annotated on axial series 201, image 15, coronal series 203, image 78 and sagittal  series 204, image 62); large amount of acute subarachnoid hemorrhage in the left cerebral hemisphere and basilar cisterns; acute left subdural hematoma of variable thickness but not exceeding 8-9 mm thickness; mild left-to-right midline shift of just 2-3 mm        Abnormal but unchanged from earlier this morning. 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 It has not changed in size from earlier this morning   All of the acute intra-axial and extra-axial hemorrhages and mild left-to-right midline shift is all unchanged   No discernible enlarging aneurysm or worsening hemorrhage   MACRO: None   Signed by: Jean Hester 7/30/2024 7:41 AM Dictation workstation:   [id number]     CT head wo IV contrast     Result Date: 7/30/2024  Interpreted By:  Sandro Roe, STUDY: CT HEAD WO IV CONTRAST; CT CERVICAL SPINE WO IV CONTRAST;  7/30/2024 1:24 am   INDICATION: Signs/Symptoms:fall back of head   COMPARISON: None.   ACCESSION NUMBER(S): [id number]; [id number]   ORDERING CLINICIAN: CAMILLA MILLER   TECHNIQUE: Axial noncontrast CT images of head with coronal and sagittal reconstructed images. Axial noncontrast CT images of the cervical spine with coronal and sagittal reconstructed images.   FINDINGS: CT HEAD:   Abnormal examination. There is a subdural hematoma seen in the left measuring about 5 mm anteriorly and 8-9 mm posteriorly. Additionally there is extensive subarachnoid hemorrhage noted along the basilar cisterns as well as layering along the left cerebral convexity. Superior to the cerebellum there is of focus of hemorrhage measuring up to about 1 cm   No acute skull fracture.   Global volume loss. Evidence of prior chronic small vessel ischemic change seen in the right cerebral hemisphere there may be minimal midline shift to the right of 2-3 mm.   Soft tissue swelling seen  in the left occipital parietal region.     CT CERVICAL SPINE:   Demineralization of the bones. Scattered multilevel degenerative disc disease. Robust vascular calcification.   Degenerative disc disease most notable C4-5, C5-6 and C6-7. No evidence of acute cervical spine fracture        Abnormal examination. There is extensive left cerebral hemispheric subarachnoid hemorrhage.. Basilar cistern subarachnoid hemorrhage also noted. There is small subdural hematoma also seen in the left more prominent posteriorly. Minimal midline shift to the right of 2-3 mm   Global volume loss. Evidence of prior ischemic event in the right cerebral hemisphere not significantly changed. No intraventricular hemorrhage.   No findings of acute cervical spine fracture     Sandro Roe discussed the significance and urgency of this critical finding by epic chat with  CAMILLA MILLER on 7/30/2024 at 1:57 am. (**-RCF-**) Findings:  See findings.       Signed by: Sandro Roe 7/30/2024 1:58 AM Dictation workstation:   [id number]     CT cervical spine wo IV contrast     Result Date: 7/30/2024  Interpreted By:  Sandro Roe, STUDY: CT HEAD WO IV CONTRAST; CT CERVICAL SPINE WO IV CONTRAST;  7/30/2024 1:24 am   INDICATION: Signs/Symptoms:fall back of head   COMPARISON: None.   ACCESSION NUMBER(S): [id number]; [id number]   ORDERING CLINICIAN: CAMILLA MILLER   TECHNIQUE: Axial noncontrast CT images of head with coronal and sagittal reconstructed images. Axial noncontrast CT images of the cervical spine with coronal and sagittal reconstructed images.   FINDINGS: CT HEAD:   Abnormal examination. There is a subdural hematoma seen in the left measuring about 5 mm anteriorly and 8-9 mm posteriorly. Additionally there is extensive subarachnoid hemorrhage noted along the basilar cisterns as well as layering along the left cerebral convexity. Superior to the cerebellum there is of focus of hemorrhage measuring up to about 1 cm   No acute  skull fracture.   Global volume loss. Evidence of prior chronic small vessel ischemic change seen in the right cerebral hemisphere there may be minimal midline shift to the right of 2-3 mm.   Soft tissue swelling seen in the left occipital parietal region.     CT CERVICAL SPINE:   Demineralization of the bones. Scattered multilevel degenerative disc disease. Robust vascular calcification.   Degenerative disc disease most notable C4-5, C5-6 and C6-7. No evidence of acute cervical spine fracture        Abnormal examination. There is extensive left cerebral hemispheric subarachnoid hemorrhage.. Basilar cistern subarachnoid hemorrhage also noted. There is small subdural hematoma also seen in the left more prominent posteriorly. Minimal midline shift to the right of 2-3 mm   Global volume loss. Evidence of prior ischemic event in the right cerebral hemisphere not significantly changed. No intraventricular hemorrhage.   No findings of acute cervical spine fracture     Sandro Roe discussed the significance and urgency of this critical finding by epic chat with  CAMILLA MILLER on 7/30/2024 at 1:57 am. (**-RCF-**) Findings:  See findings.       Signed by: Sandro Roe 7/30/2024 1:58 AM Dictation workstation:   [id number]     Lower extremity venous duplex left     Result Date: 7/26/2024             [place] [id number]   Tel [phone] and Fax [phone]  Vascular Lab Report [facility] LOWER EXTREMITY VENOUS DUPLEX LEFT  Patient Name:      SEMAJ Marina Physician: [id number] Bhakti Camacho MD Study Date:        7/26/2024           Ordering           [id number] BREANNA BERGERON                                        Physician: MRN/PID:           [id number]            Technologist:      Anny You T Accession#:        [id number]        Technologist 2: Date of Birth/Age: 1960 / 63      Encounter#:         [id number]                    years Gender:            F Admission Status:  Inpatient           Location           [facility]                                        Performed:  Diagnosis/ICD: Localized (leg) edema-R60.0 Indication:    Limb pain. CPT Codes:     93567 Peripheral venous duplex scan for DVT Limited  CONCLUSIONS: Right Lower Venous: The right common femoral vein demonstrates normal spontaneous and respirophasic flow. Left Lower Venous: No evidence of acute deep vein thrombus visualized in the left lower extremity. Lymph nodes noted in the left groin.  Imaging & Doppler Findings:  Right        Flow CFV   Spontaneous/Phasic  Left                  Compress Thrombus        Flow Distal External Iliac   Yes      None   Spontaneous/Phasic CFV                     Yes      None   Spontaneous/Phasic PFV                     Yes      None FV Proximal             Yes      None   Spontaneous/Phasic FV Mid                  Yes      None FV Distal               Yes      None Popliteal               Yes      None   Spontaneous/Phasic Peroneal                Yes      None PTV                     Yes      None  [id number] Bhakti Camacho MD Electronically signed by [id number] Bhakti Camacho MD on 7/26/2024 at 5:46:16 PM  ** Final **      ECG 12 lead     Result Date: 7/24/2024  Normal sinus rhythm Incomplete right bundle branch block Prolonged QT Abnormal ECG When compared with ECG of 21-JUN-2024 09:59, Nonspecific T wave abnormality no longer evident in Anterior leads See ED provider note for full interpretation and clinical correlation Confirmed by Ibis Tejada ([id number]) on 7/24/2024 4:57:43 PM      CT head wo IV contrast    Result Date: 8/7/2024  Interpreted By:  July Eli, STUDY: CT HEAD WO IV CONTRAST;  8/7/2024 7:57 am   INDICATION: Signs/Symptoms:follow up SAH/SDH.   COMPARISON: August 5, 2024   ACCESSION NUMBER(S): [id number]   ORDERING CLINICIAN: EMPERATRIZ CASTROEL   TECHNIQUE: Noncontrast axial CT scan of head  was performed. Angled reformats in brain and bone windows were generated. The images were reviewed in bone, brain, blood and soft tissue windows. Coronal and sagittal reformats are provided for review.   FINDINGS: There is again multifocal subarachnoid hemorrhage remaining most pronounced within left hemispheric sulci and the left sylvian fissure. There is hyperdense subdural hemorrhage overlying the left cerebral hemisphere, most pronounced posteriorly. There is also subdural hemorrhage extending along the dorsal left aspect of the falx and left greater than right tentorium. The component along the posterolateral left cerebral hemisphere measures approximately 5 mm in thickness. The component along the dorsal left falx measures approximately 2 mm in thickness. Hemorrhage along the tentorium measures up to approximately 3 mm in thickness on the left. When allowing for differences in slice positioning and partial volume averaging, these are very similar measurements from the previous examination.   There is hypodensity with loss of gray/white matter differentiation within the left parietal, left frontal, and left temporal lobes with local mass effect including sulcal effacement. There is also diminished attenuation in the right occipital and posteromedial temporal lobes without evidence of significant volume loss.   There is again asymmetric sulcal effacement on the left with partial effacement of the left lateral ventricle and left perimesencephalic cistern. There is bowing of the septum pellucidum to the right measuring approximately 3 mm, very similar from the previous study.   There is again ex vacuo dilatation of the right lateral ventricle. There is overall prominence of ventricles and sulci compatible with diffuse parenchymal volume loss.   There are again areas of encephalomalacia and gliosis in the right parietooccipital and posterior temporal lobes. Prominent perivascular space is suspected in the inferior  left lentiform nucleus.   There are nonspecific areas of diminished attenuation in the subcortical and periventricular white matter. The posterior fossa is degraded by beam hardening artifact.   There is again a lucent lesion in the occipital bone to the left of midline.   Mucosal thickening and fluid are noted in the left sphenoid sinus. There are retention cysts or polyps in the maxillary sinuses, right greater than left. There is opacification of a few mastoid air cells. There is nonspecific soft tissue opacity in the external auditory canals which may represent cerumen or debris.       1. Redemonstration of multifocal subarachnoid hemorrhage remaining most pronounced within left hemispheric sulci and sylvian fissure. There is also again multifocal subdural hemorrhage, very similar from the previous examination. There is persistent mass effect and rightward midline shift. 2. Areas of diminished attenuation with loss of gray/white matter differentiation involving portions of the left frontal, parietal, and temporal lobes suspicious for evolving infarct in the left MCA distribution. MRI with diffusion-weighted imaging is recommended for further evaluation. 3. Suspected areas of remote ischemic injury in the right MCA distribution and potential subacute ischemic injury in the right PCA distribution. Again MRI with diffusion-weighted imaging is recommended.       MACRO: Critical Finding:  See findings. Notification was initiated on 8/7/2024 at 8:52 am by  July Eli.  (**-OCF-**)   Signed by: July Eli 8/7/2024 8:52 AM Dictation workstation:   [id number]    Assessment/Plan  Principal Problem:    Subdural hematoma (Multi)     Ms. Sanz is a 63 y.o. woman with PMHx of stroke d/t embolic occlusion of RCA (1/28/21), left humerus fracture s/p intramedullary nail humerus (6/19/24), , EtOH use disorder, EtOH related seizure, anemia, depression, anxiety, HTN, HLD, and anxiety presented to the ED from [facility]  [facility] [place]) after a fall from standing. On presentation to the ED, patient with left occiput hematoma, A+O x 3 and moving all extremities Started on CIWA protocol d/t concern for alcohol withdrawal until 8/2. Neurological examination is limited, patient is confused, arousable to sternal rub, pupils equal and reactive to light, withdraws to noxious stimuli in all extremities, doesn't follow commands, DTRs are hyperactive symmetric, plantar response mute bilaterally.  CTH wo contrast on 8/7/2024 with redemonstration of subarachnoid and left-sided subdural hematomas, evidence of prior ischemic event in the right cerebral hemisphere. Global volume loss. No intraventricular hemorrhage. Neurosurgery was consulted and didn't recommend any acute interventions.  EEG showed right hemispheric highly epileptogenic structural lesion and a severe diffuse encephalopathy, no seizures are seen. patient didn't develop seizures recently, had alcohol withdrawal seizures in the past which is not an indication for long-term AEM. Her current SAH and SDH puts her at high risk of developing seizures, if he develops seizures she should be maintained on AEM. Patient has low sodium level of 127 today, I recommend slow correction with daily limit of 8-12 mmol/L.Given the history and today's evaluation, clinical picture is consistent with toxic/metabolic encephalopathy, contributing factors are recent SAH and SDH. Her POA, Xiang Mcknight wishes to focus on comfort and withdraw aggressive medical care, his wish was honored by primary team for DNR comfort with hospice referral.     Recommendations:  SBP<160  ASA restart post bleed day 7, plavix restart post bleed day 14  Correct hyponatremia with daily limit of 8-12 mmol/L   Neuro checks q4h  I agree with palliative care recs for changing Haldol to second generation antipsychotic  Delirium precautions      I spent 38 minutes in the professional and overall care of this patient.        David  MD Chiquita

## 2024-08-08 NOTE — PROGRESS NOTES
"Music Therapy Note      Therapy Session  Referral Type: New referral this admission  Visit Type: New visit  Session Start Time: 1057  Session End Time: 1058  Intervention Delivery: In-person  Conflict of Service: None  Number of family members present: 2  Number of staff members present: 1     Pre-assessment  Unable to Assess Reason: Physical limitation  Mood/Affect: Tired/lethargic, Calm         Treatment/Interventions  Music Therapy Interventions: Assessment    Post-assessment  Unable to Assess Reason: Did not provide expressive therapy intervention  Total Session Time (min): 1 minutes    Narrative  Assessment Detail: Upon assessment pt was calm and asleep with family at bedside. Family with hospice nurse but welcomed MT/MTI. MT introduced self and services, offering music interventions to assist with this admission. Family agreed to session and engaged in discussion regarding pts music preferences, particularly 70s music. Family discussed playing music for pt so she could \"let go\". Pt and family thanked MT/MTI for visit.  Follow-up: MT will follow up with pt as able    Education Documentation  No documentation found.          "

## 2024-08-08 NOTE — PROGRESS NOTES
Spiritual Care Visit    Clinical Encounter Type  Visited With: Patient  Routine Visit: Introduction  Continue Visiting: Yes  Referral From: Family  Referral To:     Anglican Encounters  Anglican Needs: Prayer    Family requested prayer. Unfortunately they had left upon arrival. There was a phone call to Xiang and there was agreement that the pt should be prayed for.  Possibly when there is a visit tomorrow the family might be present.  She was prayed for and a spiritual care card was left in the room.   There will be another visit scheduled.     Chaplain Jd Mota

## 2024-08-08 NOTE — SIGNIFICANT EVENT
Discussed aggressiveness of care with the patient's power of  for medical care/nephew. Xiang Mcknight.  Xiang  feels that Yue Sanz's  health has been slowly declining.  he wishes to focus on comfort and withdraw aggressive medical care.  he agrees to the comfort care order set focusing on comfort care meds.  He also agrees to the hospitalist referral to assist with comfort care meds.  She appears to be actively dying at this point with agonal respirations.  Will abide by Yefri's  wishes at this point and may Yue  DNR comfort care with hospice referral.  DNR comfort care order set is in place.

## 2024-08-08 NOTE — PROGRESS NOTES
PALLIATIVE CARE SOCIAL WORK NOTE     Hospice order received this morning. Palliative Care MD has been following pt. I met with pt's HCPOA/nephew Xiang ([phone]) and his wife An ([phone]) at bedside this morning. They have been here all night, having been called in last evening as pt experienced a change in condition. They are interested in meeting with hospice and getting hospice involved as soon as possible. Offered choice of hospice agencies. They were not familiar with any agencies. Discussed the hospital's joint venture with [facility]. Agreeable to using [facility]. Referral has been made to [facility] via [facility] with request that they reach out to family as soon as possible and that pt be seen this morning. Will continue to follow as appropriate.  Thank you.     BOSSMAN Willis

## 2024-08-09 NOTE — PROGRESS NOTES
PALLIATIVE CARE SOCIAL WORK NOTE     Pt remains in GIP status with [facility]. [facility] is under consideration if she needs to be discharged from the hospital and beds are available at [facility]. There are currently no beds available at [facility]. [facility] will continue to follow.     BOSSMAN Willis

## 2024-08-09 NOTE — CARE PLAN
The clinical goals for the shift include: Yue will maintain comfort throughout the shift.  Upon arrival at the patients bedside the patient did not open her eyes, did not track with her eyes. The patient did not respond to verbal or painful stimuli the patient did not exhibit any respiratory distress, q2 turns, rounding ensued following protocol, Rn will continue to maintain comfort and intervene for any abnormalities as needed following protocol.

## 2024-08-09 NOTE — PROGRESS NOTES
Between 7AM-7PM please message me via Epic Secure Chat.  After 7PM please page Nocturnist on call.    [facility] Hospitalist Progress Note      Yue Sanz    :  1960(63 y.o.)    MRN:  [id number]  Date: 24     Assessment and Plan:     End of life care  Acute metabolic encephalopathy  s/p traumatic L sided subdural hematoma/subarachnoid hemorrhage due [facility] fall at [facility]  Hyponatremia  Hypokalemia  Alcohol use disorder  Chronic macrocytic anemia  Hx of CVA  Possible cirrhosis     - Hospice and palliative care teams following to assist with symptom mgmt  - continue meds as ordered; if remains stable may need to consider tx to hospice house  - DNR-CC    DVT Prophylaxis: Contraindicated in hospice    Disposition: GIP hospice    Electronically signed by Alexi Quezada DO on 24 at 4:34 PM     Subjective:      Interval History:   Vitals and chart notes from overnight reviewed.   No acute issues overnight.   Patient seen and evaluated at bedside. Family at bedside.    Review of Systems:   Unable: unresponsive    Current medications:  Scheduled Meds:HYDROmorphone, 0.6 mg, intravenous, q4h      Continuous Infusions:   PRN Meds:PRN medications: glycopyrrolate, haloperidol lactate, HYDROmorphone, LORazepam, LORazepam, oxygen      Objective:     Heart Rate:  [100-117]   Temp:  [36.4 °C (97.5 °F)-37.2 °C (99 °F)]   Resp:  [17-28]   BP: (100-131)/(67-86)   SpO2:  [90 %-95 %]          Physical Exam  Laying comfortably  Unresponsive    Labs:   Lab Results   Component Value Date     (L) 2024    K 4.0 2024    CL 98 2024    CO2 15 (L) 2024    BUN 17 2024    CREATININE 1.28 (H) 2024    GLUCOSE 87 2024    CALCIUM 8.3 (L) 2024    PROT 6.8 2024    BILITOT 0.7 2024    ALKPHOS 106 2024    AST 40 (H) 2024    ALT 14 2024       Lab Results   Component Value Date    WBC 13.8 (H) 2024    HGB 10.1 (L) 2024    HCT  31.6 (L) 08/08/2024     (H) 08/08/2024     08/08/2024

## 2024-08-09 NOTE — NURSING NOTE
Good morning during my head to toe assessment and frequent rounding q2 the pt did not have any outward signs of pain no facial grimacing, no restlessness, no tachypnea. The patient did not respond to pain or movement during personal care the patient appeared very comfortable , no noted distress.  The hydromorphone that she received at 1900 had her in no apparent respiratory distress. I followed the protocol and it stated that if she is not in respiratory distress the Rn could refrain from from administering if the RDOS score is <3. She was not anxious and not in respiratory distress, a pca with continuous infusion would suit her best.

## 2024-08-09 NOTE — PROGRESS NOTES
Yue Sanz is a 63 y.o. female on day 1 of admission presenting with End of life care.    Subjective   Patient appears comfortable. Still tachypneic over respiratory rate of 25.     Objective     Physical Exam  Constitutional:       General: She is not in acute distress.     Comments: Obtunded, does not respond to painful stimuli.   HENT:      Head: Normocephalic and atraumatic.      Mouth/Throat:      Mouth: Mucous membranes are moist.   Eyes:      Extraocular Movements: Extraocular movements intact.      Pupils: Pupils are equal, round, and reactive to light.   Cardiovascular:      Rate and Rhythm: Normal rate and regular rhythm.      Pulses: Normal pulses.   Pulmonary:      Effort: Pulmonary effort is normal.      Breath sounds: Rhonchi present.      Comments: Bronchial breath sounds. Retained oral secretions with loud breathing.   Abdominal:      General: Abdomen is flat.      Palpations: Abdomen is soft.   Musculoskeletal:      Cervical back: Normal range of motion and neck supple.      Comments: LT extremity usually flexed. Some resistance to passive movement.    Skin:     Comments: Mottling in BL forearms.    Neurological:      Mental Status: She is disoriented.      Comments: Unresponsive.         Last Recorded Vitals  Blood pressure 127/76, pulse (!) 117, temperature 37 °C (98.6 °F), temperature source Axillary, resp. rate 17, SpO2 93%.  Intake/Output last 3 Shifts:  No intake/output data recorded.    Relevant Results  Scheduled medications  HYDROmorphone, 0.6 mg, intravenous, q4h      Continuous medications     PRN medications  PRN medications: glycopyrrolate, haloperidol lactate, HYDROmorphone, LORazepam, LORazepam, oxygen  No results found for this or any previous visit (from the past 24 hour(s)).    CT head wo IV contrast    Result Date: 8/7/2024  Interpreted By:  July Eli, STUDY: CT HEAD WO IV CONTRAST;  8/7/2024 7:57 am   INDICATION: Signs/Symptoms:follow up SAH/SDH.   COMPARISON: August 5,  2024   ACCESSION NUMBER(S): [id number]   ORDERING CLINICIAN: EMPERATRIZ BOYLE   TECHNIQUE: Noncontrast axial CT scan of head was performed. Angled reformats in brain and bone windows were generated. The images were reviewed in bone, brain, blood and soft tissue windows. Coronal and sagittal reformats are provided for review.   FINDINGS: There is again multifocal subarachnoid hemorrhage remaining most pronounced within left hemispheric sulci and the left sylvian fissure. There is hyperdense subdural hemorrhage overlying the left cerebral hemisphere, most pronounced posteriorly. There is also subdural hemorrhage extending along the dorsal left aspect of the falx and left greater than right tentorium. The component along the posterolateral left cerebral hemisphere measures approximately 5 mm in thickness. The component along the dorsal left falx measures approximately 2 mm in thickness. Hemorrhage along the tentorium measures up to approximately 3 mm in thickness on the left. When allowing for differences in slice positioning and partial volume averaging, these are very similar measurements from the previous examination.   There is hypodensity with loss of gray/white matter differentiation within the left parietal, left frontal, and left temporal lobes with local mass effect including sulcal effacement. There is also diminished attenuation in the right occipital and posteromedial temporal lobes without evidence of significant volume loss.   There is again asymmetric sulcal effacement on the left with partial effacement of the left lateral ventricle and left perimesencephalic cistern. There is bowing of the septum pellucidum to the right measuring approximately 3 mm, very similar from the previous study.   There is again ex vacuo dilatation of the right lateral ventricle. There is overall prominence of ventricles and sulci compatible with diffuse parenchymal volume loss.   There are again areas of encephalomalacia and  gliosis in the right parietooccipital and posterior temporal lobes. Prominent perivascular space is suspected in the inferior left lentiform nucleus.   There are nonspecific areas of diminished attenuation in the subcortical and periventricular white matter. The posterior fossa is degraded by beam hardening artifact.   There is again a lucent lesion in the occipital bone to the left of midline.   Mucosal thickening and fluid are noted in the left sphenoid sinus. There are retention cysts or polyps in the maxillary sinuses, right greater than left. There is opacification of a few mastoid air cells. There is nonspecific soft tissue opacity in the external auditory canals which may represent cerumen or debris.       1. Redemonstration of multifocal subarachnoid hemorrhage remaining most pronounced within left hemispheric sulci and sylvian fissure. There is also again multifocal subdural hemorrhage, very similar from the previous examination. There is persistent mass effect and rightward midline shift. 2. Areas of diminished attenuation with loss of gray/white matter differentiation involving portions of the left frontal, parietal, and temporal lobes suspicious for evolving infarct in the left MCA distribution. MRI with diffusion-weighted imaging is recommended for further evaluation. 3. Suspected areas of remote ischemic injury in the right MCA distribution and potential subacute ischemic injury in the right PCA distribution. Again MRI with diffusion-weighted imaging is recommended.       MACRO: Critical Finding:  See findings. Notification was initiated on 8/7/2024 at 8:52 am by  July Eli.  (**-OCF-**)   Signed by: July Eli 8/7/2024 8:52 AM Dictation workstation:   [id number]    Electrocardiogram, 12-lead PRN ACS symptoms    Result Date: 8/6/2024  Normal sinus rhythm Cannot rule out Inferior infarct , age undetermined Abnormal ECG When compared with ECG of 03-AUG-2024 15:40, MT interval has decreased  RSR' pattern in V1 is no longer Present ST now depressed in Inferior leads Nonspecific T wave abnormality, worse in Inferior leads    Electrocardiogram, 12-lead PRN ACS symptoms    Result Date: 8/5/2024  Sinus rhythm with 1st degree AV block RSR' or QR pattern in V1 suggests right ventricular conduction delay Prolonged QT Abnormal ECG When compared with ECG of 30-JUL-2024 02:17, No significant change was found Confirmed by Brenden Esparza ([id number]) on 8/5/2024 9:36:22 PM    EEG    IMPRESSION Impression This routine EEG is consistent with 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 No seizures are seen. A full report will be scanned into the patient's chart at a later time. This report has been interpreted and electronically signed by    CT head wo IV contrast    Result Date: 8/5/2024  Interpreted By:  Missy Wilson, STUDY: CT HEAD WO IV CONTRAST;  8/5/2024 2:47 pm   INDICATION: Signs/Symptoms:altered mental status. 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   FINDINGS: All caps the periventricular and subcortical white matter changes are present.   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 Left-sided subarachnoid hemorrhage is again noted as well as subarachnoid hemorrhage in the basilar cisterns which appears to be slightly less prominent than on the prior exam..   4 mm left frontal subdural hematoma is again seen, without significant change. Also noted is a left occipital subdural hematoma measuring proximally 4 mm. It is also unchanged.   Diffuse left cerebral edema is noted with effacement of the sulci.   4.6 mm left-to-right midline shift is again seen, which not  significantly changed.   Again noted is large area of hypodensity in the right parietal region, which most likely is related to a prior infarct and is unchanged.   There appears to be a small area of hyperdensity in the right occipital lobe which may be new area of parenchymal or subarachnoid hemorrhage.   The sulci and ventricles are prominent in the right cerebral hemisphere   INTRACRANIAL VESSELS: No significant atherosclerotic calcification.   EXTRACRANIAL SOFT TISSUES: Within normal limits.   PARANASAL SINUSES/MASTOIDS: Within normal limits.   CALVARIUM: Scratch the lucent lesion is again seen in the left occipital bone. Depressed skull fracture.       Possible small area of new parenchymal or subarachnoid hemorrhage in the right occipital lobe.   Redemonstration of left subdural hematomas, which are unchanged.   Redemonstration of left subarachnoid hemorrhage and small amount of subarachnoid hemorrhage in the basilar cisterns which appear to be slightly less prominent than on the prior exam.   Redemonstration of left surgery edema with 4.6 mm left-to-right midline shift, which has not significantly changed   Redemonstration of basilar tip aneurysm.   MACRO: Critical Finding:  See findings. Notification was initiated on 8/5/2024 at 3:07 pm by  Missy Wilson.  (**-OCF-**)   Signed by: Missy Wilson 8/5/2024 3:07 PM Dictation workstation:   [id number]    CT head wo IV contrast    Result Date: 8/1/2024  Interpreted By:  Karina Spann, STUDY: CT HEAD WO IV CONTRAST;  8/1/2024 4:25 pm   INDICATION: Signs/Symptoms:New visual field deficit, want to ensure stability of known SAH/subdural.   COMPARISON: 07/30/2024 at 3:33 p.m.   ACCESSION NUMBER(S): [id number]   ORDERING CLINICIAN: SANDIP HINSON   TECHNIQUE: Unenhanced CT images of the head were obtained.   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 Acute subarachnoid hemorrhage involving the left cerebral hemisphere and basal cisterns similar in size but overall  slightly less dense than on the previous exam. Acute left-sided subdural hematomas measuring up to 5 mm in the frontal region similar to the prior exam. There is mild diffuse left cerebral edema with sulcal effacement and approximately 5 mm of midline shift at the level of the 3rd ventricle similar to the prior exam. No new acute intracranial hemorrhage. Stable low-density likely remote infarct in the right parietal region with porencephalic dilatation of the posterior horn of the right lateral ventricle.   Stable subcentimeter lucent lesion in the occipital calvarium just to the left of midline image 16/36.   thickening in the left sphenoid and partially imaged right maxillary sinuses. Remaining visualized paranasal sinuses are clear.       Redemonstration of basilar tip aneurysm with acute subarachnoid and left-sided subdural hematomas,  cerebral edema and mild midline shift. No significant change from 07/30/2024 at 3:33 p.m..   MACRO: None.   Signed by: Karina Spann 8/1/2024 4:48 PM Dictation workstation:   [id number]    CT head wo IV contrast    Result Date: 7/30/2024  Interpreted By:  Varghese Rapp, STUDY: CT HEAD WO IV CONTRAST;  7/30/2024 3:35 pm   INDICATION: Signs/Symptoms:6hr eval of SAH, SDH.   COMPARISON: Most recent prior is from 07/30/2024 at 7:31 a.m...   ACCESSION NUMBER(S): [id number]   ORDERING CLINICIAN: JOSE WAITE   TECHNIQUE: Routine axial images were obtained from the skull base through the vertex.  Sagittal and coronal reconstruction images were generated. Brain, subdural, and bone windows were reviewed. N/A Unavailable   FINDINGS:   Stable appearance 9 mm basilar tip aneurysm. There is persistent subdural blood adjacent to the posterior aspect of the falx and along the superior aspect of the left tentorium. There is stable subarachnoid blood along the right side of the mar and midbrain and also along the posterior and lateral aspect of the mar and midbrain on the left. Stable  subarachnoid blood in multiple left parietal fissures including the left sylvian fissure. Small grossly stable anterolateral left frontal subdural hematoma measuring 6 mm in thickness; small amount stable acute subdural blood along the anterolateral aspect of the left temporal lobe measuring approximately 2 mm in transverse thickness. Stable lateral and posterior high left parietal subdural hematoma measuring 7 mm in transverse thickness. There is approximately 3.6 mm of rightward displacement of the midline at the level of the septum pellucidum, compared to 4.2 mm earlier today. There is mild stable anterior inferior bifrontal subarachnoid blood. No gross intraventricular blood.   Underlying mild prominence of ventricles and sulci consistent with volume loss. There is a small to moderate-sized old infarct in the lateral right parietal lobe. No indication of acute infarct.   Small retention cyst in the left sphenoid sinus. The bilateral mastoids were clear. No paranasal sinus fluid level. No destructive calvarial lesion or depressed skull fracture.         Grossly stable basilar tip aneurysm.   Acute subdural and subarachnoid blood as described, with no significant change from the exam earlier this morning.   Underlying volume loss.   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   MACRO: None   Signed by: Varghese Rapp 7/30/2024 4:03 PM Dictation workstation:   [id number]    ECG 12 lead    Result Date: 7/30/2024  Normal sinus rhythm Incomplete right bundle branch block Cannot rule out Anterior infarct , age undetermined Abnormal ECG When compared with ECG of 24-JUL-2024 11:09, Inverted T waves have replaced nonspecific T wave abnormality in Inferior leads See ED provider note for full interpretation and clinical correlation Confirmed by Heather Harris ([id number]) on 7/30/2024 11:22:30 AM    CT angio head and neck w and wo IV contrast    Result Date: 7/30/2024  Interpreted By:  Roderick Lin, STUDY: CT ANGIO  HEAD AND NECK W AND WO IV CONTRAST   INDICATION: Signs/Symptoms:c/f aneurysm   COMPARISON: Head CT 07/30/2024.   ACCESSION NUMBER(S): [id number]   ORDERING CLINICIAN: ERASMO MARLOW   TECHNIQUE: CTA of the head and neck was performed after the intravenous administration of 75 mL Omnipaque 350 nonionic IV contrast. 3-D reconstructions were performed under physician supervision on a separate workstation and reviewed.   FINDINGS: CTA NECK:   AORTIC ARCH: The visualized portion of the aortic arch is unremarkable in appearance. The origins of the great vessels and the vertebral arteries are normal without evidence of stenosis.   CERVICAL CAROTID ARTERIES: The common carotid arteries are patent bilaterally without evidence of stenosis. Atherosclerotic disease of bilateral carotid bifurcations resulting in mild (less than 50%) luminal stenosis of bilateral proximal cervical ICAs. Remainder of the cervical ICAs are patent throughout their course.   VERTEBRAL ARTERIES: The vertebral arteries are patent bilaterally throughout their course noting slight diminutive caliber of the left vertebral artery.   CTA HEAD:   INTERNAL CAROTID ARTERIES: Atherosclerotic disease of bilateral carotid siphons, which remain overall patent. Intracranial ICAs are otherwise normal.   CEREBRAL ARTERIES: No aneurysm appreciated. Left MCA branches appear patent and grossly symmetric with right MCA branches; no definitive findings to suggest vasospasm. Bilateral ACAs are within normal limits. Bilateral PCAs are predominantly supplied by the posterior communicating arteries.   VERTEBROBASILAR SYSTEM: Left intradural vertebral artery is again diminutive and is severely narrowed in caliber distal to the PICA branch. Right intradural vertebral artery is unremarkable. Basilar artery is patent but is again diminutive secondary to PCOM dominance. No evidence of basilar tip aneurysm.   There is no evidence for saccular aneurysm, vascular malformation, or  large vessel occlusion.   OTHER: Known extensive subarachnoid and subdural hemorrhages are better assessed on noncontrast head CT performed earlier today.       No evidence of basilar tip aneurysm. Previously noted rounded hyperdensity corresponds to subarachnoid hemorrhage extending into the supra cerebellar cistern.   Mild scattered atherosclerotic disease of the cervical and intracranial vasculature as detailed.   Please refer to noncontrast head CT for complete evaluation of subarachnoid and subdural hemorrhages. No definitive evidence of vasospasm at this time.   _____________ NASCET criteria for internal carotid artery stenosis: Mild: 0% to 49% Moderate: 50% to 69% Severe: 70% to 99% Complete Occlusion   Signed by: Roderick Lin 7/30/2024 9:11 AM Dictation workstation:   [id number]    CT head wo IV contrast    Result Date: 7/30/2024  Interpreted By:  Jean Hester, STUDY: CT HEAD WO IV CONTRAST;  7/30/2024 7:32 am   INDICATION: Signs/Symptoms:sah sdh.   COMPARISON: CT Brain, earlier same morning 30 July 2024 at 0122 hours   ACCESSION NUMBER(S): [id number]   ORDERING CLINICIAN: CAMILLA MILLER   TECHNIQUE: CT of the brain from the skull vertex to the skull base, without intravenous contrast   FINDINGS: No interval change to the abnormal CT appearance of the brain. 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 At approximately 9-10 mm maximum diameter berry aneurysm developed at the basilar tip sometime between CTs brain 6 June 2024 when there was no such aneurysm  and earlier this morning. It has not changed in size from earlier this morning   All of the acute intra-axial and extra-axial hemorrhages and mild left-to-right midline shift is all unchanged   No discernible enlarging aneurysm or worsening hemorrhage   MACRO: None   Signed by: Jean Hester 7/30/2024 7:41 AM Dictation workstation:   [id number]    CT head wo IV contrast    Result Date: 7/30/2024  Interpreted By:  Sandro Roe, STUDY: CT HEAD WO IV CONTRAST; CT CERVICAL SPINE WO IV CONTRAST;  7/30/2024 1:24 am   INDICATION: Signs/Symptoms:fall back of head   COMPARISON: None.   ACCESSION NUMBER(S): [id number]; [id number]   ORDERING CLINICIAN: CAMILLA MILLER   TECHNIQUE: Axial noncontrast CT images of head with coronal and sagittal reconstructed images. Axial noncontrast CT images of the cervical spine with coronal and sagittal reconstructed images.   FINDINGS: CT HEAD:   Abnormal examination. There is a subdural hematoma seen in the left measuring about 5 mm anteriorly and 8-9 mm posteriorly. Additionally there is extensive subarachnoid hemorrhage noted along the basilar cisterns as well as layering along the left cerebral convexity. Superior to the cerebellum there is of focus of hemorrhage measuring up to about 1 cm   No acute skull fracture.   Global volume loss. Evidence of prior chronic small vessel ischemic change seen in the right cerebral hemisphere there may be minimal midline shift to the right of 2-3 mm.   Soft tissue swelling seen in the left occipital parietal region.     CT CERVICAL SPINE:   Demineralization of the bones. Scattered multilevel degenerative disc disease. Robust vascular calcification.   Degenerative disc disease most notable C4-5, C5-6 and C6-7. No evidence of acute cervical spine fracture       Abnormal examination. There is extensive left cerebral hemispheric subarachnoid hemorrhage.. Basilar cistern subarachnoid hemorrhage also noted. There is small subdural hematoma also seen  in the left more prominent posteriorly. Minimal midline shift to the right of 2-3 mm   Global volume loss. Evidence of prior ischemic event in the right cerebral hemisphere not significantly changed. No intraventricular hemorrhage.   No findings of acute cervical spine fracture     Sandro Roe discussed the significance and urgency of this critical finding by epic chat with  CAMILLA MILLER on 7/30/2024 at 1:57 am. (**-RCF-**) Findings:  See findings.       Signed by: Sandro Roe 7/30/2024 1:58 AM Dictation workstation:   [id number]        Assessment/Plan   IMP:  IMP:  64 yo F with PMH of AUD, CVA hx, ?cirrhosis(found on imaging, no evidence of synthetic dysfunction on recent labs) recent humeral fracture 2/2 fall who was admitted after fall at SNF ([facility]) and found to have LT sided SAH and SDH that have been stable on repeat imaging. Seen by NSGY who recommended conservative treatment and starting Keppra in light of her SAH/ SDH. She was treated w/ CIWA protocol with phenobarbital. Palliative was consulted for assistance with GOC and complex decision making. Repeat CT of head on 8/6/2024 with new possible small area of new parenchymal or subarachnoid hemorrhage in the right occipital lobe.       Transitioned to [facility] here. Possible transfer to hospice house if prolonged admission, bed is available and family is accepting of transfer to more comfortable venue that will continue to aggressively treat symptoms. Will continue with care at [facility] for time being. Titrating medications to effect as able. Patient was comfortable yet tachypneic through out.     8/9/2024:  Discussed ongoing symptom control with family including mother, Ruslan, nephew Xiang, and brother, Richie. Hospice is following and offering hospice house but this is far for them. They would prefer to stay GIP at [facility].         Last 24 hour OME use: 25OME though not accurate for need.   Hydromorphone 0.6 IV at 1914,  1445  Hydromorphone 0.4 IV at 1132, 0918    Recommendations:  DNR-CC as of 8/7/24  Patient converted to [facility] care here.   Morphine converted to Dilaudid due to concern for ALEJANDRO   Scheduling hydromorphone 0.4mg IV q4h to control tachypnea   Continue with hydromorphone 0.4mg IV q3h prn breakthrough tachypnea RR>22  Continue with lorazepam 0.5mg IV q4h prn for agitation, restlessness  Continue wit haloperidol 1mg IV q4h prn for nause or vomiting  Continue with glycopyrrolate 0.2mg IV q4h for secretions  Will be reassessing for pain and symptoms over course of day.  Will continue to follow patient.        Other Palliative Support  Music Therapy   Support      I spent 30 minutes in the professional and overall care of this patient.      Dar Montelongo MD

## 2024-08-10 NOTE — CARE PLAN
The patient's goals for the shift include      The clinical goals for the shift include comfortable, no S/S distress by end of shift      Problem: Skin  Goal: Participates in plan/prevention/treatment measures  Outcome: Progressing  Goal: Prevent/manage excess moisture  Outcome: Progressing  Goal: Prevent/minimize sheer/friction injuries  Outcome: Progressing     Problem: Fall/Injury  Goal: Not fall by end of shift  Outcome: Progressing  Goal: Be free from injury by end of the shift  Outcome: Progressing  Goal: Pace activities to prevent fatigue by end of the shift  Outcome: Progressing

## 2024-08-10 NOTE — NURSING NOTE
RN Hospice Note    Yue Sanz is a Hospice Patient.   Hospice terminal diagnosis: ICH  Physician: Aracelis  Visit type: routine GIP    Comments/recommendations: Evaluated pt, and conferred with staff. Reviewed medications given, pt has needed 1 prn dose of dilaudid in addition to scheduled dilaudid IV 0.6mg every 4 hours and 2 doses of robinul for secretions.  Symptoms are close to being controlled and pt does not appear imminent. KIRILL Otoole, per phone agrees with possible transfer back to [facility] with medicaid paid room and board. Pt does not qualify for hospice house. Plan-re-evaluare tomorrow for symptoms, referral to [facility] per Louise NOGUEIRA, IV meds converted to SL per Dr Quezada.     Discharge Planning:  Patient to be discharged to  [facility]        Plan of care reviewed with patient/family members lela Otoole   Plan of care reviewed with hospital staff members: Reanna STEVENS, Louise NOGUEIRA     Please notify [facility] of any changes in condition. Thank you.  Office: [phone] (8 am-6:30 pm M-F and 8 am-4:30 pm weekends and holidays)   [phone] (6:30 pm-8 am M-F and 4:30 pm-8 am weekends and holidays)    Tiffany Connelly RN

## 2024-08-10 NOTE — NURSING NOTE
Hourly rounding completed. Chest rise, fall observed. RR 14 at this time. Repositioned. Safety maintained.

## 2024-08-10 NOTE — PROGRESS NOTES
Between 7AM-7PM please message me via Epic Secure Chat.  After 7PM please page Nocturnist on call.    [facility] Hospitalist Progress Note      Yue Sanz    :  1960(63 y.o.)    MRN:  [id number]  Date: 08/10/24     Assessment and Plan:     End of life care  Acute metabolic encephalopathy  s/p traumatic L sided subdural hematoma/subarachnoid hemorrhage due to mechanical fall at SNF  Hyponatremia  Hypokalemia  Alcohol use disorder  Chronic macrocytic anemia  Hx of CVA  Possible cirrhosis     - Hospice and palliative care teams following to assist with symptom mgmt  - IV meds converted to SLP per hospice team recs but patient unable to tolerate. Will place back on IV meds and await further recommendations from hospice and palliative care  - DNR-CC    DVT Prophylaxis: Contraindicated in hospice    Disposition: GIP hospice    Electronically signed by Alexi Quezada DO on 08/10/24 at 9:59 AM     Subjective:      Interval History:   Vitals and chart notes from overnight reviewed.   No acute issues overnight.   Patient seen and evaluated at bedside. No family at bedside.    Review of Systems:   Unable: unresponsive    Current medications:  Scheduled Meds:HYDROmorphone, 0.6 mg, intravenous, q4h      Continuous Infusions:   PRN Meds:PRN medications: glycopyrrolate, haloperidol lactate, HYDROmorphone, LORazepam, LORazepam, oxygen      Objective:     Heart Rate:  []   Temp:  [36.5 °C (97.7 °F)-36.9 °C (98.5 °F)]   Resp:  [10-28]   BP: (118-135)/(78-86)   SpO2:  [90 %-95 %]          Physical Exam  Laying comfortably  Unresponsive    Labs:   Lab Results   Component Value Date     (L) 2024    K 4.0 2024    CL 98 2024    CO2 15 (L) 2024    BUN 17 2024    CREATININE 1.28 (H) 2024    GLUCOSE 87 2024    CALCIUM 8.3 (L) 2024    PROT 6.8 2024    BILITOT 0.7 2024    ALKPHOS 106 2024    AST 40 (H) 2024    ALT 14 2024       Lab  Results   Component Value Date    WBC 13.8 (H) 08/08/2024    HGB 10.1 (L) 08/08/2024    HCT 31.6 (L) 08/08/2024     (H) 08/08/2024     08/08/2024

## 2024-08-10 NOTE — NURSING NOTE
Hourly rounding completed. Chest rise and fall noted, RR 12, shallow. Repositioned. Safety maintained.

## 2024-08-10 NOTE — NURSING NOTE
Hourly rounding completed on patient. Introduced self; patient opened eyes with light tactile stimuli. Breathing shallow, RR 16 at time of note. No S/S distress at this time. Safety maintained.

## 2024-08-10 NOTE — NURSING NOTE
Hourly rounding completed. Chest rise, fall observed. Repositioned, warm blanket applied. Safety maintained.

## 2024-08-10 NOTE — NURSING NOTE
Hourly rounding completed. Patient sleeping, RR 14, shallow. No S/S distress observed at this time. Safety maintained.

## 2024-08-11 NOTE — CARE PLAN
Problem: Skin  Goal: Participates in plan/prevention/treatment measures  Outcome: Progressing  Goal: Prevent/manage excess moisture  Outcome: Progressing  Goal: Prevent/minimize sheer/friction injuries  Outcome: Progressing

## 2024-08-11 NOTE — PROGRESS NOTES
RN Hospice Note     Yue Sanz is a Hospice Patient.   Hospice terminal diagnosis: ICH  Physician: Dr. Alexi Quezada  Visit type: routine GIP     Comments/recommendations: Pt has declined significantly today. She has had labored breathing (rate 30s) despite receiving scheduled Dilaudid. Nurse gave additional prn dose with no effect. Rate remains in the 30s, heart rate 140-150. Spoke with hospice provider for recommendations. Hospice CNP recommended increasing Dilaudid dose to 1mg q4h and 1mg q1h prn. Pt is actively dying and has had increased secretions. Pt expected to pass away within hours.     Discharge Planning:  Patient to be discharged to: recommend to keep pt here as she is actively transitioning           Plan of care reviewed with patient/family members lela Otoole   Plan of care reviewed with hospital staff members: Reanna Peck RN     Please notify [facility] of any changes in condition. Thank you.  Office:[phone] (8 am-6:30 pm M-F and 8 am-4:30 pm weekends and holidays)              [phone] (6:30 pm-8 am M-F and 4:30 pm-8 am weekends and holidays)     Izabella River RN

## 2024-08-11 NOTE — DISCHARGE SUMMARY
Discharge Diagnosis  End of life care  Acute metabolic encephalopathy  s/p traumatic L sided subdural hematoma/subarachnoid hemorrhage due to mechanical fall at [facility]  Hyponatremia  Hypokalemia  Alcohol use disorder  Chronic macrocytic anemia  Hx of CVA  Possible cirrhosis    Discharge Meds     Your medication list        ASK your doctor about these medications        Instructions Last Dose Given Next Dose Due   aspirin 81 mg EC tablet           atorvastatin 40 mg tablet  Commonly known as: Lipitor      Take 1 tablet (40 mg) by mouth once daily.       clopidogrel 75 mg tablet  Commonly known as: Plavix      Take 1 tablet (75 mg) by mouth once daily.       cyclobenzaprine 10 mg tablet  Commonly known as: Flexeril      Take 1 tablet (10 mg) by mouth 3 times a day as needed for muscle spasms for up to 10 days.       folic acid 1 mg tablet  Commonly known as: Folvite      Take 1 tablet (1 mg) by mouth once daily.       levETIRAcetam 500 mg tablet  Commonly known as: Keppra      Take 1 tablet (500 mg) by mouth 2 times a day.       metoprolol tartrate 25 mg tablet  Commonly known as: Lopressor      Take 1 tablet (25 mg) by mouth 2 times a day.       multivitamin with minerals tablet      Take 1 tablet by mouth once daily.       Oysco 500/D 500 mg-5 mcg (200 unit) tablet  Generic drug: calcium carbonate-vitamin D3      Take 1 tablet by mouth 2 times a day.       PHENobarbitaL 32.4 mg tablet  Commonly known as: Luminal  Start taking on: July 26, 2024      Take 2 tablets (64.8 mg) by mouth 3 times a day for 1 day, THEN 1 tablet (32.4 mg) 3 times a day for 2 days.       polyethylene glycol 17 gram packet  Commonly known as: Glycolax, Miralax      Take 17 g by mouth once daily.       potassium chloride CR 20 mEq ER tablet  Commonly known as: Klor-Con M20      Take 1 tablet (20 mEq) by mouth once daily. Monitor K levels at [facility]       sertraline 50 mg tablet  Commonly known as: Zoloft      Take 1 tablet (50 mg) by mouth once  daily.       thiamine 100 mg tablet  Commonly known as: Vitamin B-1      Take 1 tablet (100 mg) by mouth once daily. Do not fill before July 27, 2024.                Test Results Pending At Discharge  Pending Labs       No current pending labs.            Hospital Course  63 y.o. female presented to the ED from SNF ([facility] [place]) after a fall from standing. On presentation to the ED, patient with left occiput hematoma, A+O x 3 and moving all extremities. Of note, patient is on Plavix and has a history of seizures (no seizure witnessed pre/post fall). CT head with left cerebral hemispheric SAH, basilar cistern hemorrhage and left posterior hematoma. Prolonged encephalopathy multifactorial due to SAH/SDH, over sedation with phenobarbital; was on PO phenobarbital taper then loaded with 7mg/kg loading dose on 7/30. Mental status worsening despite holding sedating meds. Overnight on 8/7 she had continued worsening of tachycardia, tachypnea and molting changes. Patient less responsive. Family elected to transition to comfort measures only. She is being transitioned to [facility]. Comfort meds adjust by hospice team. Patient passed away on 08/11/24 at 11:15 AM.    Pertinent Physical Exam At Time of Discharge  Physical Exam  There were no heart tones or breath sounds auscultated, no carotid pulse or radial pulse palpated, no ocular reflex present.     Outpatient Follow-Up  No future appointments.      Deep Quezada, DO

## 2024-08-11 NOTE — NURSING NOTE
Notified pt's nephew Xiang Fontaine of pt's passing. He states he is 50 minutes away and is coming in to see patient. Several other family members may visit as well. Xiang expressed that it was written in pt's living will that she does not want to be an organ donor.   Home: [place]  Home [phone]   [place]: [phone]    Izabella River RN

## 2024-08-11 NOTE — SIGNIFICANT EVENT
Death Note Documentation    Time of death: 11:15am    I was asked to come pronounce the patient. There were no heart tones or breath sounds auscultated, no carotid pulse or radial pulse palpated, no ocular reflex present. Family was notified by staff.    Alexi Quezada, [facility] Medicine

## 2024-08-11 NOTE — NURSING NOTE
Upon arrival the patient was laying in bed , gcs+3, nonverbal, no eye contact, no musculoskeletal movement. The patient is hospice and receiving end of life order set. The patient can benefit from a morphine drip or PCA of dilaudid to ensure comfort and dignity is maintained.  Care of patient transferred to oncoming Hillary Forte.

## 2024-08-11 NOTE — NURSING NOTE
[facility] notified.  attempted to be notified-this nurse was told to call back in 20 min.    [id number]  attempted again. Ryan answered phone and asked for phone number to this unit and stated he will call back.     [id number] Ryan Hameed returned phone call and stated patient could be released to  home.

## 2024-08-11 NOTE — CARE PLAN
Problem: Fall/Injury  Goal: Not fall by end of shift  Outcome: Progressing  Goal: Be free from injury by end of the shift  Outcome: Progressing

## 2024-08-13 ENCOUNTER — APPOINTMENT (OUTPATIENT)
Dept: RADIOLOGY | Facility: HOSPITAL | Age: 64
End: 2024-08-13
Payer: COMMERCIAL

## 2024-08-13 LAB
ATRIAL RATE: 100 BPM
P AXIS: 55 DEGREES
P OFFSET: 174 MS
P ONSET: 129 MS
PR INTERVAL: 186 MS
Q ONSET: 222 MS
QRS COUNT: 16 BEATS
QRS DURATION: 94 MS
QT INTERVAL: 384 MS
QTC CALCULATION(BAZETT): 495 MS
QTC FREDERICIA: 455 MS
R AXIS: 16 DEGREES
T AXIS: -1 DEGREES
T OFFSET: 414 MS
VENTRICULAR RATE: 100 BPM

## 2024-08-20 ENCOUNTER — APPOINTMENT (OUTPATIENT)
Dept: UROLOGY | Facility: CLINIC | Age: 64
End: 2024-08-20
Payer: COMMERCIAL

## 2024-09-06 ENCOUNTER — APPOINTMENT (OUTPATIENT)
Dept: PRIMARY CARE | Facility: CLINIC | Age: 64
End: 2024-09-06
Payer: COMMERCIAL

## 2024-09-24 ENCOUNTER — APPOINTMENT (OUTPATIENT)
Dept: ORTHOPEDIC SURGERY | Facility: HOSPITAL | Age: 64
End: 2024-09-24
Payer: COMMERCIAL

## (undated) DEVICE — Device

## (undated) DEVICE — TOWEL, SURGICAL, NEURO, O/R, 16 X 26, BLUE, STERILE

## (undated) DEVICE — DRESSING, MEPILEX BORDER, POST-OP AG, 4 X 6 IN

## (undated) DEVICE — SUTURE, MONOCRYL, 2-0, 36 IN, CT-1, UNDYED

## (undated) DEVICE — COVER, CART, 45 X 27 X 48 IN, CLEAR

## (undated) DEVICE — STAPLER, SKIN PROXIMATE, 35 WIDE

## (undated) DEVICE — ELECTRODE, ELECTROSURGICAL, BLADE, INSULATED, ENT/IMA, STERILE

## (undated) DEVICE — COVER, BACK TABLE, 65 X 90, HVY REINFORCED

## (undated) DEVICE — DRESSING, MEPILEX BORDER, POST-OP AG, 4 X 10 IN

## (undated) DEVICE — SUTURE, MONOCRYL, 4-0, 27 IN, PS-2, UNDYED

## (undated) DEVICE — APPLICATOR, CHLORAPREP, W/ORANGE TINT, 26ML

## (undated) DEVICE — SUTURE, PDS II, 0, 27 IN, CT-2, VIOLET

## (undated) DEVICE — GUIDEWIRE, 2.2 X 800 SMOOTH TIP

## (undated) DEVICE — DRAPE, SHEET, U, W/ADHESIVE STRIP, IMPERVIOUS, 60 X 70 IN, DISPOSABLE, LF, STERILE

## (undated) DEVICE — DRAPE, INCISE, ANTIMICROBIAL, IOBAN 2, LARGE, 17 X 23 IN, DISPOSABLE, STERILE

## (undated) DEVICE — BANDAGE, COFLEX, 4 X 5 YDS, TAN, STERILE, LF

## (undated) DEVICE — COVER, C-ARM W/CLIPS, OEC GE

## (undated) DEVICE — BANDAGE, GAUZE, CONFORMING, KERLIX, 6 PLY, 4.5 IN X 4.1 YD

## (undated) DEVICE — DRAPE, SHEET, THREE QUARTER, FAN FOLD, 57 X 77 IN

## (undated) DEVICE — DRILL BIT, AEQUALIS HUMERAL,  DIA 3, 5MM